# Patient Record
Sex: FEMALE | Race: WHITE | NOT HISPANIC OR LATINO | Employment: UNEMPLOYED | URBAN - NONMETROPOLITAN AREA
[De-identification: names, ages, dates, MRNs, and addresses within clinical notes are randomized per-mention and may not be internally consistent; named-entity substitution may affect disease eponyms.]

---

## 2017-01-13 ENCOUNTER — OFFICE VISIT (OUTPATIENT)
Dept: SURGERY | Facility: CLINIC | Age: 57
End: 2017-01-13

## 2017-01-13 ENCOUNTER — HOSPITAL ENCOUNTER (OUTPATIENT)
Facility: HOSPITAL | Age: 57
Setting detail: HOSPITAL OUTPATIENT SURGERY
End: 2017-01-13
Attending: SURGERY | Admitting: SURGERY

## 2017-01-13 VITALS
RESPIRATION RATE: 16 BRPM | DIASTOLIC BLOOD PRESSURE: 92 MMHG | SYSTOLIC BLOOD PRESSURE: 140 MMHG | OXYGEN SATURATION: 98 % | WEIGHT: 125.2 LBS | TEMPERATURE: 97.7 F | BODY MASS INDEX: 20.86 KG/M2 | HEIGHT: 65 IN | HEART RATE: 93 BPM

## 2017-01-13 DIAGNOSIS — R10.13 EPIGASTRIC ABDOMINAL PAIN: Primary | ICD-10-CM

## 2017-01-13 DIAGNOSIS — R11.0 NAUSEA: ICD-10-CM

## 2017-01-13 PROCEDURE — 99213 OFFICE O/P EST LOW 20 MIN: CPT | Performed by: SURGERY

## 2017-01-13 RX ORDER — SODIUM CHLORIDE 0.9 % (FLUSH) 0.9 %
1-10 SYRINGE (ML) INJECTION AS NEEDED
Status: CANCELLED | OUTPATIENT
Start: 2017-01-13

## 2017-01-13 RX ORDER — SODIUM CHLORIDE, SODIUM LACTATE, POTASSIUM CHLORIDE, CALCIUM CHLORIDE 600; 310; 30; 20 MG/100ML; MG/100ML; MG/100ML; MG/100ML
50 INJECTION, SOLUTION INTRAVENOUS CONTINUOUS
Status: CANCELLED | OUTPATIENT
Start: 2017-01-13

## 2017-01-13 NOTE — MR AVS SNAPSHOT
"                        Jessica Cole Catrachito   1/13/2017 10:10 AM   Office Visit    Dept Phone:  [phone]   Encounter #:  [id number]    Provider:  Anca Trevino MD   Department:  [facility] GENERAL SURGERY                Your Full Care Plan              Your Updated Medication List          This list is accurate as of: 1/13/17 11:39 AM.  Always use your most recent med list.                folic acid 1 MG tablet   Commonly known as:  FOLVITE       leflunomide 10 MG tablet   Commonly known as:  ARAVA       lisinopril 10 MG tablet   Commonly known as:  PRINIVIL,ZESTRIL       meloxicam 15 MG tablet   Commonly known as:  MOBIC       methotrexate 2.5 MG tablet   Commonly known as:  RHEUMATREX       omeprazole 40 MG capsule   Commonly known as:  priLOSEC       ondansetron 4 MG tablet   Commonly known as:  ZOFRAN       SIMPONI 50 MG/0.5ML solution auto-injector   Generic drug:  Golimumab               We Performed the Following     Case Request     Provide NPO Instructions to Patient       You Were Diagnosed With        Codes Comments    Epigastric abdominal pain    -  Primary ICD-10-CM: R10.13  ICD-9-CM: 789.06     Nausea     ICD-10-CM: R11.0  ICD-9-CM: 787.02       Instructions     None    Patient Instructions History      Upcoming Appointments     Visit Type Date Time Department    FOLLOW UP 1/13/2017 10:10 AM [facility]      [employer] Signup     Our records indicate that you have declined [facility] signup. If you would like to sign up for [employer], please email [email or web address] or call [phone] to obtain an activation code.             Other Info from Your Visit           Allergies     Codeine      Penicillins        Reason for Visit     Follow-up RUQ pain      Vital Signs     Blood Pressure Pulse Temperature Respirations Height Weight    140/92 93 97.7 °F (36.5 °C) (Temporal Artery ) 16 65\" (165.1 cm) 125 lb 3.2 oz (56.8 kg)    Oxygen Saturation " Body Mass Index Smoking Status             98% 20.83 kg/m2 Current Every Day Smoker         Problems and Diagnoses Noted     Epigastric abdominal pain    -  Primary    Nauseous

## 2017-01-13 NOTE — LETTER
January 13, 2017     DENISE Jimenez  [address] [address]    Patient: Jessica Winston   YOB: 1960   Date of Visit: 1/13/2017       Dear DENISE Kingston:    Thank you for referring Jessica Winston to me for evaluation. Below are the relevant portions of my assessment and plan of care.      Jessica was seen today for follow-up.    Diagnoses and all orders for this visit:    Epigastric abdominal pain  -     Case Request; Standing  -     sodium chloride 0.9 % flush 1-10 mL; Infuse 1-10 mL into a venous catheter As Needed for line care.  -     lactated ringers infusion; Infuse 50 mL/hr into a venous catheter Continuous.  -     Case Request    Nausea  -     Case Request; Standing  -     sodium chloride 0.9 % flush 1-10 mL; Infuse 1-10 mL into a venous catheter As Needed for line care.  -     lactated ringers infusion; Infuse 50 mL/hr into a venous catheter Continuous.  -     Case Request    Other orders  -     Follow Anesthesia Guidelines / Standing Orders; Future  -     Provide NPO Instructions to Patient  -     Follow Anesthesia Guidelines / Standing Orders; Standing  -     Verify NPO Status; Standing  -     Verify Informed Consent; Standing  -     Insert Peripheral IV; Standing  -     Saline Lock & Maintain IV Access; Standing      We discussed EGD for diagnostic purposes given that GB workup has been unremarkable.  We will need to evaluate for underlying PUD and h. Pylori infection.   We discussed the indications for upper endoscopy as well as the risks, benefits and alternatives to this procedure.   The patient was given an opportunity to ask questions.  The patient verbalized understanding of these recommendations and the plan of care. The patient is willing to proceed with endoscopy and has signed informed consent in the office today.  My office will arrange scheduling for the EGD procedure and pre-admission testing.                      If you have questions, please do not  hesitate to call me. I look forward to following Jessica along with you.         Sincerely,        Anca Trevino MD        CC: No Recipients

## 2017-01-13 NOTE — PROGRESS NOTES
Subjective   Jessica Winston is a 56 y.o. female.   Chief Complaint   Patient presents with   • Follow-up     RUQ pain     .     History of Present Illness   Ms. Winston returns for routine follow up after recent HIDA scan and for reassessment of her abdominal symptoms.  Her HIDA scan was normal and she reports that she had absolutely no symptoms with the HIDA.  She has changed her diet to a bland diet and is avoiding large meals with improvement in her symptoms.  She continues to have irregular bowel habits.      Past Medical History   Diagnosis Date   • Depression    • Fractures    • HTN (hypertension)    • Hyperglycemia    • Mixed hyperlipidemia    • Ovarian cyst    • RA (rheumatoid arthritis)    • Vitamin D deficiency        Past Surgical History   Procedure Laterality Date   • Tubal abdominal ligation     • [place] tooth extraction     • Knee surgery Left    • Mandible surgery     • Arm tendon repair Left    • Inguinal hernia repair       age 8         Current Outpatient Prescriptions:   •  folic acid (FOLVITE) 1 MG tablet, Take 1 mg by mouth Daily., Disp: , Rfl:   •  leflunomide (ARAVA) 10 MG tablet, Take 10 mg by mouth Daily., Disp: , Rfl:   •  lisinopril (PRINIVIL,ZESTRIL) 10 MG tablet, Take 10 mg by mouth Daily., Disp: , Rfl:   •  meloxicam (MOBIC) 15 MG tablet, Take 15 mg by mouth Daily., Disp: , Rfl:   •  methotrexate (RHEUMATREX) 2.5 MG tablet, Take 15 mg by mouth 1 (One) Time Per Week., Disp: , Rfl:   •  omeprazole (priLOSEC) 40 MG capsule, Take 40 mg by mouth Daily., Disp: , Rfl: 0  •  ondansetron (ZOFRAN) 4 MG tablet, Take 4 mg by mouth Every 6 (Six) Hours As Needed., Disp: , Rfl: 0  •  SIMPONI 50 MG/0.5ML solution auto-injector, Every 30 (Thirty) Days., Disp: , Rfl:       Allergies   Allergen Reactions   • Codeine    • Penicillins        Family History   Problem Relation Age of Onset   • Arthritis Mother    • Cancer Mother      leukemia   • Hypertension Mother    • Heart disease Father      heart  "attack   • Hypertension Father    • Migraines Daughter    • Cancer Maternal Grandmother      uterus       Social History     Social History   • Marital status: Single     Spouse name: N/A   • Number of children: N/A   • Years of education: N/A     Occupational History   • Not on file.     Social History Main Topics   • Smoking status: Current Every Day Smoker     Packs/day: 1.00     Types: Cigarettes   • Smokeless tobacco: Never Used   • Alcohol use No   • Drug use: No   • Sexual activity: Not on file     Other Topics Concern   • Not on file     Social History Narrative         Review of Systems   Constitutional: Positive for fatigue.   HENT: Positive for congestion, postnasal drip, sinus pressure, sneezing and voice change.    Eyes: Negative.         Eye swelling   Respiratory: Positive for cough, shortness of breath and wheezing.    Cardiovascular: Negative.    Gastrointestinal: Positive for abdominal pain, constipation and nausea.   Endocrine: Negative.    Genitourinary: Negative.    Musculoskeletal: Positive for arthralgias.   Skin: Negative.    Allergic/Immunologic: Negative.    Neurological: Positive for dizziness, weakness, light-headedness and headaches.   Hematological: Negative.    Psychiatric/Behavioral: Negative.        Objective    Visit Vitals   • /92   • Pulse 93   • Temp 97.7 °F (36.5 °C) (Temporal Artery )   • Resp 16   • Ht 65\" (165.1 cm)   • Wt 125 lb 3.2 oz (56.8 kg)   • SpO2 98%   • BMI 20.83 kg/m2       Physical Exam   Constitutional: She is oriented to person, place, and time. She appears well-developed and well-nourished.   HENT:   Head: Normocephalic and atraumatic.   Eyes: No scleral icterus.   Cardiovascular: Regular rhythm.    Pulmonary/Chest: Effort normal.   Abdominal: Soft. She exhibits no distension. There is no tenderness.   Neurological: She is alert and oriented to person, place, and time.   Skin: Skin is warm and dry.   Psychiatric: She has a normal mood and affect. Her " behavior is normal.       IMAGING:     HIDA scan personally reviewed.   EF = 51% with no reproduction of symptoms.     Assessment/Plan   Jessica was seen today for follow-up.    Diagnoses and all orders for this visit:    Epigastric abdominal pain  -     Case Request; Standing  -     sodium chloride 0.9 % flush 1-10 mL; Infuse 1-10 mL into a venous catheter As Needed for line care.  -     lactated ringers infusion; Infuse 50 mL/hr into a venous catheter Continuous.  -     Case Request    Nausea  -     Case Request; Standing  -     sodium chloride 0.9 % flush 1-10 mL; Infuse 1-10 mL into a venous catheter As Needed for line care.  -     lactated ringers infusion; Infuse 50 mL/hr into a venous catheter Continuous.  -     Case Request    Other orders  -     Follow Anesthesia Guidelines / Standing Orders; Future  -     Provide NPO Instructions to Patient  -     Follow Anesthesia Guidelines / Standing Orders; Standing  -     Verify NPO Status; Standing  -     Verify Informed Consent; Standing  -     Insert Peripheral IV; Standing  -     Saline Lock & Maintain IV Access; Standing      We discussed EGD for diagnostic purposes given that GB workup has been unremarkable.  We will need to evaluate for underlying PUD and h. Pylori infection.   We discussed the indications for upper endoscopy as well as the risks, benefits and alternatives to this procedure.   The patient was given an opportunity to ask questions.  The patient verbalized understanding of these recommendations and the plan of care. The patient is willing to proceed with endoscopy and has signed informed consent in the office today.  My office will arrange scheduling for the EGD procedure and pre-admission testing.

## 2017-01-17 VITALS — WEIGHT: 125 LBS | BODY MASS INDEX: 20.09 KG/M2 | HEIGHT: 66 IN

## 2017-01-17 NOTE — DISCHARGE INSTRUCTIONS
DISCUSSED WITH PATIENT THE PREADMISSION TESTING PASS AND PREOP INSTRUCTIONS. PATIENT VERBALIZED UNDERSTANDING.

## 2017-01-27 ENCOUNTER — HOSPITAL ENCOUNTER (OUTPATIENT)
Dept: OTHER | Age: 57
Discharge: HOME | End: 2017-01-27
Attending: NURSE PRACTITIONER | Admitting: NURSE PRACTITIONER

## 2017-01-27 LAB
A/G RATIO: 1.5 (ref 0.8–2)
ALBUMIN SERPL-MCNC: 4.1 G/DL (ref 3.4–4.8)
ALP BLD-CCNC: 64 U/L (ref 25–100)
ALT SERPL-CCNC: 16 U/L (ref 4–36)
ANION GAP SERPL CALCULATED.3IONS-SCNC: 17 MMOL/L (ref 3–16)
AST SERPL-CCNC: 22 U/L (ref 8–33)
BASOPHILS ABSOLUTE: 0.1 K/UL (ref 0–0.1)
BASOPHILS RELATIVE PERCENT: 0.9 %
BILIRUB SERPL-MCNC: 0.3 MG/DL (ref 0.3–1.2)
BUN BLDV-MCNC: 10 MG/DL (ref 6–20)
CALCIUM SERPL-MCNC: 9.7 MG/DL (ref 8.5–10.5)
CHLORIDE BLD-SCNC: 94 MMOL/L (ref 98–107)
CO2: 24 MMOL/L (ref 20–30)
CREAT SERPL-MCNC: 0.7 MG/DL (ref 0.4–1.2)
EOSINOPHILS ABSOLUTE: 0.2 K/UL (ref 0–0.4)
EOSINOPHILS RELATIVE PERCENT: 1.7 %
GFR AFRICAN AMERICAN: >59
GFR NON-AFRICAN AMERICAN: >60
GLOBULIN: 2.8 G/DL
GLUCOSE BLD-MCNC: 100 MG/DL (ref 74–106)
HCT VFR BLD CALC: 38.1 % (ref 37–47)
HEMOGLOBIN: 12.4 G/DL (ref 11.5–16.5)
LYMPHOCYTES ABSOLUTE: 1.6 K/UL (ref 1.5–4)
LYMPHOCYTES RELATIVE PERCENT: 15.6 % (ref 20–40)
MCH RBC QN AUTO: 33 PG (ref 27–32)
MCHC RBC AUTO-ENTMCNC: 32.5 G/DL (ref 31–35)
MCV RBC AUTO: 101.3 FL (ref 80–100)
MONOCYTES ABSOLUTE: 1.3 K/UL (ref 0.2–0.8)
MONOCYTES RELATIVE PERCENT: 13.1 % (ref 3–10)
NEUTROPHILS ABSOLUTE: 6.8 K/UL (ref 2–7.5)
NEUTROPHILS RELATIVE PERCENT: 68.7 %
PDW BLD-RTO: 13 % (ref 11–16)
PLATELET # BLD: 435 K/UL (ref 150–400)
PMV BLD AUTO: 10 FL (ref 6–10)
POTASSIUM SERPL-SCNC: 4.4 MMOL/L (ref 3.4–5.1)
RBC # BLD: 3.76 M/UL (ref 3.8–5.8)
SODIUM BLD-SCNC: 135 MMOL/L (ref 136–145)
TOTAL PROTEIN: 6.9 G/DL (ref 6.4–8.3)
WBC # BLD: 9.9 K/UL (ref 4–11)

## 2017-02-08 ENCOUNTER — HOSPITAL ENCOUNTER (OUTPATIENT)
Dept: OTHER | Age: 57
Discharge: HOME | End: 2017-02-08
Attending: NURSE PRACTITIONER | Admitting: NURSE PRACTITIONER

## 2017-02-08 LAB
A/G RATIO: 1.5 (ref 0.8–2)
ALBUMIN SERPL-MCNC: 4.3 G/DL (ref 3.4–4.8)
ALP BLD-CCNC: 66 U/L (ref 25–100)
ALT SERPL-CCNC: 18 U/L (ref 4–36)
ANION GAP SERPL CALCULATED.3IONS-SCNC: 14 MMOL/L (ref 3–16)
AST SERPL-CCNC: 27 U/L (ref 8–33)
BILIRUB SERPL-MCNC: 0.4 MG/DL (ref 0.3–1.2)
BUN BLDV-MCNC: 9 MG/DL (ref 6–20)
CALCIUM SERPL-MCNC: 10.3 MG/DL (ref 8.5–10.5)
CHLORIDE BLD-SCNC: 96 MMOL/L (ref 98–107)
CO2: 26 MMOL/L (ref 20–30)
CREAT SERPL-MCNC: 0.6 MG/DL (ref 0.4–1.2)
GFR AFRICAN AMERICAN: >59
GFR NON-AFRICAN AMERICAN: >60
GLOBULIN: 2.8 G/DL
GLUCOSE BLD-MCNC: 116 MG/DL (ref 74–106)
POTASSIUM SERPL-SCNC: 4.8 MMOL/L (ref 3.4–5.1)
SODIUM BLD-SCNC: 136 MMOL/L (ref 136–145)
TOTAL PROTEIN: 7.1 G/DL (ref 6.4–8.3)

## 2017-03-29 ENCOUNTER — PREP FOR SURGERY (OUTPATIENT)
Dept: SURGERY | Facility: CLINIC | Age: 57
End: 2017-03-29

## 2017-03-29 DIAGNOSIS — R11.0 NAUSEA: ICD-10-CM

## 2017-03-29 DIAGNOSIS — R10.13 EPIGASTRIC PAIN: Primary | ICD-10-CM

## 2017-03-29 RX ORDER — SODIUM CHLORIDE 0.9 % (FLUSH) 0.9 %
1-10 SYRINGE (ML) INJECTION AS NEEDED
Status: CANCELLED | OUTPATIENT
Start: 2017-03-29

## 2017-03-29 RX ORDER — HYDROXYZINE HYDROCHLORIDE 25 MG/1
25 TABLET, FILM COATED ORAL 3 TIMES DAILY PRN
COMMUNITY
End: 2017-05-26

## 2017-03-30 ENCOUNTER — ANESTHESIA (OUTPATIENT)
Dept: GASTROENTEROLOGY | Facility: HOSPITAL | Age: 57
End: 2017-03-30

## 2017-03-30 ENCOUNTER — ANESTHESIA EVENT (OUTPATIENT)
Dept: GASTROENTEROLOGY | Facility: HOSPITAL | Age: 57
End: 2017-03-30

## 2017-03-30 ENCOUNTER — HOSPITAL ENCOUNTER (OUTPATIENT)
Facility: HOSPITAL | Age: 57
Setting detail: HOSPITAL OUTPATIENT SURGERY
Discharge: HOME | End: 2017-03-30
Attending: SURGERY | Admitting: SURGERY

## 2017-03-30 VITALS
BODY MASS INDEX: 18.64 KG/M2 | HEIGHT: 66 IN | TEMPERATURE: 98.2 F | SYSTOLIC BLOOD PRESSURE: 118 MMHG | WEIGHT: 116 LBS | DIASTOLIC BLOOD PRESSURE: 77 MMHG | OXYGEN SATURATION: 97 % | RESPIRATION RATE: 21 BRPM | HEART RATE: 87 BPM

## 2017-03-30 DIAGNOSIS — R10.13 EPIGASTRIC PAIN: ICD-10-CM

## 2017-03-30 DIAGNOSIS — R11.0 NAUSEA: ICD-10-CM

## 2017-03-30 PROBLEM — R10.11 RUQ PAIN: Status: ACTIVE | Noted: 2017-03-30

## 2017-03-30 PROCEDURE — 25010000002 PROPOFOL 200 MG/20ML EMULSION: Performed by: NURSE ANESTHETIST, CERTIFIED REGISTERED

## 2017-03-30 PROCEDURE — 43239 EGD BIOPSY SINGLE/MULTIPLE: CPT | Performed by: SURGERY

## 2017-03-30 PROCEDURE — S0260 H&P FOR SURGERY: HCPCS | Performed by: SURGERY

## 2017-03-30 PROCEDURE — 25010000002 ONDANSETRON PER 1 MG: Performed by: NURSE ANESTHETIST, CERTIFIED REGISTERED

## 2017-03-30 RX ORDER — PROPOFOL 10 MG/ML
INJECTION, EMULSION INTRAVENOUS AS NEEDED
Status: DISCONTINUED | OUTPATIENT
Start: 2017-03-30 | End: 2017-03-30 | Stop reason: SURG

## 2017-03-30 RX ORDER — SODIUM CHLORIDE 0.9 % (FLUSH) 0.9 %
1-10 SYRINGE (ML) INJECTION AS NEEDED
Status: DISCONTINUED | OUTPATIENT
Start: 2017-03-30 | End: 2017-03-30 | Stop reason: HOSPADM

## 2017-03-30 RX ORDER — ONDANSETRON 2 MG/ML
INJECTION INTRAMUSCULAR; INTRAVENOUS AS NEEDED
Status: DISCONTINUED | OUTPATIENT
Start: 2017-03-30 | End: 2017-03-30 | Stop reason: SURG

## 2017-03-30 RX ORDER — SODIUM CHLORIDE, SODIUM LACTATE, POTASSIUM CHLORIDE, CALCIUM CHLORIDE 600; 310; 30; 20 MG/100ML; MG/100ML; MG/100ML; MG/100ML
1000 INJECTION, SOLUTION INTRAVENOUS CONTINUOUS PRN
Status: DISCONTINUED | OUTPATIENT
Start: 2017-03-30 | End: 2017-03-30 | Stop reason: HOSPADM

## 2017-03-30 RX ORDER — SODIUM CHLORIDE 0.9 % (FLUSH) 0.9 %
3 SYRINGE (ML) INJECTION AS NEEDED
Status: DISCONTINUED | OUTPATIENT
Start: 2017-03-30 | End: 2017-03-30 | Stop reason: HOSPADM

## 2017-03-30 RX ADMIN — PROPOFOL 50 MG: 10 INJECTION, EMULSION INTRAVENOUS at 08:44

## 2017-03-30 RX ADMIN — PROPOFOL 50 MG: 10 INJECTION, EMULSION INTRAVENOUS at 08:42

## 2017-03-30 RX ADMIN — ONDANSETRON 4 MG: 2 INJECTION INTRAMUSCULAR; INTRAVENOUS at 08:31

## 2017-03-30 RX ADMIN — PROPOFOL 100 MG: 10 INJECTION, EMULSION INTRAVENOUS at 08:37

## 2017-03-30 RX ADMIN — SODIUM CHLORIDE, POTASSIUM CHLORIDE, SODIUM LACTATE AND CALCIUM CHLORIDE 1000 ML: 600; 310; 30; 20 INJECTION, SOLUTION INTRAVENOUS at 07:04

## 2017-03-30 RX ADMIN — PROPOFOL 50 MG: 10 INJECTION, EMULSION INTRAVENOUS at 08:40

## 2017-03-30 RX ADMIN — LIDOCAINE HYDROCHLORIDE 60 MG: 20 INJECTION, SOLUTION INTRAVENOUS at 08:37

## 2017-03-30 NOTE — ANESTHESIA POSTPROCEDURE EVALUATION
Patient: Jessica Winston    Procedure Summary     Date Anesthesia Start Anesthesia Stop Room / Location    03/30/17 0828 0853 [facility] ENDOSCOPY 1 / [facility] ENDOSCOPY       Procedure Diagnosis Surgeon Provider    ESOPHAGOGASTRODUODENOSCOPY WITH COLD FORCEP BIOPSY (N/A Esophagus) Nausea; Epigastric pain  (Nausea [R11.0]; Epigastric pain [R10.13]) MD Leatha Dye CRNA          Anesthesia Type: MAC  Last vitals  /77 (03/30/17 0928)    Temp 98.2 °F (36.8 °C) (03/30/17 0858)    Pulse 87 (03/30/17 0928)   Resp 21 (03/30/17 0928)    SpO2 97 % (03/30/17 0928)      Post Anesthesia Care and Evaluation    Patient location during evaluation: PHASE II  Patient participation: complete - patient participated  Level of consciousness: awake and alert  Pain score: 0  Pain management: satisfactory to patient  Airway patency: patent  Anesthetic complications: No anesthetic complications  PONV Status: none  Cardiovascular status: acceptable and stable  Respiratory status: acceptable  Hydration status: acceptable

## 2017-03-30 NOTE — ANESTHESIA PREPROCEDURE EVALUATION
Anesthesia Evaluation     Patient summary reviewed and Nursing notes reviewed   no history of anesthetic complications:  NPO Status: > 8 hours   Airway   Mallampati: II  TM distance: >3 FB  no difficulty expected  Dental - normal exam     Pulmonary - normal exam   (+) pneumonia (21 years ago) resolved , a smoker (1 ppd for 30 years) Current,   (-) COPD, asthma, shortness of breath, sleep apnea  Cardiovascular   Exercise tolerance: good (4-7 METS)    Rhythm: regular  Rate: normal    (+) hypertension well controlled, hyperlipidemia  (-) pacemaker, past MI, CAD, dysrhythmias, cardiac stents, CABG      Neuro/Psych  (+) psychiatric history Depression,    (-) seizures, TIA, CVA, headaches, dizziness/light headedness  GI/Hepatic/Renal/Endo    (-) GERD, PUD, hepatitis, liver disease, renal disease, diabetes, hypothyroidism    Musculoskeletal     (-) back pain, neck pain, chronic pain  Abdominal    Substance History   (-) alcohol use, drug use     OB/GYN      Comment: menopause      Other   (+) arthritis (RA and OA)                                 Anesthesia Plan    ASA 3     MAC   (Risks and benefits discussed including risk of aspiration, recall and dental damage. All patient questions answered. Will continue with POC.)  intravenous induction   Anesthetic plan and risks discussed with patient.

## 2017-04-05 LAB
LAB AP CASE REPORT: NORMAL
Lab: NORMAL
PATH REPORT.FINAL DX SPEC: NORMAL

## 2017-04-07 ENCOUNTER — OFFICE VISIT (OUTPATIENT)
Dept: SURGERY | Facility: CLINIC | Age: 57
End: 2017-04-07

## 2017-04-07 VITALS
OXYGEN SATURATION: 97 % | BODY MASS INDEX: 18.64 KG/M2 | WEIGHT: 116 LBS | TEMPERATURE: 96.3 F | HEIGHT: 66 IN | HEART RATE: 96 BPM | DIASTOLIC BLOOD PRESSURE: 86 MMHG | SYSTOLIC BLOOD PRESSURE: 140 MMHG

## 2017-04-07 DIAGNOSIS — K29.30 SUPERFICIAL GASTRITIS WITHOUT HEMORRHAGE, UNSPECIFIED CHRONICITY: ICD-10-CM

## 2017-04-07 DIAGNOSIS — T39.391A: Primary | ICD-10-CM

## 2017-04-07 PROCEDURE — 99213 OFFICE O/P EST LOW 20 MIN: CPT | Performed by: SURGERY

## 2017-04-07 RX ORDER — SUCRALFATE ORAL 1 G/10ML
1 SUSPENSION ORAL
Qty: 420 ML | Refills: 1 | Status: SHIPPED | OUTPATIENT
Start: 2017-04-07 | End: 2017-04-21 | Stop reason: SDUPTHER

## 2017-04-07 NOTE — PROGRESS NOTES
"Subjective   Jessica Winston is a 56 y.o. female.   Chief Complaint   Patient presents with   • Follow-up     EGD done 3/30/17       History of Present Illness   Ms. Winston returns to the office today for routine follow-up after recent upper endoscopy performed for further investigation of abdominal pain.  She was found have mild gastritis.  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  There was no evidence of H. pylori.  She also appeared grossly to have some inflammation of the duodenal bulb.  However, biopsies of the duodenal bulb demonstrated no evidence of inflammation.  Biopsies of the second portion of duodenum were also normal without evidence of celiac sprue.    Of note, the patient is a long-term smoker and is on multiple medications for rheumatoid arthritis, including Mobic,  Methotrexate, Arava and Simponi.  She recently discontinued the methotrexate at the direction of her rheumatologist.  However she does continue to take the other 3 medications.  Given the findings of chemical gastropathy on her abscesses, I suspect that this is NSAID related given her long-term use of Mobic.      The following portions of the patient's history were reviewed and updated as appropriate: allergies, current medications, past family history, past medical history, past social history, past surgical history and problem list.    Review of Systems   Constitutional: Negative.    Eyes: Negative.    Respiratory: Negative.    Cardiovascular: Negative.    Gastrointestinal: Negative.    Endocrine: Negative.    Genitourinary: Negative.    Musculoskeletal: Negative.    Skin: Negative.    Allergic/Immunologic: Negative.    Neurological: Positive for headaches.   Hematological: Negative.    Psychiatric/Behavioral: Negative.        Objective    /86  Pulse 96  Temp 96.3 °F (35.7 °C)  Ht 66\" (167.6 cm)  Wt 116 lb (52.6 kg)  LMP 08/01/2010 (Approximate)  SpO2 97%  BMI 18.72 kg/m2    Physical Exam "   Constitutional: She is oriented to person, place, and time. She appears well-developed and well-nourished.   HENT:   Head: Normocephalic and atraumatic.   Pulmonary/Chest: Effort normal.   Neurological: She is alert and oriented to person, place, and time.   Skin: Skin is warm and dry.   Psychiatric: She has a normal mood and affect. Her behavior is normal.     Results:  Pathology reviewed.  See above.      Assessment/Plan   Jessica was seen today for follow-up.    Diagnoses and all orders for this visit:    NSAID-associated gastropathy, accidental or unintentional, initial encounter    Superficial gastritis without hemorrhage, unspecified chronicity  -     sucralfate (CARAFATE) 1 GM/10ML suspension; Take 10 mL by mouth 4 (Four) Times a Day With Meals & at Bedtime.       Discussed these results with the patient in detail.  I have advised her that I suspect her abdominal pain is related to NSAID associated gastropathy likely from the use of Mobic.  This is certainly exacerbated by her ongoing tobacco use.  I have prescribed her Carafate to be taken 4 times a day with meals and at bedtime.  I have also encouraged her to discuss with her room and tolerated just the possibility of discontinuing the use of Mobic.  I will see her back in 1 month for reevaluation.

## 2017-04-21 DIAGNOSIS — K29.30 SUPERFICIAL GASTRITIS WITHOUT HEMORRHAGE, UNSPECIFIED CHRONICITY: ICD-10-CM

## 2017-04-21 RX ORDER — SUCRALFATE ORAL 1 G/10ML
SUSPENSION ORAL
Qty: 420 ML | Refills: 1 | Status: SHIPPED | OUTPATIENT
Start: 2017-04-21 | End: 2017-05-05 | Stop reason: SDUPTHER

## 2017-05-05 DIAGNOSIS — K29.30 SUPERFICIAL GASTRITIS WITHOUT HEMORRHAGE, UNSPECIFIED CHRONICITY: ICD-10-CM

## 2017-05-05 RX ORDER — SUCRALFATE 1 G/10ML
SUSPENSION ORAL
Qty: 420 ML | Refills: 1 | Status: SHIPPED | OUTPATIENT
Start: 2017-05-05 | End: 2017-08-03 | Stop reason: SDUPTHER

## 2017-05-26 ENCOUNTER — OFFICE VISIT (OUTPATIENT)
Dept: SURGERY | Facility: CLINIC | Age: 57
End: 2017-05-26

## 2017-05-26 VITALS
OXYGEN SATURATION: 96 % | HEIGHT: 66 IN | SYSTOLIC BLOOD PRESSURE: 186 MMHG | TEMPERATURE: 97.8 F | DIASTOLIC BLOOD PRESSURE: 110 MMHG | HEART RATE: 94 BPM | BODY MASS INDEX: 19.61 KG/M2 | WEIGHT: 122 LBS

## 2017-05-26 DIAGNOSIS — K29.70 GASTRITIS DETERMINED BY BIOPSY: Primary | ICD-10-CM

## 2017-05-26 PROCEDURE — 99212 OFFICE O/P EST SF 10 MIN: CPT | Performed by: SURGERY

## 2017-05-26 RX ORDER — RANITIDINE 150 MG/1
1 TABLET ORAL DAILY PRN
Refills: 1 | COMMUNITY
Start: 2017-05-05 | End: 2021-03-05

## 2017-05-26 RX ORDER — IPRATROPIUM BROMIDE 21 UG/1
1 SPRAY, METERED NASAL DAILY PRN
Refills: 5 | COMMUNITY
Start: 2017-05-05 | End: 2020-01-27

## 2017-05-26 RX ORDER — KETOTIFEN FUMARATE 0.35 MG/ML
1 SOLUTION/ DROPS OPHTHALMIC DAILY PRN
Refills: 4 | COMMUNITY
Start: 2017-05-05 | End: 2020-01-27

## 2017-05-26 RX ORDER — ALBUTEROL SULFATE 90 UG/1
1 AEROSOL, METERED RESPIRATORY (INHALATION) 4 TIMES DAILY PRN
Refills: 4 | COMMUNITY
Start: 2017-05-05 | End: 2020-01-27

## 2017-05-26 RX ORDER — PREDNISONE 10 MG/1
10 TABLET ORAL AS NEEDED
COMMUNITY

## 2017-05-26 RX ORDER — ONDANSETRON 4 MG/1
4 TABLET, FILM COATED ORAL EVERY 8 HOURS PRN
COMMUNITY
End: 2020-01-27

## 2017-05-31 DIAGNOSIS — K29.30 SUPERFICIAL GASTRITIS WITHOUT HEMORRHAGE, UNSPECIFIED CHRONICITY: ICD-10-CM

## 2017-05-31 RX ORDER — SUCRALFATE 1 G/10ML
SUSPENSION ORAL
Qty: 420 ML | Refills: 1 | Status: SHIPPED | OUTPATIENT
Start: 2017-05-31 | End: 2020-01-27

## 2017-06-30 ENCOUNTER — HOSPITAL ENCOUNTER (OUTPATIENT)
Dept: GENERAL RADIOLOGY | Age: 57
Discharge: HOME | End: 2017-06-30
Attending: NURSE PRACTITIONER | Admitting: NURSE PRACTITIONER

## 2017-06-30 DIAGNOSIS — R22.42 LEG MASS, LEFT: ICD-10-CM

## 2017-06-30 DIAGNOSIS — R22.42 LOCALIZED SWELLING, MASS AND LUMP, LEFT LOWER LIMB: ICD-10-CM

## 2017-08-03 DIAGNOSIS — K29.30 SUPERFICIAL GASTRITIS WITHOUT HEMORRHAGE, UNSPECIFIED CHRONICITY: ICD-10-CM

## 2017-08-03 RX ORDER — SUCRALFATE 1 G/10ML
SUSPENSION ORAL
Qty: 420 ML | Refills: 1 | Status: SHIPPED | OUTPATIENT
Start: 2017-08-03 | End: 2020-01-27

## 2018-02-06 ENCOUNTER — HOSPITAL ENCOUNTER (OUTPATIENT)
Dept: OTHER | Age: 58
Discharge: HOME | End: 2018-02-06
Attending: NURSE PRACTITIONER | Admitting: NURSE PRACTITIONER

## 2018-02-06 ENCOUNTER — HOSPITAL ENCOUNTER (OUTPATIENT)
Dept: GENERAL RADIOLOGY | Age: 58
Discharge: HOME | End: 2018-02-06
Attending: NURSE PRACTITIONER | Admitting: NURSE PRACTITIONER

## 2018-02-06 DIAGNOSIS — R60.0 LOCALIZED EDEMA: ICD-10-CM

## 2018-02-06 DIAGNOSIS — M79.605 LEFT LEG PAIN: ICD-10-CM

## 2018-02-06 LAB
A/G RATIO: 1.8 (ref 0.8–2)
ALBUMIN SERPL-MCNC: 4.6 G/DL (ref 3.4–4.8)
ALP BLD-CCNC: 61 U/L (ref 25–100)
ALT SERPL-CCNC: 15 U/L (ref 4–36)
ANION GAP SERPL CALCULATED.3IONS-SCNC: 11 MMOL/L (ref 3–16)
AST SERPL-CCNC: 22 U/L (ref 8–33)
BASOPHILS ABSOLUTE: 0.1 K/UL (ref 0–0.1)
BASOPHILS RELATIVE PERCENT: 0.7 %
BILIRUB SERPL-MCNC: 0.3 MG/DL (ref 0.3–1.2)
BUN BLDV-MCNC: 12 MG/DL (ref 6–20)
CALCIUM SERPL-MCNC: 10 MG/DL (ref 8.5–10.5)
CHLORIDE BLD-SCNC: 99 MMOL/L (ref 98–107)
CO2: 27 MMOL/L (ref 20–30)
CREAT SERPL-MCNC: 0.6 MG/DL (ref 0.4–1.2)
EOSINOPHILS ABSOLUTE: 0 K/UL (ref 0–0.4)
EOSINOPHILS RELATIVE PERCENT: 0.5 %
GFR AFRICAN AMERICAN: >59
GFR NON-AFRICAN AMERICAN: >60
GLOBULIN: 2.6 G/DL
GLUCOSE BLD-MCNC: 101 MG/DL (ref 74–106)
HCT VFR BLD CALC: 41.7 % (ref 37–47)
HEMOGLOBIN: 13.3 G/DL (ref 11.5–16.5)
IMMATURE GRANULOCYTES #: 0 K/UL
IMMATURE GRANULOCYTES %: 0.4 % (ref 0–5)
LYMPHOCYTES ABSOLUTE: 1.6 K/UL (ref 1.5–4)
LYMPHOCYTES RELATIVE PERCENT: 19.5 %
MCH RBC QN AUTO: 33.3 PG (ref 27–32)
MCHC RBC AUTO-ENTMCNC: 31.9 G/DL (ref 31–35)
MCV RBC AUTO: 104.5 FL (ref 80–100)
MONOCYTES ABSOLUTE: 0.5 K/UL (ref 0.2–0.8)
MONOCYTES RELATIVE PERCENT: 6.1 %
NEUTROPHILS ABSOLUTE: 5.9 K/UL (ref 2–7.5)
NEUTROPHILS RELATIVE PERCENT: 72.8 %
PDW BLD-RTO: 13.2 % (ref 11–16)
PLATELET # BLD: 316 K/UL (ref 150–400)
PMV BLD AUTO: 10.9 FL (ref 6–10)
POTASSIUM SERPL-SCNC: 4.6 MMOL/L (ref 3.4–5.1)
RBC # BLD: 3.99 M/UL (ref 3.8–5.8)
SODIUM BLD-SCNC: 137 MMOL/L (ref 136–145)
TOTAL PROTEIN: 7.2 G/DL (ref 6.4–8.3)
URIC ACID, SERUM: 4.7 MG/DL (ref 2.5–7.1)
WBC # BLD: 8 K/UL (ref 4–11)

## 2018-02-07 LAB — C-REACTIVE PROTEIN: 2.6 MG/L (ref 0–5.1)

## 2018-02-09 ENCOUNTER — HOSPITAL ENCOUNTER (OUTPATIENT)
Dept: OTHER | Age: 58
Discharge: HOME | End: 2018-02-09
Attending: NURSE PRACTITIONER | Admitting: NURSE PRACTITIONER

## 2018-02-09 DIAGNOSIS — M79.672 LEFT FOOT PAIN: ICD-10-CM

## 2019-05-30 ENCOUNTER — HOSPITAL ENCOUNTER (OUTPATIENT)
Dept: GENERAL RADIOLOGY | Facility: HOSPITAL | Age: 59
Discharge: HOME | End: 2019-05-30

## 2019-05-30 ENCOUNTER — TRANSCRIBE ORDERS (OUTPATIENT)
Dept: ADMINISTRATIVE | Facility: HOSPITAL | Age: 59
End: 2019-05-30

## 2019-05-30 ENCOUNTER — HOSPITAL ENCOUNTER (OUTPATIENT)
Dept: GENERAL RADIOLOGY | Facility: HOSPITAL | Age: 59
Discharge: HOME | End: 2019-05-30
Admitting: INTERNAL MEDICINE

## 2019-05-30 DIAGNOSIS — M79.641 BILATERAL HAND PAIN: ICD-10-CM

## 2019-05-30 DIAGNOSIS — E55.9 VITAMIN D DEFICIENCY, UNSPECIFIED: ICD-10-CM

## 2019-05-30 DIAGNOSIS — M25.521 BILATERAL ELBOW JOINT PAIN: ICD-10-CM

## 2019-05-30 DIAGNOSIS — M06.09 RHEUMATOID ARTHRITIS OF MULTIPLE SITES WITHOUT RHEUMATOID FACTOR (HCC): ICD-10-CM

## 2019-05-30 DIAGNOSIS — M79.672 FOOT PAIN, BILATERAL: ICD-10-CM

## 2019-05-30 DIAGNOSIS — M79.671 FOOT PAIN, BILATERAL: ICD-10-CM

## 2019-05-30 DIAGNOSIS — M25.522 BILATERAL ELBOW JOINT PAIN: ICD-10-CM

## 2019-05-30 DIAGNOSIS — M79.7 FIBROMYALGIA: ICD-10-CM

## 2019-05-30 DIAGNOSIS — M79.642 BILATERAL HAND PAIN: ICD-10-CM

## 2019-05-30 PROCEDURE — 73120 X-RAY EXAM OF HAND: CPT

## 2019-05-30 PROCEDURE — 73630 X-RAY EXAM OF FOOT: CPT

## 2019-05-30 PROCEDURE — 73080 X-RAY EXAM OF ELBOW: CPT

## 2019-08-12 ENCOUNTER — HOSPITAL ENCOUNTER (OUTPATIENT)
Facility: HOSPITAL | Age: 59
Discharge: HOME | End: 2019-08-12
Payer: MEDICAID

## 2019-08-12 LAB
A/G RATIO: 2 (ref 0.8–2)
ALBUMIN SERPL-MCNC: 4.7 G/DL (ref 3.4–4.8)
ALP BLD-CCNC: 63 U/L (ref 25–100)
ALT SERPL-CCNC: 13 U/L (ref 4–36)
ANION GAP SERPL CALCULATED.3IONS-SCNC: 14 MMOL/L (ref 3–16)
AST SERPL-CCNC: 23 U/L (ref 8–33)
BASOPHILS ABSOLUTE: 0.1 K/UL (ref 0–0.1)
BASOPHILS RELATIVE PERCENT: 1 %
BILIRUB SERPL-MCNC: <0.2 MG/DL (ref 0.3–1.2)
BUN BLDV-MCNC: 12 MG/DL (ref 6–20)
CALCIUM SERPL-MCNC: 10.1 MG/DL (ref 8.5–10.5)
CHLORIDE BLD-SCNC: 99 MMOL/L (ref 98–107)
CHOLESTEROL, TOTAL: 247 MG/DL (ref 0–200)
CO2: 25 MMOL/L (ref 20–30)
CREAT SERPL-MCNC: 0.6 MG/DL (ref 0.4–1.2)
EOSINOPHILS ABSOLUTE: 0.1 K/UL (ref 0–0.4)
EOSINOPHILS RELATIVE PERCENT: 2 %
FOLATE: 8.66 NG/ML
GFR AFRICAN AMERICAN: >59
GFR NON-AFRICAN AMERICAN: >60
GLOBULIN: 2.4 G/DL
GLUCOSE BLD-MCNC: 87 MG/DL (ref 74–106)
HCT VFR BLD CALC: 44 % (ref 37–47)
HDLC SERPL-MCNC: 87 MG/DL (ref 40–60)
HEMOGLOBIN: 14.4 G/DL (ref 11.5–16.5)
IMMATURE GRANULOCYTES #: 0 K/UL
IMMATURE GRANULOCYTES %: 0.4 % (ref 0–5)
LDL CHOLESTEROL CALCULATED: 143 MG/DL
LYMPHOCYTES ABSOLUTE: 1.1 K/UL (ref 1.5–4)
LYMPHOCYTES RELATIVE PERCENT: 16.4 %
MCH RBC QN AUTO: 34.3 PG (ref 27–32)
MCHC RBC AUTO-ENTMCNC: 32.7 G/DL (ref 31–35)
MCV RBC AUTO: 104.8 FL (ref 80–100)
MONOCYTES ABSOLUTE: 0.4 K/UL (ref 0.2–0.8)
MONOCYTES RELATIVE PERCENT: 5.7 %
NEUTROPHILS ABSOLUTE: 5.1 K/UL (ref 2–7.5)
NEUTROPHILS RELATIVE PERCENT: 74.5 %
PDW BLD-RTO: 13.4 % (ref 11–16)
PLATELET # BLD: 270 K/UL (ref 150–400)
PMV BLD AUTO: 10.5 FL (ref 6–10)
POTASSIUM SERPL-SCNC: 4.7 MMOL/L (ref 3.4–5.1)
RBC # BLD: 4.2 M/UL (ref 3.8–5.8)
SEDIMENTATION RATE, ERYTHROCYTE: 6 MM/HR (ref 0–20)
SODIUM BLD-SCNC: 138 MMOL/L (ref 136–145)
TOTAL PROTEIN: 7.1 G/DL (ref 6.4–8.3)
TRIGL SERPL-MCNC: 85 MG/DL (ref 0–249)
TSH SERPL DL<=0.05 MIU/L-ACNC: 1.76 UIU/ML (ref 0.35–5.5)
URIC ACID, SERUM: 5.7 MG/DL (ref 2.5–7.1)
VITAMIN B-12: 303 PG/ML (ref 211–911)
VITAMIN D 25-HYDROXY: 26.2 (ref 32–100)
VLDLC SERPL CALC-MCNC: 17 MG/DL
WBC # BLD: 6.8 K/UL (ref 4–11)

## 2019-08-12 PROCEDURE — 82607 VITAMIN B-12: CPT

## 2019-08-12 PROCEDURE — 85652 RBC SED RATE AUTOMATED: CPT

## 2019-08-12 PROCEDURE — 82306 VITAMIN D 25 HYDROXY: CPT

## 2019-08-12 PROCEDURE — 82746 ASSAY OF FOLIC ACID SERUM: CPT

## 2019-08-12 PROCEDURE — 80061 LIPID PANEL: CPT

## 2019-08-12 PROCEDURE — 85025 COMPLETE CBC W/AUTO DIFF WBC: CPT

## 2019-08-12 PROCEDURE — 80053 COMPREHEN METABOLIC PANEL: CPT

## 2019-08-12 PROCEDURE — 84443 ASSAY THYROID STIM HORMONE: CPT

## 2019-08-12 PROCEDURE — 84550 ASSAY OF BLOOD/URIC ACID: CPT

## 2019-08-12 PROCEDURE — 36415 COLL VENOUS BLD VENIPUNCTURE: CPT

## 2019-08-15 ENCOUNTER — HOSPITAL ENCOUNTER (OUTPATIENT)
Dept: MAMMOGRAPHY | Facility: HOSPITAL | Age: 59
Discharge: HOME | End: 2019-08-15
Payer: MEDICAID

## 2019-08-15 ENCOUNTER — HOSPITAL ENCOUNTER (OUTPATIENT)
Dept: ULTRASOUND IMAGING | Facility: HOSPITAL | Age: 59
Discharge: HOME | End: 2019-08-15
Payer: MEDICAID

## 2019-08-15 DIAGNOSIS — N63.20 LEFT BREAST MASS: ICD-10-CM

## 2019-08-15 DIAGNOSIS — N63.20 LEFT BREAST LUMP: ICD-10-CM

## 2019-08-15 PROCEDURE — G0279 TOMOSYNTHESIS, MAMMO: HCPCS

## 2019-08-15 PROCEDURE — 76642 ULTRASOUND BREAST LIMITED: CPT

## 2019-08-19 ENCOUNTER — HOSPITAL ENCOUNTER (OUTPATIENT)
Dept: GENERAL RADIOLOGY | Facility: HOSPITAL | Age: 59
Discharge: HOME | End: 2019-08-19
Admitting: NURSE PRACTITIONER

## 2019-08-19 ENCOUNTER — TRANSCRIBE ORDERS (OUTPATIENT)
Dept: GENERAL RADIOLOGY | Facility: HOSPITAL | Age: 59
End: 2019-08-19

## 2019-08-19 DIAGNOSIS — M06.09 RHEUMATOID ARTHRITIS OF MULTIPLE SITES WITHOUT RHEUMATOID FACTOR (HCC): Primary | ICD-10-CM

## 2019-08-19 DIAGNOSIS — M79.7 SCAPULOHUMERAL FIBROSITIS: ICD-10-CM

## 2019-08-19 DIAGNOSIS — Z22.7 INACTIVE TUBERCULOSIS: ICD-10-CM

## 2019-08-19 DIAGNOSIS — Z11.1 SCREENING EXAMINATION FOR PULMONARY TUBERCULOSIS: ICD-10-CM

## 2019-08-19 DIAGNOSIS — E55.9 AVITAMINOSIS D: ICD-10-CM

## 2019-08-19 DIAGNOSIS — Z79.899 NEED FOR PROPHYLACTIC CHEMOTHERAPY: ICD-10-CM

## 2019-08-19 DIAGNOSIS — M15.0 PRIMARY GENERALIZED HYPERTROPHIC OSTEOARTHROSIS: ICD-10-CM

## 2019-08-19 DIAGNOSIS — M06.09 RHEUMATOID ARTHRITIS OF MULTIPLE SITES WITHOUT RHEUMATOID FACTOR (HCC): ICD-10-CM

## 2019-08-19 PROCEDURE — 71046 X-RAY EXAM CHEST 2 VIEWS: CPT

## 2019-08-23 ENCOUNTER — OFFICE VISIT (OUTPATIENT)
Dept: SURGERY | Facility: CLINIC | Age: 59
End: 2019-08-23

## 2019-08-23 VITALS
WEIGHT: 111 LBS | BODY MASS INDEX: 17.84 KG/M2 | SYSTOLIC BLOOD PRESSURE: 140 MMHG | DIASTOLIC BLOOD PRESSURE: 78 MMHG | HEIGHT: 66 IN | OXYGEN SATURATION: 99 % | HEART RATE: 70 BPM | TEMPERATURE: 99.7 F

## 2019-08-23 DIAGNOSIS — N63.0 BREAST MASS: Primary | ICD-10-CM

## 2019-08-23 DIAGNOSIS — R92.8 ABNORMAL MAMMOGRAM: ICD-10-CM

## 2019-08-23 PROCEDURE — 99203 OFFICE O/P NEW LOW 30 MIN: CPT | Performed by: SURGERY

## 2019-08-23 RX ORDER — SODIUM CHLORIDE 0.9 % (FLUSH) 0.9 %
3 SYRINGE (ML) INJECTION EVERY 12 HOURS SCHEDULED
Status: CANCELLED | OUTPATIENT
Start: 2019-08-23

## 2019-08-23 RX ORDER — SULFAMETHOXAZOLE AND TRIMETHOPRIM 800; 160 MG/1; MG/1
TABLET ORAL
Refills: 0 | COMMUNITY
Start: 2019-08-16 | End: 2020-01-27

## 2019-08-23 RX ORDER — ERGOCALCIFEROL 1.25 MG/1
CAPSULE ORAL
Refills: 3 | COMMUNITY
Start: 2019-08-16 | End: 2020-01-27 | Stop reason: SDUPTHER

## 2019-08-23 RX ORDER — SODIUM CHLORIDE 0.9 % (FLUSH) 0.9 %
3-10 SYRINGE (ML) INJECTION AS NEEDED
Status: CANCELLED | OUTPATIENT
Start: 2019-08-23
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
## 2019-09-03 ENCOUNTER — OUTSIDE FACILITY SERVICE (OUTPATIENT)
Dept: SURGERY | Facility: CLINIC | Age: 59
End: 2019-09-03

## 2019-09-03 PROCEDURE — 19120 REMOVAL OF BREAST LESION: CPT | Performed by: SURGERY

## 2019-09-10 NOTE — PROGRESS NOTES
Patient: Jessica Winston    YOB: 1960    Date: 09/12/2019    Primary Care Provider: Dina Tompkins APRN    Chief Complaint   Patient presents with   • Post-op     breast bx       Subjective .     History of present illness:  I saw the patient in the office  today for follow up on recent left breast biopsy. Pathology showed ruptured epidermal inclusion cyst. Patient has no complaints.     Review of Systems    History:  Past Medical History:   Diagnosis Date   • Depression    • Fractures    • HTN (hypertension)    • Hyperglycemia    • Mixed hyperlipidemia    • Ovarian cyst    • Pneumonia     PT REPORTS APX 21 YEARS AGO    • RA (rheumatoid arthritis) (CMS/HCC)     ALSO REPORTS OA   • Vitamin D deficiency           Past Surgical History:   Procedure Laterality Date   • ARM TENDON REPAIR Left    • BREAST SURGERY     • ENDOSCOPY N/A 3/30/2017    Procedure: ESOPHAGOGASTRODUODENOSCOPY WITH COLD FORCEP BIOPSY;  Surgeon: Anca Trevino MD;  Location: [facility] ENDOSCOPY;  Service:    • HERNIA REPAIR Bilateral     INGUINAL   • INGUINAL HERNIA REPAIR      age 8   • KNEE SURGERY Left    • MANDIBLE SURGERY     • TUBAL ABDOMINAL LIGATION     • WISDOM TOOTH EXTRACTION         Family History   Problem Relation Age of Onset   • Arthritis Mother    • Cancer Mother         leukemia   • Hypertension Mother    • Heart disease Father         heart attack   • Hypertension Father    • Migraines Daughter    • Cancer Maternal Grandmother         uterus       Social History     Tobacco Use   • Smoking status: Current Every Day Smoker     Packs/day: 1.00     Years: 30.00     Pack years: 30.00     Types: Cigarettes   • Smokeless tobacco: Never Used   • Tobacco comment: HX OF SMOKING 1 PPD FOR THE PAST 30 YEARS    Substance Use Topics   • Alcohol use: No   • Drug use: No       Allergies:  Allergies   Allergen Reactions   • Clindamycin/Lincomycin Swelling   • Codeine Swelling   • Penicillins Swelling  "      Medications:     Current Outpatient Medications:   •  CARAFATE 1 GM/10ML suspension, TAKE TWO TEASPOONFULS BY MOUTH 4 TIMES DAILY WITH MEALS AND AT BEDTIME, Disp: 420 mL, Rfl: 1  •  CARAFATE 1 GM/10ML suspension, TAKE TWO TEASPOONFULS BY MOUTH 4 TIMES DAILY WITH MEALS AND AT BEDTIME, Disp: 420 mL, Rfl: 1  •  Golimumab (SIMPONI) 50 MG/0.5ML solution auto-injector, Inject  under the skin Every 30 (Thirty) Days., Disp: , Rfl:   •  HYDROcodone-acetaminophen (NORCO) 5-325 MG per tablet, , Disp: , Rfl: 0  •  ipratropium (ATROVENT) 0.03 % nasal spray, 1 spray into each nostril Daily As Needed., Disp: , Rfl: 5  •  ketotifen (ZADITOR) 0.025 % ophthalmic solution, Administer 1 drop to both eyes Daily As Needed., Disp: , Rfl: 4  •  leflunomide (ARAVA) 10 MG tablet, Take 10 mg by mouth Daily., Disp: , Rfl:   •  lisinopril (PRINIVIL,ZESTRIL) 10 MG tablet, Take 10 mg by mouth Daily., Disp: , Rfl:   •  meloxicam (MOBIC) 15 MG tablet, Take 15 mg by mouth Daily., Disp: , Rfl:   •  omeprazole (priLOSEC) 40 MG capsule, Take 40 mg by mouth Daily., Disp: , Rfl: 0  •  ondansetron (ZOFRAN) 4 MG tablet, Take 4 mg by mouth Every 8 (Eight) Hours As Needed for Nausea or Vomiting., Disp: , Rfl:   •  predniSONE (DELTASONE) 10 MG tablet, Take 10 mg by mouth Daily., Disp: , Rfl:   •  raNITIdine (ZANTAC) 150 MG tablet, Take 1 tablet by mouth Daily As Needed., Disp: , Rfl: 1  •  sulfamethoxazole-trimethoprim (BACTRIM DS,SEPTRA DS) 800-160 MG per tablet, , Disp: , Rfl: 0  •  VENTOLIN  (90 BASE) MCG/ACT inhaler, Inhale 1 puff 4 (Four) Times a Day As Needed., Disp: , Rfl: 4  •  vitamin D (ERGOCALCIFEROL) 00238 units capsule capsule, , Disp: , Rfl: 3    Objective     Vital Signs:   Vitals:    09/12/19 0836   BP: 154/90   Pulse: 80   Resp: 16   Temp: 99 °F (37.2 °C)   SpO2: 99%   Weight: 50.9 kg (112 lb 3.2 oz)   Height: 167.6 cm (66\")       Physical Exam:     General Appearance:    Alert, cooperative, in no acute distress   Head:    " Normocephalic, without obvious abnormality, atraumatic   Eyes:            Lids and lashes normal, conjunctivae and sclerae normal, no   icterus, no pallor, corneas clear,   Ears:    Ears appear intact with no abnormalities noted   Throat:   No oral lesions, no thrush, oral mucosa moist   Breast:    No redness or drainage, wound looks good   Lungs:     Clear to auscultation,respirations regular, even and                  Unlabored    Heart:    Regular rhythm and normal rate, no murmur, no gallop.   Chest Wall:    No abnormalities observed   Abdomen:     Normal bowel sounds, no masses, no organomegaly, soft        non-tender, non-distended, no guarding.   Extremities:   Moves all extremities well, no edema, no cyanosis, no             redness   Pulses:   Pulses palpable and equal bilaterally   Skin:   No bleeding, bruising or rash   Lymph nodes:   No palpable adenopathy   Neurologic:   Cranial nerves 2 - 12 grossly intact.           Results Review:   I reviewed the patient's new clinical results.      Assessment / Plan:    1. Postoperative visit        I did have a detailed and extensive discussion with the patient in the office today and reviewed her recent workup.  I have told the patient that there is no precancerous regions, continue yearly mammograms and follow-up as needed    Electronically signed by Oc Estrada MD  09/12/19  8:58 AM    Portions of this note have been scribed for Oc Estrada MD by Joy Robbins CMA 9/12/2019  8:58 AM

## 2019-09-12 ENCOUNTER — OFFICE VISIT (OUTPATIENT)
Dept: SURGERY | Facility: CLINIC | Age: 59
End: 2019-09-12

## 2019-09-12 VITALS
OXYGEN SATURATION: 99 % | SYSTOLIC BLOOD PRESSURE: 154 MMHG | DIASTOLIC BLOOD PRESSURE: 90 MMHG | HEART RATE: 80 BPM | HEIGHT: 66 IN | TEMPERATURE: 99 F | WEIGHT: 112.2 LBS | RESPIRATION RATE: 16 BRPM | BODY MASS INDEX: 18.03 KG/M2

## 2019-09-12 DIAGNOSIS — Z48.89 POSTOPERATIVE VISIT: Primary | ICD-10-CM

## 2019-09-12 PROCEDURE — 99024 POSTOP FOLLOW-UP VISIT: CPT | Performed by: SURGERY

## 2019-09-12 RX ORDER — HYDROCODONE BITARTRATE AND ACETAMINOPHEN 5; 325 MG/1; MG/1
TABLET ORAL
Refills: 0 | COMMUNITY
Start: 2019-09-03 | End: 2020-01-27

## 2019-11-20 ENCOUNTER — APPOINTMENT (OUTPATIENT)
Dept: GENERAL RADIOLOGY | Facility: HOSPITAL | Age: 59
End: 2019-11-20
Payer: MEDICAID

## 2019-11-20 ENCOUNTER — HOSPITAL ENCOUNTER (EMERGENCY)
Facility: HOSPITAL | Age: 59
Discharge: HOME | End: 2019-11-20
Attending: HOSPITALIST
Payer: MEDICAID

## 2019-11-20 VITALS
WEIGHT: 112 LBS | HEIGHT: 66 IN | RESPIRATION RATE: 16 BRPM | TEMPERATURE: 97.8 F | SYSTOLIC BLOOD PRESSURE: 144 MMHG | BODY MASS INDEX: 18 KG/M2 | DIASTOLIC BLOOD PRESSURE: 90 MMHG | HEART RATE: 71 BPM | OXYGEN SATURATION: 97 %

## 2019-11-20 DIAGNOSIS — S22.42XA CLOSED FRACTURE OF MULTIPLE RIBS OF LEFT SIDE, INITIAL ENCOUNTER: Primary | ICD-10-CM

## 2019-11-20 PROCEDURE — 6360000002 HC RX W HCPCS: Performed by: HOSPITALIST

## 2019-11-20 PROCEDURE — 99283 EMERGENCY DEPT VISIT LOW MDM: CPT

## 2019-11-20 PROCEDURE — 71101 X-RAY EXAM UNILAT RIBS/CHEST: CPT

## 2019-11-20 PROCEDURE — 96372 THER/PROPH/DIAG INJ SC/IM: CPT

## 2019-11-20 RX ORDER — PREDNISONE 10 MG/1
10 TABLET ORAL PRN
COMMUNITY
End: 2021-06-11 | Stop reason: SDUPTHER

## 2019-11-20 RX ORDER — LEFLUNOMIDE 10 MG/1
10 TABLET ORAL DAILY
COMMUNITY
End: 2020-10-22 | Stop reason: DRUGHIGH

## 2019-11-20 RX ORDER — OXYCODONE HYDROCHLORIDE AND ACETAMINOPHEN 5; 325 MG/1; MG/1
1 TABLET ORAL EVERY 6 HOURS PRN
Qty: 12 TABLET | Refills: 0 | Status: SHIPPED | OUTPATIENT
Start: 2019-11-20 | End: 2019-11-23

## 2019-11-20 RX ORDER — IPRATROPIUM BROMIDE 21 UG/1
1 SPRAY, METERED NASAL PRN
COMMUNITY
Start: 2017-05-05 | End: 2020-10-22 | Stop reason: ALTCHOICE

## 2019-11-20 RX ORDER — IBUPROFEN 600 MG/1
600 TABLET ORAL EVERY 6 HOURS PRN
Qty: 20 TABLET | Refills: 0 | Status: SHIPPED | OUTPATIENT
Start: 2019-11-20 | End: 2020-10-22 | Stop reason: ALTCHOICE

## 2019-11-20 RX ORDER — LISINOPRIL 10 MG/1
10 TABLET ORAL DAILY
COMMUNITY
End: 2020-11-12 | Stop reason: SDUPTHER

## 2019-11-20 RX ORDER — ALBUTEROL SULFATE 90 UG/1
1 AEROSOL, METERED RESPIRATORY (INHALATION) EVERY 6 HOURS PRN
COMMUNITY
Start: 2017-05-05 | End: 2021-02-11 | Stop reason: SDUPTHER

## 2019-11-20 RX ORDER — MELOXICAM 15 MG/1
15 TABLET ORAL DAILY
COMMUNITY
End: 2020-11-12 | Stop reason: SDUPTHER

## 2019-11-20 RX ORDER — KETOROLAC TROMETHAMINE 30 MG/ML
30 INJECTION, SOLUTION INTRAMUSCULAR; INTRAVENOUS ONCE
Status: COMPLETED | OUTPATIENT
Start: 2019-11-20 | End: 2019-11-20

## 2019-11-20 RX ORDER — ERGOCALCIFEROL 1.25 MG/1
50000 CAPSULE ORAL WEEKLY
COMMUNITY
Start: 2019-08-16 | End: 2020-11-12 | Stop reason: SDUPTHER

## 2019-11-20 RX ADMIN — KETOROLAC TROMETHAMINE 30 MG: 30 INJECTION, SOLUTION INTRAMUSCULAR; INTRAVENOUS at 17:11

## 2019-11-20 ASSESSMENT — PAIN DESCRIPTION - LOCATION: LOCATION: RIB CAGE

## 2019-11-20 ASSESSMENT — PAIN DESCRIPTION - PAIN TYPE: TYPE: ACUTE PAIN

## 2019-11-20 ASSESSMENT — PAIN SCALES - GENERAL: PAINLEVEL_OUTOF10: 8

## 2019-11-20 ASSESSMENT — PAIN DESCRIPTION - FREQUENCY: FREQUENCY: CONTINUOUS

## 2019-11-20 ASSESSMENT — PAIN DESCRIPTION - ONSET: ONSET: GRADUAL

## 2019-11-20 ASSESSMENT — PAIN DESCRIPTION - ORIENTATION: ORIENTATION: LEFT

## 2019-11-20 ASSESSMENT — PAIN DESCRIPTION - DESCRIPTORS: DESCRIPTORS: ACHING

## 2019-11-20 ASSESSMENT — PAIN DESCRIPTION - PROGRESSION: CLINICAL_PROGRESSION: GRADUALLY WORSENING

## 2020-01-09 ENCOUNTER — HOSPITAL ENCOUNTER (EMERGENCY)
Facility: HOSPITAL | Age: 60
Discharge: ANOTHER HOSPITAL | End: 2020-01-10
Attending: EMERGENCY MEDICINE | Admitting: EMERGENCY MEDICINE

## 2020-01-09 ENCOUNTER — APPOINTMENT (OUTPATIENT)
Dept: GENERAL RADIOLOGY | Facility: HOSPITAL | Age: 60
End: 2020-01-09

## 2020-01-09 DIAGNOSIS — S62.101A RIGHT WRIST FRACTURE, CLOSED, INITIAL ENCOUNTER: Primary | ICD-10-CM

## 2020-01-09 DIAGNOSIS — S72.001A CLOSED RIGHT HIP FRACTURE, INITIAL ENCOUNTER (HCC): ICD-10-CM

## 2020-01-09 LAB
BASOPHILS # BLD AUTO: 0.06 10*3/MM3 (ref 0–0.2)
BASOPHILS NFR BLD AUTO: 0.8 % (ref 0–1.5)
DEPRECATED RDW RBC AUTO: 49.8 FL (ref 37–54)
EOSINOPHIL # BLD AUTO: 0.09 10*3/MM3 (ref 0–0.4)
EOSINOPHIL NFR BLD AUTO: 1.2 % (ref 0.3–6.2)
ERYTHROCYTE [DISTWIDTH] IN BLOOD BY AUTOMATED COUNT: 13.2 % (ref 12.3–15.4)
HCT VFR BLD AUTO: 39.8 % (ref 34–46.6)
HGB BLD-MCNC: 13.3 G/DL (ref 12–15.9)
IMM GRANULOCYTES # BLD AUTO: 0.04 10*3/MM3 (ref 0–0.05)
IMM GRANULOCYTES NFR BLD AUTO: 0.6 % (ref 0–0.5)
LYMPHOCYTES # BLD AUTO: 2.19 10*3/MM3 (ref 0.7–3.1)
LYMPHOCYTES NFR BLD AUTO: 30.3 % (ref 19.6–45.3)
MCH RBC QN AUTO: 34 PG (ref 26.6–33)
MCHC RBC AUTO-ENTMCNC: 33.4 G/DL (ref 31.5–35.7)
MCV RBC AUTO: 101.8 FL (ref 79–97)
MONOCYTES # BLD AUTO: 0.69 10*3/MM3 (ref 0.1–0.9)
MONOCYTES NFR BLD AUTO: 9.6 % (ref 5–12)
NEUTROPHILS # BLD AUTO: 4.15 10*3/MM3 (ref 1.7–7)
NEUTROPHILS NFR BLD AUTO: 57.5 % (ref 42.7–76)
NRBC BLD AUTO-RTO: 0 /100 WBC (ref 0–0.2)
PLATELET # BLD AUTO: 294 10*3/MM3 (ref 140–450)
PMV BLD AUTO: 10.3 FL (ref 6–12)
RBC # BLD AUTO: 3.91 10*6/MM3 (ref 3.77–5.28)
WBC NRBC COR # BLD: 7.22 10*3/MM3 (ref 3.4–10.8)

## 2020-01-09 PROCEDURE — 96374 THER/PROPH/DIAG INJ IV PUSH: CPT

## 2020-01-09 PROCEDURE — 96375 TX/PRO/DX INJ NEW DRUG ADDON: CPT

## 2020-01-09 PROCEDURE — 73110 X-RAY EXAM OF WRIST: CPT

## 2020-01-09 PROCEDURE — 99284 EMERGENCY DEPT VISIT MOD MDM: CPT

## 2020-01-09 PROCEDURE — 80053 COMPREHEN METABOLIC PANEL: CPT | Performed by: EMERGENCY MEDICINE

## 2020-01-09 PROCEDURE — 25010000002 FENTANYL CITRATE (PF) 100 MCG/2ML SOLUTION: Performed by: EMERGENCY MEDICINE

## 2020-01-09 PROCEDURE — 85025 COMPLETE CBC W/AUTO DIFF WBC: CPT | Performed by: EMERGENCY MEDICINE

## 2020-01-09 PROCEDURE — 25010000002 ONDANSETRON PER 1 MG: Performed by: EMERGENCY MEDICINE

## 2020-01-09 PROCEDURE — 51702 INSERT TEMP BLADDER CATH: CPT

## 2020-01-09 PROCEDURE — 73502 X-RAY EXAM HIP UNI 2-3 VIEWS: CPT

## 2020-01-09 RX ORDER — SODIUM CHLORIDE 0.9 % (FLUSH) 0.9 %
10 SYRINGE (ML) INJECTION AS NEEDED
Status: DISCONTINUED | OUTPATIENT
Start: 2020-01-09 | End: 2020-01-10 | Stop reason: HOSPADM

## 2020-01-09 RX ORDER — ONDANSETRON 2 MG/ML
4 INJECTION INTRAMUSCULAR; INTRAVENOUS ONCE
Status: COMPLETED | OUTPATIENT
Start: 2020-01-09 | End: 2020-01-09

## 2020-01-09 RX ORDER — FENTANYL CITRATE 50 UG/ML
50 INJECTION, SOLUTION INTRAMUSCULAR; INTRAVENOUS ONCE
Status: COMPLETED | OUTPATIENT
Start: 2020-01-09 | End: 2020-01-09

## 2020-01-09 RX ADMIN — ONDANSETRON 4 MG: 2 INJECTION INTRAMUSCULAR; INTRAVENOUS at 23:43

## 2020-01-09 RX ADMIN — FENTANYL CITRATE 50 MCG: 50 INJECTION, SOLUTION INTRAMUSCULAR; INTRAVENOUS at 23:43

## 2020-01-10 ENCOUNTER — APPOINTMENT (OUTPATIENT)
Dept: GENERAL RADIOLOGY | Facility: HOSPITAL | Age: 60
End: 2020-01-10

## 2020-01-10 VITALS
DIASTOLIC BLOOD PRESSURE: 87 MMHG | HEART RATE: 104 BPM | RESPIRATION RATE: 20 BRPM | HEIGHT: 65 IN | BODY MASS INDEX: 17.99 KG/M2 | WEIGHT: 108 LBS | TEMPERATURE: 98.1 F | OXYGEN SATURATION: 94 % | SYSTOLIC BLOOD PRESSURE: 135 MMHG

## 2020-01-10 LAB
ALBUMIN SERPL-MCNC: 4 G/DL (ref 3.5–5.2)
ALBUMIN/GLOB SERPL: 1.3 G/DL
ALP SERPL-CCNC: 47 U/L (ref 39–117)
ALT SERPL W P-5'-P-CCNC: 23 U/L (ref 1–33)
ANION GAP SERPL CALCULATED.3IONS-SCNC: 14 MMOL/L (ref 5–15)
AST SERPL-CCNC: 29 U/L (ref 1–32)
BILIRUB SERPL-MCNC: 0.3 MG/DL (ref 0.2–1.2)
BUN BLD-MCNC: 14 MG/DL (ref 6–20)
BUN/CREAT SERPL: 24.6 (ref 7–25)
CALCIUM SPEC-SCNC: 9.3 MG/DL (ref 8.6–10.5)
CHLORIDE SERPL-SCNC: 102 MMOL/L (ref 98–107)
CO2 SERPL-SCNC: 25 MMOL/L (ref 22–29)
CREAT BLD-MCNC: 0.57 MG/DL (ref 0.57–1)
GFR SERPL CREATININE-BSD FRML MDRD: 109 ML/MIN/1.73
GLOBULIN UR ELPH-MCNC: 3 GM/DL
GLUCOSE BLD-MCNC: 100 MG/DL (ref 65–99)
POTASSIUM BLD-SCNC: 3.8 MMOL/L (ref 3.5–5.2)
PROT SERPL-MCNC: 7 G/DL (ref 6–8.5)
SODIUM BLD-SCNC: 141 MMOL/L (ref 136–145)

## 2020-01-10 PROCEDURE — 25010000002 ORPHENADRINE CITRATE PER 60 MG: Performed by: PHYSICIAN ASSISTANT

## 2020-01-10 PROCEDURE — 25010000002 HYDROMORPHONE 1 MG/ML SOLUTION: Performed by: EMERGENCY MEDICINE

## 2020-01-10 PROCEDURE — 96375 TX/PRO/DX INJ NEW DRUG ADDON: CPT

## 2020-01-10 PROCEDURE — 71045 X-RAY EXAM CHEST 1 VIEW: CPT

## 2020-01-10 RX ORDER — ORPHENADRINE CITRATE 30 MG/ML
60 INJECTION INTRAMUSCULAR; INTRAVENOUS ONCE
Status: COMPLETED | OUTPATIENT
Start: 2020-01-10 | End: 2020-01-10

## 2020-01-10 RX ADMIN — HYDROMORPHONE HYDROCHLORIDE 1 MG: 1 INJECTION, SOLUTION INTRAMUSCULAR; INTRAVENOUS; SUBCUTANEOUS at 00:28

## 2020-01-10 RX ADMIN — ORPHENADRINE CITRATE 60 MG: 30 INJECTION INTRAMUSCULAR; INTRAVENOUS at 00:57

## 2020-01-10 NOTE — ED NOTES
EMS gave 50mcg of Fentanyl at 2215 and 2245 for a total of 100mcg.     Mushtaq Sanchez, RN  01/09/20 4258

## 2020-01-10 NOTE — ED PROVIDER NOTES
Subjective   This patient states she slipped and fell on a wet spot on the floor work just prior to arrival.  She complains right wrist and right hip pain.  I performed the right wrist.  Neurovascular intact right upper and right lower extremity.  Nonambulatory due to pain.  No head neck or back injury.           Review of Systems   Constitutional: Negative.    HENT: Negative.    Eyes: Negative.    Respiratory: Negative.    Cardiovascular: Negative.    Gastrointestinal: Negative.    Genitourinary: Negative.    Musculoskeletal: Negative.         Right hip and right wrist pain   Skin: Negative.    Neurological: Negative.    Psychiatric/Behavioral: Negative.        Past Medical History:   Diagnosis Date   • Depression    • Fractures    • HTN (hypertension)    • Hyperglycemia    • Mixed hyperlipidemia    • Ovarian cyst    • Pneumonia     PT REPORTS APX 21 YEARS AGO    • RA (rheumatoid arthritis) (CMS/[facility])     ALSO REPORTS OA   • Vitamin D deficiency        Allergies   Allergen Reactions   • Clindamycin/Lincomycin Swelling   • Codeine Swelling   • Penicillins Swelling       Past Surgical History:   Procedure Laterality Date   • ARM TENDON REPAIR Left    • BREAST SURGERY     • ENDOSCOPY N/A 3/30/2017    Procedure: ESOPHAGOGASTRODUODENOSCOPY WITH COLD FORCEP BIOPSY;  Surgeon: Anca Trevino MD;  Location: [facility] ENDOSCOPY;  Service:    • HERNIA REPAIR Bilateral     INGUINAL   • INGUINAL HERNIA REPAIR      age 8   • KNEE SURGERY Left    • MANDIBLE SURGERY     • TUBAL ABDOMINAL LIGATION     • WISDOM TOOTH EXTRACTION         Family History   Problem Relation Age of Onset   • Arthritis Mother    • Cancer Mother         leukemia   • Hypertension Mother    • Heart disease Father         heart attack   • Hypertension Father    • Migraines Daughter    • Cancer Maternal Grandmother         uterus       Social History     Socioeconomic History   • Marital status: Single     Spouse name: Not on file   • Number of  children: Not on file   • Years of education: Not on file   • Highest education level: Not on file   Tobacco Use   • Smoking status: Current Every Day Smoker     Packs/day: 1.00     Years: 30.00     Pack years: 30.00     Types: Cigarettes   • Smokeless tobacco: Never Used   • Tobacco comment: HX OF SMOKING 1 PPD FOR THE PAST 30 YEARS    Substance and Sexual Activity   • Alcohol use: No   • Drug use: No   • Sexual activity: Defer           Objective   Physical Exam   Constitutional: She appears well-developed and well-nourished.   HENT:   Head: Normocephalic and atraumatic.   Eyes: Pupils are equal, round, and reactive to light.   Neck: Normal range of motion. Neck supple.   Cardiovascular: Normal rate and regular rhythm.   Pulmonary/Chest: Effort normal.   Abdominal: Soft.   Musculoskeletal:   Deformity to the right wrist with deviation of the hand medially.  2+ radial pulse on the right.  No laceration or puncture to the skin.  Pain with palpation and any movement of the right hip.  Given patient positioning difficult to determine if shortening present or not.  2+ dorsalis pedis pulse on the right.   Neurological: She is alert.   Skin: Skin is warm and dry.   Psychiatric: She has a normal mood and affect. Her behavior is normal.   Nursing note and vitals reviewed.      Procedures           ED Course        Splinting / strapping of right wrist  Consent obtained discussed all risks and benefits, patient elected to continue  Volar wrist splint on the right  placed  Supplies used 3 inch Ortho-Glass and 3 inch Ace wrap  re-examined post splinting revealing neurovascular intact with good capillary refill, pink extremity distal                                         MDM  1:01 AM  Discussed with Dr. Cheema on-call for orthopedics after reviewing images recommends transfer to [place] for higher level care.      Final diagnoses:   Right wrist fracture, closed, initial encounter   Closed right hip fracture, initial encounter  (CMS/[facility])            Lefty Snell PA-C  01/10/20 0102       Lefty Snell PA-C  01/10/20 0103

## 2020-01-15 RX ORDER — OXYCODONE HYDROCHLORIDE 5 MG/1
5 TABLET ORAL EVERY 6 HOURS PRN
Qty: 120 TABLET | Refills: 0 | Status: SHIPPED | OUTPATIENT
Start: 2020-01-15 | End: 2020-01-27 | Stop reason: SDUPTHER

## 2020-01-15 RX ORDER — TRAMADOL HYDROCHLORIDE 50 MG/1
50 TABLET ORAL EVERY 4 HOURS PRN
Qty: 180 TABLET | Refills: 1 | Status: SHIPPED | OUTPATIENT
Start: 2020-01-15 | End: 2020-01-27 | Stop reason: SDUPTHER

## 2020-01-15 RX ORDER — GABAPENTIN 100 MG/1
100 CAPSULE ORAL 3 TIMES DAILY
Qty: 90 CAPSULE | Refills: 1 | Status: SHIPPED | OUTPATIENT
Start: 2020-01-15 | End: 2020-01-27 | Stop reason: SDUPTHER

## 2020-01-27 ENCOUNTER — NURSING HOME (OUTPATIENT)
Dept: INTERNAL MEDICINE | Facility: CLINIC | Age: 60
End: 2020-01-27

## 2020-01-27 DIAGNOSIS — S72.001D CLOSED FRACTURE OF RIGHT HIP WITH ROUTINE HEALING, SUBSEQUENT ENCOUNTER: Primary | ICD-10-CM

## 2020-01-27 DIAGNOSIS — M06.9 RHEUMATOID ARTHRITIS, INVOLVING UNSPECIFIED SITE, UNSPECIFIED RHEUMATOID FACTOR PRESENCE: ICD-10-CM

## 2020-01-27 DIAGNOSIS — S52.501D CLOSED FRACTURE OF DISTAL END OF RIGHT RADIUS WITH ROUTINE HEALING, UNSPECIFIED FRACTURE MORPHOLOGY, SUBSEQUENT ENCOUNTER: ICD-10-CM

## 2020-01-27 DIAGNOSIS — I10 ESSENTIAL HYPERTENSION: ICD-10-CM

## 2020-01-27 PROCEDURE — 99315 NF DSCHRG MGMT 30 MIN/LESS: CPT | Performed by: PHYSICIAN ASSISTANT

## 2020-01-27 RX ORDER — ERGOCALCIFEROL 1.25 MG/1
50000 CAPSULE ORAL WEEKLY
COMMUNITY

## 2020-01-27 RX ORDER — METHOCARBAMOL 750 MG/1
750 TABLET, FILM COATED ORAL EVERY 6 HOURS PRN
Status: ON HOLD | COMMUNITY
End: 2021-06-02

## 2020-01-27 RX ORDER — ACETAMINOPHEN 500 MG
1000 TABLET ORAL EVERY 6 HOURS PRN
COMMUNITY
End: 2021-06-04 | Stop reason: HOSPADM

## 2020-01-27 RX ORDER — SENNA AND DOCUSATE SODIUM 50; 8.6 MG/1; MG/1
2 TABLET, FILM COATED ORAL NIGHTLY
COMMUNITY
End: 2021-03-05

## 2020-01-27 RX ORDER — MULTIPLE VITAMINS W/ MINERALS TAB 9MG-400MCG
1 TAB ORAL DAILY
COMMUNITY
End: 2021-03-05

## 2020-01-27 RX ORDER — TRAMADOL HYDROCHLORIDE 50 MG/1
50 TABLET ORAL EVERY 4 HOURS PRN
Qty: 10 TABLET | Refills: 0 | Status: SHIPPED | OUTPATIENT
Start: 2020-01-27 | End: 2020-02-06

## 2020-01-27 RX ORDER — GABAPENTIN 100 MG/1
100 CAPSULE ORAL 3 TIMES DAILY
Qty: 30 CAPSULE | Refills: 0 | Status: SHIPPED | OUTPATIENT
Start: 2020-01-27 | End: 2021-03-05

## 2020-01-27 RX ORDER — OXYCODONE HYDROCHLORIDE 5 MG/1
5 TABLET ORAL EVERY 6 HOURS PRN
Qty: 40 TABLET | Refills: 0 | Status: SHIPPED | OUTPATIENT
Start: 2020-01-27 | End: 2020-02-06

## 2020-01-27 RX ORDER — LEFLUNOMIDE 20 MG/1
20 TABLET ORAL DAILY
COMMUNITY
End: 2021-04-13

## 2020-01-28 VITALS
OXYGEN SATURATION: 98 % | DIASTOLIC BLOOD PRESSURE: 82 MMHG | SYSTOLIC BLOOD PRESSURE: 124 MMHG | RESPIRATION RATE: 16 BRPM | HEART RATE: 78 BPM | TEMPERATURE: 98.2 F

## 2020-01-28 NOTE — PROGRESS NOTES
Nursing Home Discharge Summary      Efra Cobos DO  []  DENISE Scott  []  [address] [place] [address]  Phone: [phone]  Fax: [phone] Ruth Carmen MD  []  Haris Ortiz DO  []  Lilliana Soliman PA-C  [x]  [address] [place] [address]  Phone: [phone]  Fax: [phone]     PATIENT NAME: Jessica Winston                                                                          YOB: 1960     Age:  59 y.o.  Sex:  female  DATE OF SERVICE: 01/27/2020  Primary Care Physician:  Dina Tompkins APRN   Date of Discharge:  01/27/2020   Admission Date:  1/14/2020  FACILITY:   [x] [facility]    [] [facility]    [] [facility]     [] [facility]    [] Other      Discharge Diagnosis:    Encounter Diagnoses   Name Primary?   • Closed fracture of right hip with routine healing, subsequent encounter Stable.  Continue follow up with ortho.       • Closed fracture of distal end of right radius with routine healing, unspecified fracture morphology, subsequent encounter Stable.  Continue follow up with ortho.         • Essential hypertension Controlled.  Continue current medications.     • Rheumatoid arthritis, involving unspecified site, unspecified rheumatoid factor presence (CMS/[facility]) Stable.  Continue current medications.           Presenting Problem: weakness due to IT femur fracture and right distal radius fracture.    History of Presenting Illness:  Ms. Cole is a 58 y/o female patient who presented to [facility] for rehabilitation services following IT femur and right radial fracture from mechanical fall.  She underwent surgical intervention at [facility] and subsequently transferred to [facility] for strengthening.  She has been participating in therapy services daily, with noted improvement to mobility and function.  She has followed along with orthopaedic services regularly as well while at inpatient rehab.  She has hard cast to right wrist.  Dressing in place to  right hip.  She continues to require oral pain medications.  She plans to discharge home today.  She will be participating in outpatient therapy services in [place].  It is of note that patient has refused lovenox injections that were ordered to be continued until 2/15/20.  This was communicated with ortho.  Medication has been discontinued.  She will require BSC upon arrival to home.  In addition, she will also need cane for stability.  She denies any acute concerns about discharge.  She feels she is ready to return home.     Labs:  1/15:  Na 143, K 3.8, BUN 11, Cr 0.5, AST 43, ALT 30, WBC 4.2, Hgb 8.1, Hct 24.7    Vitals:  /82   Pulse 78   Temp 98.2 °F (36.8 °C)   Resp 16   LMP 08/01/2010 (Approximate)   SpO2 98%     Review of Systems   Constitutional: Negative for activity change, appetite change, chills and fever.   HENT: Negative.    Eyes: Negative.    Respiratory: Negative for cough, chest tightness and shortness of breath.    Cardiovascular: Negative for chest pain, palpitations and leg swelling.   Gastrointestinal: Negative for abdominal pain, constipation, diarrhea, nausea and vomiting.   Endocrine: Negative.    Genitourinary: Negative for dysuria and hematuria.   Musculoskeletal: Positive for arthralgias. Negative for myalgias.   Skin: Negative for color change.   Neurological: Positive for weakness (generalized, improving. ). Negative for dizziness and light-headedness.   Psychiatric/Behavioral: Negative for dysphoric mood and sleep disturbance.         Physical Exam   Constitutional: She is oriented to person, place, and time. She appears well-developed and well-nourished. No distress.   HENT:   Head: Normocephalic and atraumatic.   Eyes: Pupils are equal, round, and reactive to light. Conjunctivae and EOM are normal.   Neck: Normal range of motion. Neck supple.   Cardiovascular: Normal rate, regular rhythm, normal heart sounds and intact distal pulses. Exam reveals no gallop and no friction  rub.   No murmur heard.  Pulmonary/Chest: Effort normal and breath sounds normal. No respiratory distress. She has no wheezes. She has no rales.   Musculoskeletal: Normal range of motion. She exhibits no edema.   Dressing, dry and intact, to right LE.  Case noted to Right UE.     Neurological: She is alert and oriented to person, place, and time.   Skin: Skin is warm and dry. Capillary refill takes less than 2 seconds. She is not diaphoretic.   Psychiatric: She has a normal mood and affect. Her behavior is normal.   Nursing note and vitals reviewed.       Condition on Discharge:    Stable to home    Discharge Medication:    Current Outpatient Medications:   •  acetaminophen (TYLENOL) 500 MG tablet, Take 1,000 mg by mouth Every 6 (Six) Hours As Needed., Disp: , Rfl:   •  Etanercept 50 MG/ML solution auto-injector, Inject 1 mL under the skin into the appropriate area as directed Every 7 (Seven) Days., Disp: , Rfl:   •  leflunomide (ARAVA) 20 MG tablet, Take 20 mg by mouth Daily., Disp: , Rfl:   •  lisinopril (PRINIVIL,ZESTRIL) 10 MG tablet, Take 10 mg by mouth Daily., Disp: , Rfl:   •  meloxicam (MOBIC) 15 MG tablet, Take 15 mg by mouth Daily., Disp: , Rfl:   •  methocarbamol (ROBAXIN) 750 MG tablet, Take 750 mg by mouth Every 6 (Six) Hours As Needed., Disp: , Rfl:   •  Multiple Vitamins-Minerals (MULTIVITAMIN WITH MINERALS) tablet tablet, Take 1 tablet by mouth Daily., Disp: , Rfl:   •  omeprazole (priLOSEC) 40 MG capsule, Take 40 mg by mouth Daily., Disp: , Rfl: 0  •  predniSONE (DELTASONE) 10 MG tablet, Take 10 mg by mouth Daily., Disp: , Rfl:   •  raNITIdine (ZANTAC) 150 MG tablet, Take 1 tablet by mouth Daily As Needed., Disp: , Rfl: 1  •  senna-docusate sodium (SENOKOT-S) 8.6-50 MG tablet, Take 2 tablets by mouth Every Night., Disp: , Rfl:   •  vitamin D (ERGOCALCIFEROL) 1.25 MG (50196 UT) capsule capsule, Take 50,000 Units by mouth 1 (One) Time Per Week., Disp: , Rfl:   •  gabapentin (NEURONTIN) 100 MG capsule,  Take 1 capsule by mouth 3 (Three) Times a Day., Disp: 30 capsule, Rfl: 0  •  oxyCODONE (ROXICODONE) 5 MG immediate release tablet, Take 1 tablet by mouth Every 6 (Six) Hours As Needed for Moderate Pain  for up to 10 days., Disp: 40 tablet, Rfl: 0  •  traMADol (ULTRAM) 50 MG tablet, Take 1 tablet by mouth Every 4 (Four) Hours As Needed for Moderate Pain  for up to 10 days., Disp: 10 tablet, Rfl: 0     Time: Discharge 25 min    Lilliana Soliman PA-C   01/27/2020

## 2020-03-10 ENCOUNTER — HOSPITAL ENCOUNTER (OUTPATIENT)
Dept: PHYSICAL THERAPY | Facility: HOSPITAL | Age: 60
Setting detail: THERAPIES SERIES
Discharge: HOME | End: 2020-03-10
Payer: COMMERCIAL

## 2020-03-10 PROCEDURE — 97161 PT EVAL LOW COMPLEX 20 MIN: CPT

## 2020-03-10 PROCEDURE — 97110 THERAPEUTIC EXERCISES: CPT

## 2020-03-11 ASSESSMENT — PAIN DESCRIPTION - ONSET: ONSET: ON-GOING

## 2020-03-11 ASSESSMENT — PAIN DESCRIPTION - ORIENTATION: ORIENTATION: RIGHT

## 2020-03-11 ASSESSMENT — PAIN DESCRIPTION - PAIN TYPE: TYPE: SURGICAL PAIN

## 2020-03-11 ASSESSMENT — PAIN DESCRIPTION - PROGRESSION: CLINICAL_PROGRESSION: GRADUALLY IMPROVING

## 2020-03-11 ASSESSMENT — PAIN DESCRIPTION - FREQUENCY: FREQUENCY: INTERMITTENT

## 2020-03-11 ASSESSMENT — PAIN DESCRIPTION - LOCATION: LOCATION: HIP

## 2020-03-11 ASSESSMENT — PAIN SCALES - GENERAL: PAINLEVEL_OUTOF10: 3

## 2020-03-11 ASSESSMENT — PAIN DESCRIPTION - DESCRIPTORS: DESCRIPTORS: ACHING

## 2020-03-11 NOTE — PROGRESS NOTES
Physical Therapy  Initial Assessment  Date: 3/10/2020  Patient Name: Patsy Wade  MRN: [id number]  : 1960     Treatment Diagnosis: s/p right hip fracture with ORIF; muscle weakness; abnormalilty of gait; decreased functional capacity    Restrictions  Restrictions/Precautions  Restrictions/Precautions: Weight Bearing, General Precautions  Required Braces or Orthoses?: No  Lower Extremity Weight Bearing Restrictions  Left Lower Extremity Weight Bearing: Weight Bearing As Tolerated    Subjective   General  Chart Reviewed: Yes  Patient assessed for rehabilitation services?: Yes  Response To Previous Treatment: Not applicable  Family / Caregiver Present: No  Referring Practitioner: Poppy Hinkle PA-C  Diagnosis: s/p right closed intertrochanteric hip fracture, s/p ORIF - WBAT  Follows Commands: Within Functional Limits  PT Visit Information  PT Insurance Information: Worker's compensation  Subjective  Subjective: Patient reports: she sustained injuires from a fall at work [employer]); including right hip fracture as noted; also with right distal radius fx (no specific orders for therapy on this); regarding hip -- had surgical fixation, had a 4-day inpatient hospital stay,then 2 weeks of inpt rehab, returned home; was independent prior to injury; current c/o: right hip pain, knee pain, difficulty walking, LE weakness; uses SBQC for ambulation  Pain Screening  Patient Currently in Pain: Yes  Pain Assessment  Clinical Progression: Gradually improving    Vision/Hearing  Vision  Vision: Within Functional Limits  Hearing  Hearing: Within functional limits    Orientation  Orientation  Overall Orientation Status: Within Normal Limits    Social/Functional History  Social/Functional History  Lives With: Alone  Active : Yes  Mode of Transportation: Car  Occupation: Full time employment  Type of occupation: [place]  - frequent walking, some bending and lifting; currently off work    Objective Plan   Plan  Times per week: 2-3 x week  Plan weeks: 6-8 weeks  Current Treatment Recommendations: Strengthening, ROM, Gait Training, Balance Training, Functional Mobility Training, Manual Therapy - Soft Tissue Mobilization, Home Exercise Program, Neuromuscular Re-education, Manual Therapy - Joint Manipulation, Modalities, Safety Education & Training               Goals  Long term goals  Time Frame for Long term goals : 6-8 weeks  Long term goal 1: Achieve a normal gait without use of AD for community distances. Long term goal 2: Achieve 4+/5 or greater right hip strength in MMG;s. Long term goal 3: Independent with HEP. Long term goal 4: Achieve a TUG test time of 14 seconds or less. Long term goal 5: Achieve a LEFS score of 38/80. Therapy Time   Individual Concurrent Group Co-treatment   Time In           Time Out           Minutes                   Electronically signed by Ronnie Parker PT on 3/13/2020 at 91:38 AM      Certification of Medical Necessity: It will be understood that this treatment plan is certified medically necessary by the documenting therapist and referring physician mentioned in this report. Unless the physician indicated otherwise through written correspondence with our office, all further referrals will act as certification of medical necessity on this treatment plan. Thank you for this referral.  If you have questions regarding this plan of care, please call [phone].           Revisions to this plan (optional):                     Please sign and return this plan to:   FAX: [phone]      Signature:                                 Date:

## 2020-03-12 ENCOUNTER — HOSPITAL ENCOUNTER (OUTPATIENT)
Dept: PHYSICAL THERAPY | Facility: HOSPITAL | Age: 60
Setting detail: THERAPIES SERIES
Discharge: HOME | End: 2020-03-12
Payer: COMMERCIAL

## 2020-03-12 PROCEDURE — 97530 THERAPEUTIC ACTIVITIES: CPT

## 2020-03-12 PROCEDURE — 97110 THERAPEUTIC EXERCISES: CPT

## 2020-03-13 ASSESSMENT — PAIN DESCRIPTION - PROGRESSION: CLINICAL_PROGRESSION: GRADUALLY IMPROVING

## 2020-03-17 ENCOUNTER — APPOINTMENT (OUTPATIENT)
Dept: PHYSICAL THERAPY | Facility: HOSPITAL | Age: 60
End: 2020-03-17
Payer: COMMERCIAL

## 2020-03-18 ENCOUNTER — HOSPITAL ENCOUNTER (OUTPATIENT)
Dept: PHYSICAL THERAPY | Facility: HOSPITAL | Age: 60
Setting detail: THERAPIES SERIES
Discharge: HOME | End: 2020-03-18
Payer: COMMERCIAL

## 2020-03-18 PROCEDURE — 97530 THERAPEUTIC ACTIVITIES: CPT

## 2020-03-18 PROCEDURE — 97110 THERAPEUTIC EXERCISES: CPT

## 2020-03-19 ENCOUNTER — HOSPITAL ENCOUNTER (OUTPATIENT)
Dept: PHYSICAL THERAPY | Facility: HOSPITAL | Age: 60
Setting detail: THERAPIES SERIES
End: 2020-03-19
Payer: COMMERCIAL

## 2020-03-26 ENCOUNTER — HOSPITAL ENCOUNTER (OUTPATIENT)
Dept: PHYSICAL THERAPY | Facility: HOSPITAL | Age: 60
Setting detail: THERAPIES SERIES
End: 2020-03-26
Payer: COMMERCIAL

## 2020-06-02 ENCOUNTER — LAB (OUTPATIENT)
Dept: LAB | Facility: HOSPITAL | Age: 60
End: 2020-06-02

## 2020-06-02 ENCOUNTER — TRANSCRIBE ORDERS (OUTPATIENT)
Dept: LAB | Facility: HOSPITAL | Age: 60
End: 2020-06-02

## 2020-06-02 DIAGNOSIS — M06.09 RHEUMATOID ARTHRITIS OF MULTIPLE SITES WITHOUT RHEUMATOID FACTOR (HCC): ICD-10-CM

## 2020-06-02 DIAGNOSIS — M15.0 PRIMARY GENERALIZED HYPERTROPHIC OSTEOARTHROSIS: ICD-10-CM

## 2020-06-02 DIAGNOSIS — E55.9 AVITAMINOSIS D: ICD-10-CM

## 2020-06-02 DIAGNOSIS — Z72.0 TOBACCO ABUSE: ICD-10-CM

## 2020-06-02 DIAGNOSIS — M06.09 RHEUMATOID ARTHRITIS OF MULTIPLE SITES WITHOUT RHEUMATOID FACTOR (HCC): Primary | ICD-10-CM

## 2020-06-02 DIAGNOSIS — M79.7 SCAPULOHUMERAL FIBROSITIS: ICD-10-CM

## 2020-06-02 LAB
BASOPHILS # BLD AUTO: 0.13 10*3/MM3 (ref 0–0.2)
BASOPHILS NFR BLD AUTO: 1.9 % (ref 0–1.5)
DEPRECATED RDW RBC AUTO: 43.8 FL (ref 37–54)
EOSINOPHIL # BLD AUTO: 0.03 10*3/MM3 (ref 0–0.4)
EOSINOPHIL NFR BLD AUTO: 0.4 % (ref 0.3–6.2)
ERYTHROCYTE [DISTWIDTH] IN BLOOD BY AUTOMATED COUNT: 12 % (ref 12.3–15.4)
HCT VFR BLD AUTO: 36.5 % (ref 34–46.6)
HGB BLD-MCNC: 12.4 G/DL (ref 12–15.9)
IMM GRANULOCYTES # BLD AUTO: 0.05 10*3/MM3 (ref 0–0.05)
IMM GRANULOCYTES NFR BLD AUTO: 0.7 % (ref 0–0.5)
LYMPHOCYTES # BLD AUTO: 1.27 10*3/MM3 (ref 0.7–3.1)
LYMPHOCYTES NFR BLD AUTO: 18.5 % (ref 19.6–45.3)
MCH RBC QN AUTO: 33.8 PG (ref 26.6–33)
MCHC RBC AUTO-ENTMCNC: 34 G/DL (ref 31.5–35.7)
MCV RBC AUTO: 99.5 FL (ref 79–97)
MONOCYTES # BLD AUTO: 0.34 10*3/MM3 (ref 0.1–0.9)
MONOCYTES NFR BLD AUTO: 5 % (ref 5–12)
NEUTROPHILS # BLD AUTO: 5.04 10*3/MM3 (ref 1.7–7)
NEUTROPHILS NFR BLD AUTO: 73.5 % (ref 42.7–76)
NRBC BLD AUTO-RTO: 0 /100 WBC (ref 0–0.2)
PLATELET # BLD AUTO: 402 10*3/MM3 (ref 140–450)
PMV BLD AUTO: 10.1 FL (ref 6–12)
RBC # BLD AUTO: 3.67 10*6/MM3 (ref 3.77–5.28)
WBC NRBC COR # BLD: 6.86 10*3/MM3 (ref 3.4–10.8)

## 2020-06-02 PROCEDURE — 86140 C-REACTIVE PROTEIN: CPT

## 2020-06-02 PROCEDURE — 80053 COMPREHEN METABOLIC PANEL: CPT

## 2020-06-02 PROCEDURE — 85025 COMPLETE CBC W/AUTO DIFF WBC: CPT

## 2020-06-02 PROCEDURE — 36415 COLL VENOUS BLD VENIPUNCTURE: CPT

## 2020-06-03 LAB
ALBUMIN SERPL-MCNC: 4.1 G/DL (ref 3.5–5.2)
ALBUMIN/GLOB SERPL: 1.3 G/DL
ALP SERPL-CCNC: 59 U/L (ref 39–117)
ALT SERPL W P-5'-P-CCNC: 13 U/L (ref 1–33)
ANION GAP SERPL CALCULATED.3IONS-SCNC: 16.4 MMOL/L (ref 5–15)
AST SERPL-CCNC: 29 U/L (ref 1–32)
BILIRUB SERPL-MCNC: 0.2 MG/DL (ref 0.2–1.2)
BUN BLD-MCNC: 8 MG/DL (ref 6–20)
BUN/CREAT SERPL: 15.7 (ref 7–25)
CALCIUM SPEC-SCNC: 9.7 MG/DL (ref 8.6–10.5)
CHLORIDE SERPL-SCNC: 99 MMOL/L (ref 98–107)
CO2 SERPL-SCNC: 18.6 MMOL/L (ref 22–29)
CREAT BLD-MCNC: 0.51 MG/DL (ref 0.57–1)
CRP SERPL-MCNC: 0.05 MG/DL (ref 0–0.5)
GFR SERPL CREATININE-BSD FRML MDRD: 124 ML/MIN/1.73
GLOBULIN UR ELPH-MCNC: 3.1 GM/DL
GLUCOSE BLD-MCNC: 103 MG/DL (ref 65–99)
POTASSIUM BLD-SCNC: 4.9 MMOL/L (ref 3.5–5.2)
PROT SERPL-MCNC: 7.2 G/DL (ref 6–8.5)
SODIUM BLD-SCNC: 134 MMOL/L (ref 136–145)

## 2020-06-18 ENCOUNTER — APPOINTMENT (OUTPATIENT)
Dept: PHYSICAL THERAPY | Facility: HOSPITAL | Age: 60
End: 2020-06-18
Payer: COMMERCIAL

## 2020-06-23 ENCOUNTER — HOSPITAL ENCOUNTER (OUTPATIENT)
Dept: PHYSICAL THERAPY | Facility: HOSPITAL | Age: 60
Setting detail: THERAPIES SERIES
Discharge: HOME | End: 2020-06-23
Payer: COMMERCIAL

## 2020-06-23 PROCEDURE — 97110 THERAPEUTIC EXERCISES: CPT

## 2020-06-23 PROCEDURE — 97161 PT EVAL LOW COMPLEX 20 MIN: CPT

## 2020-06-23 ASSESSMENT — PAIN SCALES - GENERAL: PAINLEVEL_OUTOF10: 3

## 2020-06-23 ASSESSMENT — PAIN DESCRIPTION - LOCATION: LOCATION: HIP

## 2020-06-23 ASSESSMENT — PAIN DESCRIPTION - ORIENTATION: ORIENTATION: RIGHT

## 2020-06-25 ASSESSMENT — PAIN DESCRIPTION - ORIENTATION: ORIENTATION: RIGHT

## 2020-06-25 ASSESSMENT — PAIN DESCRIPTION - LOCATION: LOCATION: HIP

## 2020-06-25 ASSESSMENT — PAIN SCALES - GENERAL: PAINLEVEL_OUTOF10: 3

## 2020-06-25 NOTE — PROGRESS NOTES
External Rotation 0-45: 0-38 deg  R Hip Internal Rotation 0-45: 0-20 deg    Strength RLE  Strength RLE: Exception  R Hip Flexion: 4+/5  R Hip ABduction: 3+/5  R Hip Internal Rotation: 4-/5  R Hip External Rotation: 4/5  R Knee Flexion: 5/5  R Knee Extension: 5/5  R Ankle Dorsiflexion: 5/5     Additional Measures  Special Tests: LEFS: 55/80        Exercises  Exercise 1: Nustep: L4-5, 9'  Exercise 2: QS w/ towel roll 3x10  Exercise 3: SLR 3x10  Exercise 4: Hip abd / add (BTB / ball) 3x10  Exercise 5: Clam shells 3x10  Exercise 6: Bridges 3x10  Exercise 7: Mini squats 3x10   Exercise 8: Standing hip abd 3x10   Pt was educated in and issued a written HEP of the above exercises. Assessment   Conditions Requiring Skilled Therapeutic Intervention  Body structures, Functions, Activity limitations: Decreased ROM; Decreased high-level IADLs;Decreased functional mobility ; Decreased strength; Increased pain  Assessment: Pt will benefit from skilled PT to address deficits due to s/p R hip intertrochanteric fracture with surgical correction to restore optimal functional level. Treatment Diagnosis: s/p R hip intertrochanteric fracture with surgical correction  REQUIRES PT FOLLOW UP: Yes  Activity Tolerance  Activity Tolerance: Patient Tolerated treatment well         Plan   Plan  Times per week: 2-3x/week  Plan weeks: 8-10 weeks  Current Treatment Recommendations: Strengthening, Gait Training, Patient/Caregiver Education & Training, ROM, Equipment Evaluation, Education, & procurement, Modalities, Balance Training, Functional Mobility Training, Manual Therapy - Soft Tissue Mobilization, Home Exercise Program      Goals  Short term goals  Time Frame for Short term goals: 4 weeks  Short term goal 1: Pt to be I with HEP  Short term goal 2: Pt to demonstrate R hip abd strength of 4-/5.   Short term goal 3: Pt to perform daily activities with pain of 5/10 or less at greatest.  Long term goals  Time Frame for Long term goals : 8 weeks  Long term goal 1: LEFS score to improve to 65/80 indicating improved function. Long term goal 2: Pt to demonstrate R hip abduction strength of 4/5 or greater. Long term goal 3: Pt to be able to ambulate short community distances without need for a cane with minimum to no gait deviations. Long term goal 4: Pt to perform daily activities with average pain of less than 3/10. Karlie Quinones, PT     Certification of Medical Necessity: It will be understood that this treatment plan is certified medically necessary by the documenting therapist and referring physician mentioned in this report. Unless the physician indicated otherwise through written correspondence with our office, all further referrals will act as certification of medical necessity on this treatment plan. Thank you for this referral.  If you have questions regarding this plan of care, please call [phone].           Revisions to this plan (optional):                     Please sign and return this plan to:   FAX: [phone]      Signature:                                 Date:

## 2020-06-29 ENCOUNTER — HOSPITAL ENCOUNTER (OUTPATIENT)
Dept: PHYSICAL THERAPY | Facility: HOSPITAL | Age: 60
Setting detail: THERAPIES SERIES
Discharge: HOME | End: 2020-06-29
Payer: COMMERCIAL

## 2020-06-29 PROCEDURE — 97110 THERAPEUTIC EXERCISES: CPT

## 2020-07-01 ENCOUNTER — HOSPITAL ENCOUNTER (OUTPATIENT)
Dept: PHYSICAL THERAPY | Facility: HOSPITAL | Age: 60
Setting detail: THERAPIES SERIES
Discharge: HOME | End: 2020-07-01
Payer: COMMERCIAL

## 2020-07-01 PROCEDURE — 97110 THERAPEUTIC EXERCISES: CPT

## 2020-07-01 NOTE — FLOWSHEET NOTE
Physical Therapy Daily Treatment Note   Date:  2020    TIme In:  1129                    Time Out:  4173    Patient Name:  Keith Mercedes    :  1960  MRN: [id number]    Restrictions/Precautions:    Pertinent Medical History:  Medical/Treatment Diagnosis Information:  ·   s/p R hip intertrochanteric fracture with surgical correction  ·   Intertrochanteric fracture, R hip  Insurance/Certification information:     Physician Information:     Plan of care signed (Y/N):    Visit# / total visits:     3/    G-Code (if applicable):      Date / Visit # G-Code Applied:         Progress Note: []  Yes  [x]  No  Next due by: Visit #10      Pain level:   3/10  Subjective: Pt reports she done really well on the steps today and is a little sore. Objective:   Observation:    Test measurements:     Palpation:    Exercises:  Exercise Resistance/Repetitions Other comments   Nustep L4-5, 8' 1   Mini squats 3x10 1   Standing hip abd 3x10 B 1   QS w/ towel roll 3x10 1   SLR  3x10 1   Hip abd / add  BTB / ball, 3x10 1   Clam shells 3x10 1   Bridges 3x10 1   Side stepping 3 laps 1   Step ups (lead with R as shar) 2x10 1               Other Therapeutic Activities:      Manual Treatments:       Modalities:        Timed Code Treatment Minutes:  32      Total Treatment Minutes:  36    Treatment/Activity Tolerance:  [x] Patient tolerated treatment well [] Patient limited by fatigue  [] Patient limited by pain  [] Patient limited by other medical complications  [x] Other: Pt completed tx with mod c/o pain and completed tx with slower performance of exercises.     Pain after treatment:      7/10    Prognosis: [x] Good [] Fair  [] Poor    Patient Requires Follow-up: [x] Yes  [] No    Plan:   [x] Continue per plan of care [] Alter current plan (see comments)  [] Plan of care initiated [] Hold pending MD visit [] Discharge    Plan for Next Session:        Electronically signed by:  Vahid Major PTA

## 2020-07-06 ENCOUNTER — HOSPITAL ENCOUNTER (OUTPATIENT)
Dept: PHYSICAL THERAPY | Facility: HOSPITAL | Age: 60
Setting detail: THERAPIES SERIES
End: 2020-07-06
Payer: COMMERCIAL

## 2020-07-07 ENCOUNTER — HOSPITAL ENCOUNTER (OUTPATIENT)
Dept: PHYSICAL THERAPY | Facility: HOSPITAL | Age: 60
Setting detail: THERAPIES SERIES
Discharge: HOME | End: 2020-07-07
Payer: COMMERCIAL

## 2020-07-07 PROCEDURE — 97110 THERAPEUTIC EXERCISES: CPT

## 2020-07-09 ENCOUNTER — HOSPITAL ENCOUNTER (OUTPATIENT)
Dept: PHYSICAL THERAPY | Facility: HOSPITAL | Age: 60
Setting detail: THERAPIES SERIES
Discharge: HOME | End: 2020-07-09
Payer: COMMERCIAL

## 2020-07-09 PROCEDURE — 97110 THERAPEUTIC EXERCISES: CPT

## 2020-07-09 NOTE — FLOWSHEET NOTE
Physical Therapy Daily Treatment Note   Date:  2020    TIme In:     1128                 Time Out: 1209    Patient Name:  Guanaco Gotti    :  1960  MRN: [id number]    Restrictions/Precautions:    Pertinent Medical History:  Medical/Treatment Diagnosis Information:  ·   s/p R hip intertrochanteric fracture with surgical correction  ·   Intertrochanteric fracture, R hip  Insurance/Certification information:     Physician Information:     Plan of care signed (Y/N):    Visit# / total visits:     5 /    G-Code (if applicable):      Date / Visit # G-Code Applied:         Progress Note: []  Yes  [x]  No  Next due by: Visit #10      Pain level:   4-5 /10    Subjective:    Pt reports: more sore today-states she did some house cleaning yesterday, otherwise ok. Objective:   Observation: guarded gait with mild antalgia, using a cane   Test measurements:     Palpation:    Exercises:  Exercise Resistance/Repetitions Other comments   Nustep L4-5, 8' 9   Mini squats 3x10 9   Standing hip abd 3x10 B 9   QS w/ towel roll 3x10 9   SLR  3x10 9   Hip abd / add  BTB / ball, 3x10 9   Clam shells 3x10 9   Bridges 3x10 9   Side stepping 3 laps 9   Step ups (lead with R as shar) 2x10 9               Other Therapeutic Activities:      Manual Treatments:       Modalities:        Timed Code Treatment Minutes:  45'      Total Treatment Minutes:  39'    Treatment/Activity Tolerance:  [x] Patient tolerated treatment well [] Patient limited by fatigue  [] Patient limited by pain  [] Patient limited by other medical complications  [x] Other: Pt completed tx with mod c/o pain and completed tx with slower performance of exercises.     Pain after treatment:      8 /10    Prognosis: [x] Good [] Fair  [] Poor    Patient Requires Follow-up: [x] Yes  [] No    Plan:   [x] Continue per plan of care [] Alter current plan (see comments)  [] Plan of care initiated [] Hold pending MD visit [] Discharge    Plan for Next Session: Electronically signed by:  Ashley Berger, PT

## 2020-07-13 ENCOUNTER — HOSPITAL ENCOUNTER (OUTPATIENT)
Dept: PHYSICAL THERAPY | Facility: HOSPITAL | Age: 60
Setting detail: THERAPIES SERIES
Discharge: HOME | End: 2020-07-13
Payer: COMMERCIAL

## 2020-07-13 PROCEDURE — 97110 THERAPEUTIC EXERCISES: CPT

## 2020-07-13 NOTE — FLOWSHEET NOTE
Physical Therapy Daily Treatment Note   Date:  2020    TIme In:   1132                   Time Out:  7044    Patient Name:  Sowmya Pimentel    :  1960  MRN: [id number]    Restrictions/Precautions:    Pertinent Medical History:  Medical/Treatment Diagnosis Information:  ·   s/p R hip intertrochanteric fracture with surgical correction  ·   Intertrochanteric fracture, R hip  Insurance/Certification information:     Physician Information:     Plan of care signed (Y/N):    Visit# / total visits:     6 /    G-Code (if applicable):      Date / Visit # G-Code Applied:         Progress Note: []  Yes  [x]  No  Next due by: Visit #10      Pain level:    2/10    Subjective:    Pt reports her hip still hurts but she knows she has to deal with it. Objective:   Observation: guarded gait with mild antalgia, using a cane   Test measurements:     Palpation:    Exercises:  Exercise Resistance/Repetitions Other comments   Nustep L4-5, 8' 13   Mini squats 3x10 13   Standing hip abd 3x10 B 13   QS w/ towel roll 3x10 13   SLR  3x10 13   Hip abd / add  BTB / ball, 3x10 13   Clam shells 3x10 13   Bridges 3x10 13   Side stepping 3 laps 13   Step ups (lead with R as shar) 2x10 13               Other Therapeutic Activities:      Manual Treatments:       Modalities:        Timed Code Treatment Minutes:  28      Total Treatment Minutes:  30    Treatment/Activity Tolerance:  [x] Patient tolerated treatment well [] Patient limited by fatigue  [] Patient limited by pain  [] Patient limited by other medical complications  [x] Other: Pt completed tx with slight increase in pain but good compliance with PT.     Pain after treatment:      3/10    Prognosis: [x] Good [] Fair  [] Poor    Patient Requires Follow-up: [x] Yes  [] No    Plan:   [x] Continue per plan of care [] Alter current plan (see comments)  [] Plan of care initiated [] Hold pending MD visit [] Discharge    Plan for Next Session:        Electronically signed by: Denise Major, PTA

## 2020-07-15 ENCOUNTER — HOSPITAL ENCOUNTER (OUTPATIENT)
Dept: PHYSICAL THERAPY | Facility: HOSPITAL | Age: 60
Setting detail: THERAPIES SERIES
Discharge: HOME | End: 2020-07-15
Payer: COMMERCIAL

## 2020-07-15 PROCEDURE — 97110 THERAPEUTIC EXERCISES: CPT

## 2020-07-15 NOTE — FLOWSHEET NOTE
Physical Therapy Daily Treatment Note   Date:  7/15/2020    TIme In:   1129                   Time Out:  1209    Patient Name:  Liz Hernandez    :  1960  MRN: [id number]    Restrictions/Precautions:    Pertinent Medical History:  Medical/Treatment Diagnosis Information:  ·   s/p R hip intertrochanteric fracture with surgical correction  ·   Intertrochanteric fracture, R hip  Insurance/Certification information:     Physician Information:     Plan of care signed (Y/N):    Visit# / total visits:     6 /    G-Code (if applicable):      Date / Visit # G-Code Applied:         Progress Note: []  Yes  [x]  No  Next due by: Visit #10      Pain level:    2/10    Subjective:    Pt reports: hip still hurts, knees hurt; no significant changes to note    Objective:   Observation: guarded gait with mild antalgia, using a cane   Test measurements:     Palpation:    Exercises:  Exercise Resistance/Repetitions Other comments   Nustep L4-5, 10' 15   Mini squats 3x10 15   Standing hip abd 3x10 B 15   QS w/ towel roll 3x10 15   SLR  3x10 15   Hip abd / add  BTB / ball, 3x10 15   Clam shells 3x10 15   Bridges 3x10 15   Side stepping 3 laps 15   Step ups (lead with R as shar) 3x10 15               Other Therapeutic Activities:      Manual Treatments:       Modalities:        Timed Code Treatment Minutes:  45'      Total Treatment Minutes:  36'    Treatment/Activity Tolerance:  [x] Patient tolerated treatment well [] Patient limited by fatigue  [] Patient limited by pain  [] Patient limited by other medical complications  [x] Other: Pt completed tx with slight increase in pain but good compliance with PT.     Pain after treatment:      3/10    Prognosis: [x] Good [] Fair  [] Poor    Patient Requires Follow-up: [x] Yes  [] No    Plan:   [x] Continue per plan of care [] Alter current plan (see comments)  [] Plan of care initiated [] Hold pending MD visit [] Discharge    Plan for Next Session:        Electronically signed by: Nader Brunson, PT

## 2020-07-20 ENCOUNTER — HOSPITAL ENCOUNTER (OUTPATIENT)
Dept: PHYSICAL THERAPY | Facility: HOSPITAL | Age: 60
Setting detail: THERAPIES SERIES
Discharge: HOME | End: 2020-07-20
Payer: COMMERCIAL

## 2020-07-20 PROCEDURE — 97110 THERAPEUTIC EXERCISES: CPT

## 2020-07-22 ENCOUNTER — HOSPITAL ENCOUNTER (OUTPATIENT)
Dept: PHYSICAL THERAPY | Facility: HOSPITAL | Age: 60
Setting detail: THERAPIES SERIES
Discharge: HOME | End: 2020-07-22
Payer: COMMERCIAL

## 2020-07-22 PROCEDURE — 97110 THERAPEUTIC EXERCISES: CPT

## 2020-07-22 NOTE — FLOWSHEET NOTE
Physical Therapy Daily Treatment Note   Date:  2020    TIme In:   1138                   Time Out:  1219    Patient Name:  Raimundo Stovall    :  1960  MRN: [id number]    Restrictions/Precautions:    Pertinent Medical History:  Medical/Treatment Diagnosis Information:  ·   s/p R hip intertrochanteric fracture with surgical correction  ·   Intertrochanteric fracture, R hip  Insurance/Certification information:     Physician Information:     Plan of care signed (Y/N):    Visit# / total visits:     8 /    G-Code (if applicable):      Date / Visit # G-Code Applied:         Progress Note: []  Yes  [x]  No  Next due by: Visit #10      Pain level:    4 /10    Subjective:    Pt reports: no new c/o today    Objective:   Observation: guarded gait with mild antalgia, using a cane   Test measurements:  LEFS: 37/80, TU\", R hip abd: 4/5.  Palpation:    Exercises:  Exercise Resistance/Repetitions Other comments   Nustep L4-5, 10' 22   Mini squats 3x10 22   Standing hip abd 3x10 B 22   QS w/ towel roll 3x10 22   SLR  3x10 22   Hip abd / add  BTB / ball, 3x10 22   Clam shells 3x10 22   Bridges 3x10 22   Side stepping 3 laps 22   Step ups (lead with R as shar) 3x10 22               Other Therapeutic Activities:     Manual Treatments:       Modalities:        Timed Code Treatment Minutes: 45'      Total Treatment Minutes:  39'    Treatment/Activity Tolerance:  [x] Patient tolerated treatment well [] Patient limited by fatigue  [] Patient limited by pain  [] Patient limited by other medical complications  [x] Other: Pt completed tx with mod increase in pain and continues to have weakness in right hip abductors. Pain after treatment:      6 /10    Goals  Short term goals  Time Frame for Short term goals: 4 weeks  Short term goal 1: Pt to be I with HEP  Short term goal 2: Pt to demonstrate R hip abd strength of 4-/5.   Short term goal 3: Pt to perform daily activities with pain of 5/10 or less at greatest.  Long term goals  Time Frame for Long term goals : 8 weeks  Long term goal 1: LEFS score to improve to 65/80 indicating improved function. Long term goal 2: Pt to demonstrate R hip abduction strength of 4/5 or greater. Long term goal 3: Pt to be able to ambulate short community distances without need for a cane with minimum to no gait deviations. Long term goal 4: Pt to perform daily activities with average pain of less than 3/10.        Prognosis: [x] Good [] Fair  [] Poor    Patient Requires Follow-up: [x] Yes  [] No    Plan:   [x] Continue per plan of care [] Alter current plan (see comments)  [] Plan of care initiated [] Hold pending MD visit [] Discharge    Plan for Next Session:         Electronically signed by:  Francisco Hannah PT

## 2020-07-27 ENCOUNTER — HOSPITAL ENCOUNTER (OUTPATIENT)
Dept: PHYSICAL THERAPY | Facility: HOSPITAL | Age: 60
Setting detail: THERAPIES SERIES
Discharge: HOME | End: 2020-07-27
Payer: COMMERCIAL

## 2020-07-27 PROCEDURE — 97110 THERAPEUTIC EXERCISES: CPT

## 2020-07-27 NOTE — FLOWSHEET NOTE
Physical Therapy Daily Treatment Note   Date:  2020    TIme In:   1132                   Time Out:  1207    Patient Name:  Ashwini Stephens    :  1960  MRN: [id number]    Restrictions/Precautions:    Pertinent Medical History:  Medical/Treatment Diagnosis Information:  ·   s/p R hip intertrochanteric fracture with surgical correction  ·   Intertrochanteric fracture, R hip  Insurance/Certification information:     Physician Information:    Yeni Sneed PA-C  Plan of care signed (Y/N):    Visit# / total visits:     9 /    G-Code (if applicable):      Date / Visit # G-Code Applied:         Progress Note: []  Yes  [x]  No  Next due by: Visit #10      Pain level:    1/10    Subjective:    Pt reports her hip feels like it has a cath in it and sometimes it feels like the perla is going to come out of her butt/thigh. Objective:   Observation: guarded gait with mild antalgia, using a cane   Test measurements:  LEFS: 37/80, TU\", R hip abd: 4/5.  Palpation:    Exercises:  Exercise Resistance/Repetitions Other comments   Nustep L4-5, 10' 27   Mini squats 3x10 27   Standing hip abd 3x10 B 27   QS w/ towel roll 3x10 27   SLR  3x10 27   Hip abd / add  BTB / ball, 3x10 27   Clam shells 3x10 27   Bridges 3x10 27   Side stepping 3 laps 27   Step ups (lead with R as shar) 3x10 27               Other Therapeutic Activities:     Manual Treatments:       Modalities:        Timed Code Treatment Minutes: 32      Total Treatment Minutes:  35    Treatment/Activity Tolerance:  [x] Patient tolerated treatment well [] Patient limited by fatigue  [] Patient limited by pain  [] Patient limited by other medical complications  [x] Other: Pt completed tx with complaints of instability of right hip jnt    Pain after treatment:      -- /10    Goals  Short term goals  Time Frame for Short term goals: 4 weeks  Short term goal 1: Pt to be I with HEP  Short term goal 2: Pt to demonstrate R hip abd strength of 4-/5.   Short term goal 3: Pt to perform daily activities with pain of 5/10 or less at greatest.  Long term goals  Time Frame for Long term goals : 8 weeks  Long term goal 1: LEFS score to improve to 65/80 indicating improved function. Long term goal 2: Pt to demonstrate R hip abduction strength of 4/5 or greater. Long term goal 3: Pt to be able to ambulate short community distances without need for a cane with minimum to no gait deviations. Long term goal 4: Pt to perform daily activities with average pain of less than 3/10.        Prognosis: [x] Good [] Fair  [] Poor    Patient Requires Follow-up: [x] Yes  [] No    Plan:   [x] Continue per plan of care [] Alter current plan (see comments)  [] Plan of care initiated [] Hold pending MD visit [] Discharge    Plan for Next Session:         Electronically signed by:  Neris Major PTA

## 2020-07-29 ENCOUNTER — APPOINTMENT (OUTPATIENT)
Dept: PHYSICAL THERAPY | Facility: HOSPITAL | Age: 60
End: 2020-07-29
Payer: COMMERCIAL

## 2020-08-03 ENCOUNTER — HOSPITAL ENCOUNTER (OUTPATIENT)
Dept: PHYSICAL THERAPY | Facility: HOSPITAL | Age: 60
Setting detail: THERAPIES SERIES
Discharge: HOME | End: 2020-08-03
Payer: COMMERCIAL

## 2020-08-03 PROCEDURE — 97110 THERAPEUTIC EXERCISES: CPT

## 2020-08-03 NOTE — FLOWSHEET NOTE
Physical Therapy Daily Treatment Note   Date:  8/3/2020    TIme In:  6046                    Time Out:  1200    Patient Name:  Fanny Garcia    :  1960  MRN: [id number]    Restrictions/Precautions:    Pertinent Medical History:  Medical/Treatment Diagnosis Information:  ·   s/p R hip intertrochanteric fracture with surgical correction  ·   Intertrochanteric fracture, R hip  Insurance/Certification information:     Physician Information:    Zoltan Jones PA-C  Plan of care signed (Y/N):    Visit# / total visits:     10/    G-Code (if applicable):      Date / Visit # G-Code Applied:         Progress Note: []  Yes  [x]  No  Next due by: Visit #10      Pain level:    0/10    Subjective:    Pt reports she went back to the doc and they are going to send her to a specialist and will most likely do another surgery. Objective:   Observation: guarded gait with mild antalgia, using a cane   Test measurements:  LEFS: 37/80, TU\", R hip abd: 4/5.  Palpation:    Exercises:  Exercise Resistance/Repetitions Other comments   Nustep L4-5, 10' 3   Mini squats 3x10 3   Standing hip abd 3x10 B 3   QS w/ towel roll 3x10 3   SLR  3x10 3   Hip abd / add  BTB / ball, 3x10 3   Clam shells 3x10 3   Bridges 3x10 3   Side stepping 3 laps 3   Step ups (lead with R as shar) 3x10 3               Other Therapeutic Activities:     Manual Treatments:       Modalities:        Timed Code Treatment Minutes: 35      Total Treatment Minutes:  38    Treatment/Activity Tolerance:  [x] Patient tolerated treatment well [] Patient limited by fatigue  [] Patient limited by pain  [] Patient limited by other medical complications  [x] Other: Pt completed tx with no pain and no new symptoms. Pain after treatment:      0/10    Goals  Short term goals  Time Frame for Short term goals: 4 weeks  Short term goal 1: Pt to be I with HEP  Short term goal 2: Pt to demonstrate R hip abd strength of 4-/5.   Short term goal 3: Pt to perform daily activities with pain of 5/10 or less at greatest.  Long term goals  Time Frame for Long term goals : 8 weeks  Long term goal 1: LEFS score to improve to 65/80 indicating improved function. Long term goal 2: Pt to demonstrate R hip abduction strength of 4/5 or greater. Long term goal 3: Pt to be able to ambulate short community distances without need for a cane with minimum to no gait deviations. Long term goal 4: Pt to perform daily activities with average pain of less than 3/10.        Prognosis: [x] Good [] Fair  [] Poor    Patient Requires Follow-up: [x] Yes  [] No    Plan:   [x] Continue per plan of care [] Alter current plan (see comments)  [] Plan of care initiated [] Hold pending MD visit [] Discharge    Plan for Next Session:         Electronically signed by:  Daysi Major PTA

## 2020-08-05 ENCOUNTER — APPOINTMENT (OUTPATIENT)
Dept: PHYSICAL THERAPY | Facility: HOSPITAL | Age: 60
End: 2020-08-05
Payer: COMMERCIAL

## 2020-08-06 ENCOUNTER — HOSPITAL ENCOUNTER (OUTPATIENT)
Dept: PHYSICAL THERAPY | Facility: HOSPITAL | Age: 60
Setting detail: THERAPIES SERIES
Discharge: HOME | End: 2020-08-06
Payer: COMMERCIAL

## 2020-08-06 PROCEDURE — 97110 THERAPEUTIC EXERCISES: CPT

## 2020-08-06 NOTE — FLOWSHEET NOTE
Physical Therapy Daily Treatment Note   Date:  2020    TIme In:   1105                  Time Out:  8767    Patient Name:  Raimundo Stovall    :  1960  MRN: [id number]    Restrictions/Precautions:    Pertinent Medical History:  Medical/Treatment Diagnosis Information:  ·   s/p R hip intertrochanteric fracture with surgical correction  ·   Intertrochanteric fracture, R hip  Insurance/Certification information:     Physician Information:    Sania Sterling PA-C  Plan of care signed (Y/N):    Visit# / total visits:     11/    G-Code (if applicable):      Date / Visit # G-Code Applied:         Progress Note: []  Yes  [x]  No  Next due by: Visit #10      Pain level:    1/10    Subjective:    Pt reports she isn't hurting too bad today. She expressed that she hurts worse at night and when she sits too long and gets up to move. Objective:   Observation: guarded gait with mild antalgia, using a cane   Test measurements:  LEFS: 37/80, TU\", R hip abd: 4/5.  Palpation:    Exercises:  Exercise Resistance/Repetitions Other comments   Nustep L4-5, 10' 6   Mini squats 3x10 6   Standing hip abd 3x10 B 6   QS w/ towel roll 3x10 6   SLR  3x10 6   Hip abd / add  BTB / ball, 3x10 6   Clam shells 3x10 6   Bridges 3x10 6   Side stepping 3 laps 6   Step ups (lead with R as shar) 3x10 6               Other Therapeutic Activities: Pt ambulated well in PT clinic with quad cane and is recommended pt use of said device at home or in public places. Manual Treatments:       Modalities:        Timed Code Treatment Minutes: 30      Total Treatment Minutes:  31    Treatment/Activity Tolerance:  [x] Patient tolerated treatment well [] Patient limited by fatigue  [] Patient limited by pain  [] Patient limited by other medical complications  [x] Other: Pt completed tx with slight increase in pain and good tolerance to activity.      Pain after treatment:      3/10    Goals  Short term goals  Time Frame for Short term goals: 4 weeks  Short term goal 1: Pt to be I with HEP  Short term goal 2: Pt to demonstrate R hip abd strength of 4-/5. Short term goal 3: Pt to perform daily activities with pain of 5/10 or less at greatest.  Long term goals  Time Frame for Long term goals : 8 weeks  Long term goal 1: LEFS score to improve to 65/80 indicating improved function. Long term goal 2: Pt to demonstrate R hip abduction strength of 4/5 or greater. Long term goal 3: Pt to be able to ambulate short community distances without need for a cane with minimum to no gait deviations. Long term goal 4: Pt to perform daily activities with average pain of less than 3/10.        Prognosis: [x] Good [] Fair  [] Poor    Patient Requires Follow-up: [x] Yes  [] No    Plan:   [x] Continue per plan of care [] Alter current plan (see comments)  [] Plan of care initiated [] Hold pending MD visit [] Discharge    Plan for Next Session:         Electronically signed by:  Olya Major PTA

## 2020-08-10 ENCOUNTER — HOSPITAL ENCOUNTER (OUTPATIENT)
Dept: PHYSICAL THERAPY | Facility: HOSPITAL | Age: 60
Setting detail: THERAPIES SERIES
Discharge: HOME | End: 2020-08-10
Payer: COMMERCIAL

## 2020-08-10 PROCEDURE — 97110 THERAPEUTIC EXERCISES: CPT

## 2020-08-10 NOTE — FLOWSHEET NOTE
Physical Therapy Daily Treatment Note   Date:  8/10/2020    TIme In:   1130                  Time Out:  5300    Patient Name:  Mitzi Echo    :  1960  MRN: [id number]    Restrictions/Precautions:    Pertinent Medical History:  Medical/Treatment Diagnosis Information:  ·   s/p R hip intertrochanteric fracture with surgical correction  ·   Intertrochanteric fracture, R hip  Insurance/Certification information:     Physician Information:    Rakel Reese PA-C  Plan of care signed (Y/N):    Visit# / total visits:     12/    G-Code (if applicable):      Date / Visit # G-Code Applied:         Progress Note: []  Yes  [x]  No  Next due by: Visit #10      Pain level:    0/10    Subjective:    Pt reports her pain isn't bad today and she goes at the end of the month to the specialist.  Objective:   Observation: guarded gait with mild antalgia, using a cane   Test measurements:  LEFS: 37/80, TU\", R hip abd: 4/5.  Palpation:    Exercises:  Exercise Resistance/Repetitions Other comments   Nustep L4-5, 10' 10   Mini squats 3x10 10   Standing hip abd 3x10 B 10   QS w/ towel roll 3x10 10   SLR  3x10 10   Hip abd / add  BTB / ball, 3x10 10   Clam shells 3x10 10   Bridges 3x10 10   Side stepping 3 laps 10   Step ups (lead with R as shar) 3x10 10   HS curls 2x10 green 10   LAQ 2x10 10   Mini lunge 2x10 10               Other Therapeutic Activities: Pt ambulated well in PT clinic with quad cane and is recommended pt use of said device at home or in public places. Manual Treatments:       Modalities:        Timed Code Treatment Minutes: 39      Total Treatment Minutes:  41    Treatment/Activity Tolerance:  [x] Patient tolerated treatment well [] Patient limited by fatigue  [] Patient limited by pain  [] Patient limited by other medical complications  [x] Other: Pt completed tx with slight increase in pain due to additional exercises today.     Pain after treatment:      3/10    Goals  Short term goals  Time Frame for Short term goals: 4 weeks  Short term goal 1: Pt to be I with HEP  Short term goal 2: Pt to demonstrate R hip abd strength of 4-/5. Short term goal 3: Pt to perform daily activities with pain of 5/10 or less at greatest.  Long term goals  Time Frame for Long term goals : 8 weeks  Long term goal 1: LEFS score to improve to 65/80 indicating improved function. Long term goal 2: Pt to demonstrate R hip abduction strength of 4/5 or greater. Long term goal 3: Pt to be able to ambulate short community distances without need for a cane with minimum to no gait deviations. Long term goal 4: Pt to perform daily activities with average pain of less than 3/10.        Prognosis: [x] Good [] Fair  [] Poor    Patient Requires Follow-up: [x] Yes  [] No    Plan:   [x] Continue per plan of care [] Alter current plan (see comments)  [] Plan of care initiated [] Hold pending MD visit [] Discharge    Plan for Next Session:         Electronically signed by:  Keke Major PTA

## 2020-08-12 ENCOUNTER — HOSPITAL ENCOUNTER (OUTPATIENT)
Dept: PHYSICAL THERAPY | Facility: HOSPITAL | Age: 60
Setting detail: THERAPIES SERIES
Discharge: HOME | End: 2020-08-12
Payer: COMMERCIAL

## 2020-08-12 PROCEDURE — 97110 THERAPEUTIC EXERCISES: CPT

## 2020-08-12 NOTE — FLOWSHEET NOTE
Physical Therapy Daily Treatment Note   Date:  2020    TIme In:     1131                Time Out:  6618    Patient Name:  Michel Hung    :  1960  MRN: [id number]    Restrictions/Precautions:    Pertinent Medical History:  Medical/Treatment Diagnosis Information:  ·   s/p R hip intertrochanteric fracture with surgical correction  ·   Intertrochanteric fracture, R hip  Insurance/Certification information:     Physician Information:    Comfort Clinton PA-C  Plan of care signed (Y/N):    Visit# / total visits:     12/    G-Code (if applicable):      Date / Visit # G-Code Applied:         Progress Note: []  Yes  [x]  No  Next due by: Visit #10      Pain level:    2 /10    Subjective:    Pt reports: no changes to note; trying to walk some at home without the cane, but hip still hurts with FWBing; sees surgeon soon    Objective:   Observation: guarded gait with mild antalgia, using a cane   Test measurements:  LEFS: 37/80, TU\", R hip abd: 4/5.  Palpation:    Exercises:  Exercise Resistance/Repetitions Other comments   Nustep L4-5, 10' 12   Mini squats 3x10 12   Standing hip abd 3x10 B 12   QS w/ towel roll 3x10 12   SLR  3x10 12   Hip abd / add  BTB / ball, 3x10 12   Clam shells 3x10 12   Bridges 3x10 12   Side stepping 3 laps 12   Step ups (lead with R as shar) 3x10 12   HS curls 2x10 green 12   LAQ 2x10 12   Mini lunge 2x10 12               Other Therapeutic Activities:     Manual Treatments:       Modalities:        Timed Code Treatment Minutes: 45'      Total Treatment Minutes:  36'    Treatment/Activity Tolerance:  [x] Patient tolerated treatment well [] Patient limited by fatigue  [] Patient limited by pain  [] Patient limited by other medical complications  [x] Other: Pt completed tx with slight increase in pain due to additional exercises today.     Pain after treatment:    5  /10    Goals  Short term goals  Time Frame for Short term goals: 4 weeks  Short term goal 1: Pt to be I with HEP  Short term goal 2: Pt to demonstrate R hip abd strength of 4-/5. Short term goal 3: Pt to perform daily activities with pain of 5/10 or less at greatest.  Long term goals  Time Frame for Long term goals : 8 weeks  Long term goal 1: LEFS score to improve to 65/80 indicating improved function. Long term goal 2: Pt to demonstrate R hip abduction strength of 4/5 or greater. Long term goal 3: Pt to be able to ambulate short community distances without need for a cane with minimum to no gait deviations. Long term goal 4: Pt to perform daily activities with average pain of less than 3/10.        Prognosis: [x] Good [] Fair  [] Poor    Patient Requires Follow-up: [x] Yes  [] No    Plan:   [x] Continue per plan of care [] Alter current plan (see comments)  [] Plan of care initiated [] Hold pending MD visit [] Discharge    Plan for Next Session:         Electronically signed by:  Manju Duncan PT

## 2020-08-17 ENCOUNTER — HOSPITAL ENCOUNTER (OUTPATIENT)
Dept: PHYSICAL THERAPY | Facility: HOSPITAL | Age: 60
Setting detail: THERAPIES SERIES
Discharge: HOME | End: 2020-08-17
Payer: COMMERCIAL

## 2020-08-17 PROCEDURE — 97110 THERAPEUTIC EXERCISES: CPT

## 2020-08-17 NOTE — FLOWSHEET NOTE
Physical Therapy Reassessment Note   Date:  2020    TIme In:   1133                  Time Out:  1210    Patient Name:  Rico Frazier    :  1960  MRN: [id number]    Restrictions/Precautions:    Pertinent Medical History:  Medical/Treatment Diagnosis Information:  ·   s/p R hip intertrochanteric fracture with surgical correction  ·   Intertrochanteric fracture, R hip  Insurance/Certification information:     Physician Information:    Crys Mohan PA-C  Plan of care signed (Y/N):    Visit# / total visits:     13/    G-Code (if applicable):      Date / Visit # G-Code Applied:         Progress Note: []  Yes  [x]  No  Next due by: Visit #10      Pain level:    2-3/10    Subjective:    Pt reports she goes to the specialist at then end of this month. She expressed her hip still feels like it is going to come out of her butt cheek. Objective:   Observation: guarded gait with mild antalgia, using a cane   Test measurements:  LEFS: 33/80, TU\", R hip abd: 4+/5.  Palpation:    Exercises:  Exercise Resistance/Repetitions Other comments   Nustep L4-5, 10' 17   Mini squats 3x10 17   Standing hip abd 3x10 B 17   QS w/ towel roll 3x10 17   SLR  3x10 17   Hip abd / add  BTB / ball, 3x10 17   Clam shells 3x10 17   Bridges 3x10 17   Side stepping 3 laps 17   Step ups (lead with R as shar) 3x10 17   HS curls 2x10 green 17   LAQ 2x10 17   Mini lunge 2x10 17               Other Therapeutic Activities: Re-assessment performed by Robel Olivares PT. Manual Treatments:       Modalities:        Timed Code Treatment Minutes: 35      Total Treatment Minutes:  37    Treatment/Activity Tolerance:  [x] Patient tolerated treatment well [] Patient limited by fatigue  [] Patient limited by pain  [] Patient limited by other medical complications  [x] Other: Pt completed tx with mod c/o pain but is maintaining good functional measures with improved strength in right hip abductors.     Pain after treatment: 5-6/10    Goals  Short term goals  Time Frame for Short term goals: 4 weeks  Short term goal 1: Pt to be I with HEP - met  Short term goal 2: Pt to demonstrate R hip abd strength of 4-/5. - met  Short term goal 3: Pt to perform daily activities with pain of 5/10 or less at greatest. - not met but slightly improved  Long term goals  Time Frame for Long term goals : 8 weeks  Long term goal 1: LEFS score to improve to 65/80 indicating improved function. - not met  Long term goal 2: Pt to demonstrate R hip abduction strength of 4/5 or greater. - met  Long term goal 3: Pt to be able to ambulate short community distances without need for a cane with minimum to no gait deviations. Long term goal 4: Pt to perform daily activities with average pain of less than 3/10. Patient has shown improvement with general functional mobility (LEFS score improved by 6) and hip strength; however, her pain c/o are not significantly changed.        Prognosis: [x] Good [] Fair  [] Poor    Patient Requires Follow-up: [x] Yes  [] No    Plan:   [x] Continue per plan of care [] Alter current plan (see comments)  [] Plan of care initiated [] Hold pending MD visit [] Discharge    Plan for Next Session:         Electronically signed by:  Brittny Vallejo PTA      Electronically signed by Velia Coleman PT on 8/19/2020 at 1:13 PM

## 2020-08-19 ENCOUNTER — HOSPITAL ENCOUNTER (OUTPATIENT)
Dept: PHYSICAL THERAPY | Facility: HOSPITAL | Age: 60
Setting detail: THERAPIES SERIES
Discharge: HOME | End: 2020-08-19
Payer: COMMERCIAL

## 2020-08-19 PROCEDURE — 97110 THERAPEUTIC EXERCISES: CPT

## 2020-08-19 NOTE — FLOWSHEET NOTE
Physical Therapy Daily Treatment Note   Date:  2020    TIme In:   1136                  Time Out:  7792    Patient Name:  Moises Walton    :  1960  MRN: [id number]    Restrictions/Precautions:    Pertinent Medical History:  Medical/Treatment Diagnosis Information:  ·   s/p R hip intertrochanteric fracture with surgical correction  ·   Intertrochanteric fracture, R hip  Insurance/Certification information:     Physician Information:    Vinayak Garza PA-C  Plan of care signed (Y/N):    Visit# / total visits:     15 /    G-Code (if applicable):      Date / Visit # G-Code Applied:         Progress Note: []  Yes  [x]  No  Next due by: Visit #10      Pain level:    23/10    Subjective:    Pt reports she goes to the specialist at then end of this month; hip is still sore. Objective:   Observation: guarded gait with mild antalgia, using a cane   Test measurements:  LEFS: 33/80, TU\", R hip abd: 4+/5.    Palpation:    Exercises:  Exercise Resistance/Repetitions Other comments   Nustep L4-5, 10' 19   Mini squats 3x10 19   Standing hip abd 3x10 B 19   QS w/ towel roll 3x10 19   SLR  3x10 19   Hip abd / add  BTB / ball, 3x10 19   Clam shells 3x10 19   Bridges 3x10 19   Side stepping 3 laps 19   Step ups (lead with R as shar) 3x10 19   HS curls 2x10 green 19   LAQ 2x10 19   Mini lunge 2x10 19               Other Therapeutic Activities:    Manual Treatments:       Modalities:        Timed Code Treatment Minutes: 45'      Total Treatment Minutes:  40    Treatment/Activity Tolerance:  [x] Patient tolerated treatment well [] Patient limited by fatigue  [] Patient limited by pain  [] Patient limited by other medical complications  [x] Other: Pt completed tx with mild c/o pain but is maintaining good functional measures with improved strength in right hip abductors; completed exercises well today    Pain after treatment:   4/10    Goals  Short term goals  Time Frame for Short term goals: 4 weeks  Short term goal 1: Pt to be I with HEP  Short term goal 2: Pt to demonstrate R hip abd strength of 4-/5. Short term goal 3: Pt to perform daily activities with pain of 5/10 or less at greatest.  Long term goals  Time Frame for Long term goals : 8 weeks  Long term goal 1: LEFS score to improve to 65/80 indicating improved function. Long term goal 2: Pt to demonstrate R hip abduction strength of 4/5 or greater. Long term goal 3: Pt to be able to ambulate short community distances without need for a cane with minimum to no gait deviations. Long term goal 4: Pt to perform daily activities with average pain of less than 3/10.        Prognosis: [x] Good [] Fair  [] Poor    Patient Requires Follow-up: [x] Yes  [] No    Plan:   [x] Continue per plan of care [] Alter current plan (see comments)  [] Plan of care initiated [] Hold pending MD visit [] Discharge    Plan for Next Session:         Electronically signed by:  Francisco Hannah PT

## 2020-08-24 ENCOUNTER — HOSPITAL ENCOUNTER (OUTPATIENT)
Dept: PHYSICAL THERAPY | Facility: HOSPITAL | Age: 60
Setting detail: THERAPIES SERIES
Discharge: HOME | End: 2020-08-24
Payer: COMMERCIAL

## 2020-08-24 PROCEDURE — 97110 THERAPEUTIC EXERCISES: CPT

## 2020-08-24 NOTE — FLOWSHEET NOTE
Physical Therapy Daily Treatment Note   Date:  2020    TIme In:   3551                  Time Out:  7284    Patient Name:  Sandhya Morgan    :  1960  MRN: [id number]    Restrictions/Precautions:    Pertinent Medical History:  Medical/Treatment Diagnosis Information:  ·   s/p R hip intertrochanteric fracture with surgical correction  ·   Intertrochanteric fracture, R hip  Insurance/Certification information:     Physician Information:    Refugio Evans PA-C  Plan of care signed (Y/N):    Visit# / total visits:     13 /    G-Code (if applicable):      Date / Visit # G-Code Applied:         Progress Note: []  Yes  [x]  No  Next due by: Visit #10      Pain level:    0 /10    Subjective:    Pt reports she goes to the specialist at then end of this month; hip is still sore, feeling pretty good at the moment. Objective:   Observation: guarded gait with mild antalgia, using a cane   Test measurements:  LEFS: 33/80, TU\", R hip abd: 4+/5.    Palpation:    Exercises:  Exercise Resistance/Repetitions Other comments   Nustep L4-5, 10' 24   Mini squats 3x10 24   Standing hip abd 3x10 B 24   QS w/ towel roll 3x10 24   SLR  3x10 24   Hip abd / add  BTB / ball, 3x10 24   Clam shells 3x10 24   Bridges 3x10 24   Side stepping 3 laps 24   Step ups (lead with R as shar) 3x10 24   HS curls 2x10 green 24   LAQ 2x10 24   Mini lunge 2x10 24               Other Therapeutic Activities:    Manual Treatments:       Modalities:        Timed Code Treatment Minutes: 45'      Total Treatment Minutes:  39'    Treatment/Activity Tolerance:  [x] Patient tolerated treatment well [] Patient limited by fatigue  [] Patient limited by pain  [] Patient limited by other medical complications  [x] Other: Pt completed tx with mild c/o pain but is maintaining good functional measures with improved strength in right hip abductors; completed exercises well today    Pain after treatment:   0/10      Prognosis: [x] Good [] Fair  [] Poor    Goals  Short term goals  Time Frame for Short term goals: 4 weeks  Short term goal 1: Pt to be I with HEP - met  Short term goal 2: Pt to demonstrate R hip abd strength of 4-/5. - met  Short term goal 3: Pt to perform daily activities with pain of 5/10 or less at greatest. - not met but slightly improved  Long term goals  Time Frame for Long term goals : 8 weeks  Long term goal 1: LEFS score to improve to 65/80 indicating improved function. - not met  Long term goal 2: Pt to demonstrate R hip abduction strength of 4/5 or greater. - met  Long term goal 3: Pt to be able to ambulate short community distances without need for a cane with minimum to no gait deviations.   Long term goal 4: Pt to perform daily activities with average pain of less than 3/10.     Patient has shown improvement with general functional mobility (LEFS score improved by 6) and hip strength; however, her pain c/o are not significantly changed.         Patient Requires Follow-up: [x] Yes  [] No    Plan:   [x] Continue per plan of care [] Alter current plan (see comments)  [] Plan of care initiated [] Hold pending MD visit [] Discharge    Plan for Next Session:         Electronically signed by:  Aminata Murphy, PT

## 2020-08-26 ENCOUNTER — HOSPITAL ENCOUNTER (OUTPATIENT)
Dept: PHYSICAL THERAPY | Facility: HOSPITAL | Age: 60
Setting detail: THERAPIES SERIES
Discharge: HOME | End: 2020-08-26
Payer: COMMERCIAL

## 2020-08-26 PROCEDURE — 97110 THERAPEUTIC EXERCISES: CPT

## 2020-08-26 NOTE — FLOWSHEET NOTE
Physical Therapy Daily Treatment Note   Date:  2020    TIme In:   1134                  Time Out:  6406    Patient Name:  Oscar High    :  1960  MRN: [id number]    Restrictions/Precautions:    Pertinent Medical History:  Medical/Treatment Diagnosis Information:  ·   s/p R hip intertrochanteric fracture with surgical correction  ·   Intertrochanteric fracture, R hip  Insurance/Certification information:     Physician Information:    Rex Chu PA-C  Plan of care signed (Y/N):    Visit# / total visits:     12 /    G-Code (if applicable):      Date / Visit # G-Code Applied:         Progress Note: []  Yes  [x]  No  Next due by: Visit #10      Pain level:    0 /10    Subjective:    Pt reports: feeling better this week; no new c/o. Objective:   Observation: guarded gait with mild antalgia, using a cane   Test measurements:  LEFS: 33/80, TU\", R hip abd: 4+/5.    Palpation:    Exercises:  Exercise Resistance/Repetitions Other comments   Nustep L4-5, 10' 26   Mini squats 3x10 26   Standing hip abd 3x10 B 26   QS w/ towel roll 3x10 26   SLR  3x10 26   Hip abd / add  BTB / ball, 3x10 26   Clam shells 3x10 26   Bridges 3x10 26   Side stepping 3 laps 26   Step ups (lead with R as shar) 3x10 26   HS curls 2x10 green 26   LAQ 2x10 26   Mini lunge 2x10 26               Other Therapeutic Activities:    Manual Treatments:       Modalities:        Timed Code Treatment Minutes: 36'      Total Treatment Minutes:  50'    Treatment/Activity Tolerance:  [x] Patient tolerated treatment well [] Patient limited by fatigue  [] Patient limited by pain  [] Patient limited by other medical complications  [x] Other: Pt completed tx with mild c/o pain but is maintaining good functional measures with improved strength in right hip abductors; completed exercises well today    Pain after treatment:   0/10      Prognosis: [x] Good [] Fair  [] Poor    Goals  Short term goals  Time Frame for Short term goals: 4 weeks  Short term goal 1: Pt to be I with HEP - met  Short term goal 2: Pt to demonstrate R hip abd strength of 4-/5. - met  Short term goal 3: Pt to perform daily activities with pain of 5/10 or less at greatest. - not met but slightly improved  Long term goals  Time Frame for Long term goals : 8 weeks  Long term goal 1: LEFS score to improve to 65/80 indicating improved function. - not met  Long term goal 2: Pt to demonstrate R hip abduction strength of 4/5 or greater. - met  Long term goal 3: Pt to be able to ambulate short community distances without need for a cane with minimum to no gait deviations.   Long term goal 4: Pt to perform daily activities with average pain of less than 3/10.     Patient has shown improvement with general functional mobility (LEFS score improved by 6) and hip strength; however, her pain c/o are not significantly changed.         Patient Requires Follow-up: [x] Yes  [] No    Plan:   [x] Continue per plan of care [] Alter current plan (see comments)  [] Plan of care initiated [] Hold pending MD visit [] Discharge    Plan for Next Session:         Electronically signed by:  Sheila Lima, PT

## 2020-09-09 ENCOUNTER — HOSPITAL ENCOUNTER (OUTPATIENT)
Dept: PHYSICAL THERAPY | Facility: HOSPITAL | Age: 60
Setting detail: THERAPIES SERIES
Discharge: HOME | End: 2020-09-09
Payer: COMMERCIAL

## 2020-09-09 PROCEDURE — 97110 THERAPEUTIC EXERCISES: CPT

## 2020-09-09 NOTE — FLOWSHEET NOTE
Physical Therapy Daily Treatment Note   Date:  2020    TIme In:   1103                  Time Out:  7554    Patient Name:  Belgica Dahl    :  1960  MRN: [id number]    Restrictions/Precautions:    Pertinent Medical History:  Medical/Treatment Diagnosis Information:  ·   s/p R hip intertrochanteric fracture with surgical correction  ·   Intertrochanteric fracture, R hip  Insurance/Certification information:     Physician Information:    Marcel Dennison PA-C  Plan of care signed (Y/N):    Visit# / total visits:     16 /    G-Code (if applicable):      Date / Visit # G-Code Applied:         Progress Note: []  Yes  [x]  No  Next due by: Visit #10      Pain level:    0 /10    Subjective:    Pt reports: feeling better this week; no new c/o; had orthopedic consult for right hip - is scheduled for a CT scan, with plans to refer to [facility] orthopedic trauma surgeon. Objective:   Observation: guarded gait with mild antalgia, using a cane   Test measurements:  LEFS: 33/80, TU\", R hip abd: 4+/5.    Palpation:    Exercises:  Exercise Resistance/Repetitions Other comments   Nustep L4-5, 10' 9   Mini squats 3x10 9   Standing hip abd 3x10 B 9   QS w/ towel roll 3x10 9   SLR  3x10 9   Hip abd / add  BTB / ball, 3x10 9   Clam shells 3x10 9   Bridges 3x10 9   Side stepping 3 laps 9   Step ups (lead with R as shar) 3x10 9   HS curls 2x10 green 9   LAQ 2x10 9   Mini lunge 2x10 9               Other Therapeutic Activities:    Manual Treatments:       Modalities:        Timed Code Treatment Minutes: 36'      Total Treatment Minutes:  39'    Treatment/Activity Tolerance:  [x] Patient tolerated treatment well [] Patient limited by fatigue  [] Patient limited by pain  [] Patient limited by other medical complications  [x] Other: Pt completed tx with mild c/o pain but is maintaining good functional measures with improved strength in right hip abductors; completed exercises well today    Pain after treatment: 0/10      Prognosis: [x] Good [] Fair  [] Poor    Goals  Short term goals  Time Frame for Short term goals: 4 weeks  Short term goal 1: Pt to be I with HEP - met  Short term goal 2: Pt to demonstrate R hip abd strength of 4-/5. - met  Short term goal 3: Pt to perform daily activities with pain of 5/10 or less at greatest. - not met but slightly improved  Long term goals  Time Frame for Long term goals : 8 weeks  Long term goal 1: LEFS score to improve to 65/80 indicating improved function. - not met  Long term goal 2: Pt to demonstrate R hip abduction strength of 4/5 or greater. - met  Long term goal 3: Pt to be able to ambulate short community distances without need for a cane with minimum to no gait deviations.   Long term goal 4: Pt to perform daily activities with average pain of less than 3/10.     Patient has shown improvement with general functional mobility (LEFS score improved by 6) and hip strength; however, her pain c/o are not significantly changed.         Patient Requires Follow-up: [x] Yes  [] No    Plan:   [x] Continue per plan of care [] Alter current plan (see comments)  [] Plan of care initiated [] Hold pending MD visit [] Discharge    Plan for Next Session:         Electronically signed by:  Francesca No, PT

## 2020-09-14 ENCOUNTER — HOSPITAL ENCOUNTER (OUTPATIENT)
Dept: PHYSICAL THERAPY | Facility: HOSPITAL | Age: 60
Setting detail: THERAPIES SERIES
Discharge: HOME | End: 2020-09-14
Payer: COMMERCIAL

## 2020-09-14 PROCEDURE — 97110 THERAPEUTIC EXERCISES: CPT

## 2020-09-14 NOTE — FLOWSHEET NOTE
Physical Therapy Daily Treatment Note   Date:  2020    TIme In:   1121                  Time Out:  1159    Patient Name:  Mitzi Chapman    :  1960  MRN: [id number]    Restrictions/Precautions:    Pertinent Medical History:  Medical/Treatment Diagnosis Information:  ·   s/p R hip intertrochanteric fracture with surgical correction  ·   Intertrochanteric fracture, R hip  Insurance/Certification information:     Physician Information:    Rakel Reese PA-C  Plan of care signed (Y/N):    Visit# / total visits:     18/    G-Code (if applicable):      Date / Visit # G-Code Applied:         Progress Note: []  Yes  [x]  No  Next due by: Visit #10      Pain level:   4 /10    Subjective:    Pt reports no new complaints today. Objective:   Observation: guarded gait with mild antalgia, using a cane   Test measurements:  LEFS: 33/80, TU\", R hip abd: 4+/5.  Palpation:    Exercises:  Exercise Resistance/Repetitions Other comments   Nustep L4-5, 10' 14   Mini squats 3x10 14   Standing hip abd 3x10 B 14   QS w/ towel roll 3x10 14   SLR  3x10 14   Hip abd / add  BTB / ball, 3x10 14   Clam shells 3x10 14   Bridges 3x10 14   Side stepping 3 laps 14   Step ups (lead with R as shar) 3x10 14   HS curls 2x10 green 14   LAQ 2x10 14   Mini lunge 2x10 14               Other Therapeutic Activities:    Manual Treatments:       Modalities:        Timed Code Treatment Minutes: 35      Total Treatment Minutes:  38    Treatment/Activity Tolerance:  [x] Patient tolerated treatment well [] Patient limited by fatigue  [] Patient limited by pain  [] Patient limited by other medical complications  [x] Other: Pt completed tx with no change in pain but is motivated to return to prior level of function.     Pain after treatment:   4/10      Prognosis: [x] Good [] Fair  [] Poor    Goals  Short term goals  Time Frame for Short term goals: 4 weeks  Short term goal 1: Pt to be I with HEP - met  Short term goal 2: Pt to

## 2020-09-16 ENCOUNTER — HOSPITAL ENCOUNTER (OUTPATIENT)
Dept: PHYSICAL THERAPY | Facility: HOSPITAL | Age: 60
Setting detail: THERAPIES SERIES
Discharge: HOME | End: 2020-09-16
Payer: COMMERCIAL

## 2020-09-16 PROCEDURE — 97110 THERAPEUTIC EXERCISES: CPT

## 2020-09-16 NOTE — FLOWSHEET NOTE
Physical Therapy Reassessment Note   Date:  2020    TIme In:   1121                  Time Out:  1159    Patient Name:  Traci Price    :  1960  MRN: [id number]    Restrictions/Precautions:    Pertinent Medical History:  Medical/Treatment Diagnosis Information:  ·   s/p R hip intertrochanteric fracture with surgical correction  ·   Intertrochanteric fracture, R hip  Insurance/Certification information:     Physician Information:    Jordyn Lizama PA-C  Plan of care signed (Y/N):    Visit# / total visits:     18/    G-Code (if applicable):      Date / Visit # G-Code Applied:         Progress Note: [x]  Yes  []  No  Next due by: Visit #10      Pain level:   4 /10    Subjective:    Pt reports no new complaints today; some overall decrease in pain; awaiting consultation with [facility] orthopedics. Objective:   Observation: guarded gait with mild antalgia, using a cane   Test measurements:  LEFS: 45/80 (was 33/80), TU\", R hip abd: 4+/5.  Palpation:    Exercises:  Exercise Resistance/Repetitions Other comments   Nustep L4-5, 10' 14   Mini squats 3x10 14   Standing hip abd 3x10 B 14   QS w/ towel roll 3x10 14   SLR  3x10 14   Hip abd / add  BTB / ball, 3x10 14   Clam shells 3x10 14   Bridges 3x10 14   Side stepping 3 laps 14   Step ups (lead with R as shar) 3x10 14   HS curls 2x10 green 14   LAQ 2x10 14   Mini lunge 2x10 14               Other Therapeutic Activities:    Manual Treatments:       Modalities:        Timed Code Treatment Minutes:   45'      Total Treatment Minutes:  45'    Treatment/Activity Tolerance:  [x] Patient tolerated treatment well [] Patient limited by fatigue  [] Patient limited by pain  [] Patient limited by other medical complications  [x] Other: Pt completed tx with no change in pain but is motivated to return to prior level of function.     Pain after treatment:   4/10      Prognosis: [x] Good [] Fair  [] Poor    Goals  Short term goals  Time Frame for Short term goals: 4 weeks  Short term goal 1: Pt to be I with HEP - met  Short term goal 2: Pt to demonstrate R hip abd strength of 4-/5. - met  Short term goal 3: Pt to perform daily activities with pain of 5/10 or less at greatest. - not met but slightly improved  Long term goals  Time Frame for Long term goals : 8 weeks  Long term goal 1: LEFS score to improve to 65/80 indicating improved function. - not met  Long term goal 2: Pt to demonstrate R hip abduction strength of 4/5 or greater. - met  Long term goal 3: Pt to be able to ambulate short community distances without need for a cane with minimum to no gait deviations.   Long term goal 4: Pt to perform daily activities with average pain of less than 3/10.     Patient has shown improvement with general functional mobility (LEFS score improved by 12) and hip strength; her pain is somewhat less.         Patient Requires Follow-up: [x] Yes  [] No    Plan:   [x] Continue per plan of care [] Alter current plan (see comments)  [] Plan of care initiated [] Hold pending MD visit [] Discharge    Plan for Next Session:         Electronically signed by:  Remi Loja, PT

## 2020-09-21 ENCOUNTER — HOSPITAL ENCOUNTER (OUTPATIENT)
Dept: PHYSICAL THERAPY | Facility: HOSPITAL | Age: 60
Setting detail: THERAPIES SERIES
Discharge: HOME | End: 2020-09-21
Payer: COMMERCIAL

## 2020-09-21 PROCEDURE — 97110 THERAPEUTIC EXERCISES: CPT

## 2020-09-21 NOTE — FLOWSHEET NOTE
Physical Therapy Daily Note   Date:  2020    TIme In:   1136                  Time Out:  1225    Patient Name:  Keith Mercedes    :  1960  MRN: [id number]    Restrictions/Precautions:    Pertinent Medical History:  Medical/Treatment Diagnosis Information:  ·   s/p R hip intertrochanteric fracture with surgical correction  ·   Intertrochanteric fracture, R hip  Insurance/Certification information:     Physician Information:    Nikia Umana PA-C  Plan of care signed (Y/N):    Visit# / total visits:     20/    G-Code (if applicable):      Date / Visit # G-Code Applied:         Progress Note: []  Yes  [x]  No  Next due by: Visit #10      Pain level:   3/10    Subjective:    Pt reports she goes to trauma specialist at [facility] at the end of month to hopefully her hip fixed. Objective:   Observation: guarded gait with mild antalgia, using a cane   Test measurements:  LEFS: 45/80 (was 33/80), TU\", R hip abd: 4+/5.  Palpation:    Exercises:  Exercise Resistance/Repetitions Other comments   Nustep L4-5, 10' 21   Mini squats 3x10 21   Standing hip abd 3x10 B 21   QS w/ towel roll 3x10 21   SLR  3x10 21   Hip abd / add  BTB / ball, 3x10 21        Bridges 3x10 21   Side stepping 3 laps 21   Step ups (lead with R as shar) 3x10 21   HS curls 2x10 green 21   LAQ 2x10 21   Mini lunge 2x10 21               Other Therapeutic Activities:    Manual Treatments:       Modalities:        Timed Code Treatment Minutes:   45      Total Treatment Minutes:  49    Treatment/Activity Tolerance:  [x] Patient tolerated treatment well [] Patient limited by fatigue  [] Patient limited by pain  [] Patient limited by other medical complications  [x] Other: Pt completed tx with no change in pain and significant grinding and non fluid movement noted in right hip upon palpation.     Pain after treatment:   \"same\"/10      Prognosis: [x] Good [] Fair  [] Poor    Goals  Short term goals  Time Frame for Short term goals: 4 weeks  Short term goal 1: Pt to be I with HEP - met  Short term goal 2: Pt to demonstrate R hip abd strength of 4-/5. - met  Short term goal 3: Pt to perform daily activities with pain of 5/10 or less at greatest. - not met but slightly improved  Long term goals  Time Frame for Long term goals : 8 weeks  Long term goal 1: LEFS score to improve to 65/80 indicating improved function. - not met  Long term goal 2: Pt to demonstrate R hip abduction strength of 4/5 or greater. - met  Long term goal 3: Pt to be able to ambulate short community distances without need for a cane with minimum to no gait deviations.   Long term goal 4: Pt to perform daily activities with average pain of less than 3/10.     Patient has shown improvement with general functional mobility (LEFS score improved by 12) and hip strength; her pain is somewhat less.         Patient Requires Follow-up: [x] Yes  [] No    Plan:   [x] Continue per plan of care [] Alter current plan (see comments)  [] Plan of care initiated [] Hold pending MD visit [] Discharge    Plan for Next Session:         Electronically signed by:  Armani Major PTA

## 2020-09-23 ENCOUNTER — HOSPITAL ENCOUNTER (OUTPATIENT)
Dept: PHYSICAL THERAPY | Facility: HOSPITAL | Age: 60
Setting detail: THERAPIES SERIES
Discharge: HOME | End: 2020-09-23
Payer: COMMERCIAL

## 2020-09-23 PROCEDURE — 97110 THERAPEUTIC EXERCISES: CPT

## 2020-09-23 NOTE — FLOWSHEET NOTE
Physical Therapy Daily Note   Date:  2020    TIme In:   1134                  Time Out:  1738    Patient Name:  Ashwini Stephens    :  1960  MRN: [id number]    Restrictions/Precautions:    Pertinent Medical History:  Medical/Treatment Diagnosis Information:  ·   s/p R hip intertrochanteric fracture with surgical correction  ·   Intertrochanteric fracture, R hip  Insurance/Certification information:     Physician Information:    Yeni Sneed PA-C  Plan of care signed (Y/N):    Visit# / total visits:     21 /    G-Code (if applicable):      Date / Visit # G-Code Applied:         Progress Note: []  Yes  [x]  No  Next due by: Visit #10      Pain level:   3/10    Subjective:    Pt reports she goes to trauma specialist at [facility] at the end of month to hopefully her hip fixed. Objective:   Observation: guarded gait with mild antalgia, using a cane   Test measurements:  LEFS: 45/80 (was 33/80), TU\", R hip abd: 4+/5.    Palpation:    Exercises:  Exercise Resistance/Repetitions Other comments   Nustep L4-5, 10' 23   Mini squats 3x10 23   Standing hip abd 3x10 B 23   QS w/ towel roll 3x10 23   SLR  3x10 23   Hip abd / add  BTB / ball, 3x10 23        Bridges 3x10 23   Side stepping 3 laps 23   Step ups (lead with R as shar) 3x10 23   HS curls 2x10 green 23   LAQ 2x10 23   Mini lunge 2x10 23               Other Therapeutic Activities:    Manual Treatments:       Modalities:        Timed Code Treatment Minutes:   36'      Total Treatment Minutes:  43'    Treatment/Activity Tolerance:  [x] Patient tolerated treatment well [] Patient limited by fatigue  [] Patient limited by pain  [] Patient limited by other medical complications  [x] Other:     Pain after treatment:   3 /10      Prognosis: [x] Good [] Fair  [] Poor    Goals  Short term goals  Time Frame for Short term goals: 4 weeks  Short term goal 1: Pt to be I with HEP - met  Short term goal 2: Pt to demonstrate R hip abd strength of 4-/5. - met  Short term goal 3: Pt to perform daily activities with pain of 5/10 or less at greatest. - not met but slightly improved  Long term goals  Time Frame for Long term goals : 8 weeks  Long term goal 1: LEFS score to improve to 65/80 indicating improved function. - not met  Long term goal 2: Pt to demonstrate R hip abduction strength of 4/5 or greater. - met  Long term goal 3: Pt to be able to ambulate short community distances without need for a cane with minimum to no gait deviations.   Long term goal 4: Pt to perform daily activities with average pain of less than 3/10.     Patient has shown improvement with general functional mobility (LEFS score improved by 12) and hip strength; her pain is somewhat less.         Patient Requires Follow-up: [x] Yes  [] No    Plan:   [x] Continue per plan of care [] Alter current plan (see comments)  [] Plan of care initiated [] Hold pending MD visit [] Discharge    Plan for Next Session:         Electronically signed by:  Rachel Bojorquez PT

## 2020-09-28 ENCOUNTER — HOSPITAL ENCOUNTER (OUTPATIENT)
Dept: PHYSICAL THERAPY | Facility: HOSPITAL | Age: 60
Setting detail: THERAPIES SERIES
Discharge: HOME | End: 2020-09-28
Payer: COMMERCIAL

## 2020-09-28 PROCEDURE — 97110 THERAPEUTIC EXERCISES: CPT

## 2020-09-28 NOTE — FLOWSHEET NOTE
Physical Therapy Daily Note   Date:  2020    TIme In:   1138                  Time Out:  1217    Patient Name:  Suhail Mckeon    :  1960  MRN: [id number]    Restrictions/Precautions:    Pertinent Medical History:  Medical/Treatment Diagnosis Information:  ·   s/p R hip intertrochanteric fracture with surgical correction  ·   Intertrochanteric fracture, R hip  Insurance/Certification information:     Physician Information:    Vashti Kwan PA-C  Plan of care signed (Y/N):    Visit# / total visits:     25 /    G-Code (if applicable):      Date / Visit # G-Code Applied:         Progress Note: []  Yes  [x]  No  Next due by: Visit #10      Pain level:   0/10    Subjective:    Pt reports she is going to the specialist tomorrow and is hoping for some good news. Objective:   Observation: guarded gait with mild antalgia, using a cane   Test measurements:  LEFS: 45/80 (was 33/80), TU\", R hip abd: 4+/5.  Palpation:    Exercises:  Exercise Resistance/Repetitions Other comments   Nustep L4-5, 10' 28   Mini squats 3x10 28   Standing hip abd 3x10 B 28   QS w/ towel roll 3x10 28   SLR  3x10 28   Hip abd / add  BTB / ball, 3x10 28        Bridges 3x10 28   Side stepping 3 laps 28   Step ups (lead with R as shar) 3x10 28   HS curls 2x10 green 28   LAQ 2x10 28   Mini lunge 2x10 28               Other Therapeutic Activities:    Manual Treatments:       Modalities:        Timed Code Treatment Minutes:   36      Total Treatment Minutes:  39    Treatment/Activity Tolerance:  [x] Patient tolerated treatment well [] Patient limited by fatigue  [] Patient limited by pain  [] Patient limited by other medical complications  [x] Other: Pt completed tx with mild c/o pain and is motivated to return to prior level of function.     Pain after treatment:   2-3 /10      Prognosis: [x] Good [] Fair  [] Poor    Goals  Short term goals  Time Frame for Short term goals: 4 weeks  Short term goal 1: Pt to be I with HEP - met  Short term goal 2: Pt to demonstrate R hip abd strength of 4-/5. - met  Short term goal 3: Pt to perform daily activities with pain of 5/10 or less at greatest. - not met but slightly improved  Long term goals  Time Frame for Long term goals : 8 weeks  Long term goal 1: LEFS score to improve to 65/80 indicating improved function. - not met  Long term goal 2: Pt to demonstrate R hip abduction strength of 4/5 or greater. - met  Long term goal 3: Pt to be able to ambulate short community distances without need for a cane with minimum to no gait deviations.   Long term goal 4: Pt to perform daily activities with average pain of less than 3/10.     Patient has shown improvement with general functional mobility (LEFS score improved by 12) and hip strength; her pain is somewhat less.         Patient Requires Follow-up: [x] Yes  [] No    Plan:   [x] Continue per plan of care [] Alter current plan (see comments)  [] Plan of care initiated [] Hold pending MD visit [] Discharge    Plan for Next Session:         Electronically signed by:  Mohini Major PTA

## 2020-09-30 ENCOUNTER — HOSPITAL ENCOUNTER (OUTPATIENT)
Dept: PHYSICAL THERAPY | Facility: HOSPITAL | Age: 60
Setting detail: THERAPIES SERIES
Discharge: HOME | End: 2020-09-30
Payer: COMMERCIAL

## 2020-09-30 PROCEDURE — 97110 THERAPEUTIC EXERCISES: CPT

## 2020-09-30 NOTE — FLOWSHEET NOTE
Physical Therapy Daily Note   Date:  2020    TIme In:   1142                  Time Out:  1225    Patient Name:  Lionel Saldana    :  1960  MRN: [id number]    Restrictions/Precautions:    Pertinent Medical History:  Medical/Treatment Diagnosis Information:  ·   s/p R hip intertrochanteric fracture with surgical correction  ·   Intertrochanteric fracture, R hip  Insurance/Certification information:     Physician Information:    Manuela Ott PA-C  Plan of care signed (Y/N):    Visit# / total visits:     23 /    G-Code (if applicable):      Date / Visit # G-Code Applied:         Progress Note: []  Yes  [x]  No  Next due by: Visit #10      Pain level:   2 /10    Subjective:    Pt reports: no new changes or c/o. Objective:   Observation: guarded gait with mild antalgia, using a cane   Test measurements:  LEFS: 45/80 (was 33/80), TU\", R hip abd: 4+/5.  Palpation:    Exercises:  Exercise Resistance/Repetitions Other comments   Nustep L4-5, 10' 30   Mini squats 3x10 30   Standing hip abd 3x10 B 30   QS w/ towel roll 3x10 30   SLR  3x10 30   Hip abd / add  BTB / ball, 3x10 30        Bridges 3x10 30   Side stepping 3 laps 30   Step ups (lead with R as shar) 3x10 30   HS curls 2x10 green 30   LAQ 2x10 30   Mini lunge 2x10 30               Other Therapeutic Activities:    Manual Treatments:       Modalities:        Timed Code Treatment Minutes:   36'      Total Treatment Minutes:  37'    Treatment/Activity Tolerance:  [x] Patient tolerated treatment well [] Patient limited by fatigue  [] Patient limited by pain  [] Patient limited by other medical complications  [x] Other: Pt completed tx with mild c/o pain and is motivated to return to prior level of function.     Pain after treatment:   1 /10      Prognosis: [x] Good [] Fair  [] Poor    Goals  Short term goals  Time Frame for Short term goals: 4 weeks  Short term goal 1: Pt to be I with HEP - met  Short term goal 2: Pt to demonstrate R hip abd strength of 4-/5. - met  Short term goal 3: Pt to perform daily activities with pain of 5/10 or less at greatest. - not met but slightly improved  Long term goals  Time Frame for Long term goals : 8 weeks  Long term goal 1: LEFS score to improve to 65/80 indicating improved function. - not met  Long term goal 2: Pt to demonstrate R hip abduction strength of 4/5 or greater. - met  Long term goal 3: Pt to be able to ambulate short community distances without need for a cane with minimum to no gait deviations.   Long term goal 4: Pt to perform daily activities with average pain of less than 3/10.     Patient has shown improvement with general functional mobility (LEFS score improved by 12) and hip strength; her pain is somewhat less.         Patient Requires Follow-up: [x] Yes  [] No    Plan:   [x] Continue per plan of care [] Alter current plan (see comments)  [] Plan of care initiated [] Hold pending MD visit [] Discharge    Plan for Next Session:         Electronically signed by:  Gale Sofia PT

## 2020-10-05 ENCOUNTER — HOSPITAL ENCOUNTER (OUTPATIENT)
Dept: PHYSICAL THERAPY | Facility: HOSPITAL | Age: 60
Setting detail: THERAPIES SERIES
Discharge: HOME | End: 2020-10-05
Payer: COMMERCIAL

## 2020-10-05 PROCEDURE — 97110 THERAPEUTIC EXERCISES: CPT

## 2020-10-05 NOTE — FLOWSHEET NOTE
Physical Therapy Daily Note   Date:  10/5/2020    TIme In:     1136                Time Out:  1216    Patient Name:  Florencia Lozano    :  1960  MRN: [id number]    Restrictions/Precautions:    Pertinent Medical History:  Medical/Treatment Diagnosis Information:  ·   s/p R hip intertrochanteric fracture with surgical correction  ·   Intertrochanteric fracture, R hip  Insurance/Certification information:     Physician Information:    Cristofer Moreno PA-C  Plan of care signed (Y/N):    Visit# / total visits:     24 /    G-Code (if applicable):      Date / Visit # G-Code Applied:         Progress Note: []  Yes  [x]  No  Next due by: Visit #10      Pain level:   2-3 /10    Subjective:    Pt reports her hip is about the same and she has to go for blood work because the specialist doesn't think her bone is healing well. Objective:   Observation: guarded gait with mild antalgia, using a cane   Test measurements:  LEFS: 45/80 (was 33/80), TU\", R hip abd: 4+/5.    Palpation:    Exercises:  Exercise Resistance/Repetitions Other comments   Nustep L4-5, 10' 5   Mini squats 3x10 5   Standing hip abd 3x10 B 5   QS w/ towel roll 3x10 5   SLR  3x10 5   Hip abd / add  BTB / ball, 3x10 5        Bridges 3x10 5   Side stepping 3 laps 5   Step ups (lead with R as shar) 3x10 5   HS curls 2x10 green 5   LAQ 2x10 5   Mini lunge 2x10 5               Other Therapeutic Activities:    Manual Treatments:       Modalities:        Timed Code Treatment Minutes:   28      Total Treatment Minutes:  30    Treatment/Activity Tolerance:  [x] Patient tolerated treatment well [] Patient limited by fatigue  [] Patient limited by pain  [] Patient limited by other medical complications  [x] Other: Pt completed tx with min c/o pain and is awaiting further testing from specialist.    Pain after treatment:   \"Same\" /10      Prognosis: [x] Good [] Fair  [] Poor    Goals  Short term goals  Time Frame for Short term goals: 4 weeks  Short term goal 1: Pt to be I with HEP - met  Short term goal 2: Pt to demonstrate R hip abd strength of 4-/5. - met  Short term goal 3: Pt to perform daily activities with pain of 5/10 or less at greatest. - not met but slightly improved  Long term goals  Time Frame for Long term goals : 8 weeks  Long term goal 1: LEFS score to improve to 65/80 indicating improved function. - not met  Long term goal 2: Pt to demonstrate R hip abduction strength of 4/5 or greater. - met  Long term goal 3: Pt to be able to ambulate short community distances without need for a cane with minimum to no gait deviations.   Long term goal 4: Pt to perform daily activities with average pain of less than 3/10.     Patient has shown improvement with general functional mobility (LEFS score improved by 12) and hip strength; her pain is somewhat less.         Patient Requires Follow-up: [x] Yes  [] No    Plan:   [x] Continue per plan of care [] Alter current plan (see comments)  [] Plan of care initiated [] Hold pending MD visit [] Discharge    Plan for Next Session:         Electronically signed by:  [facility], PTA

## 2020-10-07 ENCOUNTER — HOSPITAL ENCOUNTER (OUTPATIENT)
Dept: PHYSICAL THERAPY | Facility: HOSPITAL | Age: 60
Setting detail: THERAPIES SERIES
Discharge: HOME | End: 2020-10-07
Payer: COMMERCIAL

## 2020-10-07 PROCEDURE — 97110 THERAPEUTIC EXERCISES: CPT

## 2020-10-07 NOTE — FLOWSHEET NOTE
Physical Therapy Daily Note   Date:  10/7/2020    TIme In:     1130                Time Out:  8940    Patient Name:  Kassie Heredia    :  1960  MRN: [id number]    Restrictions/Precautions:    Pertinent Medical History:  Medical/Treatment Diagnosis Information:  ·   s/p R hip intertrochanteric fracture with surgical correction  ·   Intertrochanteric fracture, R hip  Insurance/Certification information:     Physician Information:    Anette Gaspar PA-C  Plan of care signed (Y/N):    Visit# / total visits:     22 /    G-Code (if applicable):      Date / Visit # G-Code Applied:         Progress Note: []  Yes  [x]  No  Next due by: Visit #10      Pain level:   2 /10    Subjective:    Pt reports her hip is about the same; waiting on lab work results; was provided with a bone stimulator but has not used it yet; provided new MD order = continue ROM, strengthening, WBAT, etc    Objective:   Observation: guarded gait with mild antalgia, using a cane   Test measurements:  LEFS: 45/80 (was 33/80), TU\", R hip abd: 4+/5.    Palpation:    Exercises:  Exercise Resistance/Repetitions Other comments   Nustep L4-5, 10' 7   Mini squats 3x15 7   Standing hip abd 3x10 B 7   QS w/ towel roll 3x10 7   SLR  3x15 7   Hip abd / add  BTB / ball, 3x10 7        Bridges 3x10 7   Side stepping 3 laps 7   Step ups (lead with R as shar) 3x10 7   HS curls 3x10 green 7   LAQ 3x10 7   Mini lunge 3x10 7               Other Therapeutic Activities:    Manual Treatments:       Modalities:        Timed Code Treatment Minutes:   40'      Total Treatment Minutes:  45    Treatment/Activity Tolerance:  [x] Patient tolerated treatment well [] Patient limited by fatigue  [] Patient limited by pain  [] Patient limited by other medical complications  [x] Other: Pt completed tx with min c/o pain and is awaiting further testing from specialist.    Pain after treatment:   2-3 /10      Prognosis: [x] Good [] Fair  [] Poor    Goals  Short term goals  Time Frame for Short term goals: 4 weeks  Short term goal 1: Pt to be I with HEP - met  Short term goal 2: Pt to demonstrate R hip abd strength of 4-/5. - met  Short term goal 3: Pt to perform daily activities with pain of 5/10 or less at greatest. - not met but slightly improved  Long term goals  Time Frame for Long term goals : 8 weeks  Long term goal 1: LEFS score to improve to 65/80 indicating improved function. - not met  Long term goal 2: Pt to demonstrate R hip abduction strength of 4/5 or greater. - met  Long term goal 3: Pt to be able to ambulate short community distances without need for a cane with minimum to no gait deviations.   Long term goal 4: Pt to perform daily activities with average pain of less than 3/10.     Patient has shown improvement with general functional mobility (LEFS score improved by 12) and hip strength; her pain is somewhat less.         Patient Requires Follow-up: [x] Yes  [] No    Plan:   [x] Continue per plan of care [] Alter current plan (see comments)  [] Plan of care initiated [] Hold pending MD visit [] Discharge    Plan for Next Session:         Electronically signed by:  Mora Mathew, PT

## 2020-10-12 ENCOUNTER — APPOINTMENT (OUTPATIENT)
Dept: PHYSICAL THERAPY | Facility: HOSPITAL | Age: 60
End: 2020-10-12
Payer: COMMERCIAL

## 2020-10-14 ENCOUNTER — HOSPITAL ENCOUNTER (OUTPATIENT)
Dept: PHYSICAL THERAPY | Facility: HOSPITAL | Age: 60
Setting detail: THERAPIES SERIES
Discharge: HOME | End: 2020-10-14
Payer: COMMERCIAL

## 2020-10-14 PROCEDURE — 97110 THERAPEUTIC EXERCISES: CPT

## 2020-10-14 NOTE — FLOWSHEET NOTE
Physical Therapy Daily Note   Date:  10/14/2020    TIme In:     1128                Time Out:  1209    Patient Name:  Dyana Montero    :  1960  MRN: [id number]    Restrictions/Precautions:    Pertinent Medical History:  Medical/Treatment Diagnosis Information:  ·   s/p R hip intertrochanteric fracture with surgical correction  ·   Intertrochanteric fracture, R hip  Insurance/Certification information:     Physician Information:    Juno Glass PA-C  Plan of care signed (Y/N):    Visit# / total visits:     32 /    G-Code (if applicable):      Date / Visit # G-Code Applied:         Progress Note: []  Yes  [x]  No  Next due by: Visit #10      Pain level:   2-3 /10    Subjective:    Pt reports her hip is about the same; states lab work results were OK; has started using her bone stimulator. Objective:   Observation: guarded gait with mild antalgia, using a cane   Test measurements:  LEFS: 45/80 (was 33/80), TU\", R hip abd: 4+/5.    Palpation:    Exercises:  Exercise Resistance/Repetitions Other comments   Nustep L4-5, 10' 14   Mini squats 3x15 14   Standing hip abd 3x10 B 14   QS w/ towel roll 3x10 14   SLR  3x15 14   Hip abd / add  BTB / ball, 3x10 14        Bridges 3x10 14   Side stepping 3 laps 14   Step ups (lead with R as shar) 3x10 14   HS curls 3x10 green 14   LAQ 3x10 14   Mini lunge 3x10 14               Other Therapeutic Activities:    Manual Treatments:       Modalities:        Timed Code Treatment Minutes:   36'      Total Treatment Minutes:  39'    Treatment/Activity Tolerance:  [x] Patient tolerated treatment well [] Patient limited by fatigue  [] Patient limited by pain  [] Patient limited by other medical complications  [x] Other: Pt completed tx with min c/o pain    Pain after treatment:   2-3 /10      Prognosis: [x] Good [] Fair  [] Poor    Goals  Short term goals  Time Frame for Short term goals: 4 weeks  Short term goal 1: Pt to be I with HEP - met  Short term goal 2: Pt to demonstrate R hip abd strength of 4-/5. - met  Short term goal 3: Pt to perform daily activities with pain of 5/10 or less at greatest. - not met but slightly improved  Long term goals  Time Frame for Long term goals : 8 weeks  Long term goal 1: LEFS score to improve to 65/80 indicating improved function. - not met  Long term goal 2: Pt to demonstrate R hip abduction strength of 4/5 or greater. - met  Long term goal 3: Pt to be able to ambulate short community distances without need for a cane with minimum to no gait deviations.   Long term goal 4: Pt to perform daily activities with average pain of less than 3/10.     Patient has shown improvement with general functional mobility (LEFS score improved by 12) and hip strength; her pain is somewhat less.         Patient Requires Follow-up: [x] Yes  [] No    Plan:   [x] Continue per plan of care [] Alter current plan (see comments)  [] Plan of care initiated [] Hold pending MD visit [] Discharge    Plan for Next Session:         Electronically signed by:  Ana Lorenzo PT

## 2020-10-19 ENCOUNTER — HOSPITAL ENCOUNTER (OUTPATIENT)
Dept: PHYSICAL THERAPY | Facility: HOSPITAL | Age: 60
Setting detail: THERAPIES SERIES
Discharge: HOME | End: 2020-10-19
Payer: COMMERCIAL

## 2020-10-19 PROCEDURE — 97110 THERAPEUTIC EXERCISES: CPT

## 2020-10-19 NOTE — FLOWSHEET NOTE
Physical Therapy Reassessment Note   Date:  10/19/2020    TIme In:       1131              Time Out:  1218    Patient Name:  Andrzej Moran    :  1960  MRN: [id number]    Restrictions/Precautions:    Pertinent Medical History:  Medical/Treatment Diagnosis Information:  ·   s/p R hip intertrochanteric fracture with surgical correction  ·   Intertrochanteric fracture, R hip  Insurance/Certification information:     Physician Information:    Jadiel Mora PA-C  Plan of care signed (Y/N):    Visit# / total visits:     27/    G-Code (if applicable):      Date / Visit # G-Code Applied:         Progress Note: []  Yes  [x]  No  Next due by: Visit #10      Pain level:    0/10    Subjective:    Pt reports her hip still feels like its going to pop out all the time. She also expressed that it hurts to lay on that hip sometimes. Objective:   Observation: guarded gait with mild antalgia, using a cane   Test measurements:  LEFS: 34/80 (was 33/80), TU\", R hip abd: 4/5.  Palpation:    Exercises:  Exercise Resistance/Repetitions Other comments   Nustep L4-5, 10' 19   Mini squats 3x15 19   Standing hip abd 3x10 B 19   QS w/ towel roll 3x10 19   SLR  3x15 19   Hip abd / add  BTB / ball, 3x10 19        Bridges 3x10 19   Side stepping 3 laps 19   Step ups (lead with R as shar) 3x10 19   HS curls 3x10 green 19   LAQ 3x10 19   Mini lunge 3x10 19               Other Therapeutic Activities: Re-assessment performed by Ana Lorenzo PT. Manual Treatments:       Modalities:        Timed Code Treatment Minutes:   45      Total Treatment Minutes:  47    Treatment/Activity Tolerance:  [x] Patient tolerated treatment well [] Patient limited by fatigue  [] Patient limited by pain  [] Patient limited by other medical complications  [x] Other: Pt completed tx with no pain and mild c/o discomfort during sleep.     Pain after treatment:    0/10      Prognosis: [x] Good [] Fair  [] Poor    Goals  Short term goals  Time Frame for Short term goals: 4 weeks  Short term goal 1: Pt to be I with HEP - met  Short term goal 2: Pt to demonstrate R hip abd strength of 4-/5. - met  Short term goal 3: Pt to perform daily activities with pain of 5/10 or less at greatest. - not met but slightly improved  Long term goals  Time Frame for Long term goals : 8 weeks  Long term goal 1: LEFS score to improve to 65/80 indicating improved function. - not met  Long term goal 2: Pt to demonstrate R hip abduction strength of 4/5 or greater. - met  Long term goal 3: Pt to be able to ambulate short community distances without need for a cane with minimum to no gait deviations. Long term goal 4: Pt to perform daily activities with average pain of less than 3/10.     Patient has made gains in strength thus far; her pain overall seems better as well; functional mobility is slowly improving.       Patient Requires Follow-up: [x] Yes  [] No    Plan:   [x] Continue per plan of care [] Alter current plan (see comments)  [] Plan of care initiated [] Hold pending MD visit [] Discharge    Plan for Next Session:         Electronically signed by:  Jatin Pineda PTA      Electronically signed by Chinedu Hdez PT on 10/26/2020 at 8:26 AM

## 2020-10-21 ENCOUNTER — HOSPITAL ENCOUNTER (OUTPATIENT)
Dept: PHYSICAL THERAPY | Facility: HOSPITAL | Age: 60
Setting detail: THERAPIES SERIES
Discharge: HOME | End: 2020-10-21
Payer: COMMERCIAL

## 2020-10-21 PROCEDURE — 97110 THERAPEUTIC EXERCISES: CPT

## 2020-10-21 NOTE — FLOWSHEET NOTE
Physical Therapy Daily Note   Date:  10/21/2020    TIme In:   1132                  Time Out:  1214    Patient Name:  Jaquelin Vera    :  1960  MRN: [id number]    Restrictions/Precautions:    Pertinent Medical History:  Medical/Treatment Diagnosis Information:  ·   s/p R hip intertrochanteric fracture with surgical correction  ·   Intertrochanteric fracture, R hip  Insurance/Certification information:     Physician Information:    Lourdes Rueda PA-C  Plan of care signed (Y/N):    Visit# / total visits:     29 /    G-Code (if applicable):      Date / Visit # G-Code Applied:         Progress Note: []  Yes  [x]  No  Next due by: Visit #10      Pain level:    2 /10    Subjective:    Pt reports: mild hip pain today; no changes or new c/o. Objective:   Observation: guarded gait with mild antalgia, using a cane   Test measurements:  LEFS: 34/80 (was 33/80), TU\", R hip abd: 4/5.  Palpation:    Exercises:  Exercise Resistance/Repetitions Other comments   Nustep L4-5, 10' 21   Mini squats 3x15 21   Standing hip abd 3x10 B 21   QS w/ towel roll 3x10 21   SLR  3x15 21   Hip abd / add  BTB / ball, 3x10 21        Bridges 3x10 21   Side stepping 3 laps 21   Step ups (lead with R as shar) 3x10 21   HS curls 3x10 green 21   LAQ 3x10 21   Mini lunge 3x10 21               Other Therapeutic Activities:     Manual Treatments:       Modalities:        Timed Code Treatment Minutes:   36'      Total Treatment Minutes:  43'    Treatment/Activity Tolerance:  [x] Patient tolerated treatment well [] Patient limited by fatigue  [] Patient limited by pain  [] Patient limited by other medical complications  [x] Other: Pt completed tx with no pain and mild c/o discomfort during sleep.     Pain after treatment:    0/10      Prognosis: [x] Good [] Fair  [] Poor    Goals  Short term goals  Time Frame for Short term goals: 4 weeks  Short term goal 1: Pt to be I with HEP - met  Short term goal 2: Pt to demonstrate R hip abd

## 2020-10-22 ENCOUNTER — OFFICE VISIT (OUTPATIENT)
Dept: PRIMARY CARE CLINIC | Age: 60
End: 2020-10-22
Payer: MEDICAID

## 2020-10-22 VITALS
RESPIRATION RATE: 16 BRPM | HEART RATE: 101 BPM | DIASTOLIC BLOOD PRESSURE: 72 MMHG | SYSTOLIC BLOOD PRESSURE: 124 MMHG | HEIGHT: 66 IN | TEMPERATURE: 97.8 F | BODY MASS INDEX: 20.22 KG/M2 | WEIGHT: 125.8 LBS | OXYGEN SATURATION: 95 %

## 2020-10-22 PROCEDURE — 4004F PT TOBACCO SCREEN RCVD TLK: CPT | Performed by: NURSE PRACTITIONER

## 2020-10-22 PROCEDURE — G8420 CALC BMI NORM PARAMETERS: HCPCS | Performed by: NURSE PRACTITIONER

## 2020-10-22 PROCEDURE — G8484 FLU IMMUNIZE NO ADMIN: HCPCS | Performed by: NURSE PRACTITIONER

## 2020-10-22 PROCEDURE — G0296 VISIT TO DETERM LDCT ELIG: HCPCS | Performed by: NURSE PRACTITIONER

## 2020-10-22 PROCEDURE — G8427 DOCREV CUR MEDS BY ELIG CLIN: HCPCS | Performed by: NURSE PRACTITIONER

## 2020-10-22 PROCEDURE — 3017F COLORECTAL CA SCREEN DOC REV: CPT | Performed by: NURSE PRACTITIONER

## 2020-10-22 PROCEDURE — 99204 OFFICE O/P NEW MOD 45 MIN: CPT | Performed by: NURSE PRACTITIONER

## 2020-10-22 RX ORDER — CYANOCOBALAMIN 1000 UG/ML
INJECTION INTRAMUSCULAR; SUBCUTANEOUS
Qty: 1 ML | Refills: 5 | Status: SHIPPED | OUTPATIENT
Start: 2020-10-22 | End: 2020-11-19

## 2020-10-22 RX ORDER — TRIAMCINOLONE ACETONIDE 1 MG/G
CREAM TOPICAL
Qty: 60 G | Refills: 0 | Status: SHIPPED | OUTPATIENT
Start: 2020-10-22 | End: 2020-11-19

## 2020-10-22 RX ORDER — CYANOCOBALAMIN 1000 UG/ML
1000 INJECTION INTRAMUSCULAR; SUBCUTANEOUS ONCE
Status: COMPLETED | OUTPATIENT
Start: 2020-10-22 | End: 2020-10-22

## 2020-10-22 RX ORDER — OMEPRAZOLE 40 MG/1
40 CAPSULE, DELAYED RELEASE ORAL DAILY
COMMUNITY
End: 2020-11-12 | Stop reason: SDUPTHER

## 2020-10-22 RX ORDER — SYRINGE W-NEEDLE,DISPOSAB,3 ML 25GX5/8"
1 SYRINGE, EMPTY DISPOSABLE MISCELLANEOUS WEEKLY
Qty: 50 EACH | Refills: 1 | Status: SHIPPED | OUTPATIENT
Start: 2020-10-22 | End: 2021-11-29

## 2020-10-22 RX ORDER — LEFLUNOMIDE 20 MG/1
20 TABLET ORAL DAILY
COMMUNITY
End: 2021-02-11 | Stop reason: SDUPTHER

## 2020-10-22 RX ADMIN — CYANOCOBALAMIN 1000 MCG: 1000 INJECTION INTRAMUSCULAR; SUBCUTANEOUS at 16:21

## 2020-10-22 SDOH — HEALTH STABILITY: MENTAL HEALTH: HOW OFTEN DO YOU HAVE A DRINK CONTAINING ALCOHOL?: NOT ASKED

## 2020-10-22 ASSESSMENT — PATIENT HEALTH QUESTIONNAIRE - PHQ9
SUM OF ALL RESPONSES TO PHQ9 QUESTIONS 1 & 2: 0
1. LITTLE INTEREST OR PLEASURE IN DOING THINGS: 0
2. FEELING DOWN, DEPRESSED OR HOPELESS: 0
SUM OF ALL RESPONSES TO PHQ QUESTIONS 1-9: 0

## 2020-10-22 ASSESSMENT — ENCOUNTER SYMPTOMS
GASTROINTESTINAL NEGATIVE: 1
RESPIRATORY NEGATIVE: 1

## 2020-10-22 NOTE — PATIENT INSTRUCTIONS
· Keep a list of your medicines with you. List all of the prescription medicines, nonprescription medicines, supplements, natural remedies, and vitamins that you take. Tell your healthcare providers who treat you about all of the products you are taking. Your provider can provide you with a form to keep track of them. Just ask. · Follow the directions that come with your medicine, including information about food or alcohol. Make sure you know how and when to take your medicine. Do not take more or less than you are supposed to take. · Keep all medicines out of the reach of children. · Store medicines according to the directions on the label. · Monitor yourself. Learn to know how your body reacts to your new medicine and keep track of how it makes you feel before attempting (If your provider has allowed you to do so) to drive or go to work. · Seek emergency medical attention if you think you have used too much of this medicine. An overdose of any prescription medicine can be fatal. Overdose symptoms may include extreme drowsiness, muscle weakness, confusion, cold and clammy skin, pinpoint pupils, shallow breathing, slow heart rate, fainting, or coma. · Don't share prescription medicines with others, even when they seem to have the same symptoms. What may be good for you may be harmful to others. · If you are no longer taking a prescribed medication and you have pills left please take your pills out of their original containers. Mix crushed pills with an undesirable substance, such as cat litter or used coffee grounds. Put the mixture into a disposable container with a lid, such as an empty margarine tub, or into a sealable bag. Cover up or remove any of your personal information on the empty containers by covering it with black permanent marker or duct tape. Place the sealed container with the mixture, and the empty drug containers, in the trash.    · If you use a medication that is in the form of a patch, dispose of used patches by folding them in half so that the sticky sides meet, and then flushing them down a toilet. They should not be placed in the household trash where children or pets can find them. · If you have any questions, ask your provider or pharmacist for more information. · Be sure to keep all appointments for provider visits or tests. We are committed to providing you with the best care possible. In order to help us achieve these goals please remember to bring all medications, herbal products, and over the counter supplements with you to each visit. If your provider has ordered testing for you, please be sure to follow up with our office if you have not received results within 7 days after the testing took place. *If you receive a survey after visiting one of our offices, please take time to share your experience concerning your physician office visit. These surveys are confidential and no health information about you is shared. We are eager to improve for you and we are counting on your feedback to help make that happen. ips to Help You Stop Smoking       Cigarette smoking is a preventable cause of death in the [place]. If you have thought about quitting but haven't been able to, here are some reasons why you should and some ways to do it. Here's Why   Quitting smoking now can decrease your risk of getting smoking-related illnesses like:   Heart disease   Stroke   Several types of cancer, including:   Lung   Mouth   Esophagus   Larynx   Bladder   Pancreas   Kidney   Chronic lung diseases:   Bronchitis   Emphysema   Asthma   Cataracts   Macular degeneration   Thyroid conditions   Hearing loss   Erectile dysfunction   Dementia   Osteoporosis   Here's How   Once you've decided to quit smoking, set your target quit date a few weeks away.  In the time leading up to your quit day, try some of these ideas offered by the [address] [place] [address] [place] to help you successfully quit smoking. For the best results, work with your doctor. Together, you can test your lung function and compare the results to those of a nonsmoking person. The results can be given to you as your lung age. Finding out your lung age right after having the test done may help you to stop smoking. Your doctor can also discuss with you all of your options and refer you to smoking-cessation support groups. You may wish to use nicotine replacement (gum, patches, inhaler) or one of the prescription medications that have been shown to increase quit rates and prolong abstinence from smoking. But whatever you and your doctor decide on these matters, it will still be you who decides when an how to quit. Here are some techniques:   Switch Brands   Switch to a brand you find distasteful. Change to a brand that is low in tar and nicotine a couple of weeks before your target quit date. This will help change your smoking behavior. However, do not smoke more cigarettes, inhale them more often or more deeply, or place your fingertips over the holes in the filters. All of these actions will increase your nicotine intake, and the idea is to get your body used to functioning without nicotine. Cut Down the Number of Cigarettes You Smoke   Smoke only half of each cigarette. Each day, postpone the lighting of your first cigarette by one hour. Decide you'll only smoke during odd or even hours of the day. Decide beforehand how many cigarettes you'll smoke during the day. For each additional cigarette, give a dollar to your favorite irina. Change your eating habits to help you cut down. For example, drink milk, which many people consider incompatible with smoking. End meals or snacks with something that won't lead to a cigarette. Reach for a glass of juice instead of a cigarette for a \"pick-me-up. \"   Remember: Cutting down can help you quit, but it's not a substitute for quitting.  If you're down to about seven cigarettes a day, it's time to set your target quit date, and get ready to stick to it. Don't Smoke \"Automatically\"   Smoke only those cigarettes you really want. Catch yourself before you light up a cigarette out of pure habit. Don't empty your ashtrays. This will remind you of how many cigarettes you've smoked each day, and the sight and the smell of stale cigarettes butts will be very unpleasant. Make yourself aware of each cigarette by using the opposite hand or putting cigarettes in an unfamiliar location or a different pocket to break the automatic reach. If you light up many times during the day without even thinking about it, try to look in a mirror each time you put a match to your cigarette. You may decide you don't need it. Make Smoking Inconvenient   Stop buying cigarettes by the carton. Wait until one pack is empty before you buy another. Stop carrying cigarettes with you at home or at work. Make them difficult to get to. Make Smoking Unpleasant   Smoke only under circumstances that aren't especially pleasurable for you. If you like to smoke with others, smoke alone. Turn your chair to an empty corner and focus only on the cigarette you are smoking and all its many negative effects. Collect all your cigarette butts in one large glass container as a visual reminder of the filth made by smoking. Just Before Quitting   Practice going without cigarettes. Don't think of never smoking again. Think of quitting in terms of one day at a time . Tell yourself you won't smoke today, and then don't. Clean your clothes to rid them of the cigarette smell, which can linger a long time. On the Day You Quit   Throw away all your cigarettes and matches. Hide your lighters and ashtrays. Visit the dentist and have your teeth cleaned to get rid of tobacco stains. Notice how nice they look and resolve to keep them that way.    Make a list of things you'd like to buy for Sign Up page. Enter your Smart Plate Access Code exactly as it appears below. You will not need to use this code after youve completed the sign-up process. If you do not sign up before the expiration date, you must request a new code. Smart Plate Access Code: [email or web address]  Expires: 12/6/2020  2:11 PM    Enter your Social Security Number ([id number]) and Date of Birth (mm/dd/yyyy) as indicated and click Submit. You will be taken to the next sign-up page. Create a Smart Plate ID. This will be your Smart Plate login ID and cannot be changed, so think of one that is secure and easy to remember. Create a Smart Plate password. You can change your password at any time. Enter your Password Reset Question and Answer. This can be used at a later time if you forget your password. Enter your e-mail address. You will receive e-mail notification when new information is available in [address] Click Sign Up. You can now view your medical record. Additional Information  If you have questions, please contact your physician practice where you receive care. Remember, Smart Plate is NOT to be used for urgent needs. For medical emergencies, dial 911. What is lung cancer screening? Lung cancer screening is a way in which doctors check the lungs for early signs of cancer in people who have no symptoms of lung cancer. A low-dose CT scan uses much less radiation than a normal CT scan and shows a more detailed image of the lungs than a standard X-ray. The goal of lung cancer screening is to find cancer early, before it has a chance to grow, spread, or cause problems. One large study found that smokers who were screened with low-dose CT scans were less likely to die of lung cancer than those who were screened with standard X-ray. Below is a summary of the things you need to know regarding screening for lung cancer with low-dose computed tomography (LDCT).   This is a screening program that involves routine annual screening with LDCT studies of the lung. The LDCTs are done using low-dose radiation that is not thought to increase your cancer risk. If you have other serious medical conditions (other cancers, congestive heart failure) that limit your life expectancy to less than 10 years, you should not undergo lung cancer screening with LDCT. The chance is 20%-60% that the LDCT result will show abnormalities. This would require additional testing which could include repeat imaging or even invasive procedures. Most (about 95%) of \"abnormal\" LDCT results are false in the sense that no lung cancer is ultimately found. Additionally, some (about 10%) of the cancers found would not affect your life expectancy, even if undetected and untreated. If you are still smoking, the single most important thing that you can do to reduce your risk of dying of lung cancer is to quit. For this screening to be covered by Medicare and most other insurers, strict criteria must be met. If you do not meet these criteria, but still wish to undergo LDCT testing, you will be required to sign a waiver indicating your willingness to pay for the scan.

## 2020-10-22 NOTE — PROGRESS NOTES
Chief Complaint   Patient presents with    Breast Pain     left    Establish Care       Have you seen any other physician or provider since your last visit yes - Dr Concha Gongora ortho    Have you had any other diagnostic tests since your last visit? yes - CT right leg and arm for Dr Sangeeta Rae and Dr Vargas Neither     Have you changed or stopped any medications since your last visit? no     I have recommended that this patient have a immunization for pneumonia and sigmoidoscopy but she declines at this time. I have discussed the risks and benefits of this examination with her. The patient verbalizes understanding. Diabetic retinal exam completed this year? No                       * If yes please have patient sign a records release to obtain record to update Health Maintenance                       * If no, please order referral for patient to be scheduled     Patient is here to establish care. She is c/o left breast pain that radiates under her arm. She states that the pain has currently stopped. She may have a spider bite on her right arm.

## 2020-10-22 NOTE — PROGRESS NOTES
SUBJECTIVE:    Patient ID: Bettylou Cushing is a 63 y.o. female. Chief Complaint   Patient presents with    Breast Pain     left    Establish Care         HPI:  She presents to [facility] care. She has history of RA and OA. She goes to dr. Alton Sanz for Rheumatology and is on Jania. And Entenercept. She hs HTN, and is on vit d and prednisone just as needed. She fell on 01/10/2020 and fractured her femur. At the same time she fractured her right radius and thumb. She is a chronic daily smoker. After returning home she has been having pain in her right hip, leg and buttock. She is going to Dr. Caden Martinez for her right arm/hand and it was never set properly. She is trying to see about having it surgically repaired. At this time she is having pain and loss of function. 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 She had normal labs. She has not had a bone dexa scan that she knows. Mother and father both with HTN and father with heart disease. Father  in his 63's. She was having breast pain but it improved. Patient's medications,allergies, past medical, surgical, social and family histories were reviewed and updated as appropriate. .  Current Outpatient Medications on File Prior to Visit   Medication Sig Dispense Refill    leflunomide (ARAVA) 20 MG tablet Take 20 mg by mouth daily      Etanercept 50 MG/ML SOAJ Inject 1 mL into the skin every 7 days      predniSONE (DELTASONE) 10 MG tablet Take 10 mg by mouth as needed      albuterol sulfate HFA (VENTOLIN HFA) 108 (90 Base) MCG/ACT inhaler Inhale 1 puff into the lungs every 6 hours as needed       No current facility-administered medications on file prior to visit. Review of Systems   Constitutional: Negative. HENT: Negative. Respiratory: Negative. Cardiovascular: Negative. Gastrointestinal: Negative. Genitourinary: Negative. Musculoskeletal: Positive for arthralgias (right hip and right hip pain) and gait problem. Skin: Negative. Psychiatric/Behavioral: Negative. OBJECTIVE:  /72 (Site: Left Upper Arm, Position: Sitting, Cuff Size: Small Adult)   Pulse 101   Temp 97.8 °F (36.6 °C) (Temporal)   Resp 16   Ht 5' 6\" (1.676 m)   Wt 125 lb 12.8 oz (57.1 kg)   SpO2 95% Comment: room air  BMI 20.30 kg/m²    Physical Exam  Vitals signs and nursing note reviewed. Constitutional:       Appearance: She is well-developed. HENT:      Head: Normocephalic and atraumatic. Eyes:      Conjunctiva/sclera: Conjunctivae normal.      Pupils: Pupils are equal, round, and reactive to light. Neck:      Musculoskeletal: Normal range of motion and neck supple. Thyroid: No thyromegaly. Vascular: No JVD. Cardiovascular:      Rate and Rhythm: Normal rate and regular rhythm. Heart sounds: No murmur. No friction rub. No gallop. Pulmonary:      Effort: Pulmonary effort is normal. No respiratory distress. Breath sounds: Examination of the right-lower field reveals decreased breath sounds. Examination of the left-lower field reveals decreased breath sounds. Decreased breath sounds present. No rales. Abdominal:      General: Bowel sounds are normal. There is no distension. Palpations: Abdomen is soft. Tenderness: There is no abdominal tenderness. There is no guarding. Musculoskeletal:      Right wrist: She exhibits decreased range of motion, tenderness and deformity. Right hip: She exhibits decreased range of motion, decreased strength and deformity. Comments: Walking with 4 prong cane. Skin:     General: Skin is warm and dry. Findings: No rash. Neurological:      Mental Status: She is alert and oriented to person, place, and time. Psychiatric:         Judgment: Judgment normal.         No results found for requested labs within last 30 days.        Hemoglobin A1C (%)   Date Value   03/04/2016 5.5     LDL Calculated (mg/dL)   Date Value   08/12/2019 143 (H)           Lab Results   Component Value Date    TSH 1.76 08/12/2019         ASSESSMENT/PLAN:     Jaky Whitman was seen today for breast pain and establish care. Diagnoses and all orders for this visit:    Closed fracture of distal end of right radius, unspecified fracture morphology, initial encounter  -     DEXA Bone Density Axial Skeleton; Future    Closed nondisplaced intertrochanteric fracture of right femur with delayed healing, subsequent encounter  -     DEXA Bone Density Axial Skeleton; Future    Insect bite of right upper arm, initial encounter  -     triamcinolone (KENALOG) 0.1 % cream; Apply topically 2 times daily. B12 deficiency  -     Cancel: Vitamin B12 & Folate; Future  -     cyanocobalamin injection 1,000 mcg  -     cyanocobalamin 1000 MCG/ML injection; Inject 1 ml once a week for 4 weeks and then once a month. -     Syringe/Needle, Disp, (SYRINGE 3CC/25GX1\") 25G X 1\" 3 ML MISC; 1 each by Does not apply route once a week    Encounter for screening mammogram for malignant neoplasm of breast  -     Cancel: SOC[facility] Digital Screen Unilateral Left; Future    Personal history of tobacco use  -     [facility] VISIT TO DISCUSS LUNG CA SCREEN W LDCT  -     CT Lung Screen (Annual); Future      Medications Discontinued During This Encounter   Medication Reason    Golimumab (SIMPONI) 50 MG/0.5ML SOAJ Alternate therapy    leflunomide (ARAVA) 10 MG tablet DOSE ADJUSTMENT    ipratropium (ATROVENT) 0.03 % nasal spray Therapy completed    ibuprofen (IBU) 600 MG tablet Therapy completed     Low Dose CT (LDCT) Lung Screening criteria met   Age 50-69   Pack year smoking >30   Still smoking or less than 15 year since quit   No sign or symptoms of lung cancer   > 11 months since last LDCT     Risks and benefits of lung cancer screening with LDCT scans discussed:    Significance of positive screen - False-positive LDCT results often occur. 95% of all positive results do not lead to a diagnosis of cancer.  Usually further imaging can resolve most false-positive results; however, some patients may require invasive procedures. Over diagnosis risk - 10% to 12% of screen-detected lung cancer cases are over diagnosed--that is, the cancer would not have been detected in the patient's lifetime without the screening. Need for follow up screens annually to continue lung cancer screening effectiveness     Risks associated with radiation from annual LDCT- Radiation exposure is about the same as for a mammogram, which is about 1/3 of the annual background radiation exposure from everyday life. Starting screening at age 54 is not likely to increase cancer risk from radiation exposure. Patients with comorbidities resulting in life expectancy of < 10 years, or that would preclude treatment of an abnormality identified on CT, should not be screened due to lack of benefit.     To obtain maximal benefit from this screening, smoking cessation and long-term abstinence from smoking is critical

## 2020-10-26 ENCOUNTER — HOSPITAL ENCOUNTER (OUTPATIENT)
Dept: PHYSICAL THERAPY | Facility: HOSPITAL | Age: 60
Setting detail: THERAPIES SERIES
Discharge: HOME | End: 2020-10-26
Payer: COMMERCIAL

## 2020-10-26 PROCEDURE — 97110 THERAPEUTIC EXERCISES: CPT

## 2020-10-26 NOTE — FLOWSHEET NOTE
Physical Therapy Daily Note   Date:  10/26/2020    TIme In:  1100                  Time Out: 1143    Patient Name:  Allie Zapata    :  1960  MRN: [id number]    Restrictions/Precautions:    Pertinent Medical History:  Medical/Treatment Diagnosis Information:  ·   s/p R hip intertrochanteric fracture with surgical correction  ·   Intertrochanteric fracture, R hip  Insurance/Certification information:     Physician Information:    Evan Reyes PA-C  Plan of care signed (Y/N):    Visit# / total visits:     29/    G-Code (if applicable):      Date / Visit # G-Code Applied:         Progress Note: []  Yes  [x]  No  Next due by: Visit #10      Pain level:    0/10    Subjective:    Pt reports no new complaints today. Objective:   Observation: guarded gait with mild antalgia, using a cane.  Test measurements:  LEFS: 34/80 (was 33/80), TU\", R hip abd: 4/5.  Palpation:    Exercises:  Exercise Resistance/Repetitions Other comments   Nustep L4-5, 10' 26   Mini squats 3x15 26   Standing hip abd 3x10 B 26   QS w/ towel roll 3x10 26   SLR  3x15 26   Hip abd / add  BTB / ball, 3x10 26        Bridges 3x10 26   Side stepping 3 laps 26   Step ups (lead with R as shar) 3x10 26   HS curls 3x10 green 26   LAQ 3x10 26   Mini lunge 3x10 26               Other Therapeutic Activities:     Manual Treatments:       Modalities:        Timed Code Treatment Minutes:   36'      Total Treatment Minutes:  37'    Treatment/Activity Tolerance:  [x] Patient tolerated treatment well [] Patient limited by fatigue  [] Patient limited by pain  [] Patient limited by other medical complications  [x] Other: Pt completed tx with no pain and no new complaints.     Pain after treatment:    0/10      Prognosis: [x] Good [] Fair  [] Poor    Goals  Short term goals  Time Frame for Short term goals: 4 weeks  Short term goal 1: Pt to be I with HEP - met  Short term goal 2: Pt to demonstrate R hip abd strength of 4-/5. - met  Short term goal 3: Pt to perform daily activities with pain of 5/10 or less at greatest. - not met but slightly improved  Long term goals  Time Frame for Long term goals : 8 weeks  Long term goal 1: LEFS score to improve to 65/80 indicating improved function. - not met  Long term goal 2: Pt to demonstrate R hip abduction strength of 4/5 or greater. - met  Long term goal 3: Pt to be able to ambulate short community distances without need for a cane with minimum to no gait deviations.   Long term goal 4: Pt to perform daily activities with average pain of less than 3/10.       Patient Requires Follow-up: [x] Yes  [] No    Plan:   [x] Continue per plan of care [] Alter current plan (see comments)  [] Plan of care initiated [] Hold pending MD visit [] Discharge    Plan for Next Session:         Electronically signed by:  Deborah Major PTA

## 2020-10-28 ENCOUNTER — HOSPITAL ENCOUNTER (OUTPATIENT)
Dept: PHYSICAL THERAPY | Facility: HOSPITAL | Age: 60
Setting detail: THERAPIES SERIES
Discharge: HOME | End: 2020-10-28
Payer: COMMERCIAL

## 2020-10-28 PROCEDURE — 97110 THERAPEUTIC EXERCISES: CPT

## 2020-10-28 NOTE — FLOWSHEET NOTE
Physical Therapy Daily Note   Date:  10/28/2020    TIme In:  1054                  Time Out: 1136    Patient Name:  Andrzej Moran    :  1960  MRN: [id number]    Restrictions/Precautions:    Pertinent Medical History:  Medical/Treatment Diagnosis Information:  ·   s/p R hip intertrochanteric fracture with surgical correction  ·   Intertrochanteric fracture, R hip  Insurance/Certification information:     Physician Information:    Jadiel Mora PA-C  Plan of care signed (Y/N):    Visit# / total visits:     27 /    G-Code (if applicable):      Date / Visit # G-Code Applied:         Progress Note: []  Yes  [x]  No  Next due by: Visit #10      Pain level:    2 /10    Subjective:    Pt reports no new complaints today. Objective:   Observation: guarded gait with mild antalgia, using a cane.  Test measurements:  LEFS: 34/80 (was 33/80), TU\", R hip abd: 4/5.  Palpation:    Exercises:  Exercise Resistance/Repetitions Other comments   Nustep L4-5, 10' - up to L-6 28   Mini squats 3x15 28   Standing hip abd 3x10 B 28   QS w/ towel roll 3x10 28   SLR  3x15 28   Hip abd / add  BTB / ball, 3x10 28        Bridges 3x10 28   Side stepping 3 laps 28   Step ups (lead with R as shar) 3x10 28   HS curls 3x10 green 28   LAQ 3x10 28   Mini lunge 3x10 28               Other Therapeutic Activities:     Manual Treatments:       Modalities:        Timed Code Treatment Minutes:   36'      Total Treatment Minutes:  43'    Treatment/Activity Tolerance:  [x] Patient tolerated treatment well [] Patient limited by fatigue  [] Patient limited by pain  [] Patient limited by other medical complications  [x] Other: Pt completed tx with no pain and no new complaints.     Pain after treatment:    3 /10      Prognosis: [x] Good [] Fair  [] Poor    Goals  Short term goals  Time Frame for Short term goals: 4 weeks  Short term goal 1: Pt to be I with HEP - met  Short term goal 2: Pt to demonstrate R hip abd strength of 4-/5. - met  Short term goal 3: Pt to perform daily activities with pain of 5/10 or less at greatest. - not met but slightly improved  Long term goals  Time Frame for Long term goals : 8 weeks  Long term goal 1: LEFS score to improve to 65/80 indicating improved function. - not met  Long term goal 2: Pt to demonstrate R hip abduction strength of 4/5 or greater. - met  Long term goal 3: Pt to be able to ambulate short community distances without need for a cane with minimum to no gait deviations.   Long term goal 4: Pt to perform daily activities with average pain of less than 3/10.       Patient Requires Follow-up: [x] Yes  [] No    Plan:   [x] Continue per plan of care [] Alter current plan (see comments)  [] Plan of care initiated [] Hold pending MD visit [] Discharge    Plan for Next Session:         Electronically signed by:  Charles Headley PT

## 2020-11-02 ENCOUNTER — HOSPITAL ENCOUNTER (OUTPATIENT)
Dept: PHYSICAL THERAPY | Facility: HOSPITAL | Age: 60
Setting detail: THERAPIES SERIES
Discharge: HOME | End: 2020-11-02
Payer: COMMERCIAL

## 2020-11-02 PROCEDURE — 97110 THERAPEUTIC EXERCISES: CPT

## 2020-11-02 NOTE — FLOWSHEET NOTE
Physical Therapy Daily Note   Date:  2020    TIme In:  1102                  Time Out: 1146    Patient Name:  Isis Parker    :  1960  MRN: [id number]    Restrictions/Precautions:    Pertinent Medical History:  Medical/Treatment Diagnosis Information:  ·   s/p R hip intertrochanteric fracture with surgical correction  ·   Intertrochanteric fracture, R hip  Insurance/Certification information:     Physician Information:    Samira Travis PA-C  Plan of care signed (Y/N):    Visit# / total visits:     32 /    G-Code (if applicable):      Date / Visit # G-Code Applied:         Progress Note: []  Yes  [x]  No  Next due by: Visit #10      Pain level:    2 /10    Subjective:    Pt reports no new complaints today. Objective:   Observation: guarded gait with mild antalgia, using a cane.  Test measurements:  LEFS: 34/80 (was 33/80), TU\", R hip abd: 4/5.  Palpation:    Exercises:  Exercise Resistance/Repetitions Other comments   Nustep L4-5, 10' - up to L-6 2   Mini squats 3x15 2   Standing hip abd 3x10 B 2   QS w/ towel roll 3x10 2   SLR  3x15 2   Hip abd / add  BTB / ball, 3x10 2        Bridges 3x10 2   Side stepping 3 laps 2   Step ups (lead with R as shar) 3x10 2   HS curls 3x10 green 2   LAQ 3x10 2   Mini lunge 3x10 2               Other Therapeutic Activities:     Manual Treatments:       Modalities:        Timed Code Treatment Minutes:   36'      Total Treatment Minutes:  43'    Treatment/Activity Tolerance:  [x] Patient tolerated treatment well [] Patient limited by fatigue  [] Patient limited by pain  [] Patient limited by other medical complications  [x] Other: Pt completed tx with no pain and no new complaints.     Pain after treatment:    3 /10      Prognosis: [x] Good [] Fair  [] Poor    Goals  Short term goals  Time Frame for Short term goals: 4 weeks  Short term goal 1: Pt to be I with HEP - met  Short term goal 2: Pt to demonstrate R hip abd strength of 4-/5. - met  Short term goal 3: Pt to perform daily activities with pain of 5/10 or less at greatest. - not met but slightly improved  Long term goals  Time Frame for Long term goals : 8 weeks  Long term goal 1: LEFS score to improve to 65/80 indicating improved function. - not met  Long term goal 2: Pt to demonstrate R hip abduction strength of 4/5 or greater. - met  Long term goal 3: Pt to be able to ambulate short community distances without need for a cane with minimum to no gait deviations.   Long term goal 4: Pt to perform daily activities with average pain of less than 3/10.       Patient Requires Follow-up: [x] Yes  [] No    Plan:   [x] Continue per plan of care [] Alter current plan (see comments)  [] Plan of care initiated [] Hold pending MD visit [] Discharge    Plan for Next Session:         Electronically signed by:  Kranthi Andrew PT

## 2020-11-04 ENCOUNTER — HOSPITAL ENCOUNTER (OUTPATIENT)
Dept: CT IMAGING | Facility: HOSPITAL | Age: 60
Discharge: HOME | End: 2020-11-04
Payer: MEDICAID

## 2020-11-04 ENCOUNTER — HOSPITAL ENCOUNTER (OUTPATIENT)
Dept: MAMMOGRAPHY | Facility: HOSPITAL | Age: 60
Discharge: HOME | End: 2020-11-04
Payer: MEDICAID

## 2020-11-04 ENCOUNTER — HOSPITAL ENCOUNTER (OUTPATIENT)
Dept: GENERAL RADIOLOGY | Facility: HOSPITAL | Age: 60
Discharge: HOME | End: 2020-11-04
Payer: MEDICAID

## 2020-11-04 PROCEDURE — G0297 LDCT FOR LUNG CA SCREEN: HCPCS

## 2020-11-04 PROCEDURE — 77063 BREAST TOMOSYNTHESIS BI: CPT

## 2020-11-04 PROCEDURE — 77080 DXA BONE DENSITY AXIAL: CPT

## 2020-11-09 ENCOUNTER — HOSPITAL ENCOUNTER (OUTPATIENT)
Dept: PHYSICAL THERAPY | Facility: HOSPITAL | Age: 60
Setting detail: THERAPIES SERIES
Discharge: HOME | End: 2020-11-09
Payer: COMMERCIAL

## 2020-11-09 ENCOUNTER — TELEPHONE (OUTPATIENT)
Dept: PRIMARY CARE CLINIC | Age: 60
End: 2020-11-09

## 2020-11-09 PROCEDURE — 97110 THERAPEUTIC EXERCISES: CPT

## 2020-11-09 NOTE — FLOWSHEET NOTE
pain of 5/10 or less at greatest. - not met but slightly improved  Long term goals  Time Frame for Long term goals : 8 weeks  Long term goal 1: LEFS score to improve to 65/80 indicating improved function. - not met  Long term goal 2: Pt to demonstrate R hip abduction strength of 4/5 or greater. - met  Long term goal 3: Pt to be able to ambulate short community distances without need for a cane with minimum to no gait deviations.   Long term goal 4: Pt to perform daily activities with average pain of less than 3/10.       Patient Requires Follow-up: [x] Yes  [] No    Plan:   [x] Continue per plan of care [] Alter current plan (see comments)  [] Plan of care initiated [] Hold pending MD visit [] Discharge    Plan for Next Session:         Electronically signed by:  Benson Prince PT

## 2020-11-09 NOTE — TELEPHONE ENCOUNTER
----- Message from LUCILLE Robb sent at 11/5/2020  9:19 PM EST -----  No nodules or sign of cancer. Will discuss the compression fracture and fat density at her next appt.

## 2020-11-11 ENCOUNTER — HOSPITAL ENCOUNTER (OUTPATIENT)
Dept: PHYSICAL THERAPY | Facility: HOSPITAL | Age: 60
Setting detail: THERAPIES SERIES
Discharge: HOME | End: 2020-11-11
Payer: COMMERCIAL

## 2020-11-11 PROCEDURE — 97110 THERAPEUTIC EXERCISES: CPT

## 2020-11-11 NOTE — FLOWSHEET NOTE
perform daily activities with pain of 5/10 or less at greatest. - not met but slightly improved  Long term goals  Time Frame for Long term goals : 8 weeks  Long term goal 1: LEFS score to improve to 65/80 indicating improved function. - not met  Long term goal 2: Pt to demonstrate R hip abduction strength of 4/5 or greater. - met  Long term goal 3: Pt to be able to ambulate short community distances without need for a cane with minimum to no gait deviations.   Long term goal 4: Pt to perform daily activities with average pain of less than 3/10.       Patient Requires Follow-up: [x] Yes  [] No    Plan:   [x] Continue per plan of care [] Alter current plan (see comments)  [] Plan of care initiated [] Hold pending MD visit [] Discharge    Plan for Next Session:         Electronically signed by:  Radha Dangelo, PT

## 2020-11-12 RX ORDER — OMEPRAZOLE 40 MG/1
40 CAPSULE, DELAYED RELEASE ORAL DAILY
Qty: 30 CAPSULE | Refills: 3 | Status: SHIPPED | OUTPATIENT
Start: 2020-11-12 | End: 2021-09-15 | Stop reason: ALTCHOICE

## 2020-11-12 RX ORDER — MELOXICAM 15 MG/1
15 TABLET ORAL DAILY
Qty: 30 TABLET | Refills: 3 | Status: SHIPPED | OUTPATIENT
Start: 2020-11-12 | End: 2021-05-28

## 2020-11-12 RX ORDER — LISINOPRIL 10 MG/1
10 TABLET ORAL DAILY
Qty: 30 TABLET | Refills: 3 | Status: SHIPPED | OUTPATIENT
Start: 2020-11-12 | End: 2021-08-03 | Stop reason: SDUPTHER

## 2020-11-12 RX ORDER — ERGOCALCIFEROL 1.25 MG/1
50000 CAPSULE ORAL WEEKLY
Qty: 5 CAPSULE | Refills: 3 | Status: SHIPPED | OUTPATIENT
Start: 2020-11-12 | End: 2021-03-01

## 2020-11-16 ENCOUNTER — HOSPITAL ENCOUNTER (OUTPATIENT)
Dept: PHYSICAL THERAPY | Facility: HOSPITAL | Age: 60
Setting detail: THERAPIES SERIES
End: 2020-11-16
Payer: COMMERCIAL

## 2020-11-18 ENCOUNTER — HOSPITAL ENCOUNTER (OUTPATIENT)
Dept: PHYSICAL THERAPY | Facility: HOSPITAL | Age: 60
Setting detail: THERAPIES SERIES
Discharge: HOME | End: 2020-11-18
Payer: COMMERCIAL

## 2020-11-18 PROCEDURE — 97110 THERAPEUTIC EXERCISES: CPT

## 2020-11-18 NOTE — FLOWSHEET NOTE
Physical Therapy Reassessment Note   Date:  2020    TIme In:  1132                  Time Out: 1214    Patient Name:  Allie Zapata    :  1960  MRN: [id number]    Restrictions/Precautions:    Pertinent Medical History:  Medical/Treatment Diagnosis Information:  ·   s/p R hip intertrochanteric fracture with surgical correction  ·   Intertrochanteric fracture, R hip  Insurance/Certification information:     Physician Information:    Evan Reyes PA-C  Plan of care signed (Y/N):    Visit# / total visits:     29 /    G-Code (if applicable):      Date / Visit # G-Code Applied:         Progress Note: []  Yes  [x]  No  Next due by: Visit #10      Pain level:    3 /10    Subjective:    Pt reports mild- moderate pain currently; no new c/o or changes to note    Objective:   Observation: guarded gait with mild antalgia, using a cane.  Test measurements:  LEFS: 31/80 (was 34/80), TU\", R hip abd: 4/5.    Palpation:    Exercises:  Exercise Resistance/Repetitions Other comments   Nustep L6, 10' -  18   Mini squats 3x15 18   Standing hip abd 3x10 B 18   QS w/ towel roll 3x10 18   SLR  3x15 18   Hip abd / add  BTB / ball, 3x10 18        Bridges 3x10 18   Side stepping 3 laps 18   Step ups (lead with R as shar) 3x10 18   HS curls 3x10 green 18   LAQ 3x10 18   Mini lunge 3x10 18               Other Therapeutic Activities:     Manual Treatments:       Modalities:        Timed Code Treatment Minutes:   36'      Total Treatment Minutes:  43'    Treatment/Activity Tolerance:  [x] Patient tolerated treatment well [] Patient limited by fatigue  [] Patient limited by pain  [] Patient limited by other medical complications  [] Other:     Pain after treatment:     3 /10      Prognosis: [x] Good [] Fair  [] Poor    Goals  Short term goals  Time Frame for Short term goals: 4 weeks  Short term goal 1: Pt to be I with HEP - met  Short term goal 2: Pt to demonstrate R hip abd strength of 4-/5. - met  Short term goal

## 2020-11-19 RX ORDER — CYANOCOBALAMIN 1000 UG/ML
INJECTION INTRAMUSCULAR; SUBCUTANEOUS
Qty: 2 ML | Refills: 5 | Status: SHIPPED | OUTPATIENT
Start: 2020-11-19 | End: 2021-03-01

## 2020-11-19 RX ORDER — TRIAMCINOLONE ACETONIDE 1 MG/G
CREAM TOPICAL
Qty: 60 G | Refills: 0 | Status: SHIPPED | OUTPATIENT
Start: 2020-11-19 | End: 2020-12-31

## 2020-11-23 ENCOUNTER — HOSPITAL ENCOUNTER (OUTPATIENT)
Dept: PHYSICAL THERAPY | Facility: HOSPITAL | Age: 60
Setting detail: THERAPIES SERIES
Discharge: HOME | End: 2020-11-23
Payer: COMMERCIAL

## 2020-11-23 PROCEDURE — 97110 THERAPEUTIC EXERCISES: CPT

## 2020-11-23 NOTE — FLOWSHEET NOTE
Physical Therapy Daily Note   Date:  2020    TIme In:   1100                Time Out: 1138    Patient Name:  Christine Cavazos    :  1960  MRN: [id number]    Restrictions/Precautions:    Pertinent Medical History:  Medical/Treatment Diagnosis Information:  ·   s/p R hip intertrochanteric fracture with surgical correction  ·   Intertrochanteric fracture, R hip  Insurance/Certification information:     Physician Information:    Brenda Patel PA-C  Plan of care signed (Y/N):    Visit# / total visits:     28 /    G-Code (if applicable):      Date / Visit # G-Code Applied:         Progress Note: []  Yes  [x]  No  Next due by: Visit #10      Pain level:    2/10    Subjective:    Pt reports she returns to the doc about her hip on the 22nd of Dec and her other doc won't operate on her rist until she quits smoking and gets her hip taken care of.     Objective:   Observation: guarded gait with mild antalgia, using a cane.  Test measurements:  LEFS: 31/80 (was 34/80), TU\", R hip abd: 4/5.  Palpation:    Exercises:  Exercise Resistance/Repetitions Other comments   Nustep L6, 10' -  23   Mini squats 3x15 23   Standing hip abd 3x10 B 23   QS w/ towel roll 3x10 23   SLR  3x15 23   Hip abd / add  BTB / ball, 3x10 23        Bridges 3x10 23   Side stepping 3 laps 23   Step ups (lead with R as shar) 3x10 23   HS curls 3x10 green 23   LAQ 3x10 23   Mini lunge 3x10 23               Other Therapeutic Activities:     Manual Treatments:       Modalities:        Timed Code Treatment Minutes:   35      Total Treatment Minutes:  38    Treatment/Activity Tolerance:  [x] Patient tolerated treatment well [] Patient limited by fatigue  [] Patient limited by pain  [] Patient limited by other medical complications  [x] Other: Pt completed tx with mild c/o pain and no new symptoms.     Pain after treatment:      3/10      Prognosis: [x] Good [] Fair  [] Poor     Goals  Short term goals  Time Frame for Short term goals: 4 weeks  Short term goal 1: Pt to be I with HEP - met  Short term goal 2: Pt to demonstrate R hip abd strength of 4-/5. - met  Short term goal 3: Pt to perform daily activities with pain of 5/10 or less at greatest. - not met but slightly improved  Long term goals  Time Frame for Long term goals : 8 weeks  Long term goal 1: LEFS score to improve to 65/80 indicating improved function. - not met  Long term goal 2: Pt to demonstrate R hip abduction strength of 4/5 or greater. - met  Long term goal 3: Pt to be able to ambulate short community distances without need for a cane with minimum to no gait deviations. Long term goal 4: Pt to perform daily activities with average pain of less than 3/10.     Patient continues to have hip pain, limiting her strength and progression; she is able to complete her exercise program with minimal difficulty, but is limited on progression due to pain and fatigue; non-healing fracture complicates therapeutic and functional progression.       Patient Requires Follow-up: [x] Yes  [] No    Plan:   [x] Continue per plan of care [] Alter current plan (see comments)  [] Plan of care initiated [] Hold pending MD visit [] Discharge    Plan for Next Session:         Electronically signed by:  Erasto Major PTA

## 2020-11-25 ENCOUNTER — APPOINTMENT (OUTPATIENT)
Dept: PHYSICAL THERAPY | Facility: HOSPITAL | Age: 60
End: 2020-11-25
Payer: COMMERCIAL

## 2020-12-04 ENCOUNTER — HOSPITAL ENCOUNTER (OUTPATIENT)
Dept: PHYSICAL THERAPY | Facility: HOSPITAL | Age: 60
Setting detail: THERAPIES SERIES
Discharge: HOME | End: 2020-12-04
Payer: COMMERCIAL

## 2020-12-04 PROCEDURE — 97110 THERAPEUTIC EXERCISES: CPT

## 2020-12-04 NOTE — FLOWSHEET NOTE
Physical Therapy Daily Note   Date:  2020    TIme In:   1033                Time Out: 1117    Patient Name:  Dasha Dickens    :  1960  MRN: [id number]    Restrictions/Precautions:    Pertinent Medical History:  Medical/Treatment Diagnosis Information:  ·   s/p R hip intertrochanteric fracture with surgical correction  ·   Intertrochanteric fracture, R hip  Insurance/Certification information:     Physician Information:    Shadi Ortiz PA-C  Plan of care signed (Y/N):    Visit# / total visits:     39 /    G-Code (if applicable):      Date / Visit # G-Code Applied:         Progress Note: []  Yes  [x]  No  Next due by: Visit #10      Pain level:    3 /10    Subjective:    Pt reports: mild soreness in hip; no new c/o; was having more pain last week but feels better; ortho f/u . Objective:   Observation: guarded gait with mild antalgia, using a cane.  Test measurements:  LEFS: 31/80 (was 34/80), TU\", R hip abd: 4/5.  Palpation:    Exercises:  Exercise Resistance/Repetitions Other comments   Nustep L6, 10' -  4   Mini squats 3x15 4   Standing hip abd 3x10 B 4   QS w/ towel roll 3x10 4   SLR  3x15 4   Hip abd / add  BTB / ball, 3x10 4        Bridges 3x10 4   Side stepping 3 laps 4   Step ups (lead with R as shar) 3x10 4   HS curls 3x10 green 4   LAQ 3x10 4   Mini lunge 3x10 4               Other Therapeutic Activities:     Manual Treatments:       Modalities:        Timed Code Treatment Minutes:   36'      Total Treatment Minutes:    40'    Treatment/Activity Tolerance:  [x] Patient tolerated treatment well [] Patient limited by fatigue  [] Patient limited by pain  [] Patient limited by other medical complications  [x] Other: Pt completed tx with mild c/o pain and no new symptoms.     Pain after treatment:      3 /10      Prognosis: [x] Good [] Fair  [] Poor     Goals  Short term goals  Time Frame for Short term goals: 4 weeks  Short term goal 1: Pt to be I with HEP - met  Short term goal 2: Pt to demonstrate R hip abd strength of 4-/5. - met  Short term goal 3: Pt to perform daily activities with pain of 5/10 or less at greatest. - not met but slightly improved  Long term goals  Time Frame for Long term goals : 8 weeks  Long term goal 1: LEFS score to improve to 65/80 indicating improved function. - not met  Long term goal 2: Pt to demonstrate R hip abduction strength of 4/5 or greater. - met  Long term goal 3: Pt to be able to ambulate short community distances without need for a cane with minimum to no gait deviations. Long term goal 4: Pt to perform daily activities with average pain of less than 3/10.     Patient continues to have hip pain, limiting her strength and progression; she is able to complete her exercise program with minimal difficulty, but is limited on progression due to pain and fatigue; non-healing fracture complicates therapeutic and functional progression.       Patient Requires Follow-up: [x] Yes  [] No    Plan:   [x] Continue per plan of care [] Alter current plan (see comments)  [] Plan of care initiated [] Hold pending MD visit [] Discharge    Plan for Next Session:         Electronically signed by:  Agustin Morley, PT

## 2020-12-07 ENCOUNTER — HOSPITAL ENCOUNTER (OUTPATIENT)
Dept: PHYSICAL THERAPY | Facility: HOSPITAL | Age: 60
Setting detail: THERAPIES SERIES
Discharge: HOME | End: 2020-12-07
Payer: COMMERCIAL

## 2020-12-07 PROCEDURE — 97110 THERAPEUTIC EXERCISES: CPT

## 2020-12-07 NOTE — FLOWSHEET NOTE
Physical Therapy Daily Note   Date:  2020    TIme In:   1131                Time Out:  1212    Patient Name:  Isis Parker    :  1960  MRN: [id number]    Restrictions/Precautions:    Pertinent Medical History:  Medical/Treatment Diagnosis Information:  ·   s/p R hip intertrochanteric fracture with surgical correction  ·   Intertrochanteric fracture, R hip  Insurance/Certification information:     Physician Information:    Samira Travis PA-C  Plan of care signed (Y/N):    Visit# / total visits:     40 /    G-Code (if applicable):      Date / Visit # G-Code Applied:         Progress Note: []  Yes  [x]  No  Next due by: Visit #10      Pain level:    2 /10    Subjective:    Pt reports: mild soreness in hip; no new c/o; was having more pain last week but feels better; ortho f/u . Objective:   Observation: guarded gait with mild antalgia, using a cane.  Test measurements:  LEFS: 31/80 (was 34/80), TU\", R hip abd: 4/5.  Palpation:    Exercises:  Exercise Resistance/Repetitions Other comments   Nustep L6, 10' -  7   Mini squats 3x15 7   Standing hip abd 3x10 B 7   QS w/ towel roll 3x10 7   SLR  3x15 7   Hip abd / add  BTB / ball, 3x10 7        Bridges 3x10 7   Side stepping 3 laps 7   Step ups (lead with R as shar) 3x10 7   HS curls 3x10 green 7   LAQ 3x10 7   Mini lunge 3x10 7               Other Therapeutic Activities:     Manual Treatments:       Modalities:        Timed Code Treatment Minutes:   36'      Total Treatment Minutes:    39'    Treatment/Activity Tolerance:  [x] Patient tolerated treatment well [] Patient limited by fatigue  [] Patient limited by pain  [] Patient limited by other medical complications  [x] Other: Pt completed tx with mild c/o pain and no new symptoms.     Pain after treatment:      3 /10      Prognosis: [x] Good [] Fair  [] Poor     Goals  Short term goals  Time Frame for Short term goals: 4 weeks  Short term goal 1: Pt to be I with HEP - met  Short term goal 2: Pt to demonstrate R hip abd strength of 4-/5. - met  Short term goal 3: Pt to perform daily activities with pain of 5/10 or less at greatest. - not met but slightly improved  Long term goals  Time Frame for Long term goals : 8 weeks  Long term goal 1: LEFS score to improve to 65/80 indicating improved function. - not met  Long term goal 2: Pt to demonstrate R hip abduction strength of 4/5 or greater. - met  Long term goal 3: Pt to be able to ambulate short community distances without need for a cane with minimum to no gait deviations. Long term goal 4: Pt to perform daily activities with average pain of less than 3/10.     Patient continues to have hip pain, limiting her strength and progression; she is able to complete her exercise program with minimal difficulty, but is limited on progression due to pain and fatigue; non-healing fracture complicates therapeutic and functional progression.       Patient Requires Follow-up: [x] Yes  [] No    Plan:   [x] Continue per plan of care [] Alter current plan (see comments)  [] Plan of care initiated [] Hold pending MD visit [] Discharge    Plan for Next Session:         Electronically signed by:  Efraín Schwab, PT

## 2020-12-09 ENCOUNTER — HOSPITAL ENCOUNTER (OUTPATIENT)
Dept: PHYSICAL THERAPY | Facility: HOSPITAL | Age: 60
Setting detail: THERAPIES SERIES
Discharge: HOME | End: 2020-12-09
Payer: COMMERCIAL

## 2020-12-09 PROCEDURE — 97110 THERAPEUTIC EXERCISES: CPT

## 2020-12-09 NOTE — FLOWSHEET NOTE
Physical Therapy Daily Note   Date:  2020    TIme In:   1053                Time Out:  1136    Patient Name:  Kassie Heredia    :  1960  MRN: [id number]    Restrictions/Precautions:    Pertinent Medical History:  Medical/Treatment Diagnosis Information:  ·   s/p R hip intertrochanteric fracture with surgical correction  ·   Intertrochanteric fracture, R hip  Insurance/Certification information:     Physician Information:    Anette Gaspar PA-C  Plan of care signed (Y/N):    Visit# / total visits:     45 /    G-Code (if applicable):      Date / Visit # G-Code Applied:         Progress Note: []  Yes  [x]  No  Next due by: Visit #10      Pain level:    2 /10    Subjective:    Pt reports: mild soreness in hip; no new c/o; was having more pain last week but feels better; ortho f/u . Objective:   Observation: guarded gait with mild antalgia, using a cane.  Test measurements:  LEFS: 31/80 (was 34/80), TU\", R hip abd: 4/5.  Palpation:    Exercises:  Exercise Resistance/Repetitions Other comments   Nustep L6, 10' -  9   Mini squats 3x15 9   Standing hip abd 3x10 B 9   QS w/ towel roll 3x10 9   SLR  3x15 9   Hip abd / add  BTB / ball, 3x10 9        Bridges 3x10 9   Side stepping 3 laps 9   Step ups (lead with R as shar) 3x10 9   HS curls 3x10 green 9   LAQ 3x10 9   Mini lunge 3x10 9               Other Therapeutic Activities:     Manual Treatments:       Modalities:        Timed Code Treatment Minutes:   36'      Total Treatment Minutes:    37'    Treatment/Activity Tolerance:  [x] Patient tolerated treatment well [] Patient limited by fatigue  [] Patient limited by pain  [] Patient limited by other medical complications  [x] Other: Pt completed tx with mild c/o pain and no new symptoms.     Pain after treatment:      3 /10      Prognosis: [x] Good [] Fair  [] Poor     Goals  Short term goals  Time Frame for Short term goals: 4 weeks  Short term goal 1: Pt to be I with HEP - met  Short term goal 2: Pt to demonstrate R hip abd strength of 4-/5. - met  Short term goal 3: Pt to perform daily activities with pain of 5/10 or less at greatest. - not met but slightly improved  Long term goals  Time Frame for Long term goals : 8 weeks  Long term goal 1: LEFS score to improve to 65/80 indicating improved function. - not met  Long term goal 2: Pt to demonstrate R hip abduction strength of 4/5 or greater. - met  Long term goal 3: Pt to be able to ambulate short community distances without need for a cane with minimum to no gait deviations. Long term goal 4: Pt to perform daily activities with average pain of less than 3/10.     Patient continues to have hip pain, limiting her strength and progression; she is able to complete her exercise program with minimal difficulty, but is limited on progression due to pain and fatigue; non-healing fracture complicates therapeutic and functional progression.       Patient Requires Follow-up: [x] Yes  [] No    Plan:   [x] Continue per plan of care [] Alter current plan (see comments)  [] Plan of care initiated [] Hold pending MD visit [] Discharge    Plan for Next Session:         Electronically signed by:  Braden Jarquin, PT

## 2020-12-15 ENCOUNTER — HOSPITAL ENCOUNTER (OUTPATIENT)
Dept: PHYSICAL THERAPY | Facility: HOSPITAL | Age: 60
Setting detail: THERAPIES SERIES
Discharge: HOME | End: 2020-12-15
Payer: COMMERCIAL

## 2020-12-15 PROCEDURE — 97110 THERAPEUTIC EXERCISES: CPT

## 2020-12-15 NOTE — FLOWSHEET NOTE
Physical Therapy Reassessment Note   Date:  12/15/2020    TIme In:                  Time Out:       Patient Name:  Bettylou Cushing    :  1960  MRN: [id number]    Restrictions/Precautions:    Pertinent Medical History:  Medical/Treatment Diagnosis Information:  ·   s/p R hip intertrochanteric fracture with surgical correction  ·   Intertrochanteric fracture, R hip  Insurance/Certification information:     Physician Information:    Fabrice Leong PA-C  Plan of care signed (Y/N):    Visit# / total visits:     39/    G-Code (if applicable):      Date / Visit # G-Code Applied:         Progress Note: []  Yes  [x]  No  Next due by: Visit #10      Pain level:    2/10    Subjective:    Pt reports she can't really tell if she is any better but she returns to MD next week to see how well the bone has healed. Objective:   Observation: guarded gait with mild antalgia, using a cane.  Test measurements:  LEFS: 43/80 (was 34/80), TU\", R hip abd: 4+/5.  Palpation:    Exercises:  Exercise Resistance/Repetitions Other comments   Nustep L6, 10'  15   Mini squats 3x15 15   Standing hip abd 3x10 B 15   QS w/ towel roll 3x10 15   SLR  3x15 15   Hip abd / add  BTB / ball, 3x10 15        Bridges 3x10 15   Side stepping 3 laps 15   Step ups (lead with R as shar) 3x10 15   HS curls 3x10 green 15   LAQ 3x10 15   Mini lunge 3x10 15               Other Therapeutic Activities: Re-assessment performed by Wendy Santizo PT. Manual Treatments:       Modalities:        Timed Code Treatment Minutes:   39      Total Treatment Minutes:    41    Treatment/Activity Tolerance:  [x] Patient tolerated treatment well [] Patient limited by fatigue  [] Patient limited by pain  [] Patient limited by other medical complications  [x] Other: Pt completed tx with improved functional measures and improved hip abd strength as compared to last re-assess.      Pain after treatment:       0/10      Prognosis: [x] Good [] Fair  [] Poor     Goals  Short term goals  Time Frame for Short term goals: 4 weeks  Short term goal 1: Pt to be I with HEP - met  Short term goal 2: Pt to demonstrate R hip abd strength of 4-/5. - met  Short term goal 3: Pt to perform daily activities with pain of 5/10 or less at greatest. - not met but slightly improved  Long term goals  Time Frame for Long term goals : 8 weeks  Long term goal 1: LEFS score to improve to 65/80 indicating improved function. - not met  Long term goal 2: Pt to demonstrate R hip abduction strength of 4/5 or greater. - met  Long term goal 3: Pt to be able to ambulate short community distances without need for a cane with minimum to no gait deviations. Long term goal 4: Pt to perform daily activities with average pain of less than 3/10.     Patient is showing progress toward some of her goals; still having pain that is limiting progressin.       Patient Requires Follow-up: [x] Yes  [] No    Plan:   [x] Continue per plan of care [] Alter current plan (see comments)  [] Plan of care initiated [] Hold pending MD visit [] Discharge    Plan for Next Session:         Electronically signed by:  Ephraim Dunlap PTA    Electronically signed by Myriam Deal PT on 2/18/2021 at 11:50 AM

## 2020-12-17 ENCOUNTER — HOSPITAL ENCOUNTER (OUTPATIENT)
Dept: PHYSICAL THERAPY | Facility: HOSPITAL | Age: 60
Setting detail: THERAPIES SERIES
Discharge: HOME | End: 2020-12-17
Payer: COMMERCIAL

## 2020-12-17 PROCEDURE — 97110 THERAPEUTIC EXERCISES: CPT

## 2020-12-17 NOTE — FLOWSHEET NOTE
Physical Therapy Daily Note   Date:  2020    TIme In:  1057                 Time Out:   1139    Patient Name:  Girish Hall    :  1960  MRN: [id number]    Restrictions/Precautions:    Pertinent Medical History:  Medical/Treatment Diagnosis Information:  ·   s/p R hip intertrochanteric fracture with surgical correction  ·   Intertrochanteric fracture, R hip  Insurance/Certification information:     Physician Information:    Meghana Duran PA-C  Plan of care signed (Y/N):    Visit# / total visits:     40/    G-Code (if applicable):      Date / Visit # G-Code Applied:         Progress Note: []  Yes  [x]  No  Next due by: Visit #10      Pain level:    2/10    Subjective:    Pt reports she can't really tell if she is any better but she returns to MD next week to see how well the bone has healed. Objective:   Observation: guarded gait with mild antalgia, using a cane.  Test measurements:  LEFS: 43/80 (was 34/80), TU\", R hip abd: 4+/5.  Palpation:    Exercises:  Exercise Resistance/Repetitions Other comments   Nustep L6, 10'  15   Mini squats 3x15 15   Standing hip abd 3x10 B 15   QS w/ towel roll 3x10 15   SLR  3x15 15   Hip abd / add  BTB / ball, 3x10 15        Bridges 3x10 15   Side stepping 3 laps 15   Step ups (lead with R as shar) 3x10 15   HS curls 3x10 green 15   LAQ 3x10 15   Mini lunge 3x10 15               Other Therapeutic Activities:     Manual Treatments:       Modalities:        Timed Code Treatment Minutes:   36'      Total Treatment Minutes:    43'    Treatment/Activity Tolerance:  [x] Patient tolerated treatment well [] Patient limited by fatigue  [] Patient limited by pain  [] Patient limited by other medical complications  [x] Other: Pt completed tx with improved functional measures and improved hip abd strength as compared to last re-assess.      Pain after treatment:       0/10      Prognosis: [x] Good [] Fair  [] Poor     Goals  Short term goals  Time Frame for Short term goals: 4 weeks  Short term goal 1: Pt to be I with HEP - met  Short term goal 2: Pt to demonstrate R hip abd strength of 4-/5. - met  Short term goal 3: Pt to perform daily activities with pain of 5/10 or less at greatest. - not met but slightly improved  Long term goals  Time Frame for Long term goals : 8 weeks  Long term goal 1: LEFS score to improve to 65/80 indicating improved function. - not met  Long term goal 2: Pt to demonstrate R hip abduction strength of 4/5 or greater. - met  Long term goal 3: Pt to be able to ambulate short community distances without need for a cane with minimum to no gait deviations.   Long term goal 4: Pt to perform daily activities with average pain of less than 3/10.       Patient Requires Follow-up: [x] Yes  [] No    Plan:   [x] Continue per plan of care [] Alter current plan (see comments)  [] Plan of care initiated [] Hold pending MD visit [] Discharge    Plan for Next Session:         Electronically signed by:  Luis Ann PT

## 2020-12-21 ENCOUNTER — APPOINTMENT (OUTPATIENT)
Dept: PHYSICAL THERAPY | Facility: HOSPITAL | Age: 60
End: 2020-12-21
Payer: COMMERCIAL

## 2020-12-23 ENCOUNTER — HOSPITAL ENCOUNTER (OUTPATIENT)
Dept: PHYSICAL THERAPY | Facility: HOSPITAL | Age: 60
Setting detail: THERAPIES SERIES
Discharge: HOME | End: 2020-12-23
Payer: COMMERCIAL

## 2020-12-23 PROCEDURE — 97110 THERAPEUTIC EXERCISES: CPT

## 2020-12-23 NOTE — FLOWSHEET NOTE
Physical Therapy Daily Note   Date:  2020    TIme In:  1130                 Time Out:   6280    Patient Name:  Florencia Lozano    :  1960  MRN: [id number]    Restrictions/Precautions:    Pertinent Medical History:  Medical/Treatment Diagnosis Information:  ·   s/p R hip intertrochanteric fracture with surgical correction  ·   Intertrochanteric fracture, R hip  Insurance/Certification information:     Physician Information:    Cristofer Moreno PA-C  Plan of care signed (Y/N):    Visit# / total visits:     39 /    G-Code (if applicable):      Date / Visit # G-Code Applied:         Progress Note: []  Yes  [x]  No  Next due by: Visit #10      Pain level:    4 /10    Subjective:    Pt reports: hip is more sore this morning - lateral hip area, no known reason; MD appt was cancelled by MD office - so no update on that. Objective:   Observation: guarded gait with mild antalgia, using a cane.  Test measurements:  LEFS: 43/80 (was 34/80), TU\", R hip abd: 4+/5.    Palpation:    Exercises:  Exercise Resistance/Repetitions Other comments   Nustep L6, 10'  HOLD   Mini squats 3x15 23   Standing hip abd 3x10 B 23   QS w/ towel roll 3x10 23   SLR  3x15 23   Hip abd / add  BTB / ball, 3x10 23        Bridges 3x10 23   Side stepping 3 laps 23   Step ups (lead with R as shar) 3x10 23   HS curls 3x10 green 23   LAQ 3x10 23   Mini lunge 3x10 23               Other Therapeutic Activities:     Manual Treatments:       Modalities:        Timed Code Treatment Minutes:   36'      Total Treatment Minutes:    39'    Treatment/Activity Tolerance:  [x] Patient tolerated treatment well [] Patient limited by fatigue  [] Patient limited by pain  [] Patient limited by other medical complications  [x] Other: Pt completed tx with extra time due to pain    Pain after treatment:       4 /10      Prognosis: [x] Good [] Fair  [] Poor     Goals  Short term goals  Time Frame for Short term goals: 4 weeks  Short term goal 1: Pt to be I with HEP - met  Short term goal 2: Pt to demonstrate R hip abd strength of 4-/5. - met  Short term goal 3: Pt to perform daily activities with pain of 5/10 or less at greatest. - not met but slightly improved  Long term goals  Time Frame for Long term goals : 8 weeks  Long term goal 1: LEFS score to improve to 65/80 indicating improved function. - not met  Long term goal 2: Pt to demonstrate R hip abduction strength of 4/5 or greater. - met  Long term goal 3: Pt to be able to ambulate short community distances without need for a cane with minimum to no gait deviations.   Long term goal 4: Pt to perform daily activities with average pain of less than 3/10.       Patient Requires Follow-up: [x] Yes  [] No    Plan:   [x] Continue per plan of care [] Alter current plan (see comments)  [] Plan of care initiated [] Hold pending MD visit [] Discharge    Plan for Next Session:         Electronically signed by:  Kimani Patton PT

## 2020-12-30 ENCOUNTER — HOSPITAL ENCOUNTER (OUTPATIENT)
Dept: PHYSICAL THERAPY | Facility: HOSPITAL | Age: 60
Setting detail: THERAPIES SERIES
Discharge: HOME | End: 2020-12-30
Payer: COMMERCIAL

## 2020-12-30 PROCEDURE — 97110 THERAPEUTIC EXERCISES: CPT

## 2020-12-30 NOTE — FLOWSHEET NOTE
hip abd strength of 4-/5. - met  Short term goal 3: Pt to perform daily activities with pain of 5/10 or less at greatest. - not met but slightly improved  Long term goals  Time Frame for Long term goals : 8 weeks  Long term goal 1: LEFS score to improve to 65/80 indicating improved function. - not met  Long term goal 2: Pt to demonstrate R hip abduction strength of 4/5 or greater. - met  Long term goal 3: Pt to be able to ambulate short community distances without need for a cane with minimum to no gait deviations.   Long term goal 4: Pt to perform daily activities with average pain of less than 3/10.       Patient Requires Follow-up: [x] Yes  [] No    Plan:   [x] Continue per plan of care [] Alter current plan (see comments)  [] Plan of care initiated [] Hold pending MD visit [] Discharge    Plan for Next Session:         Electronically signed by:  Ramos Major PTA

## 2020-12-31 ENCOUNTER — OFFICE VISIT (OUTPATIENT)
Dept: PRIMARY CARE CLINIC | Age: 60
End: 2020-12-31
Payer: MEDICAID

## 2020-12-31 VITALS
DIASTOLIC BLOOD PRESSURE: 70 MMHG | SYSTOLIC BLOOD PRESSURE: 118 MMHG | BODY MASS INDEX: 22.21 KG/M2 | HEIGHT: 66 IN | WEIGHT: 138.2 LBS | TEMPERATURE: 96.9 F | RESPIRATION RATE: 16 BRPM | OXYGEN SATURATION: 97 % | HEART RATE: 69 BPM

## 2020-12-31 PROCEDURE — G8420 CALC BMI NORM PARAMETERS: HCPCS | Performed by: NURSE PRACTITIONER

## 2020-12-31 PROCEDURE — 4004F PT TOBACCO SCREEN RCVD TLK: CPT | Performed by: NURSE PRACTITIONER

## 2020-12-31 PROCEDURE — G8427 DOCREV CUR MEDS BY ELIG CLIN: HCPCS | Performed by: NURSE PRACTITIONER

## 2020-12-31 PROCEDURE — 99214 OFFICE O/P EST MOD 30 MIN: CPT | Performed by: NURSE PRACTITIONER

## 2020-12-31 PROCEDURE — 3017F COLORECTAL CA SCREEN DOC REV: CPT | Performed by: NURSE PRACTITIONER

## 2020-12-31 PROCEDURE — G8484 FLU IMMUNIZE NO ADMIN: HCPCS | Performed by: NURSE PRACTITIONER

## 2020-12-31 RX ORDER — ROPINIROLE 0.5 MG/1
0.5 TABLET, FILM COATED ORAL NIGHTLY
Qty: 30 TABLET | Refills: 2 | Status: SHIPPED | OUTPATIENT
Start: 2020-12-31 | End: 2021-03-01

## 2020-12-31 RX ORDER — BUPROPION HYDROCHLORIDE 150 MG/1
150 TABLET ORAL EVERY MORNING
Qty: 30 TABLET | Refills: 3 | Status: SHIPPED | OUTPATIENT
Start: 2020-12-31 | End: 2021-04-05

## 2020-12-31 NOTE — PROGRESS NOTES
Chief Complaint   Patient presents with    3 Month Follow-Up    Results     Bone density        Have you seen any other physician or provider since your last visit no    Have you had any other diagnostic tests since your last visit? no    Have you changed or stopped any medications since your last visit? no     Pt here today for f/u and results of bone density scan

## 2020-12-31 NOTE — PROGRESS NOTES
SUBJECTIVE:    Patient ID: Aysha Bird is a 62 y.o. female. Chief Complaint   Patient presents with    3 Month Follow-Up    Results     Bone density     Other     b12 def         HPI:  She returns about her bone density. She is having a lot of depression and is interested in stopping smoking. She is interested in taking Wellbutrin. She wants to discuss RLS  She has never addressed it before. She is not sleeping due to her arms and legs jumping and moving. She is still following about her orthopedic issues. She is walking with a cane and wearing a splint om her right leg. she has a perla in her right leg. She has an appt in Feb.   She had ct lung screen completed and bone dexa scan Patient's medications,allergies, past medical, surgical, social and family histories were reviewed and updated as appropriate. .  Current Outpatient Medications on File Prior to Visit   Medication Sig Dispense Refill    cyanocobalamin 1000 MCG/ML injection INJECT 1 ML ONCE A WEEK FOR 4 WEEKS AND THEN ONCE A MONTH. 2 mL 5    vitamin D (ERGOCALCIFEROL) 1.25 MG (57124 UT) CAPS capsule Take 1 capsule by mouth once a week 5 capsule 3    meloxicam (MOBIC) 15 MG tablet Take 1 tablet by mouth daily 30 tablet 3    lisinopril (PRINIVIL;ZESTRIL) 10 MG tablet Take 1 tablet by mouth daily 30 tablet 3    omeprazole (PRILOSEC) 40 MG delayed release capsule Take 1 capsule by mouth daily 30 capsule 3    leflunomide (ARAVA) 20 MG tablet Take 20 mg by mouth daily      Etanercept 50 MG/ML SOAJ Inject 1 mL into the skin every 7 days      Syringe/Needle, Disp, (SYRINGE 3CC/25GX1\") 25G X 1\" 3 ML MISC 1 each by Does not apply route once a week 50 each 1    albuterol sulfate HFA (VENTOLIN HFA) 108 (90 Base) MCG/ACT inhaler Inhale 1 puff into the lungs every 6 hours as needed      predniSONE (DELTASONE) 10 MG tablet Take 10 mg by mouth as needed       No current facility-administered medications on file prior to visit.         Review of Systems   Constitutional: Negative. HENT: Negative. Respiratory: Positive for cough and shortness of breath. Cardiovascular: Negative. Gastrointestinal: Negative. Genitourinary: Negative. Musculoskeletal: Positive for arthralgias (right arm and leg) and back pain. Skin: Negative. Neurological: Negative. Leg pain at night, jerking     Psychiatric/Behavioral: Negative. OBJECTIVE:  /70 (Site: Left Upper Arm, Position: Sitting, Cuff Size: Medium Adult)   Pulse 69   Temp 96.9 °F (36.1 °C) (Temporal)   Resp 16   Ht 5' 6\" (1.676 m)   Wt 138 lb 3.2 oz (62.7 kg)   SpO2 97%   BMI 22.31 kg/m²    Physical Exam  Vitals signs and nursing note reviewed. Constitutional:       Appearance: She is well-developed. HENT:      Head: Normocephalic and atraumatic. Eyes:      Conjunctiva/sclera: Conjunctivae normal.      Pupils: Pupils are equal, round, and reactive to light. Neck:      Musculoskeletal: Normal range of motion and neck supple. Thyroid: No thyromegaly. Vascular: No JVD. Cardiovascular:      Rate and Rhythm: Normal rate and regular rhythm. Heart sounds: No murmur. No friction rub. No gallop. Pulmonary:      Effort: Pulmonary effort is normal. No respiratory distress. Breath sounds: Normal breath sounds. Decreased air movement present. No wheezing or rales. Abdominal:      General: Bowel sounds are normal. There is no distension. Palpations: Abdomen is soft. Tenderness: There is no abdominal tenderness. There is no guarding. Musculoskeletal:         General: No tenderness. Arms:       Comments: Walking with cane and limp from right leg pain     Skin:     General: Skin is warm and dry. Findings: No rash. Neurological:      Mental Status: She is alert and oriented to person, place, and time. Psychiatric:         Judgment: Judgment normal.         No results found for requested labs within last 30 days. Hemoglobin A1C (%)   Date Value   03/04/2016 5.5     LDL Calculated (mg/dL)   Date Value   08/12/2019 143 (H)           Lab Results   Component Value Date    TSH 1.76 08/12/2019         ASSESSMENT/PLAN:     Lazaro Martinez was seen today for 3 month follow-up, results and other. 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 Will get echo to evaluate. She has not symptoms of chest pain or arrhythmia. RLS (restless legs syndrome)  -     rOPINIRole (REQUIP) 0.5 MG tablet; Take 1 tablet by mouth nightly  Will start on med and titrate. B12 deficiency  vitamin b injection given today. Localized osteoporosis, unspecified pathological fracture presence  Discussed treatment options and will have her discuss with her orthopedist for her plan before starting    Reactive depression  -     buPROPion (WELLBUTRIN XL) 150 MG extended release tablet; Take 1 tablet by mouth every morning    Tobacco abuse  -     buPROPion (WELLBUTRIN XL) 150 MG extended release tablet;  Take 1 tablet by mouth every morning      Medications Discontinued During This Encounter   Medication Reason    triamcinolone (KENALOG) 0.1 % cream LIST CLEANUP

## 2021-01-15 ENCOUNTER — OUTSIDE FACILITY SERVICE (OUTPATIENT)
Dept: CARDIOLOGY | Facility: CLINIC | Age: 61
End: 2021-01-15

## 2021-01-15 ENCOUNTER — HOSPITAL ENCOUNTER (OUTPATIENT)
Dept: NON INVASIVE DIAGNOSTICS | Facility: HOSPITAL | Age: 61
Discharge: HOME | End: 2021-01-15
Payer: MEDICAID

## 2021-01-15 DIAGNOSIS — I51.89 CARDIAC MASS: ICD-10-CM

## 2021-01-15 LAB
LV EF: 65 %
LVEF MODALITY: NORMAL

## 2021-01-15 PROCEDURE — 93306 TTE W/DOPPLER COMPLETE: CPT

## 2021-01-15 PROCEDURE — 93308 TTE F-UP OR LMTD: CPT | Performed by: INTERNAL MEDICINE

## 2021-01-18 ENCOUNTER — HOSPITAL ENCOUNTER (OUTPATIENT)
Dept: PHYSICAL THERAPY | Facility: HOSPITAL | Age: 61
Setting detail: THERAPIES SERIES
Discharge: HOME | End: 2021-01-18
Payer: COMMERCIAL

## 2021-01-18 PROCEDURE — 97110 THERAPEUTIC EXERCISES: CPT

## 2021-01-18 NOTE — FLOWSHEET NOTE
Physical Therapy Reassessment Note   Date:  2021    TIme In:                  Time Out:   1217    Patient Name:  Patito Hensley    :  1960  MRN: [id number]    Restrictions/Precautions:    Pertinent Medical History:  Medical/Treatment Diagnosis Information:  ·   s/p R hip intertrochanteric fracture with surgical correction  ·   Intertrochanteric fracture, R hip  Insurance/Certification information:     Physician Information:    Barbara Harvey PA-C  Plan of care signed (Y/N):    Visit# / total visits:     37 /    G-Code (if applicable):      Date / Visit # G-Code Applied:         Progress Note: []  Yes  [x]  No  Next due by: Visit #10      Pain level:    4/10    Subjective:    Pt reports she can tell she hasn't been to PT in a couple of weeks. She returns to MD in March. Objective:   Observation: guarded gait with mild antalgia, using a cane.  Test measurements:  LEFS: 38/80 (was 34/80), TU\", R hip abd: 4+/5.  Palpation:    Exercises:  Exercise Resistance/Repetitions Other comments   Nustep L6, 10'  18   Mini squats 3x15 18   Standing hip abd 3x10 B 18   QS w/ towel roll 3x10 18   SLR  3x15 18   Hip abd / add  BTB / ball, 3x10 18        Bridges 3x10 18   Side stepping 3 laps 18   Step ups (lead with R as shar) 3x10 18   HS curls 3x10 green 18   LAQ 3x10 18   Mini lunge 3x10 18               Other Therapeutic Activities: Re-assessment performed by Griffin Conner PT. Manual Treatments:       Modalities:        Timed Code Treatment Minutes:  40      Total Treatment Minutes:     42    Treatment/Activity Tolerance:  [x] Patient tolerated treatment well [] Patient limited by fatigue  [] Patient limited by pain  [] Patient limited by other medical complications  [x] Other: Pt completed tx with no change in pain and had slight decrease in functional measures today.     Pain after treatment:       \"same\"/10      Prognosis: [x] Good [] Fair  [] Poor     Goals  Short term goals  Time Frame for Short term goals: 4 weeks  Short term goal 1: Pt to be I with HEP - met  Short term goal 2: Pt to demonstrate R hip abd strength of 4-/5. - met  Short term goal 3: Pt to perform daily activities with pain of 5/10 or less at greatest. - not met but slightly improved  Long term goals  Time Frame for Long term goals : 8 weeks  Long term goal 1: LEFS score to improve to 65/80 indicating improved function. - not met  Long term goal 2: Pt to demonstrate R hip abduction strength of 4/5 or greater. - met  Long term goal 3: Pt to be able to ambulate short community distances without need for a cane with minimum to no gait deviations. Long term goal 4: Pt to perform daily activities with average pain of less than 3/10.     Patient continues to have c/o pain, soreness at hip; functionally stable, but with antalgic gait, and generally limited with functional mobility activities; pain is inhibiting progression with exercises and functional abilities.       Patient Requires Follow-up: [x] Yes  [] No    Plan:   [x] Continue per plan of care [] Alter current plan (see comments)  [] Plan of care initiated [] Hold pending MD visit [] Discharge    Plan for Next Session:         Electronically signed by:  James Worthington PTA    Electronically signed by Feliciano Griffith PT on 1/27/2021 at 8:53 AM

## 2021-01-20 ENCOUNTER — HOSPITAL ENCOUNTER (OUTPATIENT)
Dept: PHYSICAL THERAPY | Facility: HOSPITAL | Age: 61
Setting detail: THERAPIES SERIES
Discharge: HOME | End: 2021-01-20
Payer: COMMERCIAL

## 2021-01-20 PROCEDURE — 97110 THERAPEUTIC EXERCISES: CPT

## 2021-01-20 NOTE — FLOWSHEET NOTE
Physical Therapy Daily Note   Date:  2021    TIme In:   1137               Time Out:   8161    Patient Name:  Lynda Alexis    :  1960  MRN: [id number]    Restrictions/Precautions:    Pertinent Medical History:  Medical/Treatment Diagnosis Information:  ·   s/p R hip intertrochanteric fracture with surgical correction  ·   Intertrochanteric fracture, R hip  Insurance/Certification information:     Physician Information:    Georges Shaikh PA-C  Plan of care signed (Y/N):    Visit# / total visits:     40 /    G-Code (if applicable):      Date / Visit # G-Code Applied:         Progress Note: []  Yes  [x]  No  Next due by: Visit #10      Pain level:    4/10    Subjective:    Pt reports: still weak from missing PT for a couple of weeks; although she has pain, she feels better after therapy. Objective:   Observation: guarded gait with mild antalgia, using a cane.  Test measurements:  LEFS: 38/80 (was 34/80), TU\", R hip abd: 4+/5.  Palpation:    Exercises:  Exercise Resistance/Repetitions Other comments   Nustep L6, 10'  20   Mini squats 3x15 20   Standing hip abd 3x10 B 20   QS w/ towel roll 3x10 20   SLR  3x15 20   Hip abd / add  BTB / ball, 3x10 20        Bridges 3x10 20   Side stepping 3 laps 20   Step ups (lead with R as shar) 3x10 20   HS curls 3x10 green 20   LAQ 3x10 20   Mini lunge 3x10 20               Other Therapeutic Activities:     Manual Treatments:       Modalities:        Timed Code Treatment Minutes:  36'      Total Treatment Minutes:     55'    Treatment/Activity Tolerance:  [x] Patient tolerated treatment well [] Patient limited by fatigue  [] Patient limited by pain  [] Patient limited by other medical complications  [x] Other: Pt completed tx with no change in pain and had slight decrease in functional measures today.     Pain after treatment:       4 /10      Prognosis: [x] Good [] Fair  [] Poor     Goals  Short term goals  Time Frame for Short term goals: 4 weeks  Short term goal 1: Pt to be I with HEP - met  Short term goal 2: Pt to demonstrate R hip abd strength of 4-/5. - met  Short term goal 3: Pt to perform daily activities with pain of 5/10 or less at greatest. - not met but slightly improved  Long term goals  Time Frame for Long term goals : 8 weeks  Long term goal 1: LEFS score to improve to 65/80 indicating improved function. - not met  Long term goal 2: Pt to demonstrate R hip abduction strength of 4/5 or greater. - met  Long term goal 3: Pt to be able to ambulate short community distances without need for a cane with minimum to no gait deviations.   Long term goal 4: Pt to perform daily activities with average pain of less than 3/10.       Patient Requires Follow-up: [x] Yes  [] No    Plan:   [x] Continue per plan of care [] Alter current plan (see comments)  [] Plan of care initiated [] Hold pending MD visit [] Discharge    Plan for Next Session:         Electronically signed by:  Elvis Bey PT

## 2021-01-25 ENCOUNTER — APPOINTMENT (OUTPATIENT)
Dept: PHYSICAL THERAPY | Facility: HOSPITAL | Age: 61
End: 2021-01-25
Payer: COMMERCIAL

## 2021-01-27 ENCOUNTER — HOSPITAL ENCOUNTER (OUTPATIENT)
Dept: PHYSICAL THERAPY | Facility: HOSPITAL | Age: 61
Setting detail: THERAPIES SERIES
Discharge: HOME | End: 2021-01-27
Payer: COMMERCIAL

## 2021-01-27 PROCEDURE — 97110 THERAPEUTIC EXERCISES: CPT

## 2021-01-27 NOTE — FLOWSHEET NOTE
Physical Therapy Daily Note   Date:  2021    TIme In:   1106               Time Out:   1157    Patient Name:  Janean Schwab    :  1960  MRN: [id number]    Restrictions/Precautions:    Pertinent Medical History:  Medical/Treatment Diagnosis Information:  ·   s/p R hip intertrochanteric fracture with surgical correction  ·   Intertrochanteric fracture, R hip  Insurance/Certification information:     Physician Information:    Pat Ramírez PA-C  Plan of care signed (Y/N):    Visit# / total visits:     39 /    G-Code (if applicable):      Date / Visit # G-Code Applied:         Progress Note: []  Yes  [x]  No  Next due by: Visit #10      Pain level:    5 /10    Subjective:    Pt reports: more sore today than usual; states she is feeling worse; notes she had changes to some of her meds also; has f/u with Ortho next week. Objective:   Observation: guarded gait with mild antalgia, using a cane.  Test measurements:  LEFS: 38/80 (was 34/80), TU\", R hip abd: 4+/5.  Palpation:    Exercises:  Exercise Resistance/Repetitions Other comments   Nustep L6, 10'  27   Mini squats 3x15 27   Standing hip abd 3x10 B 27   QS w/ towel roll 3x10 27   SLR  3x15 27   Hip abd / add  BTB / ball, 3x10 27        Bridges 3x10 27   Side stepping 3 laps 27   Step ups (lead with R as shar) 3x10 27   HS curls 3x10 green 27   LAQ 3x10 27   Mini lunge 3x10 27               Other Therapeutic Activities:     Manual Treatments:       Modalities:        Timed Code Treatment Minutes:  36'      Total Treatment Minutes:     46'    Treatment/Activity Tolerance:  [x] Patient tolerated treatment well [] Patient limited by fatigue  [] Patient limited by pain  [] Patient limited by other medical complications  [x] Other: Pt completed tx with no change in pain.     Pain after treatment:       5 /10      Prognosis: [x] Good [] Fair  [] Poor     Goals  Short term goals  Time Frame for Short term goals: 4 weeks  Short term goal 1: Pt to be I with HEP - met  Short term goal 2: Pt to demonstrate R hip abd strength of 4-/5. - met  Short term goal 3: Pt to perform daily activities with pain of 5/10 or less at greatest. - not met but slightly improved  Long term goals  Time Frame for Long term goals : 8 weeks  Long term goal 1: LEFS score to improve to 65/80 indicating improved function. - not met  Long term goal 2: Pt to demonstrate R hip abduction strength of 4/5 or greater. - met  Long term goal 3: Pt to be able to ambulate short community distances without need for a cane with minimum to no gait deviations.   Long term goal 4: Pt to perform daily activities with average pain of less than 3/10.       Patient Requires Follow-up: [x] Yes  [] No    Plan:   [] Continue per plan of care [] Alter current plan (see comments)  [] Plan of care initiated [x] Hold pending MD visit [] Discharge    Plan for Next Session:         Electronically signed by:  Shasha Cruz PT

## 2021-02-11 ENCOUNTER — VIRTUAL VISIT (OUTPATIENT)
Dept: PRIMARY CARE CLINIC | Age: 61
End: 2021-02-11
Payer: MEDICAID

## 2021-02-11 DIAGNOSIS — F32.9 REACTIVE DEPRESSION: ICD-10-CM

## 2021-02-11 DIAGNOSIS — M81.6 LOCALIZED OSTEOPOROSIS, UNSPECIFIED PATHOLOGICAL FRACTURE PRESENCE: ICD-10-CM

## 2021-02-11 DIAGNOSIS — G25.81 RLS (RESTLESS LEGS SYNDROME): ICD-10-CM

## 2021-02-11 DIAGNOSIS — M05.79 RHEUMATOID ARTHRITIS INVOLVING MULTIPLE SITES WITH POSITIVE RHEUMATOID FACTOR (HCC): ICD-10-CM

## 2021-02-11 DIAGNOSIS — M25.551 RIGHT HIP PAIN: ICD-10-CM

## 2021-02-11 DIAGNOSIS — I51.89 MASS OF HEART: Primary | ICD-10-CM

## 2021-02-11 DIAGNOSIS — K59.01 SLOW TRANSIT CONSTIPATION: ICD-10-CM

## 2021-02-11 DIAGNOSIS — S72.001A CLOSED FRACTURE OF RIGHT HIP, INITIAL ENCOUNTER (HCC): ICD-10-CM

## 2021-02-11 DIAGNOSIS — I10 ESSENTIAL HYPERTENSION: ICD-10-CM

## 2021-02-11 DIAGNOSIS — J44.9 CHRONIC OBSTRUCTIVE PULMONARY DISEASE, UNSPECIFIED COPD TYPE (HCC): ICD-10-CM

## 2021-02-11 PROCEDURE — 99214 OFFICE O/P EST MOD 30 MIN: CPT | Performed by: NURSE PRACTITIONER

## 2021-02-11 PROCEDURE — 3017F COLORECTAL CA SCREEN DOC REV: CPT | Performed by: NURSE PRACTITIONER

## 2021-02-11 PROCEDURE — 4004F PT TOBACCO SCREEN RCVD TLK: CPT | Performed by: NURSE PRACTITIONER

## 2021-02-11 PROCEDURE — G8420 CALC BMI NORM PARAMETERS: HCPCS | Performed by: NURSE PRACTITIONER

## 2021-02-11 PROCEDURE — G8484 FLU IMMUNIZE NO ADMIN: HCPCS | Performed by: NURSE PRACTITIONER

## 2021-02-11 PROCEDURE — G8427 DOCREV CUR MEDS BY ELIG CLIN: HCPCS | Performed by: NURSE PRACTITIONER

## 2021-02-11 PROCEDURE — G8926 SPIRO NO PERF OR DOC: HCPCS | Performed by: NURSE PRACTITIONER

## 2021-02-11 PROCEDURE — 3023F SPIROM DOC REV: CPT | Performed by: NURSE PRACTITIONER

## 2021-02-11 RX ORDER — LEFLUNOMIDE 20 MG/1
20 TABLET ORAL DAILY
Qty: 30 TABLET | Refills: 0 | Status: SHIPPED | OUTPATIENT
Start: 2021-02-11 | End: 2021-09-15 | Stop reason: ALTCHOICE

## 2021-02-11 RX ORDER — ALBUTEROL SULFATE 90 UG/1
1 AEROSOL, METERED RESPIRATORY (INHALATION) EVERY 6 HOURS PRN
Qty: 1 INHALER | Refills: 3 | Status: SHIPPED | OUTPATIENT
Start: 2021-02-11 | End: 2021-05-06 | Stop reason: ALTCHOICE

## 2021-02-11 RX ORDER — HYDROCODONE BITARTRATE AND ACETAMINOPHEN 7.5; 325 MG/1; MG/1
TABLET ORAL
COMMUNITY
End: 2021-05-06 | Stop reason: ALTCHOICE

## 2021-02-11 RX ORDER — ETANERCEPT 50 MG/ML
50 SOLUTION SUBCUTANEOUS ONCE
Qty: 1 ML | Refills: 0 | Status: SHIPPED | OUTPATIENT
Start: 2021-02-11 | End: 2021-02-17 | Stop reason: SDUPTHER

## 2021-02-11 RX ORDER — GABAPENTIN 300 MG/1
CAPSULE ORAL
COMMUNITY
End: 2021-05-06 | Stop reason: ALTCHOICE

## 2021-02-11 RX ORDER — AMOXICILLIN 250 MG
CAPSULE ORAL
COMMUNITY
End: 2021-05-06 | Stop reason: ALTCHOICE

## 2021-02-11 RX ORDER — ETANERCEPT 50 MG/ML
1 SOLUTION SUBCUTANEOUS ONCE
COMMUNITY
End: 2021-02-11 | Stop reason: SDUPTHER

## 2021-02-11 ASSESSMENT — PATIENT HEALTH QUESTIONNAIRE - PHQ9
SUM OF ALL RESPONSES TO PHQ QUESTIONS 1-9: 2
2. FEELING DOWN, DEPRESSED OR HOPELESS: 1
SUM OF ALL RESPONSES TO PHQ QUESTIONS 1-9: 2

## 2021-02-11 ASSESSMENT — ENCOUNTER SYMPTOMS
GASTROINTESTINAL NEGATIVE: 1
RESPIRATORY NEGATIVE: 1

## 2021-02-11 NOTE — PROGRESS NOTES
Chief Complaint   Patient presents with    Follow-up     physical therpy    Cyst     cardiac mass       Have you seen any other physician or provider since your last visit yes - [facility], ans Dr Wesley Velasquez pain management,physical therapy     Have you had any other diagnostic tests since your last visit? yes - Echocardiogram    Have you changed or stopped any medications since your last visit? yes    I have recommended that this patient have a immunization for pneumonia and sigmoidoscopy but she declines at this time. I have discussed the risks and benefits of this examination with her. The patient verbalizes understanding. Diabetic retinal exam completed this year? No                       * If yes please have patient sign a records release to obtain record to update Health Maintenance                       * If no, please order referral for patient to be scheduled    Patient has been out of [facility] for 2 weeks due to not being able to seeing her Rheumatologist Dr Colonel Solomon. She has not been satisfied with him. Patient is doing well with RLS. Patient has been to [facility] and has to have hip surgery.

## 2021-02-11 NOTE — PROGRESS NOTES
2021    TELEHEALTH EVALUATION -- Audio/Visual (During [facility]DPR[facility]-26 public health emergency)    HPI:    Storm Gonzalez (:  1960) has requested an audio/video evaluation for the following concern(s):    She has been trying to get to her rheumatologist. She has not been able to get to see Dr. Tawana Camarena. She has a hard time getting to [place]. She would liek a different Rheumatologist but can not got to [facility] for follow up appointments. She is out of her medication for her RA until she can see the specialist.    She continues to have a lot of pain and walk with a cane. She has an appointment with Dr. Misty Donis next week about a total hip replacement. She had a hip fracture with nails and she needs a replacement at this time. She had an echocardiogram to investigate an abnormal finding in her heart on her chest ct. She denies any problems. She does continue to smoke but has been reducing. She had a bone dexa scan that she needs to discuss. Review of Systems   Constitutional: Positive for fatigue. HENT: Negative. Respiratory: Negative. Cardiovascular: Negative. Gastrointestinal: Negative. Genitourinary: Negative. Musculoskeletal: Positive for arthralgias (right hip pain. ) and gait problem. Skin: Negative. Psychiatric/Behavioral: Negative. Depression         Prior to Visit Medications    Medication Sig Taking? Authorizing Provider   HYDROcodone-acetaminophen (NORCO) 7.5-325 MG per tablet hydrocodone 7.5 mg-acetaminophen 325 mg tablet   Take 1 tablet twice a day by oral route as directed for 30 days.  Yes Historical Provider, MD   gabapentin (NEURONTIN) 300 MG capsule gabapentin 300 mg capsule Yes Historical Provider, MD   senna-docusate (Arley Roots) 8.6-50 MG per tablet Senna Plus 8.6 mg-50 mg tablet Yes Historical Provider, MD albuterol sulfate HFA (VENTOLIN HFA) 108 (90 Base) MCG/ACT inhaler Inhale 1 puff into the lungs every 6 hours as needed for Wheezing or Shortness of Breath Yes LUCILLE Leach   leflunomide (ARAVA) 20 MG tablet Take 1 tablet by mouth daily Yes LUCILLE Leach   etanercept (ENBREL) 50 MG/ML injection Inject 1 mL into the skin once for 1 dose Yes LUCILLE Leach   rOPINIRole (REQUIP) 0.5 MG tablet Take 1 tablet by mouth nightly Yes LUCILLE Leach   buPROPion (WELLBUTRIN XL) 150 MG extended release tablet Take 1 tablet by mouth every morning Yes LUCILLE Leach   cyanocobalamin 1000 MCG/ML injection INJECT 1 ML ONCE A WEEK FOR 4 WEEKS AND THEN ONCE A MONTH.  Yes LUCILLE Leach   vitamin D (ERGOCALCIFEROL) 1.25 MG (81046 UT) CAPS capsule Take 1 capsule by mouth once a week Yes LUCILLE Leach   meloxicam (MOBIC) 15 MG tablet Take 1 tablet by mouth daily Yes LUCILLE Leach   lisinopril (PRINIVIL;ZESTRIL) 10 MG tablet Take 1 tablet by mouth daily Yes LUCILLE Leach   omeprazole (PRILOSEC) 40 MG delayed release capsule Take 1 capsule by mouth daily Yes LUCILLE Leach   Syringe/Needle, Disp, (SYRINGE 3CC/25GX1\") 25G X 1\" 3 ML MISC 1 each by Does not apply route once a week Yes LUCILLE Leach   predniSONE (DELTASONE) 10 MG tablet Take 10 mg by mouth as needed Yes Historical Provider, MD       Social History     Tobacco Use    Smoking status: Current Every Day Smoker     Packs/day: 1.00     Years: 30.00     Pack years: 30.00     Types: Cigarettes    Smokeless tobacco: Never Used   Substance Use Topics    Alcohol use: Not Currently    Drug use: Not on file        Allergies   Allergen Reactions    Clindamycin/Lincomycin Swelling    Codeine     Penicillins    ,   Health Maintenance   Topic Date Due    Hepatitis C screen  1960    Pneumococcal 0-64 years Vaccine (1 of 1 - PPSV23) 12/27/1967    HIV screen  12/27/1976  DTaP/Tdap/Td vaccine (1 - Tdap) 12/27/1980    Cervical cancer screen  12/27/1982    Shingles Vaccine (1 of 2) 12/27/2011    Colon cancer screen colonoscopy  12/27/2011    Potassium monitoring  08/12/2020    Creatinine monitoring  08/12/2020    Flu vaccine (1) 09/01/2020    Low dose CT lung screening  11/04/2021    Breast cancer screen  11/04/2022    Lipid screen  08/12/2024    Hepatitis A vaccine  Aged Out    Hepatitis B vaccine  Aged Out    Hib vaccine  Aged Out    Meningococcal (ACWY) vaccine  Aged Out       PHYSICAL EXAMINATION:  [ INSTRUCTIONS:  \"[x]\" Indicates a positive item  \"[]\" Indicates a negative item  -- DELETE ALL ITEMS NOT EXAMINED]  Vital Signs: (As obtained by patient/caregiver or practitioner observation)    Blood pressure-  Heart rate-    Respiratory rate-    Temperature-  Pulse oximetry-     Constitutional: [x] Appears well-developed and well-nourished [] No apparent distress      [] Abnormal-   Mental status  [] Alert and awake  [] Oriented to person/place/time []Able to follow commands      Eyes:  EOM    []  Normal  [] Abnormal-  Sclera  []  Normal  [] Abnormal -         Discharge []  None visible  [] Abnormal -    HENT:   [] Normocephalic, atraumatic.   [] Abnormal   [] Mouth/Throat: Mucous membranes are moist.     External Ears [] Normal  [] Abnormal-     Neck: [] No visualized mass     Pulmonary/Chest: [] Respiratory effort normal.  [] No visualized signs of difficulty breathing or respiratory distress        [] Abnormal-      Musculoskeletal:   [] Normal gait with no signs of ataxia         [] Normal range of motion of neck        [x] Abnormal-  Walk with a limp      Neurological:        [] No Facial Asymmetry (Cranial nerve 7 motor function) (limited exam to video visit)          [] No gaze palsy        [] Abnormal-         Skin:        [x] No significant exanthematous lesions or discoloration noted on facial skin         [] Abnormal- Psychiatric:       [] Normal Affect [] No Hallucinations        [] Abnormal-     Other pertinent observable physical exam findings-     ASSESSMENT/PLAN:  1. Mass of heart  She needs a SARA for evaluation of the thinking of her heart  - External Referral To Cardiology    2. Localized osteoporosis, unspecified pathological fracture presence  Discussed treatment options. Advised to start medication after orthopedic surgery. 3. RLS (restless legs syndrome)  Doing well on Requip    4. Rheumatoid arthritis involving multiple sites with positive rheumatoid factor (HCC)  She is going to get an follow with with Dr. Hope Robles.   - leflunomide (ARAVA) 20 MG tablet; Take 1 tablet by mouth daily  Dispense: 30 tablet; Refill: 0  - etanercept (ENBREL) 50 MG/ML injection; Inject 1 mL into the skin once for 1 dose  Dispense: 1 mL; Refill: 0    5. Chronic obstructive pulmonary disease, unspecified COPD type ([facility])    - albuterol sulfate HFA (VENTOLIN HFA) 108 (90 Base) MCG/ACT inhaler; Inhale 1 puff into the lungs every 6 hours as needed for Wheezing or Shortness of Breath  Dispense: 1 Inhaler; Refill: 3    6. Reactive depression  Continue Wellbutrin. 7. Right hip pain  Follow with specialist.     8. Closed fracture of right hip, initial encounter ([facility] [address]    - HYDROcodone-acetaminophen ([address]) 7.5-325 MG per tablet; hydrocodone 7.5 mg-acetaminophen 325 mg tablet   Take 1 tablet twice a day by oral route as directed for 30 days.  - gabapentin (NEURONTIN) 300 MG capsule; gabapentin 300 mg capsule    9. Slow transit constipation    - senna-docusate (PERICOLACE) 8.6-50 MG per tablet; Senna Plus 8.6 mg-50 mg tablet      No follow-ups on file. Patito Hensley is a 62 y.o. female being evaluated by a Virtual Visit (video visit) encounter to address concerns as mentioned above. A caregiver was present when appropriate. Due to this being a TeleHealth encounter ([facility] public health emergency), evaluation of the following organ systems was limited: Vitals/Constitutional/EENT/Resp/CV/GI//MS/Neuro/Skin/Heme-Lymph-Imm. Pursuant to the emergency declaration under the [address] [place] [address] and the [employer] and [employer] Act, this Virtual Visit was conducted with patient's (and/or legal guardian's) consent, to reduce the patient's risk of exposure to COVID-19 and provide necessary medical care. The patient (and/or legal guardian) has also been advised to contact this office for worsening conditions or problems, and seek emergency medical treatment and/or call 911 if deemed necessary. Patient identification was verified at the start of the visit: Yes    Total time spent on this encounter: 30 minutes    Services were provided through a video synchronous discussion virtually to substitute for in-person clinic visit. Patient and provider were located at their individual homes. --LUCILLE Masters on 2/11/2021 at 11:17 AM    An electronic signature was used to authenticate this note.

## 2021-02-17 DIAGNOSIS — M05.79 RHEUMATOID ARTHRITIS INVOLVING MULTIPLE SITES WITH POSITIVE RHEUMATOID FACTOR (HCC): ICD-10-CM

## 2021-02-17 RX ORDER — ETANERCEPT 50 MG/ML
50 SOLUTION SUBCUTANEOUS ONCE
Qty: 4 ML | Refills: 1 | Status: SHIPPED | OUTPATIENT
Start: 2021-02-17 | End: 2021-05-06 | Stop reason: SDUPTHER

## 2021-03-01 DIAGNOSIS — G25.81 RLS (RESTLESS LEGS SYNDROME): ICD-10-CM

## 2021-03-01 DIAGNOSIS — E53.8 B12 DEFICIENCY: ICD-10-CM

## 2021-03-01 RX ORDER — ERGOCALCIFEROL 1.25 MG/1
50000 CAPSULE ORAL WEEKLY
Qty: 4 CAPSULE | Refills: 3 | Status: SHIPPED | OUTPATIENT
Start: 2021-03-01 | End: 2021-06-18 | Stop reason: ALTCHOICE

## 2021-03-01 RX ORDER — ROPINIROLE 0.5 MG/1
0.5 TABLET, FILM COATED ORAL NIGHTLY
Qty: 30 TABLET | Refills: 2 | Status: SHIPPED | OUTPATIENT
Start: 2021-03-01 | End: 2021-05-28

## 2021-03-01 RX ORDER — CYANOCOBALAMIN 1000 UG/ML
INJECTION INTRAMUSCULAR; SUBCUTANEOUS
Qty: 4 ML | Refills: 5 | Status: SHIPPED | OUTPATIENT
Start: 2021-03-01 | End: 2022-03-02

## 2021-03-04 ENCOUNTER — TELEPHONE (OUTPATIENT)
Dept: PRIMARY CARE CLINIC | Age: 61
End: 2021-03-04

## 2021-03-04 NOTE — PROGRESS NOTES
[facility] Cardiology  [address], [address]  [place], [place], [address]    [phone]  WWW.[facility]           OUTPATIENT CLINIC CONSULTATION NOTE    Patient care team:  Patient Care Team:  Iraida Demarco APRN as PCP - General (Family Medicine)  Anca Trevino MD as Surgeon (General Surgery)    Requesting Provider and Reason for consultation: The patient is being seen today at the request of DENISE Jamil for cardiac mass, dyspnea, fall, preoperative cardiac risk assessment.     Subjective:   Chief complaint:   Chief Complaint   Patient presents with   • cardiac mass       HPI:    Jessica Winston is a 60 y.o. female.  Partial problem list, including cardiac problems:  1. Cardiac mass  a. Initially noted on TTE 1/15/2021 at [facility].  2. Dyspnea, anginal equivalent  a. Long smoking history, family history of father having MI in his 50s or 60s  3. Hypertension  4. Hyperlipidemia  5. Right hip fracture early 2020  a. Status post repair early 2020  6. Rheumatoid arthritis  7. Anxiety/depression  8. Tobacco dependence    Today the patient presents for initial consultation.    1. Patient had a fall last Thursday.  Denies associated chest pain, dyspnea, palpitations, loss of consciousness.  States that the fall was mechanical in nature when she is bending over.  She has been having chronic right hip problems since a fall and fracture last year.  The patient states after this most recent fall, she was on the ground for 1-1/2 days without food or water nor assistance.  Family found her on the ground.  She did not have access to a phone.  Being worked up by orthopedics for possible repeat right hip surgery  2. The patient has chronic exertional dyspnea.  Long smoking history.  No clear prior diagnosis of COPD.  NYHA functional class III in severity.  Denies associated chest pain  3. Incidental diagnosis of cardiac mass on screening lung CT.  Echo  confirmed a significantly enlarged intra-atrial septum.  Denies possible related arrhythmia or heart failure symptoms.  Denies strokelike symptoms.  4. Patient has shooting pains down the right leg into the right calf when she exerts herself.  Symptoms may be related to her fall but also possibly claudication.    Father had multiple MIs, possibly first 1 in his 50s    Review of Systems:  Positive for dyspnea, falls, hip pain, claudication  All other systems are reviewed and are negative    PFSH:  Patient Active Problem List   Diagnosis   • RUQ pain   • Cardiac mass   • PVD (peripheral vascular disease) with claudication (CMS/HCC)   • Tobacco dependence         Current Outpatient Medications:   •  acetaminophen (TYLENOL) 500 MG tablet, Take 1,000 mg by mouth Every 6 (Six) Hours As Needed., Disp: , Rfl:   •  buPROPion SR (WELLBUTRIN SR) 150 MG 12 hr tablet, Take 150 mg by mouth 2 (Two) Times a Day., Disp: , Rfl:   •  cyanocobalamin 1000 MCG/ML injection, Inject  into the appropriate muscle as directed by prescriber Every 30 (Thirty) Days., Disp: , Rfl:   •  leflunomide (ARAVA) 20 MG tablet, Take 20 mg by mouth Daily., Disp: , Rfl:   •  lisinopril (PRINIVIL,ZESTRIL) 10 MG tablet, Take 10 mg by mouth Daily., Disp: , Rfl:   •  meloxicam (MOBIC) 15 MG tablet, Take 15 mg by mouth Daily., Disp: , Rfl:   •  omeprazole (priLOSEC) 40 MG capsule, Take 40 mg by mouth Daily., Disp: , Rfl: 0  •  predniSONE (DELTASONE) 10 MG tablet, Take 10 mg by mouth Daily., Disp: , Rfl:   •  rOPINIRole (REQUIP) 0.5 MG tablet, Take 0.5 mg by mouth Every Night. Take 1 hour before bedtime., Disp: , Rfl:   •  vitamin D (ERGOCALCIFEROL) 1.25 MG (27545 UT) capsule capsule, Take 50,000 Units by mouth 1 (One) Time Per Week., Disp: , Rfl:   •  methocarbamol (ROBAXIN) 750 MG tablet, Take 750 mg by mouth Every 6 (Six) Hours As Needed., Disp: , Rfl:     Allergies   Allergen Reactions   • Clindamycin/Lincomycin Swelling   • Codeine Swelling   • Penicillins  "Swelling       Social History     Socioeconomic History   • Marital status:      Spouse name: Not on file   • Number of children: Not on file   • Years of education: Not on file   • Highest education level: Not on file   Tobacco Use   • Smoking status: Current Every Day Smoker     Packs/day: 0.50     Years: 30.00     Pack years: 15.00     Types: Cigarettes   • Smokeless tobacco: Never Used   • Tobacco comment: HX OF SMOKING 1 PPD FOR THE PAST 30 YEARS    Substance and Sexual Activity   • Alcohol use: No   • Drug use: No   • Sexual activity: Defer     Family History   Problem Relation Age of Onset   • Arthritis Mother    • Cancer Mother         leukemia   • Hypertension Mother    • Heart disease Father         heart attack   • Hypertension Father    • Migraines Daughter    • Cancer Maternal Grandmother         uterus         Objective:   Physical Exam:  BP (!) 78/54   Pulse 113   Ht 165.1 cm (65\")   LMP 08/01/2010 (Approximate)   SpO2 96%   BMI 17.97 kg/m²   CONSTITUTIONAL: Well-nourished. In no acute distress.   SKIN: Warm and dry. No rashes noted  HEENT: Head is normocephalic and atraumatic. Pupils are equal and reactive to light bilaterally.   NECK: Supple without masses or thyromegaly. There is no jugular venous distention   LUNGS: Normal effort.  Mild expiratory wheeze  CARDIOVASCULAR: Regular rate and rhythm with a normal S1 and S2. There is no murmur, gallop, rub, or click appreciated. Carotid upstrokes are 2+ and symmetrical without bruits.  2+ radial pulses bilaterally.  There is no peripheral edema.   ABDOMEN: Normal bowel sounds.  Nondistended.  MUSCULOSKELETAL:  No digital cyanosis  NEUROLOGICAL: Nonfocal.  PSYCHIATRIC: Alert, orientated x 3, appropriate affect and mood    3/2021 exam: Nonpalpable right pedal pulses, nonpalpable left PT, 2+ left DP    Labs:  BUN   Date Value Ref Range Status   06/02/2020 8 6 - 20 mg/dL Final     Creatinine   Date Value Ref Range Status   06/02/2020 0.51 (L) " 0.57 - 1.00 mg/dL Final     Potassium   Date Value Ref Range Status   06/02/2020 4.9 3.5 - 5.2 mmol/L Final     ALT (SGPT)   Date Value Ref Range Status   06/02/2020 13 1 - 33 U/L Final     AST (SGOT)   Date Value Ref Range Status   06/02/2020 29 1 - 32 U/L Final     Comment:     Specimen hemolyzed.  Results may be affected.     WBC   Date Value Ref Range Status   06/02/2020 6.86 3.40 - 10.80 10*3/mm3 Final   08/12/2019 6.8 4.0 - 11.0 K/uL Final     Hemoglobin   Date Value Ref Range Status   06/02/2020 12.4 12.0 - 15.9 g/dL Final   08/12/2019 14.4 11.5 - 16.5 g/dL Final     Hematocrit   Date Value Ref Range Status   06/02/2020 36.5 34.0 - 46.6 % Final   08/12/2019 44.0 37.0 - 47.0 % Final     Platelets   Date Value Ref Range Status   06/02/2020 402 140 - 450 10*3/mm3 Final   08/12/2019 270 150 - 400 K/uL Final       No results found for: CHOL  Lab Results   Component Value Date    TRIG 85 08/12/2019     Lab Results   Component Value Date    HDL 87 (H) 08/12/2019     Lab Results   Component Value Date     (H) 08/12/2019     No components found for: LDLDIRECTC    Diagnostic Data:      ECG 12 Lead    Date/Time: 3/5/2021 12:13 PM  Performed by: Jimy Davis MD  Authorized by: Jimy Davis MD   Previous ECG: no previous ECG available  Rhythm: sinus tachycardia  Comments: Heart rate 106, QRS 76, QTc 422, right atrial enlargement            TTE 1/15/2021  -Normal left ventricular systolic function with an estimated ejection fraction of 65%.  There is grade 1A diastolic dysfunction present.  -The intra-atrial septum has evidence of significant hypertrophy.  May represent simple lipomatous hypertrophy but cannot rule out a benign or malignant neoplasm.  A cardiac MRI and/or transesophageal echocardiogram could be considered to further evaluate.  There is moderate mitral annular calcification present.       Assessment and Plan:   Diagnoses and all orders for this visit:    Dyspnea on exertion  Anginal equivalent    Preoperative cardiac risk assessment  -The patient has ongoing symptoms that may be an anginal equivalent.  Has significantly limited functional status.  Has multiple risk factors for CAD  -Recommend a Lexiscan nuclear stress test to rule out ischemia prior to a right hip operation    Fall, initial encounter  -Recent fall, and has not had a work-up  -Checking labs including a CK for rhabdo/renal injury after being on the ground for a day and a half  -Right hip x-ray ordered, will forward results to PCP    Cardiac mass  -Possible lipomatous hypertrophy of the septum versus lipoma versus other.  Discussed with the patient that if this is a benign tumor/tissue hypertrophy, will need to monitor size as it may affect other structures in the heart and/or electrical conduction  -We will pursue the above work-up first since it is the patient's most acute issues  -At next visit we will place a 30-day heart monitor to rule out arrhythmia, order a cardiac MRI to further investigate the nature of the mass  -Repeat echocardiogram in 6 months if cardiac MRI is without concerning findings to monitor size of the mass.    PVD (peripheral vascular disease) with claudication   -Arterial duplex ultrasound with ABIs at [facility]    Tobacco dependence  -Strongly advised the patient to stop smoking    Alcohol abuse  -Strongly advised the patient to decrease alcohol consumption    - Return in about 4 weeks (around 4/2/2021) for Next scheduled follow up.    A total of 85 minutes was spent preparing for the visit (such as reviewing tests); getting or reviewing a history that was separately obtained; performing the exam; counseling and providing education to the patient, family, or caregiver; ordering medicines, tests, or procedures; communicating with other healthcare professionals; documenting information in the medical record; interpreting results and sharing that information with the patient, family, or caregiver; and care  coordination.    Jimy Davis MD, MSc, FACC, [facility]  Interventional Cardiology  [facility]

## 2021-03-04 NOTE — TELEPHONE ENCOUNTER
Patient had a fall last Thursday and was not found until Saturday and was still in the floor. Daughter reports she feels she may need Xrays of her hips and lower back. She does have a workman's compensation claim since January of 2020. Daughter wondered if we could send an xray order for her, workers comp would like them done. Please advise.

## 2021-03-05 ENCOUNTER — HOSPITAL ENCOUNTER (OUTPATIENT)
Facility: HOSPITAL | Age: 61
Discharge: HOME | End: 2021-03-05
Payer: MEDICAID

## 2021-03-05 ENCOUNTER — CONSULT (OUTPATIENT)
Dept: CARDIOLOGY | Facility: CLINIC | Age: 61
End: 2021-03-05

## 2021-03-05 ENCOUNTER — HOSPITAL ENCOUNTER (OUTPATIENT)
Dept: GENERAL RADIOLOGY | Facility: HOSPITAL | Age: 61
Discharge: HOME | End: 2021-03-05
Payer: MEDICAID

## 2021-03-05 VITALS
BODY MASS INDEX: 17.97 KG/M2 | SYSTOLIC BLOOD PRESSURE: 78 MMHG | OXYGEN SATURATION: 96 % | HEART RATE: 113 BPM | HEIGHT: 65 IN | DIASTOLIC BLOOD PRESSURE: 54 MMHG

## 2021-03-05 DIAGNOSIS — Z01.810 PRE-OPERATIVE CARDIOVASCULAR EXAMINATION: ICD-10-CM

## 2021-03-05 DIAGNOSIS — I51.89 CARDIAC MASS: ICD-10-CM
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
## 2021-03-05 DIAGNOSIS — I73.9 PVD (PERIPHERAL VASCULAR DISEASE) WITH CLAUDICATION (HCC): ICD-10-CM

## 2021-03-05 DIAGNOSIS — I20.8 ANGINAL EQUIVALENT (HCC): ICD-10-CM

## 2021-03-05 DIAGNOSIS — F17.200 TOBACCO DEPENDENCE: ICD-10-CM

## 2021-03-05 DIAGNOSIS — R06.09 DYSPNEA ON EXERTION: Primary | ICD-10-CM

## 2021-03-05 LAB
A/G RATIO: 1 (ref 0.8–2)
ALBUMIN SERPL-MCNC: 3.1 G/DL (ref 3.4–4.8)
ALP BLD-CCNC: 125 U/L (ref 25–100)
ALT SERPL-CCNC: 57 U/L (ref 4–36)
ANION GAP SERPL CALCULATED.3IONS-SCNC: 17 MMOL/L (ref 3–16)
AST SERPL-CCNC: 43 U/L (ref 8–33)
BASOPHILS ABSOLUTE: 0.1 K/UL (ref 0–0.1)
BASOPHILS RELATIVE PERCENT: 0.6 %
BILIRUB SERPL-MCNC: 0.5 MG/DL (ref 0.3–1.2)
BUN BLDV-MCNC: 29 MG/DL (ref 6–20)
CALCIUM SERPL-MCNC: 9.6 MG/DL (ref 8.5–10.5)
CHLORIDE BLD-SCNC: 93 MMOL/L (ref 98–107)
CO2: 17 MMOL/L (ref 20–30)
CREAT SERPL-MCNC: 1.3 MG/DL (ref 0.4–1.2)
EOSINOPHILS ABSOLUTE: 0.1 K/UL (ref 0–0.4)
EOSINOPHILS RELATIVE PERCENT: 0.7 %
GFR AFRICAN AMERICAN: 51
GFR NON-AFRICAN AMERICAN: 42
GLOBULIN: 3.2 G/DL
GLUCOSE BLD-MCNC: 99 MG/DL (ref 74–106)
HCT VFR BLD CALC: 31.2 % (ref 37–47)
HEMOGLOBIN: 10.8 G/DL (ref 11.5–16.5)
IMMATURE GRANULOCYTES #: 0.4 K/UL
IMMATURE GRANULOCYTES %: 2.1 % (ref 0–5)
LDL CHOLESTEROL DIRECT: 92 MG/DL
LYMPHOCYTES ABSOLUTE: 0.9 K/UL (ref 1.5–4)
LYMPHOCYTES RELATIVE PERCENT: 5.4 %
MCH RBC QN AUTO: 32 PG (ref 27–32)
MCHC RBC AUTO-ENTMCNC: 34.6 G/DL (ref 31–35)
MCV RBC AUTO: 92.6 FL (ref 80–100)
MONOCYTES ABSOLUTE: 1 K/UL (ref 0.2–0.8)
MONOCYTES RELATIVE PERCENT: 5.9 %
NEUTROPHILS ABSOLUTE: 14.5 K/UL (ref 2–7.5)
NEUTROPHILS RELATIVE PERCENT: 85.3 %
PDW BLD-RTO: 13.2 % (ref 11–16)
PLATELET # BLD: 456 K/UL (ref 150–400)
PMV BLD AUTO: 9.9 FL (ref 6–10)
POTASSIUM SERPL-SCNC: 4.2 MMOL/L (ref 3.4–5.1)
RBC # BLD: 3.37 M/UL (ref 3.8–5.8)
SODIUM BLD-SCNC: 127 MMOL/L (ref 136–145)
T4 FREE: 1.05 NG/DL (ref 0.89–1.76)
TOTAL CK: 229 U/L (ref 26–174)
TOTAL PROTEIN: 6.3 G/DL (ref 6.4–8.3)
TSH SERPL DL<=0.05 MIU/L-ACNC: 4 UIU/ML (ref 0.27–4.2)
WBC # BLD: 17 K/UL (ref 4–11)

## 2021-03-05 PROCEDURE — 93005 ELECTROCARDIOGRAM TRACING: CPT

## 2021-03-05 PROCEDURE — 85025 COMPLETE CBC W/AUTO DIFF WBC: CPT

## 2021-03-05 PROCEDURE — 80053 COMPREHEN METABOLIC PANEL: CPT

## 2021-03-05 PROCEDURE — 84439 ASSAY OF FREE THYROXINE: CPT

## 2021-03-05 PROCEDURE — 36415 COLL VENOUS BLD VENIPUNCTURE: CPT

## 2021-03-05 PROCEDURE — 84443 ASSAY THYROID STIM HORMONE: CPT

## 2021-03-05 PROCEDURE — 82550 ASSAY OF CK (CPK): CPT

## 2021-03-05 PROCEDURE — 93000 ELECTROCARDIOGRAM COMPLETE: CPT | Performed by: INTERNAL MEDICINE

## 2021-03-05 PROCEDURE — 73502 X-RAY EXAM HIP UNI 2-3 VIEWS: CPT

## 2021-03-05 PROCEDURE — 99205 OFFICE O/P NEW HI 60 MIN: CPT | Performed by: INTERNAL MEDICINE

## 2021-03-05 RX ORDER — BUPROPION HYDROCHLORIDE 150 MG/1
150 TABLET, EXTENDED RELEASE ORAL DAILY
COMMUNITY

## 2021-03-05 RX ORDER — ROPINIROLE 0.5 MG/1
0.5 TABLET, FILM COATED ORAL NIGHTLY
COMMUNITY

## 2021-03-05 RX ORDER — CYANOCOBALAMIN 1000 UG/ML
1000 INJECTION, SOLUTION INTRAMUSCULAR; SUBCUTANEOUS
COMMUNITY
Start: 2021-03-01

## 2021-03-05 NOTE — TELEPHONE ENCOUNTER
I left the daughter a message that I would have to speak to [facility] on Monday.

## 2021-03-08 DIAGNOSIS — I73.9 PVD (PERIPHERAL VASCULAR DISEASE) WITH CLAUDICATION (HCC): Primary | ICD-10-CM

## 2021-03-09 ENCOUNTER — TELEPHONE (OUTPATIENT)
Dept: PRIMARY CARE CLINIC | Age: 61
End: 2021-03-09

## 2021-03-09 NOTE — TELEPHONE ENCOUNTER
Patient was seen by Dr Austen Salmon and he ordered a hip x ray that showed a fracture. Patient has an appointment on 03/11 @ 2:30 and was confirmed by patient.

## 2021-03-10 ENCOUNTER — TELEPHONE (OUTPATIENT)
Dept: PRIMARY CARE CLINIC | Age: 61
End: 2021-03-10

## 2021-03-10 NOTE — TELEPHONE ENCOUNTER
Glenny requests that the office return their call. The best time to reach her is Anytime. Thank you. *Patient called asking if she could do a VV for her appointment on 03/11/21 @ 2:30. She is unable to drive due to her hip injury and can't find anyone to bring her to the appt. Please call patient to advise. Thanks.

## 2021-03-11 ENCOUNTER — TELEPHONE (OUTPATIENT)
Dept: PRIMARY CARE CLINIC | Age: 61
End: 2021-03-11

## 2021-03-11 NOTE — TELEPHONE ENCOUNTER
Received notice from meera Duarte Edel[place], [place] for [facility], that the pt's Enbrel needed a PA. Submitted for PA with [facility] [employer] and I received notice from insurance that it did not need a PA. Called [facility] [address] and asked that they bill it to [facility]. Pharmacy billed it appropriately and claim went through for $0 copay. Called pt to let her know, left general message for her to call me back.     Iman Milan, AdrianaD

## 2021-03-15 NOTE — TELEPHONE ENCOUNTER
The fracture has still not healed. Looks like some displacement. Need to make sure she has an appt with ortho asap.

## 2021-03-25 ENCOUNTER — TRANSCRIBE ORDERS (OUTPATIENT)
Dept: LAB | Facility: HOSPITAL | Age: 61
End: 2021-03-25

## 2021-03-25 DIAGNOSIS — Z01.818 PRE-OP TESTING: Primary | ICD-10-CM

## 2021-04-02 ENCOUNTER — OFFICE VISIT (OUTPATIENT)
Dept: CARDIOLOGY | Facility: CLINIC | Age: 61
End: 2021-04-02

## 2021-04-02 ENCOUNTER — TRANSCRIBE ORDERS (OUTPATIENT)
Dept: LAB | Facility: HOSPITAL | Age: 61
End: 2021-04-02

## 2021-04-02 VITALS
DIASTOLIC BLOOD PRESSURE: 80 MMHG | OXYGEN SATURATION: 95 % | BODY MASS INDEX: 20.83 KG/M2 | HEIGHT: 65 IN | HEART RATE: 98 BPM | WEIGHT: 125 LBS | SYSTOLIC BLOOD PRESSURE: 130 MMHG

## 2021-04-02 DIAGNOSIS — R06.09 DYSPNEA ON EXERTION: Primary | ICD-10-CM

## 2021-04-02 DIAGNOSIS — I73.9 PVD (PERIPHERAL VASCULAR DISEASE) WITH CLAUDICATION (HCC): ICD-10-CM

## 2021-04-02 DIAGNOSIS — I20.8 ANGINAL EQUIVALENT (HCC): ICD-10-CM

## 2021-04-02 PROCEDURE — 99214 OFFICE O/P EST MOD 30 MIN: CPT | Performed by: INTERNAL MEDICINE

## 2021-04-02 NOTE — PROGRESS NOTES
[facility] Cardiology  [address], [address]  [place], [place], [address] [phone]  [facility]           OUTPATIENT CLINIC PROGRESS NOTE    Patient care team:  Patient Care Team:  Iraida Demarco APRN as PCP - General (Family Medicine)  Anca Trevino MD as Surgeon (General Surgery)    Subjective:   Chief complaint:   Chief Complaint   Patient presents with   • Leg Swelling       HPI:    Jessica Winston is a 60 y.o. female.  Partial problem list, including cardiac problems:  1. Cardiac mass  a. Incidentally noted on TTE 1/15/2021 at [facility].  2. Dyspnea, anginal equivalent  a. Long smoking history, family history of father having MI in his 50s or 60s  3. Hypertension  4. Hyperlipidemia  5. Right hip fracture early 2020  a. Status post repair early 2020  b. Fall 3/2021, mechanical in nature, resulted in mild rhabdomyolysis and CHRISTINE  6. Rheumatoid arthritis  7. Anxiety/depression  8. Tobacco dependence    Today the patient presents for follow-up    1. Fall last month: Ongoing hip issues, scheduled for a right hip replacement in mid April with MARTIN López, at [facility].  Has ongoing discomfort  2. Chronic dyspnea: Chronic stable dyspnea, still smoking.  No associated chest pain  3. Incidental cardiac mass: Denies significant palpitations  4. Right lower extremity pains: Pain seem most related to her hip pain and not clearly claudication    Review of Systems:  Positive for dyspnea, falls, hip pain    PFSH:  Patient Active Problem List   Diagnosis   • RUQ pain   • Cardiac mass   • PVD (peripheral vascular disease) with claudication (CMS/HCC)   • Tobacco dependence         Current Outpatient Medications:   •  acetaminophen (TYLENOL) 500 MG tablet, Take 1,000 mg by mouth Every 6 (Six) Hours As Needed., Disp: , Rfl:   •  buPROPion SR (WELLBUTRIN SR) 150 MG 12 hr tablet, Take 150 mg by mouth 2 (Two) Times a Day., Disp: , Rfl:   •   "cyanocobalamin 1000 MCG/ML injection, Inject  into the appropriate muscle as directed by prescriber Every 30 (Thirty) Days., Disp: , Rfl:   •  leflunomide (ARAVA) 20 MG tablet, Take 20 mg by mouth Daily., Disp: , Rfl:   •  lisinopril (PRINIVIL,ZESTRIL) 10 MG tablet, Take 10 mg by mouth Daily., Disp: , Rfl:   •  meloxicam (MOBIC) 15 MG tablet, Take 15 mg by mouth Daily., Disp: , Rfl:   •  methocarbamol (ROBAXIN) 750 MG tablet, Take 750 mg by mouth Every 6 (Six) Hours As Needed., Disp: , Rfl:   •  omeprazole (priLOSEC) 40 MG capsule, Take 40 mg by mouth Daily., Disp: , Rfl: 0  •  predniSONE (DELTASONE) 10 MG tablet, Take 10 mg by mouth Daily., Disp: , Rfl:   •  rOPINIRole (REQUIP) 0.5 MG tablet, Take 0.5 mg by mouth Every Night. Take 1 hour before bedtime., Disp: , Rfl:   •  vitamin D (ERGOCALCIFEROL) 1.25 MG (42456 UT) capsule capsule, Take 50,000 Units by mouth 1 (One) Time Per Week., Disp: , Rfl:     Allergies   Allergen Reactions   • Clindamycin/Lincomycin Swelling   • Codeine Swelling   • Penicillins Swelling       Social History     Socioeconomic History   • Marital status:      Spouse name: Not on file   • Number of children: Not on file   • Years of education: Not on file   • Highest education level: Not on file   Tobacco Use   • Smoking status: Current Every Day Smoker     Packs/day: 0.50     Years: 30.00     Pack years: 15.00     Types: Cigarettes   • Smokeless tobacco: Never Used   • Tobacco comment: HX OF SMOKING 1 PPD FOR THE PAST 30 YEARS    Substance and Sexual Activity   • Alcohol use: No   • Drug use: No   • Sexual activity: Defer     Family History   Problem Relation Age of Onset   • Arthritis Mother    • Cancer Mother         leukemia   • Hypertension Mother    • Heart disease Father         heart attack   • Hypertension Father    • Migraines Daughter    • Cancer Maternal Grandmother         uterus         Objective:   Physical Exam:  /80   Pulse 98   Ht 165.1 cm (65\")   Wt 56.7 kg " (125 lb)   LMP 08/01/2010 (Approximate)   SpO2 95%   BMI 20.80 kg/m²   CONSTITUTIONAL: Well-nourished. In no acute distress.   LUNGS: Normal effort.  Mild rhonchi, soft expiratory wheeze  CARDIOVASCULAR: Regular rate and rhythm with a normal S1 and S2. There is no murmur, gallop, rub, or click appreciated.  Normal radial pulse.There is no peripheral edema.     4/2021 exam: 2+ right PT pulse, nonpalpable right DP pulse  3/2021 exam: Nonpalpable right pedal pulses, nonpalpable left PT, 2+ left DP    Labs:  BUN   Date Value Ref Range Status   06/02/2020 8 6 - 20 mg/dL Final     Creatinine   Date Value Ref Range Status   06/02/2020 0.51 (L) 0.57 - 1.00 mg/dL Final     Potassium   Date Value Ref Range Status   06/02/2020 4.9 3.5 - 5.2 mmol/L Final     ALT (SGPT)   Date Value Ref Range Status   06/02/2020 13 1 - 33 U/L Final     AST (SGOT)   Date Value Ref Range Status   06/02/2020 29 1 - 32 U/L Final     Comment:     Specimen hemolyzed.  Results may be affected.     WBC   Date Value Ref Range Status   03/05/2021 17.0 (H) 4.0 - 11.0 K/uL Final     Hemoglobin   Date Value Ref Range Status   03/05/2021 10.8 (L) 11.5 - 16.5 g/dL Final     Hematocrit   Date Value Ref Range Status   03/05/2021 31.2 (L) 37.0 - 47.0 % Final     Platelets   Date Value Ref Range Status   03/05/2021 456 (H) 150 - 400 K/uL Final       No results found for: CHOL  Lab Results   Component Value Date    TRIG 85 08/12/2019     Lab Results   Component Value Date    HDL 87 (H) 08/12/2019     Lab Results   Component Value Date     (H) 08/12/2019     No components found for: LDLDIRECTC    Diagnostic Data:    Procedures    TTE 1/15/2021  -Normal left ventricular systolic function with an estimated ejection fraction of 65%.  There is grade 1A diastolic dysfunction present.  -The intra-atrial septum has evidence of significant hypertrophy.  May represent simple lipomatous hypertrophy but cannot rule out a benign or malignant neoplasm.  A cardiac MRI  and/or transesophageal echocardiogram could be considered to further evaluate.  There is moderate mitral annular calcification present.      Assessment and Plan:   Diagnoses and all orders for this visit:    Dyspnea on exertion  Anginal equivalent   Preoperative cardiac risk assessment  -Stress test scheduled for 4/7 at [facility].  If negative for ischemia, okay to proceed with hip surgery later in the month with MARTIN Glass, at [facility]    Mechanical fall  Rhabdomyolysis  CHRISTINE  -Repeat BMP and CK with PAT labs preoperatively    Cardiac mass  Ectopic heartbeats on exam  -Possible lipomatous hypertrophy of the septum versus lipoma versus other.  Discussed with the patient again that if this is a benign tumor/tissue hypertrophy, will need to monitor size as it may affect other structures in the heart and/or electrical conduction  -We will pursue further work-up after she recovers from her hip surgery  -We will order a cardiac MRI and 30-day heart monitor (to rule out concomitant arrhythmia) at her next follow-up  -If mass size is stable, will repeat echo in 1 year    PVD (peripheral vascular disease) with claudication   -Has palpable pulses bilaterally.  Will defer on further work-up for now.  Clinical monitoring.  If with claudication-like symptoms post operatively, will consider noninvasive diagnostic work-up    Tobacco dependence  -Strongly advised the patient to stop smoking      - Return in about 3 months (around 7/2/2021) for Next scheduled follow up.    Jimy Davis MD, MSc, FACC, [facility]  Interventional Cardiology  [facility]

## 2021-04-03 DIAGNOSIS — F32.9 REACTIVE DEPRESSION: ICD-10-CM

## 2021-04-03 DIAGNOSIS — Z72.0 TOBACCO ABUSE: ICD-10-CM

## 2021-04-05 ENCOUNTER — LAB (OUTPATIENT)
Dept: LAB | Facility: HOSPITAL | Age: 61
End: 2021-04-05

## 2021-04-05 DIAGNOSIS — Z01.818 PRE-OP TESTING: ICD-10-CM

## 2021-04-05 LAB — SARS-COV-2 RNA NOSE QL NAA+PROBE: NOT DETECTED

## 2021-04-05 PROCEDURE — C9803 HOPD COVID-19 SPEC COLLECT: HCPCS

## 2021-04-05 PROCEDURE — U0004 COV-19 TEST NON-CDC HGH THRU: HCPCS

## 2021-04-05 RX ORDER — ETANERCEPT 50 MG/ML
SOLUTION SUBCUTANEOUS
Qty: 4 ML | Refills: 1 | Status: SHIPPED | OUTPATIENT
Start: 2021-04-05 | End: 2021-05-28

## 2021-04-05 RX ORDER — BUPROPION HYDROCHLORIDE 150 MG/1
150 TABLET ORAL EVERY MORNING
Qty: 30 TABLET | Refills: 3 | Status: SHIPPED | OUTPATIENT
Start: 2021-04-05 | End: 2021-08-02

## 2021-04-06 ENCOUNTER — IMMUNIZATION (OUTPATIENT)
Dept: VACCINE CLINIC | Facility: HOSPITAL | Age: 61
End: 2021-04-06

## 2021-04-06 PROCEDURE — 0001A: CPT | Performed by: INTERNAL MEDICINE

## 2021-04-06 PROCEDURE — 91300 HC SARSCOV02 VAC 30MCG/0.3ML IM: CPT | Performed by: INTERNAL MEDICINE

## 2021-04-07 ENCOUNTER — HOSPITAL ENCOUNTER (OUTPATIENT)
Dept: CARDIOLOGY | Facility: HOSPITAL | Age: 61
Discharge: HOME | End: 2021-04-07

## 2021-04-07 VITALS — BODY MASS INDEX: 20.83 KG/M2 | WEIGHT: 125 LBS | HEIGHT: 65 IN

## 2021-04-07 DIAGNOSIS — R06.09 DYSPNEA ON EXERTION: ICD-10-CM

## 2021-04-07 DIAGNOSIS — I20.8 ANGINAL EQUIVALENT (HCC): ICD-10-CM

## 2021-04-07 DIAGNOSIS — Z01.810 PRE-OPERATIVE CARDIOVASCULAR EXAMINATION: ICD-10-CM

## 2021-04-07 PROCEDURE — 78452 HT MUSCLE IMAGE SPECT MULT: CPT | Performed by: INTERNAL MEDICINE

## 2021-04-07 PROCEDURE — 78452 HT MUSCLE IMAGE SPECT MULT: CPT

## 2021-04-07 PROCEDURE — 0 TECHNETIUM SESTAMIBI: Performed by: INTERNAL MEDICINE

## 2021-04-07 PROCEDURE — 93017 CV STRESS TEST TRACING ONLY: CPT

## 2021-04-07 PROCEDURE — A9500 TC99M SESTAMIBI: HCPCS | Performed by: INTERNAL MEDICINE

## 2021-04-07 PROCEDURE — 93018 CV STRESS TEST I&R ONLY: CPT | Performed by: INTERNAL MEDICINE

## 2021-04-07 PROCEDURE — 25010000002 REGADENOSON 0.4 MG/5ML SOLUTION: Performed by: INTERNAL MEDICINE

## 2021-04-07 RX ADMIN — REGADENOSON 0.4 MG: 0.08 INJECTION, SOLUTION INTRAVENOUS at 09:49

## 2021-04-07 RX ADMIN — TECHNETIUM TC 99M SESTAMIBI 1 DOSE: 1 INJECTION INTRAVENOUS at 10:04

## 2021-04-07 RX ADMIN — TECHNETIUM TC 99M SESTAMIBI 1 DOSE: 1 INJECTION INTRAVENOUS at 08:15

## 2021-04-08 LAB
BH CV REST NUCLEAR ISOTOPE DOSE: 9.7 MCI
BH CV STRESS BP STAGE 2: NORMAL
BH CV STRESS BP STAGE 4: NORMAL
BH CV STRESS COMMENTS STAGE 1: NORMAL
BH CV STRESS DOSE REGADENOSON STAGE 1: 0.4
BH CV STRESS DURATION MIN STAGE 1: 1
BH CV STRESS DURATION MIN STAGE 2: 1
BH CV STRESS DURATION MIN STAGE 3: 1
BH CV STRESS DURATION MIN STAGE 4: 1
BH CV STRESS DURATION SEC STAGE 1: 0
BH CV STRESS DURATION SEC STAGE 2: 0
BH CV STRESS DURATION SEC STAGE 3: 0
BH CV STRESS DURATION SEC STAGE 4: 0
BH CV STRESS HR STAGE 1: 106
BH CV STRESS HR STAGE 2: 125
BH CV STRESS HR STAGE 3: 120
BH CV STRESS HR STAGE 4: 120
BH CV STRESS NUCLEAR ISOTOPE DOSE: 31.5 MCI
BH CV STRESS PROTOCOL 1: NORMAL
BH CV STRESS RECOVERY BP: NORMAL MMHG
BH CV STRESS RECOVERY HR: 115 BPM
BH CV STRESS STAGE 1: 1
BH CV STRESS STAGE 2: 2
BH CV STRESS STAGE 3: 3
BH CV STRESS STAGE 4: 4
LV EF NUC BP: 74 %
MAXIMAL PREDICTED HEART RATE: 161 BPM
PERCENT MAX PREDICTED HR: 78.26 %
STRESS BASELINE BP: NORMAL MMHG
STRESS BASELINE HR: 98 BPM
STRESS O2 SAT REST: 95 %
STRESS PERCENT HR: 92 %
STRESS POST PEAK BP: NORMAL MMHG
STRESS POST PEAK HR: 126 BPM
STRESS TARGET HR: 137 BPM

## 2021-04-09 ENCOUNTER — TELEPHONE (OUTPATIENT)
Dept: CARDIOLOGY | Facility: CLINIC | Age: 61
End: 2021-04-09

## 2021-04-09 NOTE — TELEPHONE ENCOUNTER
----- Message from DENISE Almendarez sent at 4/8/2021  3:32 PM EDT -----  Can you let the patient know that her stress test was without evidence of ischemia. Per TRENTON last office noted it is okay to proceed with hip surgery without further cardiac testing.

## 2021-04-13 ENCOUNTER — HOSPITAL ENCOUNTER (OUTPATIENT)
Dept: GENERAL RADIOLOGY | Facility: HOSPITAL | Age: 61
Discharge: HOME | End: 2021-04-13

## 2021-04-13 ENCOUNTER — APPOINTMENT (OUTPATIENT)
Dept: PREADMISSION TESTING | Facility: HOSPITAL | Age: 61
End: 2021-04-13

## 2021-04-13 ENCOUNTER — PRE-ADMISSION TESTING (OUTPATIENT)
Dept: PREADMISSION TESTING | Facility: HOSPITAL | Age: 61
End: 2021-04-13

## 2021-04-13 VITALS — WEIGHT: 113.32 LBS | BODY MASS INDEX: 18.88 KG/M2 | HEIGHT: 65 IN

## 2021-04-13 DIAGNOSIS — I73.9 PVD (PERIPHERAL VASCULAR DISEASE) WITH CLAUDICATION (HCC): ICD-10-CM

## 2021-04-13 DIAGNOSIS — I20.8 ANGINAL EQUIVALENT (HCC): ICD-10-CM

## 2021-04-13 DIAGNOSIS — R06.09 DYSPNEA ON EXERTION: ICD-10-CM

## 2021-04-13 LAB
ABO GROUP BLD: NORMAL
ALBUMIN SERPL-MCNC: 3.8 G/DL (ref 3.5–5.2)
ALBUMIN/GLOB SERPL: 1.2 G/DL
ALP SERPL-CCNC: 89 U/L (ref 39–117)
ALT SERPL W P-5'-P-CCNC: 11 U/L (ref 1–33)
ANION GAP SERPL CALCULATED.3IONS-SCNC: 12 MMOL/L (ref 5–15)
APTT PPP: 24.2 SECONDS (ref 22–39)
AST SERPL-CCNC: 15 U/L (ref 1–32)
BASOPHILS # BLD AUTO: 0.13 10*3/MM3 (ref 0–0.2)
BASOPHILS NFR BLD AUTO: 1.2 % (ref 0–1.5)
BILIRUB SERPL-MCNC: 0.3 MG/DL (ref 0–1.2)
BLD GP AB SCN SERPL QL: NEGATIVE
BUN SERPL-MCNC: 7 MG/DL (ref 6–20)
BUN/CREAT SERPL: 9.5 (ref 7–25)
CALCIUM SPEC-SCNC: 9.8 MG/DL (ref 8.6–10.5)
CHLORIDE SERPL-SCNC: 100 MMOL/L (ref 98–107)
CK SERPL-CCNC: 47 U/L (ref 20–180)
CO2 SERPL-SCNC: 23 MMOL/L (ref 22–29)
CREAT SERPL-MCNC: 0.74 MG/DL (ref 0.57–1)
CRP SERPL-MCNC: 3.45 MG/DL (ref 0–0.5)
DEPRECATED RDW RBC AUTO: 54.4 FL (ref 37–54)
EOSINOPHIL # BLD AUTO: 0.01 10*3/MM3 (ref 0–0.4)
EOSINOPHIL NFR BLD AUTO: 0.1 % (ref 0.3–6.2)
ERYTHROCYTE [DISTWIDTH] IN BLOOD BY AUTOMATED COUNT: 16 % (ref 12.3–15.4)
ERYTHROCYTE [SEDIMENTATION RATE] IN BLOOD: 92 MM/HR (ref 0–30)
GFR SERPL CREATININE-BSD FRML MDRD: 80 ML/MIN/1.73
GLOBULIN UR ELPH-MCNC: 3.3 GM/DL
GLUCOSE SERPL-MCNC: 157 MG/DL (ref 65–99)
HBA1C MFR BLD: 5.2 % (ref 4.8–5.6)
HCT VFR BLD AUTO: 34.3 % (ref 34–46.6)
HGB BLD-MCNC: 10.8 G/DL (ref 12–15.9)
IMM GRANULOCYTES # BLD AUTO: 0.09 10*3/MM3 (ref 0–0.05)
IMM GRANULOCYTES NFR BLD AUTO: 0.8 % (ref 0–0.5)
INR PPP: 0.93 (ref 0.85–1.16)
LYMPHOCYTES # BLD AUTO: 1.18 10*3/MM3 (ref 0.7–3.1)
LYMPHOCYTES NFR BLD AUTO: 10.6 % (ref 19.6–45.3)
MCH RBC QN AUTO: 29.9 PG (ref 26.6–33)
MCHC RBC AUTO-ENTMCNC: 31.5 G/DL (ref 31.5–35.7)
MCV RBC AUTO: 95 FL (ref 79–97)
MONOCYTES # BLD AUTO: 0.24 10*3/MM3 (ref 0.1–0.9)
MONOCYTES NFR BLD AUTO: 2.1 % (ref 5–12)
MRSA DNA SPEC QL NAA+PROBE: NEGATIVE
NEUTROPHILS NFR BLD AUTO: 85.2 % (ref 42.7–76)
NEUTROPHILS NFR BLD AUTO: 9.53 10*3/MM3 (ref 1.7–7)
NRBC BLD AUTO-RTO: 0 /100 WBC (ref 0–0.2)
PLATELET # BLD AUTO: 633 10*3/MM3 (ref 140–450)
PMV BLD AUTO: 9.2 FL (ref 6–12)
POTASSIUM SERPL-SCNC: 4.7 MMOL/L (ref 3.5–5.2)
PROT SERPL-MCNC: 7.1 G/DL (ref 6–8.5)
PROTHROMBIN TIME: 12.2 SECONDS (ref 11.4–14.4)
RBC # BLD AUTO: 3.61 10*6/MM3 (ref 3.77–5.28)
RH BLD: POSITIVE
SARS-COV-2 RNA PNL SPEC NAA+PROBE: NOT DETECTED
SODIUM SERPL-SCNC: 135 MMOL/L (ref 136–145)
T&S EXPIRATION DATE: NORMAL
WBC # BLD AUTO: 11.18 10*3/MM3 (ref 3.4–10.8)

## 2021-04-13 PROCEDURE — 85025 COMPLETE CBC W/AUTO DIFF WBC: CPT

## 2021-04-13 PROCEDURE — G0480 DRUG TEST DEF 1-7 CLASSES: HCPCS

## 2021-04-13 PROCEDURE — 82550 ASSAY OF CK (CPK): CPT

## 2021-04-13 PROCEDURE — U0004 COV-19 TEST NON-CDC HGH THRU: HCPCS

## 2021-04-13 PROCEDURE — 87641 MR-STAPH DNA AMP PROBE: CPT

## 2021-04-13 PROCEDURE — 86850 RBC ANTIBODY SCREEN: CPT

## 2021-04-13 PROCEDURE — 86920 COMPATIBILITY TEST SPIN: CPT

## 2021-04-13 PROCEDURE — 86901 BLOOD TYPING SEROLOGIC RH(D): CPT

## 2021-04-13 PROCEDURE — 86140 C-REACTIVE PROTEIN: CPT

## 2021-04-13 PROCEDURE — 36415 COLL VENOUS BLD VENIPUNCTURE: CPT

## 2021-04-13 PROCEDURE — 71046 X-RAY EXAM CHEST 2 VIEWS: CPT

## 2021-04-13 PROCEDURE — C9803 HOPD COVID-19 SPEC COLLECT: HCPCS

## 2021-04-13 PROCEDURE — 85652 RBC SED RATE AUTOMATED: CPT

## 2021-04-13 PROCEDURE — 85730 THROMBOPLASTIN TIME PARTIAL: CPT

## 2021-04-13 PROCEDURE — 80053 COMPREHEN METABOLIC PANEL: CPT

## 2021-04-13 PROCEDURE — 82985 ASSAY OF GLYCATED PROTEIN: CPT

## 2021-04-13 PROCEDURE — 82652 VIT D 1 25-DIHYDROXY: CPT

## 2021-04-13 PROCEDURE — 85610 PROTHROMBIN TIME: CPT

## 2021-04-13 PROCEDURE — 86900 BLOOD TYPING SEROLOGIC ABO: CPT

## 2021-04-13 PROCEDURE — 83036 HEMOGLOBIN GLYCOSYLATED A1C: CPT

## 2021-04-13 ASSESSMENT — HOOS JR
HOOS JR SCORE: 21
HOOS JR SCORE: 20.805

## 2021-04-13 NOTE — PAT
Patient instructed to drink 20 ounces (or until full) of Gatorade and it needs to be completed 1 hour before given arrival time for procedure (NO RED Gatorade)    Patient verbalized understanding.    Discussed with patient options for receiving total joint replacement education and assessed patient's ability and preference. Joint Replacement Guide given to patient during PAT visit since not received a copy within the last year. Encouraged patient/family to read guide thoroughly and notify PAT staff with any questions or concerns. Handout provided directing patient to links to watch online videos related to joint replacement surgery on the [facility] website. The handout gives detailed instructions for joining an online joint replacement class through Zoom or phone conference offered on Thursdays. Patient agreed to participate by joining online class through Zoom. Patient verbalized understanding of instructions and to complete the online learning tool survey. Encouraged to share information with family and/or . An overview of the joint replacement education was provided during the visit including general perioperative instructions that are routine for all surgical patients (PAT PASS, wipes, directions to pre-op, etc.).    Clean catch urinalysis not indicated because patient denied recent urinary frequency, urinary urgency, burning or pain upon urination, or flank pain. No recent UTIs.    hoos and koos done in pat.       Pt had ekg on chart from 3/5/21 with clearance.     CK done for dr albarado.

## 2021-04-14 ENCOUNTER — ANESTHESIA EVENT (OUTPATIENT)
Dept: PERIOP | Facility: HOSPITAL | Age: 61
End: 2021-04-14

## 2021-04-14 LAB — FRUCTOSAMINE SERPL-SCNC: 212 UMOL/L (ref 0–285)

## 2021-04-14 RX ORDER — SODIUM CHLORIDE 0.9 % (FLUSH) 0.9 %
10 SYRINGE (ML) INJECTION AS NEEDED
Status: CANCELLED | OUTPATIENT
Start: 2021-04-14

## 2021-04-14 RX ORDER — FAMOTIDINE 10 MG/ML
20 INJECTION, SOLUTION INTRAVENOUS ONCE
Status: CANCELLED | OUTPATIENT
Start: 2021-04-14 | End: 2021-04-14

## 2021-04-14 RX ORDER — SODIUM CHLORIDE 0.9 % (FLUSH) 0.9 %
10 SYRINGE (ML) INJECTION EVERY 12 HOURS SCHEDULED
Status: CANCELLED | OUTPATIENT
Start: 2021-04-14

## 2021-04-15 ENCOUNTER — APPOINTMENT (OUTPATIENT)
Dept: GENERAL RADIOLOGY | Facility: HOSPITAL | Age: 61
End: 2021-04-15

## 2021-04-15 ENCOUNTER — HOSPITAL ENCOUNTER (INPATIENT)
Facility: HOSPITAL | Age: 61
LOS: 4 days | Discharge: NURSING, REHAB OR LONG-TERM CARE | End: 2021-04-19
Attending: ORTHOPAEDIC SURGERY | Admitting: ORTHOPAEDIC SURGERY

## 2021-04-15 ENCOUNTER — ANESTHESIA (OUTPATIENT)
Dept: PERIOP | Facility: HOSPITAL | Age: 61
End: 2021-04-15

## 2021-04-15 DIAGNOSIS — Z96.641 STATUS POST TOTAL HIP REPLACEMENT, RIGHT: Primary | ICD-10-CM

## 2021-04-15 DIAGNOSIS — M25.551 PAIN IN RIGHT HIP: ICD-10-CM

## 2021-04-15 PROBLEM — M06.9 RHEUMATOID ARTHRITIS (HCC): Status: ACTIVE | Noted: 2021-04-15

## 2021-04-15 PROBLEM — I10 HTN (HYPERTENSION): Status: ACTIVE | Noted: 2021-04-15

## 2021-04-15 LAB
APPEARANCE FLD: ABNORMAL
COLOR FLD: ABNORMAL
LYMPHOCYTES NFR FLD MANUAL: 33 %
MONOCYTES NFR FLD: 6 %
NEUTROPHILS NFR FLD MANUAL: 61 %
RBC # FLD AUTO: ABNORMAL /MM3
WBC # FLD AUTO: 997 /MM3

## 2021-04-15 PROCEDURE — 97161 PT EVAL LOW COMPLEX 20 MIN: CPT

## 2021-04-15 PROCEDURE — 87116 MYCOBACTERIA CULTURE: CPT | Performed by: ORTHOPAEDIC SURGERY

## 2021-04-15 PROCEDURE — 25010000002 DEXAMETHASONE PER 1 MG: Performed by: NURSE ANESTHETIST, CERTIFIED REGISTERED

## 2021-04-15 PROCEDURE — 25010000002 HYDROCORTISONE SODIUM SUCCINATE 100 MG RECONSTITUTED SOLUTION: Performed by: INTERNAL MEDICINE

## 2021-04-15 PROCEDURE — 87075 CULTR BACTERIA EXCEPT BLOOD: CPT | Performed by: ORTHOPAEDIC SURGERY

## 2021-04-15 PROCEDURE — C1755 CATHETER, INTRASPINAL: HCPCS | Performed by: ORTHOPAEDIC SURGERY

## 2021-04-15 PROCEDURE — C1776 JOINT DEVICE (IMPLANTABLE): HCPCS | Performed by: ORTHOPAEDIC SURGERY

## 2021-04-15 PROCEDURE — 25010000002 VANCOMYCIN 1 G RECONSTITUTED SOLUTION: Performed by: ORTHOPAEDIC SURGERY

## 2021-04-15 PROCEDURE — 25010000002 ONDANSETRON PER 1 MG: Performed by: NURSE ANESTHETIST, CERTIFIED REGISTERED

## 2021-04-15 PROCEDURE — 88331 PATH CONSLTJ SURG 1 BLK 1SPC: CPT | Performed by: PATHOLOGY

## 2021-04-15 PROCEDURE — 25010000002 VANCOMYCIN PER 500 MG: Performed by: ORTHOPAEDIC SURGERY

## 2021-04-15 PROCEDURE — 87205 SMEAR GRAM STAIN: CPT | Performed by: ORTHOPAEDIC SURGERY

## 2021-04-15 PROCEDURE — 97116 GAIT TRAINING THERAPY: CPT

## 2021-04-15 PROCEDURE — 76000 FLUOROSCOPY <1 HR PHYS/QHP: CPT

## 2021-04-15 PROCEDURE — C1713 ANCHOR/SCREW BN/BN,TIS/BN: HCPCS | Performed by: ORTHOPAEDIC SURGERY

## 2021-04-15 PROCEDURE — 89051 BODY FLUID CELL COUNT: CPT | Performed by: ORTHOPAEDIC SURGERY

## 2021-04-15 PROCEDURE — 87176 TISSUE HOMOGENIZATION CULTR: CPT | Performed by: ORTHOPAEDIC SURGERY

## 2021-04-15 PROCEDURE — 94799 UNLISTED PULMONARY SVC/PX: CPT

## 2021-04-15 PROCEDURE — 87206 SMEAR FLUORESCENT/ACID STAI: CPT | Performed by: ORTHOPAEDIC SURGERY

## 2021-04-15 PROCEDURE — 25010000002 KETOROLAC TROMETHAMINE PER 15 MG: Performed by: ORTHOPAEDIC SURGERY

## 2021-04-15 PROCEDURE — 25010000002 PROPOFOL 10 MG/ML EMULSION: Performed by: NURSE ANESTHETIST, CERTIFIED REGISTERED

## 2021-04-15 PROCEDURE — 0SR904A REPLACEMENT OF RIGHT HIP JOINT WITH CERAMIC ON POLYETHYLENE SYNTHETIC SUBSTITUTE, UNCEMENTED, OPEN APPROACH: ICD-10-PCS | Performed by: ORTHOPAEDIC SURGERY

## 2021-04-15 PROCEDURE — C1889 IMPLANT/INSERT DEVICE, NOC: HCPCS | Performed by: ORTHOPAEDIC SURGERY

## 2021-04-15 PROCEDURE — 87070 CULTURE OTHR SPECIMN AEROBIC: CPT | Performed by: ORTHOPAEDIC SURGERY

## 2021-04-15 PROCEDURE — 25010000002 PHENYLEPHRINE 10 MG/ML SOLUTION 1 ML VIAL: Performed by: NURSE ANESTHETIST, CERTIFIED REGISTERED

## 2021-04-15 PROCEDURE — 25010000002 CLONIDINE PER 1 MG: Performed by: ORTHOPAEDIC SURGERY

## 2021-04-15 PROCEDURE — 0QP604Z REMOVAL OF INTERNAL FIXATION DEVICE FROM RIGHT UPPER FEMUR, OPEN APPROACH: ICD-10-PCS | Performed by: ORTHOPAEDIC SURGERY

## 2021-04-15 PROCEDURE — 25010000003 CEFAZOLIN IN DEXTROSE 2-4 GM/100ML-% SOLUTION: Performed by: ORTHOPAEDIC SURGERY

## 2021-04-15 PROCEDURE — 88305 TISSUE EXAM BY PATHOLOGIST: CPT | Performed by: ORTHOPAEDIC SURGERY

## 2021-04-15 PROCEDURE — 87015 SPECIMEN INFECT AGNT CONCNTJ: CPT | Performed by: ORTHOPAEDIC SURGERY

## 2021-04-15 PROCEDURE — 73502 X-RAY EXAM HIP UNI 2-3 VIEWS: CPT

## 2021-04-15 PROCEDURE — 87102 FUNGUS ISOLATION CULTURE: CPT | Performed by: ORTHOPAEDIC SURGERY

## 2021-04-15 DEVICE — IMPLANTABLE DEVICE
Type: IMPLANTABLE DEVICE | Site: HIP | Status: FUNCTIONAL
Brand: CABLE-READY®

## 2021-04-15 DEVICE — IMPLANTABLE DEVICE
Type: IMPLANTABLE DEVICE | Site: HIP | Status: FUNCTIONAL
Brand: ARCOS MODULAR REVISION HIP SYSTEM

## 2021-04-15 DEVICE — DEV CONTRL TISS STRATAFIX SPIRAL PDO BIDIR 1 36X36CM: Type: IMPLANTABLE DEVICE | Site: HIP | Status: FUNCTIONAL

## 2021-04-15 DEVICE — K-WIRE: Type: IMPLANTABLE DEVICE | Site: HIP | Status: FUNCTIONAL

## 2021-04-15 DEVICE — IMPLANTABLE DEVICE: Type: IMPLANTABLE DEVICE | Site: HIP | Status: FUNCTIONAL

## 2021-04-15 DEVICE — IMPLANTABLE DEVICE
Type: IMPLANTABLE DEVICE | Site: HIP | Status: FUNCTIONAL
Brand: G7® ACETABULAR SYSTEM

## 2021-04-15 DEVICE — IMPLANTABLE DEVICE
Type: IMPLANTABLE DEVICE | Site: HIP | Status: FUNCTIONAL
Brand: BIOLOX DELTA HIP SYSTEM

## 2021-04-15 DEVICE — IMPLANTABLE DEVICE
Type: IMPLANTABLE DEVICE | Site: HIP | Status: FUNCTIONAL
Brand: G7® DUAL MOBILITY ACETABULAR SYSTEM

## 2021-04-15 DEVICE — IMPLANTABLE DEVICE
Type: IMPLANTABLE DEVICE | Site: HIP | Status: FUNCTIONAL
Brand: VIVACIT-E®

## 2021-04-15 RX ORDER — BUPROPION HYDROCHLORIDE 150 MG/1
150 TABLET, EXTENDED RELEASE ORAL DAILY
Status: DISCONTINUED | OUTPATIENT
Start: 2021-04-15 | End: 2021-04-19 | Stop reason: HOSPADM

## 2021-04-15 RX ORDER — FAMOTIDINE 20 MG/1
20 TABLET, FILM COATED ORAL ONCE
Status: COMPLETED | OUTPATIENT
Start: 2021-04-15 | End: 2021-04-15

## 2021-04-15 RX ORDER — PREDNISONE 10 MG/1
10 TABLET ORAL DAILY
Status: DISCONTINUED | OUTPATIENT
Start: 2021-04-16 | End: 2021-04-19 | Stop reason: HOSPADM

## 2021-04-15 RX ORDER — MELOXICAM 7.5 MG/1
15 TABLET ORAL DAILY
Status: DISCONTINUED | OUTPATIENT
Start: 2021-04-15 | End: 2021-04-19 | Stop reason: HOSPADM

## 2021-04-15 RX ORDER — BUPIVACAINE HYDROCHLORIDE 5 MG/ML
INJECTION, SOLUTION PERINEURAL
Status: COMPLETED | OUTPATIENT
Start: 2021-04-15 | End: 2021-04-15

## 2021-04-15 RX ORDER — CEFAZOLIN SODIUM 2 G/100ML
2 INJECTION, SOLUTION INTRAVENOUS EVERY 8 HOURS
Status: COMPLETED | OUTPATIENT
Start: 2021-04-15 | End: 2021-04-15

## 2021-04-15 RX ORDER — VANCOMYCIN HYDROCHLORIDE 1 G/20ML
INJECTION, POWDER, LYOPHILIZED, FOR SOLUTION INTRAVENOUS AS NEEDED
Status: DISCONTINUED | OUTPATIENT
Start: 2021-04-15 | End: 2021-04-15 | Stop reason: HOSPADM

## 2021-04-15 RX ORDER — ACETAMINOPHEN 500 MG
1000 TABLET ORAL ONCE
Status: COMPLETED | OUTPATIENT
Start: 2021-04-15 | End: 2021-04-15

## 2021-04-15 RX ORDER — KETAMINE HCL IN NACL, ISO-OSM 100MG/10ML
SYRINGE (ML) INJECTION AS NEEDED
Status: DISCONTINUED | OUTPATIENT
Start: 2021-04-15 | End: 2021-04-15 | Stop reason: SURG

## 2021-04-15 RX ORDER — MIDAZOLAM HYDROCHLORIDE 1 MG/ML
1 INJECTION INTRAMUSCULAR; INTRAVENOUS
Status: DISCONTINUED | OUTPATIENT
Start: 2021-04-15 | End: 2021-04-15 | Stop reason: HOSPADM

## 2021-04-15 RX ORDER — KETOROLAC TROMETHAMINE 15 MG/ML
15 INJECTION, SOLUTION INTRAMUSCULAR; INTRAVENOUS EVERY 6 HOURS PRN
Status: DISCONTINUED | OUTPATIENT
Start: 2021-04-15 | End: 2021-04-19 | Stop reason: HOSPADM

## 2021-04-15 RX ORDER — ONDANSETRON 2 MG/ML
INJECTION INTRAMUSCULAR; INTRAVENOUS AS NEEDED
Status: DISCONTINUED | OUTPATIENT
Start: 2021-04-15 | End: 2021-04-15 | Stop reason: SURG

## 2021-04-15 RX ORDER — DROPERIDOL 2.5 MG/ML
0.62 INJECTION, SOLUTION INTRAMUSCULAR; INTRAVENOUS ONCE AS NEEDED
Status: DISCONTINUED | OUTPATIENT
Start: 2021-04-15 | End: 2021-04-15 | Stop reason: HOSPADM

## 2021-04-15 RX ORDER — LABETALOL HYDROCHLORIDE 5 MG/ML
10 INJECTION, SOLUTION INTRAVENOUS EVERY 4 HOURS PRN
Status: DISCONTINUED | OUTPATIENT
Start: 2021-04-15 | End: 2021-04-19 | Stop reason: HOSPADM

## 2021-04-15 RX ORDER — FENTANYL CITRATE 50 UG/ML
50 INJECTION, SOLUTION INTRAMUSCULAR; INTRAVENOUS
Status: DISCONTINUED | OUTPATIENT
Start: 2021-04-15 | End: 2021-04-15 | Stop reason: HOSPADM

## 2021-04-15 RX ORDER — EPHEDRINE SULFATE 50 MG/ML
5 INJECTION, SOLUTION INTRAVENOUS ONCE AS NEEDED
Status: DISCONTINUED | OUTPATIENT
Start: 2021-04-15 | End: 2021-04-15 | Stop reason: HOSPADM

## 2021-04-15 RX ORDER — NALOXONE HCL 0.4 MG/ML
0.1 VIAL (ML) INJECTION
Status: DISCONTINUED | OUTPATIENT
Start: 2021-04-15 | End: 2021-04-19 | Stop reason: HOSPADM

## 2021-04-15 RX ORDER — ASPIRIN 81 MG/1
81 TABLET ORAL EVERY 12 HOURS SCHEDULED
Status: DISCONTINUED | OUTPATIENT
Start: 2021-04-16 | End: 2021-04-19 | Stop reason: HOSPADM

## 2021-04-15 RX ORDER — DEXAMETHASONE SODIUM PHOSPHATE 4 MG/ML
INJECTION, SOLUTION INTRA-ARTICULAR; INTRALESIONAL; INTRAMUSCULAR; INTRAVENOUS; SOFT TISSUE AS NEEDED
Status: DISCONTINUED | OUTPATIENT
Start: 2021-04-15 | End: 2021-04-15 | Stop reason: SURG

## 2021-04-15 RX ORDER — EPHEDRINE SULFATE 50 MG/ML
INJECTION, SOLUTION INTRAVENOUS AS NEEDED
Status: DISCONTINUED | OUTPATIENT
Start: 2021-04-15 | End: 2021-04-15 | Stop reason: SURG

## 2021-04-15 RX ORDER — ROPINIROLE 0.5 MG/1
0.5 TABLET, FILM COATED ORAL NIGHTLY
Status: DISCONTINUED | OUTPATIENT
Start: 2021-04-15 | End: 2021-04-19 | Stop reason: HOSPADM

## 2021-04-15 RX ORDER — BUPIVACAINE HCL/0.9 % NACL/PF 0.125 %
PLASTIC BAG, INJECTION (ML) EPIDURAL AS NEEDED
Status: DISCONTINUED | OUTPATIENT
Start: 2021-04-15 | End: 2021-04-15 | Stop reason: SURG

## 2021-04-15 RX ORDER — TRAMADOL HYDROCHLORIDE 50 MG/1
50 TABLET ORAL EVERY 8 HOURS PRN
Status: DISCONTINUED | OUTPATIENT
Start: 2021-04-15 | End: 2021-04-19 | Stop reason: HOSPADM

## 2021-04-15 RX ORDER — LIDOCAINE HYDROCHLORIDE 10 MG/ML
0.5 INJECTION, SOLUTION EPIDURAL; INFILTRATION; INTRACAUDAL; PERINEURAL ONCE AS NEEDED
Status: COMPLETED | OUTPATIENT
Start: 2021-04-15 | End: 2021-04-15

## 2021-04-15 RX ORDER — OXYCODONE HYDROCHLORIDE 5 MG/1
5 TABLET ORAL EVERY 4 HOURS PRN
Status: DISCONTINUED | OUTPATIENT
Start: 2021-04-15 | End: 2021-04-19 | Stop reason: HOSPADM

## 2021-04-15 RX ORDER — MIDAZOLAM HYDROCHLORIDE 1 MG/ML
2 INJECTION INTRAMUSCULAR; INTRAVENOUS
Status: DISCONTINUED | OUTPATIENT
Start: 2021-04-15 | End: 2021-04-15 | Stop reason: HOSPADM

## 2021-04-15 RX ORDER — MAGNESIUM HYDROXIDE 1200 MG/15ML
LIQUID ORAL AS NEEDED
Status: DISCONTINUED | OUTPATIENT
Start: 2021-04-15 | End: 2021-04-15 | Stop reason: HOSPADM

## 2021-04-15 RX ORDER — LISINOPRIL 10 MG/1
10 TABLET ORAL DAILY
Status: DISCONTINUED | OUTPATIENT
Start: 2021-04-15 | End: 2021-04-19 | Stop reason: HOSPADM

## 2021-04-15 RX ORDER — SODIUM CHLORIDE, SODIUM LACTATE, POTASSIUM CHLORIDE, CALCIUM CHLORIDE 600; 310; 30; 20 MG/100ML; MG/100ML; MG/100ML; MG/100ML
9 INJECTION, SOLUTION INTRAVENOUS CONTINUOUS
Status: DISCONTINUED | OUTPATIENT
Start: 2021-04-15 | End: 2021-04-19 | Stop reason: HOSPADM

## 2021-04-15 RX ORDER — SODIUM CHLORIDE, SODIUM LACTATE, POTASSIUM CHLORIDE, CALCIUM CHLORIDE 600; 310; 30; 20 MG/100ML; MG/100ML; MG/100ML; MG/100ML
100 INJECTION, SOLUTION INTRAVENOUS CONTINUOUS
Status: DISCONTINUED | OUTPATIENT
Start: 2021-04-15 | End: 2021-04-19 | Stop reason: HOSPADM

## 2021-04-15 RX ORDER — ACETAMINOPHEN 500 MG
1000 TABLET ORAL EVERY 8 HOURS
Status: DISCONTINUED | OUTPATIENT
Start: 2021-04-15 | End: 2021-04-19 | Stop reason: HOSPADM

## 2021-04-15 RX ORDER — MELOXICAM 15 MG/1
15 TABLET ORAL ONCE
Status: COMPLETED | OUTPATIENT
Start: 2021-04-15 | End: 2021-04-15

## 2021-04-15 RX ORDER — OXYCODONE HYDROCHLORIDE 5 MG/1
10 TABLET ORAL EVERY 4 HOURS PRN
Status: DISCONTINUED | OUTPATIENT
Start: 2021-04-15 | End: 2021-04-19 | Stop reason: HOSPADM

## 2021-04-15 RX ORDER — PREGABALIN 75 MG/1
75 CAPSULE ORAL ONCE
Status: COMPLETED | OUTPATIENT
Start: 2021-04-15 | End: 2021-04-15

## 2021-04-15 RX ADMIN — Medication 200 MCG: at 08:08

## 2021-04-15 RX ADMIN — BUPIVACAINE HYDROCHLORIDE 3 ML: 5 INJECTION, SOLUTION PERINEURAL at 07:31

## 2021-04-15 RX ADMIN — OXYCODONE 10 MG: 5 TABLET ORAL at 19:31

## 2021-04-15 RX ADMIN — Medication 1000 MG: at 07:54

## 2021-04-15 RX ADMIN — ACETAMINOPHEN 1000 MG: 500 TABLET, FILM COATED ORAL at 06:51

## 2021-04-15 RX ADMIN — MELOXICAM 15 MG: 15 TABLET ORAL at 06:51

## 2021-04-15 RX ADMIN — OXYCODONE 10 MG: 5 TABLET ORAL at 13:56

## 2021-04-15 RX ADMIN — MELOXICAM 15 MG: 7.5 TABLET ORAL at 13:56

## 2021-04-15 RX ADMIN — BUPROPION HYDROCHLORIDE 150 MG: 150 TABLET, EXTENDED RELEASE ORAL at 13:57

## 2021-04-15 RX ADMIN — HYDROCORTISONE SODIUM SUCCINATE 50 MG: 100 INJECTION, POWDER, FOR SOLUTION INTRAMUSCULAR; INTRAVENOUS at 13:55

## 2021-04-15 RX ADMIN — ROPINIROLE HYDROCHLORIDE 0.5 MG: 0.5 TABLET, FILM COATED ORAL at 21:25

## 2021-04-15 RX ADMIN — DEXAMETHASONE SODIUM PHOSPHATE 8 MG: 4 INJECTION, SOLUTION INTRA-ARTICULAR; INTRALESIONAL; INTRAMUSCULAR; INTRAVENOUS; SOFT TISSUE at 07:35

## 2021-04-15 RX ADMIN — ONDANSETRON 4 MG: 2 INJECTION INTRAMUSCULAR; INTRAVENOUS at 10:17

## 2021-04-15 RX ADMIN — PREGABALIN 75 MG: 75 CAPSULE ORAL at 06:52

## 2021-04-15 RX ADMIN — FAMOTIDINE 20 MG: 20 TABLET ORAL at 06:51

## 2021-04-15 RX ADMIN — Medication 1000 MG: at 10:06

## 2021-04-15 RX ADMIN — CEFAZOLIN SODIUM 2 G: 2 INJECTION, SOLUTION INTRAVENOUS at 21:25

## 2021-04-15 RX ADMIN — HYDROCORTISONE SODIUM SUCCINATE 50 MG: 100 INJECTION, POWDER, FOR SOLUTION INTRAMUSCULAR; INTRAVENOUS at 19:31

## 2021-04-15 RX ADMIN — SODIUM CHLORIDE, POTASSIUM CHLORIDE, SODIUM LACTATE AND CALCIUM CHLORIDE 9 ML/HR: 600; 310; 30; 20 INJECTION, SOLUTION INTRAVENOUS at 06:30

## 2021-04-15 RX ADMIN — SODIUM CHLORIDE, POTASSIUM CHLORIDE, SODIUM LACTATE AND CALCIUM CHLORIDE 100 ML/HR: 600; 310; 30; 20 INJECTION, SOLUTION INTRAVENOUS at 12:29

## 2021-04-15 RX ADMIN — Medication 30 MG: at 07:52

## 2021-04-15 RX ADMIN — LIDOCAINE HYDROCHLORIDE 0.2 ML: 10 INJECTION, SOLUTION EPIDURAL; INFILTRATION; INTRACAUDAL; PERINEURAL at 06:30

## 2021-04-15 RX ADMIN — PHENYLEPHRINE HYDROCHLORIDE 1 MCG/KG/MIN: 10 INJECTION INTRAVENOUS at 08:08

## 2021-04-15 RX ADMIN — VANCOMYCIN HYDROCHLORIDE 750 MG: 750 INJECTION, SOLUTION INTRAVENOUS at 06:57

## 2021-04-15 RX ADMIN — EPHEDRINE SULFATE 15 MG: 50 INJECTION, SOLUTION INTRAVENOUS at 07:59

## 2021-04-15 RX ADMIN — ACETAMINOPHEN 1000 MG: 500 TABLET, FILM COATED ORAL at 21:24

## 2021-04-15 RX ADMIN — Medication 100 MCG: at 08:05

## 2021-04-15 RX ADMIN — PROPOFOL 50 MCG/KG/MIN: 10 INJECTION, EMULSION INTRAVENOUS at 07:35

## 2021-04-15 RX ADMIN — MUPIROCIN 1 APPLICATION: 20 OINTMENT TOPICAL at 06:53

## 2021-04-15 RX ADMIN — ACETAMINOPHEN 1000 MG: 500 TABLET, FILM COATED ORAL at 13:56

## 2021-04-15 RX ADMIN — EPHEDRINE SULFATE 10 MG: 50 INJECTION, SOLUTION INTRAVENOUS at 07:52

## 2021-04-15 RX ADMIN — CEFAZOLIN SODIUM 2 G: 2 INJECTION, SOLUTION INTRAVENOUS at 15:56

## 2021-04-15 NOTE — ANESTHESIA POSTPROCEDURE EVALUATION
Patient: Jessica Winston    Procedure Summary     Date: 04/15/21 Room / Location:  [facility]  [facility]    Anesthesia Start: 0725 Anesthesia Stop: 1100    Procedure: COVERSION TO RIGHT TOTAL HIP ARTHROPLASTY (Right Hip) Diagnosis:       Malunion of fracture of proximal femur, right, closed      (Malunion of fracture of proximal femur, right, closed [[id number]])    Surgeons: Riaz Man MD Provider: Joey Mitchell MD    Anesthesia Type: spinal ASA Status: 3          Anesthesia Type: spinal    Vitals  No vitals data found for the desired time range.          Post Anesthesia Care and Evaluation    Patient location during evaluation: PACU  Patient participation: complete - patient participated  Level of consciousness: awake and alert  Pain management: adequate  Airway patency: patent  Anesthetic complications: No anesthetic complications  PONV Status: none  Cardiovascular status: hemodynamically stable and acceptable  Respiratory status: nonlabored ventilation, acceptable and nasal cannula  Hydration status: acceptable

## 2021-04-15 NOTE — ANESTHESIA PREPROCEDURE EVALUATION
Anesthesia Evaluation     Patient summary reviewed and Nursing notes reviewed                Airway   Mallampati: II  Dental      Pulmonary - negative pulmonary ROS   Cardiovascular     (+) hypertension,       Neuro/Psych- negative ROS  GI/Hepatic/Renal/Endo - negative ROS     Musculoskeletal (-) negative ROS    Abdominal    Substance History - negative use     OB/GYN negative ob/gyn ROS         Other                        Anesthesia Plan    ASA 3     spinal     intravenous induction     Anesthetic plan, all risks, benefits, and alternatives have been provided, discussed and informed consent has been obtained with: patient.

## 2021-04-15 NOTE — THERAPY EVALUATION
Patient Name: Jessica Winston  : 1960    MRN: [id number]                              Today's Date: 4/15/2021       Admit Date: 4/15/2021    Visit Dx:     ICD-10-CM ICD-9-CM   1. 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     Past Medical History:   Diagnosis Date   • Avascular necrosis (CMS/HCC)    • Depression    • Fractures    • HTN (hypertension)    • Hyperglycemia    • Hypertension    • Osteoarthritis    • RA (rheumatoid arthritis) (CMS/HCC)     ALSO REPORTS OA   • Tooth loose     top left    • Vitamin D deficiency    • Wears glasses     readers     Past Surgical History:   Procedure Laterality Date   • ARM TENDON REPAIR Left    • BREAST SURGERY     • ENDOSCOPY N/A 3/30/2017    Procedure: ESOPHAGOGASTRODUODENOSCOPY WITH COLD FORCEP BIOPSY;  Surgeon: Anca Trevino MD;  Location: [facility] ENDOSCOPY;  Service:    • FEMUR SURGERY      right    • HERNIA REPAIR Bilateral     INGUINAL- unsure about mesh    • KNEE SURGERY Left    • MANDIBLE SURGERY     • TUBAL ABDOMINAL LIGATION     • WISDOM TOOTH EXTRACTION       General Information     Row Name 04/15/21 8086          Physical Therapy Time and Intention    Document Type  evaluation  -LOUISE     Mode of Treatment  individual therapy;physical therapy  -LOUISE     Row Name 04/15/21 5538          General Information    Patient Profile Reviewed  yes  -LOUISE     Prior Level of Function  min assist:;all household mobility;transfer;bed mobility;ADL's  -LOUISE     Existing Precautions/Restrictions  (S) fall;right;hip, anterior;other (see comments) 50% PWB R LE  -LOUISE     Barriers to Rehab  previous functional deficit  -LOUISE     Row Name 04/15/21 7980          Living  Environment    Lives With  alone  -LOUISE     Row Name 04/15/21 1330          Home Main Entrance    Number of Stairs, Main Entrance  none  -LOUISE     Row Name 04/15/21 1330          Stairs Within Home, Primary    Stairs, Within Home, Primary  0  -     Number of Stairs, Within Home, Primary  none  -LOUISE     Row Name 04/15/21 1330          Cognition    Orientation Status (Cognition)  oriented x 4  -LOUISE     Row Name 04/15/21 1330          Safety Issues, Functional Mobility    Safety Issues Affecting Function (Mobility)  safety precaution awareness;safety precautions follow-through/compliance;awareness of need for assistance;sequencing abilities  -     Impairments Affecting Function (Mobility)  endurance/activity tolerance;strength;balance;pain;range of motion (ROM)  -       User Key  (r) = Recorded By, (t) = Taken By, (c) = Cosigned By    Initials Name Provider Type    Clarence Song, PT Physical Therapist        Mobility     Row Name 04/15/21 1330          Bed Mobility    Bed Mobility  scooting/bridging;supine-sit  -     Scooting/Bridging [place] (Bed Mobility)  verbal cues;minimum assist (75% patient effort)  -     Supine-Sit [place] (Bed Mobility)  verbal cues;minimum assist (75% patient effort)  -     Assistive Device (Bed Mobility)  bed rails;head of bed elevated  -     Comment (Bed Mobility)  Min A for LE management off of EOB and trunk control into sitting  -LOUISE     Row Name 04/15/21 1330          Transfers    Comment (Transfers)  Verbal cues for safe hand placement during standing/sitting and moving R LE out for comfort prior to sitting; cues for PWB at R LE  -[facility] 04/15/21 1330          Sit-Stand Transfer    Sit-Stand [place] (Transfers)  verbal cues;contact guard;2 person assist  -     Assistive Device (Sit-Stand Transfers)  walker, front-wheeled  -LOUISE     Row Name 04/15/21 1330          Gait/Stairs (Locomotion)    [place] Level (Gait)  verbal cues;contact guard;2 person  assist  -     Assistive Device (Gait)  walker, front-wheeled  -     Distance in Feet (Gait)  80 feet  -     Deviations/Abnormal Patterns (Gait)  bilateral deviations;queta decreased;gait speed decreased;stride length decreased;antalgic  -     Bilateral Gait Deviations  forward flexed posture  -     Right Sided Gait Deviations  heel strike decreased;weight shift ability decreased  -     [place] Level (Stairs)  not tested  -     Comment (Gait/Stairs)  Pt ambulated with step to pattern and decreased speed. Verbal cues for maintaining upright posture, body within walker, increase step length, WB through UEs and maintain PWB at R LE. Gait limited by fatigue and weakness.  -     Row Name 04/15/21 1330          Mobility    Extremity Weight-bearing Status  right lower extremity  -     Right Lower Extremity (Weight-bearing Status)  weight-bearing as tolerated (WBAT)  -       User Key  (r) = Recorded By, (t) = Taken By, (c) = Cosigned By    Initials Name Provider Type    LOUISE Clarence Ken, PT Physical Therapist        Obj/Interventions     [facility] Name 04/15/21 1330          Range of Motion Comprehensive    General Range of Motion  lower extremity range of motion deficits identified  -     Comment, General Range of Motion  R LE AROM impaired 25%; L LE AROM WFL; able to actively DF/PF  -[facility] Name 04/15/21 1330          Strength Comprehensive (MMT)    General Manual Muscle Testing (MMT) Assessment  lower extremity strength deficits identified  -     Comment, General Manual Muscle Testing (MMT) Assessment  R LE functionally 4-/5; L LE functionally 4+/5;  -[facility] Name 04/15/21 1330          Motor Skills    Therapeutic Exercise  hip;knee;ankle  -[facility] Name 04/15/21 1330          Hip (Therapeutic Exercise)    Hip (Therapeutic Exercise)  isometric exercises  -     Hip Isometrics (Therapeutic Exercise)  gluteal sets;10 repetitions  -[facility] Name 04/15/21 1330          Knee (Therapeutic  Exercise)    Knee (Therapeutic Exercise)  isometric exercises  -     Knee Isometrics (Therapeutic Exercise)  quad sets;10 repetitions  -     Row Name 04/15/21 1330          Ankle (Therapeutic Exercise)    Ankle (Therapeutic Exercise)  AROM (active range of motion)  -     Ankle AROM (Therapeutic Exercise)  bilateral;dorsiflexion;plantarflexion;10 repetitions  -     Row Name 04/15/21 1330          Sensory Assessment (Somatosensory)    Sensory Assessment (Somatosensory)  LE sensation intact  -       User Key  (r) = Recorded By, (t) = Taken By, (c) = Cosigned By    Initials Name Provider Type    Clarence Song, PT Physical Therapist        Goals/Plan     Row Name 04/15/21 1330          Bed Mobility Goal 1 (PT)    Activity/Assistive Device (Bed Mobility Goal 1, PT)  sit to supine/supine to sit  -LOUISE     [place] Level/Cues Needed (Bed Mobility Goal 1, PT)  contact guard assist  -LOUISE     Time Frame (Bed Mobility Goal 1, PT)  long term goal (LTG);3 days  -     Row Name 04/15/21 1330          Transfer Goal 1 (PT)    Activity/Assistive Device (Transfer Goal 1, PT)  sit-to-stand/stand-to-sit;walker, rolling  -LOUISE     [place] Level/Cues Needed (Transfer Goal 1, PT)  modified independence  -LOUISE     Time Frame (Transfer Goal 1, PT)  long term goal (LTG);3 days  -     Row Name 04/15/21 1330          Gait Training Goal 1 (PT)    Activity/Assistive Device (Gait Training Goal 1, PT)  gait (walking locomotion);walker, rolling  -LOUISE     [place] Level (Gait Training Goal 1, PT)  contact guard assist  -LOUISE     Distance (Gait Training Goal 1, PT)  150 feet  -LOUISE     Time Frame (Gait Training Goal 1, PT)  long term goal (LTG);3 days  -       User Key  (r) = Recorded By, (t) = Taken By, (c) = Cosigned By    Initials Name Provider Type    Clarence Song, PT Physical Therapist        Clinical Impression     Row Name 04/15/21 1330          Pain    Additional Documentation  Pain Scale: Numbers Pre/Post-Treatment (Group)   -LOUISE     Row Name 04/15/21 1330          Pain Scale: Numbers Pre/Post-Treatment    Pretreatment Pain Rating  6/10  -LOUISE     Posttreatment Pain Rating  8/10  -LOUISE     Pain Location - Side  Right  -LOUISE     Pain Location - Orientation  generalized  -LOUISE     Pain Location  hip  -LOUISE     Pain Intervention(s)  Repositioned;Cold applied;Ambulation/increased activity  -LOUISE     Row Name 04/15/21 1330          Therapy Assessment/Plan (PT)    Patient/Family Therapy Goals Statement (PT)  To return home  -LOUISE     Rehab Potential (PT)  good, to achieve stated therapy goals  -LOUISE     Criteria for Skilled Interventions Met (PT)  yes;meets criteria;skilled treatment is necessary  -LOUISE     Row Name 04/15/21 1330          Positioning and Restraints    Pre-Treatment Position  in bed  -LOUISE     Post Treatment Position  chair  -LOUISE     In Chair  notified nsg;reclined;call light within reach;encouraged to call for assist;exit alarm on;with family/caregiver;legs elevated;compression device  -LOUISE       User Key  (r) = Recorded By, (t) = Taken By, (c) = Cosigned By    Initials Name Provider Type    LOUISE Clarence Ken, PT Physical Therapist        Outcome Measures     Row Name 04/15/21 1330          How much help from another person do you currently need...    Turning from your back to your side while in flat bed without using bedrails?  2  -LOUISE     Moving from lying on back to sitting on the side of a flat bed without bedrails?  3  -LOUISE     Moving to and from a bed to a chair (including a wheelchair)?  3  -LOUISE     Standing up from a chair using your arms (e.g., wheelchair, bedside chair)?  3  -LOUISE     Climbing 3-5 steps with a railing?  2  -LOUISE     To walk in hospital room?  2  -LOUISE     AM-PAC 6 Clicks Score (PT)  15  -LOUISE     Row Name 04/15/21 1330          PADD    Diagnosis  2  -LOUISE     Gender  1  -LOUISE     Age Group  2  -LOUISE     Gait Distance  0  -LOUISE     Assist Level  1  -LOUISE     Home Support  0  -LOUISE     PADD Score  6  -LOUISE     Patient Preference  extended  rehabilitation  -     Prediction by PADD Score  extended rehabilitation  -     Row Name 04/15/21 1330          Functional Assessment    Outcome Measure Options  AM-PAC 6 Clicks Basic Mobility (PT);PADD  -LOUISE       User Key  (r) = Recorded By, (t) = Taken By, (c) = Cosigned By    Initials Name Provider Type    Clarence Song PT Physical Therapist        Physical Therapy Education                 Title: PT OT SLP Therapies (Done)     Topic: Physical Therapy (Done)     Point: Mobility training (Done)     Learning Progress Summary           Patient Acceptance, E,D, VU by LOUISE at 4/15/2021 1330    Comment: Educated on safe sequencing with bed mobility, ambulatory transfers, and gait. Reviewed HEP, hip precautions, and PWB precautions.   Family Acceptance, E,D, VU by LOUISE at 4/15/2021 1330    Comment: Educated on safe sequencing with bed mobility, ambulatory transfers, and gait. Reviewed HEP, hip precautions, and PWB precautions.                   Point: Home exercise program (Done)     Learning Progress Summary           Patient Acceptance, E,D, VU by LOUISE at 4/15/2021 1330    Comment: Educated on safe sequencing with bed mobility, ambulatory transfers, and gait. Reviewed HEP, hip precautions, and PWB precautions.   Family Acceptance, E,D, VU by LOUISE at 4/15/2021 1330    Comment: Educated on safe sequencing with bed mobility, ambulatory transfers, and gait. Reviewed HEP, hip precautions, and PWB precautions.                   Point: Body mechanics (Done)     Learning Progress Summary           Patient Acceptance, E,D, VU by LOUISE at 4/15/2021 1330    Comment: Educated on safe sequencing with bed mobility, ambulatory transfers, and gait. Reviewed HEP, hip precautions, and PWB precautions.   Family Acceptance, E,D, VU by LOUISE at 4/15/2021 1330    Comment: Educated on safe sequencing with bed mobility, ambulatory transfers, and gait. Reviewed HEP, hip precautions, and PWB precautions.                   Point: Precautions (Done)      Learning Progress Summary           Patient Acceptance, E,D, VU by LOUISE at 4/15/2021 1330    Comment: Educated on safe sequencing with bed mobility, ambulatory transfers, and gait. Reviewed HEP, hip precautions, and PWB precautions.   Family Acceptance, E,D, VU by LOUISE at 4/15/2021 1330    Comment: Educated on safe sequencing with bed mobility, ambulatory transfers, and gait. Reviewed HEP, hip precautions, and PWB precautions.                               User Key     Initials Effective Dates Name Provider Type [facility] 09/10/19 -  Clarence Ken, PT Physical Therapist PT              PT Recommendation and Plan  Planned Therapy Interventions (PT): balance training, bed mobility training, gait training, home exercise program, patient/family education, transfer training, ROM (range of motion), strengthening  Plan of Care Reviewed With: patient, daughter  Progress: improving  Outcome Summary: PT eval complete. Pt ambulated 80 feet using RW and CGA x2 for safety. Gait limited by pain, weakness and fatigue. Bed mobility requiring min A and STS performed with CGA x2. Reviewed HEP and PWB precautions. PADD score = 6. Recommend pt d/c IPR when appropriate.     Time Calculation:   PT Charges     Row Name 04/15/21 1330             Time Calculation    Start Time  1330  -      PT Received On  04/15/21  -      PT Goal Re-Cert Due Date  04/25/21  -         Time Calculation- PT    Total Timed Code Minutes- PT  10 minute(s)  -         Timed Charges    41837 - PT Therapeutic Exercise Minutes  2  -      01534 - Gait Training Minutes   8  -        User Key  (r) = Recorded By, (t) = Taken By, (c) = Cosigned By    Initials Name Provider Type     Clarence Ken, PT Physical Therapist        Therapy Charges for Today     Code Description Service Date Service Provider Modifiers Qty    26810449539 HC GAIT TRAINING EA 15 MIN 4/15/2021 Clarence Ken, PT GP 1    43670936482 HC PT EVAL LOW COMPLEXITY 3 4/15/2021 Clarence Ken, PT GP 1     [id number]  PT THER SUPP EA 15 MIN 4/15/2021 Clarence Ken, PT GP 2          PT G-Codes  Outcome Measure Options: AM-PAC 6 Clicks Basic Mobility (PT), PADD  AM-PAC 6 Clicks Score (PT): 15    Clarence Ken, PT  4/15/2021

## 2021-04-15 NOTE — INTERVAL H&P NOTE
"[facility] Pre-op    Full history and physical note from office is attached.    /88 (BP Location: Right arm, Patient Position: Sitting)   Pulse 103   Temp 98.7 °F (37.1 °C) (Temporal)   Resp 18   Ht 165.1 cm (65\")   Wt 51.3 kg (113 lb)   LMP 08/01/2010 (Approximate)   SpO2 99%   Breastfeeding No   BMI 18.80 kg/m²     Immunizations:  Influenza:  No  Pneumococcal:  No  Tetanus:  Unknown  Covid : No    LAB Results:  Lab Results   Component Value Date    WBC 11.18 (H) 04/13/2021    HGB 10.8 (L) 04/13/2021    HCT 34.3 04/13/2021    MCV 95.0 04/13/2021     (H) 04/13/2021    NEUTROABS 9.53 (H) 04/13/2021    GLUCOSE 157 (H) 04/13/2021    BUN 7 04/13/2021    CREATININE 0.74 04/13/2021    EGFRIFNONA 80 04/13/2021     (L) 04/13/2021    K 4.7 04/13/2021     04/13/2021    CO2 23.0 04/13/2021    CALCIUM 9.8 04/13/2021    ALBUMIN 3.80 04/13/2021    AST 15 04/13/2021    ALT 11 04/13/2021    BILITOT 0.3 04/13/2021    PTT 24.2 04/13/2021    INR 0.93 04/13/2021     Results for EL JORGE COX (MRN [id number]) as of 4/15/2021 06:43   Ref. Range 4/13/2021 12:41   Sed Rate Latest Ref Range: 0 - 30 mm/hr 92 (H)     Cancer Staging (if applicable)  Cancer Patient: __ yes __no __unknown__N/A; If yes, clinical stage T:__ N:__M:__, stage group or __N/A      Impression: avascular necrosis right hip      Plan: COVERSION TO RIGHT TOTAL HIP ARTHROPLASTY      Lakeisha Clemente, DENISE   4/15/2021   06:48 EDT   "

## 2021-04-15 NOTE — PLAN OF CARE
Problem: Adult Inpatient Plan of Care  Goal: Plan of Care Review  Flowsheets (Taken 4/15/2021 1330)  Progress: improving  Plan of Care Reviewed With:   patient   daughter  Outcome Summary: PT eval complete. Pt ambulated 80 feet using RW and CGA x2 for safety. Gait limited by pain, weakness and fatigue. Bed mobility requiring min A and STS performed with CGA x2. Reviewed HEP and PWB precautions. PADD score = 6. Recommend pt d/c IPR when appropriate.   Goal Outcome Evaluation:  Plan of Care Reviewed With: patient, daughter  Progress: improving  Outcome Summary: PT eval complete. Pt ambulated 80 feet using RW and CGA x2 for safety. Gait limited by pain, weakness and fatigue. Bed mobility requiring min A and STS performed with CGA x2. Reviewed HEP and PWB precautions. PADD score = 6. Recommend pt d/c IPR when appropriate.

## 2021-04-15 NOTE — OP NOTE
TOTAL HIP ARTHROPLASTY ANTERIOR REVISION  Procedure Report    Patient Name:  Jessica Winston  YOB: 1960    Date of Surgery:  4/15/2021     Indications:   60 y.o. female who was admitted to [facility] about 1 year ago patient had a fall and sustained a intertrochanteric hip fracture that was fixed at outside facility [facility].  Unfortunately the patient went on to a presignificant malunion with screw cut out of the femoral head and erosion of the femoral head with screw impingement into the acetabulum.  She became progressively and more pain and difficulty with ambulation and daily functions I met her she was mostly wheelchair-bound and had pain with any motion of her hip.  She was a active smoker a long discussion about smoking cessation prior to surgery in order to improve her chance had a positive outcome.  We discussed when she was at risk for things like infection or fracture due to her smoking and wound healing issues she was also rheumatoid arthritis .  Unfortunately patient progressed with significant pain and difficulty with ambulation and daily function.  The patient was indicated for a conversion to total hip arthroplasty. Likely risk benefits of the procedure including but not limited to infection, DVT, pulmonary embolism, leg length discrepancy, recurrent dislocation, possibility of injury to nerves and vessels, and periprosthetic fractures have been discussed with the patient and her daughter in detail. Despite the risks involved the patient elected to proceed with an informed consent was obtained.     Patient Identification:  Patient was seen in the prep, consent was reviewed, operative procedure was identified, surgical site and thigh marked.          Pre-op Diagnosis:   Malunion of fracture of proximal femur, right, closed [[id number]]       Post-Op Diagnosis Codes:     * Malunion of fracture of proximal femur, right, closed  [S72.001P]    Procedure/CPT® Codes:      Procedure(s):  COVERSION TO RIGHT TOTAL HIP ARTHROPLASTY    Staff:  Surgeon(s):  Riaz Man MD    Anesthesia: Spinal    Estimated Blood Loss: 200 mL    Implants:    Implant Name Type Inv. Item Serial No.  Lot No. LRB No. Used Action   SUT CONTRL TISS STRATAFIX SPIRAL PDO BIDIR 1 [id number] - [id number] Implant SUT CONTRL TISS STRATAFIX SPIRAL PDO BIDIR 1 [id number]  [employer]  [employer]  Right 1 Implanted   KWIRE [id number] NS STRL - [id number] Implant KWIRE 3.6A250XD NS STRL  [employer] [id number] Right 1 Implanted   SHLL ACET OSSEOTI G7 3H CESAR 52MM - [id number] Implant SHLL ACET OSSEOTI G7 3H CESAR 52MM  [employer] [id number] Right 1 Implanted   SCRW ACET COREY TRILOGY S/TAP 6.5X25 - [id number] Implant SCRW ACET COREY TRILOGY S/TAP 6.5X25  [employer] [id number] Right 1 Implanted   SCRW ACET COREY TRILOGY S/TAP 6.5X50 - [id number] Implant SCRW ACET COREY TRILOGY S/TAP 6.5X50  [employer] [id number] Right 1 Implanted   LINER G7 2MOBL CESAR 42MM - [id number] Implant LINER G7 2MOBL CESAR 42MM  [employer] [id number] Right 1 Implanted   CABL CERCLG GTR COCR 1.8MM 63.5CM - [id number] Implant CABL CERCLG GTR COCR 1.8MM 63.5CM  [employer] [id number] Right 1 Implanted   STEM DIST FEM/HIP BORA STS [id number] - [id number] Implant STEM DIST FEM/HIP BORA STS [id number]  [employer] [id number] Right 1 Implanted   CONE FEM BDY PROX STDOFFST BORA A 60 - [id number] Implant CONE FEM BDY PROX STDOFFST BORA A 60  [employer] [id number] Right 1 Implanted   HD FEM/HIP BIOLOX/DELTA CERAM NK 28MM PLS3 - [id number] Implant HD FEM/HIP BIOLOX/DELTA CERAM NK 28MM PLS3  [employer] [id number] Right 1 Implanted   BEAR HIP VIVACIT/E 2MOBIL HXPE [id number] - [id number] Implant BEAR HIP VIVACIT/E 2MOBIL HXPE [id number]  [employer] [id number] Right 1 Implanted       Specimen:          Specimens     ID Source Type Tests Collected By Collected At Frozen?    1 Hip, Right Tissue · FUNGAL  CULTURE  · TISSUE / BONE CULTURE  · AFB CULTURE  · ANAEROBIC CULTURE 10 DAY INCUBATION   Riaz Man MD 4/15/21 0808     Description: right femur for cultures    2 Hip, Right Body Fluid · BODY FLUID CELL COUNT WITH DIFFERENTIAL   Riaz Man MD 4/15/21 0832     Description: right hip fluid for cell count     3 Hip, Right Tissue · FUNGAL CULTURE  · TISSUE / BONE CULTURE  · AFB CULTURE  · ANAEROBIC CULTURE 10 DAY INCUBATION   Riaz Man MD 4/15/21 0921     Description: right femoral canal for culture    A Hip, Right Tissue · TISSUE PATHOLOGY EXAM   Riaz Man MD 4/15/21 0807 Yes    Description: right femur for frozen             Findings: Intraoperative frozens were negative for any acute inflammation aspiration of the hip revealed very low cell count of with low percentage of polys consistent with no infection    Complications: None    Description of Procedure: The patient was transferred to [facility] operating room preoperative antibiotics in form of Kefzol 2gm Given IV Prior to Skin Incision As Well As 1 G of Tranexemic Acid. Patient Received Spinalanesthesia and was transferred to the [place] Table. All Bony Prominences Were Padded Adequately. The Operative Hip Was Then Prepped and Draped in the Usual Sterile Fashion. Multiple timeouts Were Done Identifying the Correct Patient Surgical Site and Planned Procedure.    We began the procedure to remove the old long cephalomedullary nail.  We made an incision proximal to the greater trochanter and dissected down to the tip of the nail where we backed off the setscrew we then turned our attention to the cephalomedullary portion of the nail where the was inserted a guidepin into the lag screw attached the jig for the lag screw remove the lag screw without difficulty we then inserted the screw  for the set screw and screw that back down and then attached a removal device onto the proximal end of the nail.  Once that was securely  tightened we then turned our attention to the distal interlocks both were removed without difficulty they were intact and then the nail was removed all those wounds were then thoroughly irrigated with Betadine and normal saline and then closed with 0 Vicryl 2-0 Vicryl and staples.  We then cover those with new iodoform in order to seal them out from the rest of the hip replacement surgery.  We then turned our attention to do the hip replacement    A Skin Incision Was Made Overlying the Anterior Lateral Aspect of the Hip for about 10 Cm Just below the Lateral to the Anterior Superior Iliac Spine. Skin and Subcutaneous Tissue and Fascia Was Incised in Line with the Skin Incision Overlying the Tensor Fascia Beatriz Muscle. The Tensor Muscle Was Then Retracted Laterally and the Interval between Sartorius and Rectus Femoris Medially and the Tensor Fascia Muscle Were Developed. The Lateral Femoral Circumflex Vessels Were Identified They Were Ligated without Difficulty. The deep Fascia Was Incised and the Capsule of the Hip Joint Where Was Identified. We then placed a soft tissue protecting device deep to the rectus and the tensor. Retractors were then Placed Extracapsular the Capsule Was Then Incised in a T-Shaped Manner and the Anterior Posterior Capsules Were Then Tied with maxbraid suture . Following This Retractors Were Placed Intracapsularly. We Did Identify the Obvious signs of severe osteoarthritis upon Incising the Capsule. We Then Made Appropriate Releases Done on the Inferior Medial Neck and along the Saddle of the Femoral Neck Femoral Neck Remaining Was Identified and Osteotomy Was Done and According to the Preoperative Templating Is an Oscillating Saw. The Femoral Head and Cut Neck with Extracted Using a Corkscrew without Difficulty the Femoral Head Did Have Major Signs of fragmentation consistent with avascular necrosis.    The Acetabular Cavity Was Exposed by Placing Retractors and taking Care to Protect the  Anterior vascular Structures the Labrum Was Excised the pulvinar was Excised from the Cotyloid Fossa Acetabular Cavity Was Then Progressively Reamed Maintaining the Correct Inclination and Version under Image Intensifier Control. Adequate Medialization Was Obtained. Adequate Fit Was Found to Be Obtained with the Reamer Size of 53. The Biomet G7 ossioiti Cup Size 54 Was Then Impacted into Position. This Found to Seat Satisfactorily with Adequate Inclination and Anteversion. It Was Stable but we did Insert 1 6.5/25 and 1 6.5/50 Millimeter Lag Screw This Was Checked under Fluoroscopic Fluoroscopy and Found to Be in Good Position. Following this the cup was cleaned and then a dual mobility liner impacted Following This the Periarticular Tissues Were Then Infiltrated with Local Anesthetic.  Attention Was Then Directed to the Proximal Femur the Proximal Femur Was Delivered Using a Trochanteric Hook Placed Just Distal to the Vastus Tubercle and Proximal to the gluet Johnny Tendon. The leg was then Externally Rotated Gross Traction Released and Hyperextension and Adduction Was Done Using the [place] Table. Mechanical Lift on the Table Was Utilized to Support the Femur Appropriate Capsular Releases Were Made to Expose the Medial [place] of the Greater Trochanter and Proximal Femur Was Delivered the Femoral Canal Was Then Entered by Removing Lateral Femoral Neck.   We began by placing a ball-tipped wire down the canal and used flexible reamers to open up the canal up to size 14 with some moderate chatter.  We then began sequential reaming with the reamers for the Aline system got up to a size 16 and there was some of the proximal femur with a malunion was had a little bit of comminution and some of it extended out to the insertion site of the lag screw we made the decision to bring the leg back up in place a single prophylactic cable distal to this in order to prevent any propagation of fracture when inserting the implant once  we achieve this and the leg was brought back up we were able to ream up one more to a 17 and then inserted a 17 x 150 Aline stem it sat about a 60 body this was reamed for 60 a body and then we trialed a +3 neck.  The hip was reduced leg lengths were found to be symmetric and symmetric offset stable with range of motion.  We then removed trial components final implants were implanted without difficulty hip was again reduced.  This was then reduced again fluoroscopy remaining images both lateral and AP of the femur showed the stem to be in good position AP pelvis showed symmetric leg length and offset. The hip was then taken through a range of motion and found to be stable. There were no acute fractures there and wound was then thoroughly irrigated saline we did use of more of the injection cocktail. Betadine irrigation was used followed by 3L of saline irrigation. Soft tissue hemostasis was secured and second dose of IV TXA was used.  1 g of vancomycin powder was placed deep sponge and needle instrument counts were correct maxibraid sutures directly anterior and posterior capsular flaps were tied to each other then closed the fascial layer with a running #2 strata fix followed by 2-0 Vicryl and then Monocryl for the skin and Dermabond with perneo mesh over the top. Once the Dermabond had dried sterile dressing placed over the top. The patient was then transferred to the recovery room in stable condition patient tolerated the procedure well there were no Medications the patient adequate distal pulses and good cap refill.  The patient will be mobilized by physical therapy received antibiotic prophylaxis postoperatively for 2 more doses given the first 24 hours. The patient was started on DVT prophylaxis with 81 mg aspirin twice daily on starting postoperative day #1.  I discussed the satisfactory performance of the procedure with the patient's family and discussed the postoperative management.      Assistant  Participation:  Surgeon(s):  Riaz Man MD    Assistant: Bridgette Mclain PA-C assisted with proper preoperative positioning, preoperative templating, determingin availability of proper implants, prepping and draping of patient, manipulation placement of instruments, protection of ligaments and vital soft tissue structures, assistance in maintaining hemostasis and assistance with closure of the wound. Their skills and knowledge of the steps of operation and the desired outcome of each surgical step was crucial, allowing for efficient choreography of surgical procedure, and closure of the wound which lead to reduced surgical time, less blood loss, and less risk of complications for the patient.       Riaz Man MD     Date: 4/15/2021  Time: 10:51 EDT

## 2021-04-15 NOTE — ANESTHESIA PROCEDURE NOTES
Spinal Block      Patient reassessed immediately prior to procedure    Patient location during procedure: OR  Indication:at surgeon's request  Performed By  CRNA: Kostas Victoria CRNA  Preanesthetic Checklist  Completed: patient identified, IV checked, site marked, risks and benefits discussed, surgical consent, monitors and equipment checked, pre-op evaluation and timeout performed  Spinal Block Prep:  Patient Position:sitting  Sterile Tech:cap, gloves, sterile barriers and mask  Prep:Chloraprep  Patient Monitoring:blood pressure monitoring, continuous pulse oximetry and EKG  Spinal Block Procedure  Approach:midline  Guidance:landmark technique and palpation technique  Location:L4-L5  Needle Type:Sprotte  Needle Gauge:22 G  Placement of Spinal needle event:cerebrospinal fluid aspirated  Paresthesia: no  Fluid Appearance:clear  Medications: bupivacaine (MARCAINE) 0.5 % injection, 3 mL  Med Administered at 4/15/2021 7:31 AM   Post Assessment  Patient Tolerance:patient tolerated the procedure well with no apparent complications  Complications no  Additional Notes  Procedure:  Pt assisted to sitting position, with legs in position of comfort over side of bed.  Pt. instructed in optimal spine presentation, the spine was prepped/ Draped and the skin at insertion site was anesthetized with 1% Lidocaine 2 ml.  The spinal needle was then advanced until CSF flow was obtained and LA was injected:

## 2021-04-15 NOTE — PLAN OF CARE
Goal Outcome Evaluation:  Plan of Care Reviewed With: patient, daughter  Progress: improving  Pt from PACU after surgery on her right hip and femur, pt is alert and oriented, pt has ambulated in the chin

## 2021-04-16 PROBLEM — D62 ACUTE BLOOD LOSS ANEMIA: Status: ACTIVE | Noted: 2021-04-16

## 2021-04-16 LAB
ANION GAP SERPL CALCULATED.3IONS-SCNC: 8 MMOL/L (ref 5–15)
BUN SERPL-MCNC: 15 MG/DL (ref 6–20)
BUN/CREAT SERPL: 17 (ref 7–25)
CALCIUM SPEC-SCNC: 7.9 MG/DL (ref 8.6–10.5)
CHLORIDE SERPL-SCNC: 104 MMOL/L (ref 98–107)
CO2 SERPL-SCNC: 23 MMOL/L (ref 22–29)
CREAT SERPL-MCNC: 0.88 MG/DL (ref 0.57–1)
CYTO UR: NORMAL
GFR SERPL CREATININE-BSD FRML MDRD: 66 ML/MIN/1.73
GLUCOSE SERPL-MCNC: 104 MG/DL (ref 65–99)
HCT VFR BLD AUTO: 19.5 % (ref 34–46.6)
HCT VFR BLD AUTO: 21.3 % (ref 34–46.6)
HGB BLD-MCNC: 6.1 G/DL (ref 12–15.9)
HGB BLD-MCNC: 6.6 G/DL (ref 12–15.9)
LAB AP CASE REPORT: NORMAL
LAB AP CLINICAL INFORMATION: NORMAL
Lab: NORMAL
PATH REPORT.FINAL DX SPEC: NORMAL
PATH REPORT.GROSS SPEC: NORMAL
POTASSIUM SERPL-SCNC: 4.5 MMOL/L (ref 3.5–5.2)
SODIUM SERPL-SCNC: 135 MMOL/L (ref 136–145)

## 2021-04-16 PROCEDURE — 85018 HEMOGLOBIN: CPT | Performed by: INTERNAL MEDICINE

## 2021-04-16 PROCEDURE — 85014 HEMATOCRIT: CPT | Performed by: ORTHOPAEDIC SURGERY

## 2021-04-16 PROCEDURE — 85014 HEMATOCRIT: CPT | Performed by: INTERNAL MEDICINE

## 2021-04-16 PROCEDURE — 36430 TRANSFUSION BLD/BLD COMPNT: CPT

## 2021-04-16 PROCEDURE — 85018 HEMOGLOBIN: CPT | Performed by: ORTHOPAEDIC SURGERY

## 2021-04-16 PROCEDURE — 63710000001 PREDNISONE PER 5 MG: Performed by: INTERNAL MEDICINE

## 2021-04-16 PROCEDURE — 80048 BASIC METABOLIC PNL TOTAL CA: CPT | Performed by: ORTHOPAEDIC SURGERY

## 2021-04-16 PROCEDURE — 97110 THERAPEUTIC EXERCISES: CPT | Performed by: PHYSICAL THERAPIST

## 2021-04-16 PROCEDURE — P9016 RBC LEUKOCYTES REDUCED: HCPCS

## 2021-04-16 PROCEDURE — 86900 BLOOD TYPING SEROLOGIC ABO: CPT

## 2021-04-16 PROCEDURE — 97116 GAIT TRAINING THERAPY: CPT | Performed by: PHYSICAL THERAPIST

## 2021-04-16 RX ADMIN — ASPIRIN 81 MG: 81 TABLET, COATED ORAL at 20:02

## 2021-04-16 RX ADMIN — MELOXICAM 15 MG: 7.5 TABLET ORAL at 09:16

## 2021-04-16 RX ADMIN — OXYCODONE 10 MG: 5 TABLET ORAL at 20:02

## 2021-04-16 RX ADMIN — ROPINIROLE HYDROCHLORIDE 0.5 MG: 0.5 TABLET, FILM COATED ORAL at 20:02

## 2021-04-16 RX ADMIN — OXYCODONE 5 MG: 5 TABLET ORAL at 09:16

## 2021-04-16 RX ADMIN — ACETAMINOPHEN 1000 MG: 500 TABLET, FILM COATED ORAL at 22:33

## 2021-04-16 RX ADMIN — BUPROPION HYDROCHLORIDE 150 MG: 150 TABLET, EXTENDED RELEASE ORAL at 09:17

## 2021-04-16 RX ADMIN — PREDNISONE 10 MG: 10 TABLET ORAL at 09:16

## 2021-04-16 RX ADMIN — OXYCODONE 10 MG: 5 TABLET ORAL at 14:20

## 2021-04-16 RX ADMIN — ACETAMINOPHEN 1000 MG: 500 TABLET, FILM COATED ORAL at 06:17

## 2021-04-16 RX ADMIN — ACETAMINOPHEN 1000 MG: 500 TABLET, FILM COATED ORAL at 14:21

## 2021-04-16 RX ADMIN — OXYCODONE 10 MG: 5 TABLET ORAL at 00:14

## 2021-04-16 RX ADMIN — LISINOPRIL 10 MG: 10 TABLET ORAL at 09:16

## 2021-04-16 RX ADMIN — OXYCODONE 10 MG: 5 TABLET ORAL at 04:15

## 2021-04-16 NOTE — PROGRESS NOTES
"IM progress note      Jessica Winston  [id number]  1960     LOS: 1 day     Attending: Riaz Man MD    Primary Care Provider: Iraida Demarco APRN      Chief Complaint/Reason for visit:  No chief complaint on file.      Subjective   Feels okay.  No nausea, vomiting, or shortness of breath.  Some postop pain but progressing well with rehab.  Grieving cigarettes.    Objective        Visit Vitals  /77 (BP Location: Right arm, Patient Position: Lying)   Pulse 88   Temp 98.1 °F (36.7 °C) (Oral)   Resp 16   Ht 165.1 cm (65\")   Wt 51.3 kg (113 lb)   LMP 2010 (Approximate)   SpO2 97%   Breastfeeding No   BMI 18.80 kg/m²     Temp (24hrs), Av.8 °F (36.6 °C), Min:97.4 °F (36.3 °C), Max:98.3 °F (36.8 °C)      Intake/Output:    Intake/Output Summary (Last 24 hours) at 2021 1158  Last data filed at 2021 0900  Gross per 24 hour   Intake 50 ml   Output --   Net 50 ml        Physical Therapy: Today's note pending    Physical Exam:     General Appearance:    Alert, cooperative, in no acute distress   Head:    Normocephalic, without obvious abnormality, atraumatic    Lungs:     Normal effort, symmetric chest rise,  clear to      auscultation bilaterally              Heart:    Regular rhythm and normal rate, normal S1 and S2    Abdomen:     Normal bowel sounds, no masses, no organomegaly, soft        non-tender, non-distended, no guarding, no rebound                tenderness   Extremities:  Intact dressings over right hip incisions.  No clubbing, cyanosis or edema.  No deformities.  Intact flexion dorsiflexion bilateral feet.   Pulses:   Pulses palpable and equal bilaterally   Skin:   No bleeding, bruising or rash          Results Review:     I reviewed the patient's new clinical results.   Results from last 7 days   Lab Units 21  0824 21  0616 21  1241   WBC 10*3/mm3  --   --  11.18*   HEMOGLOBIN g/dL 6.6* 6.1* 10.8*   HEMATOCRIT % 21.3* 19.5* 34.3   PLATELETS 10*3/mm3  " --   --  633*     Results from last 7 days   Lab Units 04/16/21  0616 04/13/21  1241   SODIUM mmol/L 135* 135*   POTASSIUM mmol/L 4.5 4.7   CHLORIDE mmol/L 104 100   CO2 mmol/L 23.0 23.0   BUN mg/dL 15 7   CREATININE mg/dL 0.88 0.74   CALCIUM mg/dL 7.9* 9.8   BILIRUBIN mg/dL  --  0.3   ALK PHOS U/L  --  89   ALT (SGPT) U/L  --  11   AST (SGOT) U/L  --  15   GLUCOSE mg/dL 104* 157*     I reviewed the patient's new imaging including images and reports.    All medications reviewed.   acetaminophen, 1,000 mg, Oral, Q8H  aspirin, 81 mg, Oral, Q12H  buPROPion SR, 150 mg, Oral, Daily  lisinopril, 10 mg, Oral, Daily  meloxicam, 15 mg, Oral, Daily  predniSONE, 10 mg, Oral, Daily  rOPINIRole, 0.5 mg, Oral, Nightly      HYDROmorphone, 0.5 mg, Q2H PRN   And  naloxone, 0.1 mg, Q5 Min PRN  ketorolac, 15 mg, Q6H PRN  labetalol, 10 mg, Q4H PRN  oxyCODONE, 10 mg, Q4H PRN  oxyCODONE, 5 mg, Q4H PRN  sodium chloride, 500 mL, TID PRN  traMADol, 50 mg, Q8H PRN        Assessment/Plan       Status post total hip replacement, right, revision from prior repair    PVD (peripheral vascular disease) with claudication (CMS/[facility])    Tobacco dependence    Pain in right hip    HTN (hypertension)    Rheumatoid arthritis (CMS/[facility])    Acute blood loss anemia        Plan  1. PT/OT,  Weight bearing as tolerated right LE.  Total hip precautions  2. Pain control-prns  3. IS-encourage  4. DVT proph- Mechanicals and aspirin  5. Bowel regimen  6. Resume home medications as appropriate  7. Monitor post-op labs  8. DC planning .   following.  [place] referral has been made.  Apparently working through [employer].     - Hypertension:  Resume home medications as appropriate, formulary substitution when indicated.  Holding parameters.  Prn medications for elevated blood pressure.     -RA: Stress dose of steroids today, resume prednisone tomorrow.  Enbrel resumption when okay with Dr. Man.    Demi Bermudez MD  04/16/21  11:58  EDT

## 2021-04-16 NOTE — PLAN OF CARE
Problem: Adult Inpatient Plan of Care  Goal: Plan of Care Review  Outcome: Ongoing, Progressing  Flowsheets (Taken 4/16/2021 1848)  Progress: improving  Plan of Care Reviewed With: patient  Outcome Summary: Patient VSS, A&Ox4, RA. Ambulated 40ft this shift. Voiding spontaneously. Up w/ 1 assist. No c/o pain. 2 units PRBC given. Dressing changed this shift, CDI. BM this shift. Referrals made to [facility]. Continue to monitor.  Goal: Patient-Specific Goal (Individualized)  Outcome: Ongoing, Progressing  Goal: Absence of Hospital-Acquired Illness or Injury  Outcome: Ongoing, Progressing  Intervention: Identify and Manage Fall Risk  Recent Flowsheet Documentation  Taken 4/16/2021 1836 by Hanh Yañez RN  Safety Promotion/Fall Prevention:   activity supervised   assistive device/personal items within reach   clutter free environment maintained   fall prevention program maintained   gait belt   toileting scheduled   safety round/check completed   room organization consistent   nonskid shoes/slippers when out of bed  Taken 4/16/2021 1645 by aHnh Yañez RN  Safety Promotion/Fall Prevention:   activity supervised   assistive device/personal items within reach   clutter free environment maintained   fall prevention program maintained   gait belt   safety round/check completed   toileting scheduled   room organization consistent   nonskid shoes/slippers when out of bed  Intervention: Prevent Skin Injury  Recent Flowsheet Documentation  Taken 4/16/2021 1836 by Hanh Yañez RN  Body Position: sitting up in bed  Taken 4/16/2021 1645 by Hanh Yañez RN  Body Position: position changed independently  Intervention: Prevent and Manage VTE (venous thromboembolism) Risk  Recent Flowsheet Documentation  Taken 4/16/2021 1836 by Hanh Yañez RN  VTE Prevention/Management:   bilateral   sequential compression devices off   bleeding risk factor(s) identified  Taken 4/16/2021 1645 by Hanh Yañez RN  VTE  Prevention/Management: bleeding risk factor(s) identified  Intervention: Prevent Infection  Recent Flowsheet Documentation  Taken 4/16/2021 1836 by Hanh Yañez, RN  Infection Prevention: environmental surveillance performed  Taken 4/16/2021 1645 by Hanh Yañez RN  Infection Prevention: environmental surveillance performed  Goal: Optimal Comfort and Wellbeing  Outcome: Ongoing, Progressing  Intervention: Provide Person-Centered Care  Recent Flowsheet Documentation  Taken 4/16/2021 1836 by Hanh Yañez RN  Trust Relationship/Rapport: care explained  Taken 4/16/2021 1645 by Hanh Yañez RN  Trust Relationship/Rapport:   care explained   choices provided   questions encouraged   questions answered   reassurance provided   thoughts/feelings acknowledged  Goal: Readiness for Transition of Care  Outcome: Ongoing, Progressing     Problem: Bleeding (Hip Arthroplasty)  Goal: Absence of Bleeding  Outcome: Ongoing, Progressing     Problem: Bowel Elimination Impaired (Hip Arthroplasty)  Goal: Effective Bowel Elimination  Outcome: Ongoing, Progressing     Problem: Infection (Hip Arthroplasty)  Goal: Absence of Infection Signs and Symptoms  Outcome: Ongoing, Progressing     Problem: Joint Function Impaired (Hip Arthroplasty)  Goal: Optimal Functional Ability  Outcome: Ongoing, Progressing  Intervention: Protect Joint Integrity  Recent Flowsheet Documentation  Taken 4/16/2021 1836 by Hanh Yañez, RN  Positioning/Transfer Devices:   pillows   in use  Taken 4/16/2021 1645 by Hanh Yañez RN  Positioning/Transfer Devices:   pillows   in use     Problem: Ongoing Anesthesia Effects (Hip Arthroplasty)  Goal: Anesthesia/Sedation Recovery  Outcome: Ongoing, Progressing  Intervention: Optimize Anesthesia Recovery  Recent Flowsheet Documentation  Taken 4/16/2021 1836 by Hanh Yañez, RN  Safety Promotion/Fall Prevention:   activity supervised   assistive device/personal items within reach   clutter free  environment maintained   fall prevention program maintained   gait belt   toileting scheduled   safety round/check completed   room organization consistent   nonskid shoes/slippers when out of bed  Taken 4/16/2021 1645 by Hanh Yañez, RN  Safety Promotion/Fall Prevention:   activity supervised   assistive device/personal items within reach   clutter free environment maintained   fall prevention program maintained   gait belt   safety round/check completed   toileting scheduled   room organization consistent   nonskid shoes/slippers when out of bed     Problem: Pain (Hip Arthroplasty)  Goal: Acceptable Pain Control  Outcome: Ongoing, Progressing  Intervention: Prevent or Manage Pain  Recent Flowsheet Documentation  Taken 4/16/2021 1836 by Hanh Yañez, RN  Diversional Activities: television  Taken 4/16/2021 1645 by Hanh Yañez RN  Diversional Activities:   television   smartphone     Problem: Postoperative Nausea and Vomiting (Hip Arthroplasty)  Goal: Nausea and Vomiting Relief  Outcome: Ongoing, Progressing     Problem: Postoperative Urinary Retention (Hip Arthroplasty)  Goal: Effective Urinary Elimination  Outcome: Ongoing, Progressing  Intervention: Monitor and Manage Urinary Retention  Recent Flowsheet Documentation  Taken 4/16/2021 1836 by Hanh Yañez, RN  Urinary Elimination Promotion: toileting offered     Problem: Fall Injury Risk  Goal: Absence of Fall and Fall-Related Injury  Outcome: Ongoing, Progressing  Intervention: Promote Injury-Free Environment  Recent Flowsheet Documentation  Taken 4/16/2021 1836 by Hanh Yañez, RN  Safety Promotion/Fall Prevention:   activity supervised   assistive device/personal items within reach   clutter free environment maintained   fall prevention program maintained   gait belt   toileting scheduled   safety round/check completed   room organization consistent   nonskid shoes/slippers when out of bed  Taken 4/16/2021 1645 by Hanh Yañez, RN  Safety  Promotion/Fall Prevention:   activity supervised   assistive device/personal items within reach   clutter free environment maintained   fall prevention program maintained   gait belt   safety round/check completed   toileting scheduled   room organization consistent   nonskid shoes/slippers when out of bed     Problem: Adult Inpatient Plan of Care  Goal: Plan of Care Review  Outcome: Ongoing, Progressing  Flowsheets (Taken 4/16/2021 1848)  Progress: improving  Plan of Care Reviewed With: patient  Outcome Summary: Patient VSS, A&Ox4, RA. Ambulated 40ft this shift. Voiding spontaneously. Up w/ 1 assist. No c/o pain. 2 units PRBC given. Dressing changed this shift, CDI. BM this shift. Referrals made to [facility]. Continue to monitor.  Goal: Patient-Specific Goal (Individualized)  Outcome: Ongoing, Progressing  Goal: Absence of Hospital-Acquired Illness or Injury  Outcome: Ongoing, Progressing  Intervention: Identify and Manage Fall Risk  Recent Flowsheet Documentation  Taken 4/16/2021 1836 by Hanh Yañez, RN  Safety Promotion/Fall Prevention:   activity supervised   assistive device/personal items within reach   clutter free environment maintained   fall prevention program maintained   gait belt   toileting scheduled   safety round/check completed   room organization consistent   nonskid shoes/slippers when out of bed  Taken 4/16/2021 1645 by Hanh Yañez, RN  Safety Promotion/Fall Prevention:   activity supervised   assistive device/personal items within reach   clutter free environment maintained   fall prevention program maintained   gait belt   safety round/check completed   toileting scheduled   room organization consistent   nonskid shoes/slippers when out of bed  Intervention: Prevent Skin Injury  Recent Flowsheet Documentation  Taken 4/16/2021 1836 by Hanh Yañez RN  Body Position: sitting up in bed  Taken 4/16/2021 1645 by Hanh Yañez, RN  Body Position: position changed independently  Intervention:  Prevent and Manage VTE (venous thromboembolism) Risk  Recent Flowsheet Documentation  Taken 4/16/2021 1836 by Hanh Yañez RN  VTE Prevention/Management:   bilateral   sequential compression devices off   bleeding risk factor(s) identified  Taken 4/16/2021 1645 by Hanh Yañez RN  VTE Prevention/Management: bleeding risk factor(s) identified  Intervention: Prevent Infection  Recent Flowsheet Documentation  Taken 4/16/2021 1836 by Hanh Yañez RN  Infection Prevention: environmental surveillance performed  Taken 4/16/2021 1645 by Hanh Yañez RN  Infection Prevention: environmental surveillance performed  Goal: Optimal Comfort and Wellbeing  Outcome: Ongoing, Progressing  Intervention: Provide Person-Centered Care  Recent Flowsheet Documentation  Taken 4/16/2021 1836 by Hanh Yañez RN  Trust Relationship/Rapport: care explained  Taken 4/16/2021 1645 by Hanh Yañez RN  Trust Relationship/Rapport:   care explained   choices provided   questions encouraged   questions answered   reassurance provided   thoughts/feelings acknowledged  Goal: Readiness for Transition of Care  Outcome: Ongoing, Progressing     Problem: Bleeding (Hip Arthroplasty)  Goal: Absence of Bleeding  Outcome: Ongoing, Progressing     Problem: Bowel Elimination Impaired (Hip Arthroplasty)  Goal: Effective Bowel Elimination  Outcome: Ongoing, Progressing     Problem: Infection (Hip Arthroplasty)  Goal: Absence of Infection Signs and Symptoms  Outcome: Ongoing, Progressing     Problem: Joint Function Impaired (Hip Arthroplasty)  Goal: Optimal Functional Ability  Outcome: Ongoing, Progressing  Intervention: Protect Joint Integrity  Recent Flowsheet Documentation  Taken 4/16/2021 1836 by Hanh Yañez RN  Positioning/Transfer Devices:   pillows   in use  Taken 4/16/2021 1645 by Hanh Yañez RN  Positioning/Transfer Devices:   pillows   in use     Problem: Ongoing Anesthesia Effects (Hip Arthroplasty)  Goal:  Anesthesia/Sedation Recovery  Outcome: Ongoing, Progressing  Intervention: Optimize Anesthesia Recovery  Recent Flowsheet Documentation  Taken 4/16/2021 1836 by Hanh Yañez RN  Safety Promotion/Fall Prevention:   activity supervised   assistive device/personal items within reach   clutter free environment maintained   fall prevention program maintained   gait belt   toileting scheduled   safety round/check completed   room organization consistent   nonskid shoes/slippers when out of bed  Taken 4/16/2021 1645 by Hanh Yañez RN  Safety Promotion/Fall Prevention:   activity supervised   assistive device/personal items within reach   clutter free environment maintained   fall prevention program maintained   gait belt   safety round/check completed   toileting scheduled   room organization consistent   nonskid shoes/slippers when out of bed     Problem: Pain (Hip Arthroplasty)  Goal: Acceptable Pain Control  Outcome: Ongoing, Progressing  Intervention: Prevent or Manage Pain  Recent Flowsheet Documentation  Taken 4/16/2021 1836 by Hanh Yañez RN  Diversional Activities: television  Taken 4/16/2021 1645 by Hanh Yañez RN  Diversional Activities:   television   smartphone     Problem: Postoperative Nausea and Vomiting (Hip Arthroplasty)  Goal: Nausea and Vomiting Relief  Outcome: Ongoing, Progressing     Problem: Postoperative Urinary Retention (Hip Arthroplasty)  Goal: Effective Urinary Elimination  Outcome: Ongoing, Progressing  Intervention: Monitor and Manage Urinary Retention  Recent Flowsheet Documentation  Taken 4/16/2021 1836 by Hanh Yañez RN  Urinary Elimination Promotion: toileting offered     Problem: Fall Injury Risk  Goal: Absence of Fall and Fall-Related Injury  Outcome: Ongoing, Progressing  Intervention: Promote Injury-Free Environment  Recent Flowsheet Documentation  Taken 4/16/2021 1836 by Hanh Yañez RN  Safety Promotion/Fall Prevention:   activity supervised   assistive  device/personal items within reach   clutter free environment maintained   fall prevention program maintained   gait belt   toileting scheduled   safety round/check completed   room organization consistent   nonskid shoes/slippers when out of bed  Taken 4/16/2021 1645 by Hanh Yañez RN  Safety Promotion/Fall Prevention:   activity supervised   assistive device/personal items within reach   clutter free environment maintained   fall prevention program maintained   gait belt   safety round/check completed   toileting scheduled   room organization consistent   nonskid shoes/slippers when out of bed   Goal Outcome Evaluation:  Plan of Care Reviewed With: patient  Progress: improving  Outcome Summary: Patient VSS, A&Ox4, RA. Ambulated 40ft this shift. Voiding spontaneously. Up w/ 1 assist. No c/o pain. 2 units PRBC given. Dressing changed this shift, CDI. BM this shift. Referrals made to [facility]. Continue to monitor.

## 2021-04-16 NOTE — PLAN OF CARE
Problem: Adult Inpatient Plan of Care  Goal: Plan of Care Review  Outcome: Ongoing, Progressing  Flowsheets  Taken 4/16/2021 0556 by Morris Chaves, RN  Outcome Summary: Pt rested well during night. Some post operative pain, but medications did help. Ambulated well several times. Voiding without issue. Vital signs wdl, makes needs known to staff.  Taken 4/15/2021 1616 by Lima Kimball RN  Progress: improving  Plan of Care Reviewed With:   patient   daughter     Problem: Bleeding (Hip Arthroplasty)  Goal: Absence of Bleeding  Outcome: Ongoing, Progressing     Problem: Bowel Elimination Impaired (Hip Arthroplasty)  Goal: Effective Bowel Elimination  Outcome: Ongoing, Progressing     Problem: Infection (Hip Arthroplasty)  Goal: Absence of Infection Signs and Symptoms  Outcome: Ongoing, Progressing     Problem: Joint Function Impaired (Hip Arthroplasty)  Goal: Optimal Functional Ability  Outcome: Ongoing, Progressing  Intervention: Protect Joint Integrity  Recent Flowsheet Documentation  Taken 4/16/2021 0400 by Morris Chaves, RN  Positioning/Transfer Devices:   pillows   in use  Taken 4/16/2021 0200 by Morris Chaves, RN  Positioning/Transfer Devices:   pillows   in use  Taken 4/15/2021 2200 by Morris Chaves, RN  Positioning/Transfer Devices:   pillows   in use  Taken 4/15/2021 2000 by Morris Chaves, RN  Positioning/Transfer Devices:   pillows   in use     Problem: Ongoing Anesthesia Effects (Hip Arthroplasty)  Goal: Anesthesia/Sedation Recovery  Outcome: Ongoing, Progressing  Intervention: Optimize Anesthesia Recovery  Recent Flowsheet Documentation  Taken 4/16/2021 0400 by Morris Chaves, RN  Safety Promotion/Fall Prevention:   activity supervised   assistive device/personal items within reach   clutter free environment maintained   fall prevention program maintained   nonskid shoes/slippers when out of bed   room organization consistent   safety round/check completed   gait belt   lighting adjusted    mobility aid in reach  Taken 4/16/2021 0200 by Morris Chaves, RN  Safety Promotion/Fall Prevention:   activity supervised   assistive device/personal items within reach   clutter free environment maintained   fall prevention program maintained   gait belt   lighting adjusted   mobility aid in reach   nonskid shoes/slippers when out of bed   room organization consistent   safety round/check completed  Taken 4/15/2021 2200 by Morris Chaves, RN  Safety Promotion/Fall Prevention:   activity supervised   assistive device/personal items within reach   clutter free environment maintained   fall prevention program maintained   gait belt   lighting adjusted   mobility aid in reach   nonskid shoes/slippers when out of bed   room organization consistent   safety round/check completed  Taken 4/15/2021 2000 by Morris Chaves RN  $ Incentive Spirometry: yes  Patient Tolerance (IS): good  Safety Promotion/Fall Prevention:   activity supervised   assistive device/personal items within reach   clutter free environment maintained   fall prevention program maintained   nonskid shoes/slippers when out of bed   room organization consistent   safety round/check completed   gait belt   lighting adjusted   mobility aid in reach  Administration (IS):   proper technique demonstrated   self-administered  Level Incentive Spirometer (mL): 1500  Incentive Spirometer Predicted Level (mL): 1500  Number of Repetitions (IS): 5     Problem: Pain (Hip Arthroplasty)  Goal: Acceptable Pain Control  Outcome: Ongoing, Progressing  Intervention: Prevent or Manage Pain  Recent Flowsheet Documentation  Taken 4/16/2021 0400 by Morris Chaves, RN  Pain Management Interventions:   pain management plan reviewed with patient/caregiver   pillow support provided   position adjusted   see MAR  Taken 4/16/2021 0200 by Morris Chaves RN  Pain Management Interventions:   pain management plan reviewed with patient/caregiver   pillow support provided   position  adjusted  Taken 4/16/2021 0000 by Morris Chaves RN  Pain Management Interventions:   pain management plan reviewed with patient/caregiver   pillow support provided   position adjusted  Taken 4/15/2021 2200 by Morris Chaves RN  Pain Management Interventions:   pain management plan reviewed with patient/caregiver   pillow support provided   position adjusted   see MAR  Taken 4/15/2021 2000 by Morris Chaves RN  Pain Management Interventions:   pain management plan reviewed with patient/caregiver   pillow support provided   position adjusted  Diversional Activities:   television   smartphone   music  Taken 4/15/2021 1931 by Morris Chaves RN  Pain Management Interventions: see MAR     Problem: Postoperative Nausea and Vomiting (Hip Arthroplasty)  Goal: Nausea and Vomiting Relief  Outcome: Ongoing, Progressing     Problem: Postoperative Urinary Retention (Hip Arthroplasty)  Goal: Effective Urinary Elimination  Outcome: Ongoing, Progressing     Problem: Fall Injury Risk  Goal: Absence of Fall and Fall-Related Injury  Outcome: Ongoing, Progressing  Intervention: Identify and Manage Contributors to Fall Injury Risk  Recent Flowsheet Documentation  Taken 4/15/2021 2000 by Morris Chaves RN  Medication Review/Management:   medications reviewed   high-risk medications identified  Intervention: Promote Injury-Free Environment  Recent Flowsheet Documentation  Taken 4/16/2021 0400 by Morris Chaves RN  Safety Promotion/Fall Prevention:   activity supervised   assistive device/personal items within reach   clutter free environment maintained   fall prevention program maintained   nonskid shoes/slippers when out of bed   room organization consistent   safety round/check completed   gait belt   lighting adjusted   mobility aid in reach  Taken 4/16/2021 0200 by Morris Chaves, RN  Safety Promotion/Fall Prevention:   activity supervised   assistive device/personal items within reach   clutter free environment maintained    fall prevention program maintained   gait belt   lighting adjusted   mobility aid in reach   nonskid shoes/slippers when out of bed   room organization consistent   safety round/check completed  Taken 4/15/2021 2200 by Morris Chaves, RN  Safety Promotion/Fall Prevention:   activity supervised   assistive device/personal items within reach   clutter free environment maintained   fall prevention program maintained   gait belt   lighting adjusted   mobility aid in reach   nonskid shoes/slippers when out of bed   room organization consistent   safety round/check completed  Taken 4/15/2021 2000 by Morris Chaves, RN  Safety Promotion/Fall Prevention:   activity supervised   assistive device/personal items within reach   clutter free environment maintained   fall prevention program maintained   nonskid shoes/slippers when out of bed   room organization consistent   safety round/check completed   gait belt   lighting adjusted   mobility aid in reach   Goal Outcome Evaluation:        Outcome Summary: Pt rested well during night. Some post operative pain, but medications did help. Ambulated well several times. Voiding without issue. Vital signs wdl, makes needs known to staff.

## 2021-04-16 NOTE — PROGRESS NOTES
Orthopedic Progress Note      Patient: Jessica Winston  YOB: 1960     Date of Admission: 4/15/2021  6:20 AM Medical Record Number: [id number]     Attending Physician: Riaz Man MD    Status Post:  Procedure(s):  COVERSION TO RIGHT TOTAL HIP ARTHROPLASTY Post Operative Day Number: 1    Subjective : No new orthopaedic complaints     Pain Relief: some relief with present medication.     Systemic Complaints: No Complaints  Vitals:    04/15/21 1900 04/15/21 2300 04/16/21 0300 04/16/21 0700   BP: 90/59 113/70 100/75 137/85   BP Location: Right arm Right arm Right arm    Patient Position: Lying Lying Lying Lying   Pulse: 82 69 86    Resp: 18 18 18 14   Temp: 97.6 °F (36.4 °C) 97.4 °F (36.3 °C) 97.5 °F (36.4 °C) 97.5 °F (36.4 °C)   TempSrc: Oral Oral Oral Oral   SpO2:  95% 93%    Weight:       Height:           Physical Exam: 60 y.o. female    General Appearance:       Alert, cooperative, in no acute distress                  Extremities:    Dressing Clean, Dry and Intact             No clinical sign of DVT        Able to do good movements of digits    Pulses:   Pulses palpable and equal bilaterally           Diagnostic Tests:     Results from last 7 days   Lab Units 04/13/21  1241   WBC 10*3/mm3 11.18*   HEMOGLOBIN g/dL 10.8*   HEMATOCRIT % 34.3   PLATELETS 10*3/mm3 633*     Results from last 7 days   Lab Units 04/16/21  0616 04/13/21  1241   SODIUM mmol/L 135* 135*   POTASSIUM mmol/L 4.5 4.7   CHLORIDE mmol/L 104 100   CO2 mmol/L 23.0 23.0   BUN mg/dL 15 7   CREATININE mg/dL 0.88 0.74   GLUCOSE mg/dL 104* 157*   CALCIUM mg/dL 7.9* 9.8     Results from last 7 days   Lab Units 04/13/21  1241   INR  0.93   APTT seconds 24.2     Lab Results   Component Value Date    URICACID 5.7 08/12/2019     No results found for: CRYSTAL  Microbiology Results (last 10 days)     Procedure Component Value - Date/Time    Tissue / Bone Culture - Tissue, Hip, Right [[id number]] Collected: 04/15/21 0921    Lab Status:  Preliminary result Specimen: Tissue from Hip, Right Updated: 04/15/21 1144     Gram Stain No WBCs or organisms seen    Tissue / Bone Culture - Tissue, Hip, Right [[id number]] Collected: 04/15/21 0808    Lab Status: Preliminary result Specimen: Tissue from Hip, Right Updated: 04/15/21 1144     Gram Stain No WBCs or organisms seen    MRSA Screen, PCR (Inpatient) - Swab, Nares [[id number]]  (Normal) Collected: 04/13/21 1241    Lab Status: Final result Specimen: Swab from Nares Updated: 04/13/21 1517     MRSA PCR Negative    Narrative:      MRSA Negative    COVID PRE-OP / PRE-PROCEDURE SCREENING ORDER (NO ISOLATION) - Swab, Nasopharynx [[id number]]  (Normal) Collected: 04/13/21 1041    Lab Status: Final result Specimen: Swab from Nasopharynx Updated: 04/13/21 2009    Narrative:      The following orders were created for panel order COVID PRE-OP / PRE-PROCEDURE SCREENING ORDER (NO ISOLATION) - Swab, Nasopharynx.  Procedure                               Abnormality         Status                     ---------                               -----------         ------                     COVID-19, M,T,W, APTIMA ...[[id number]]  Normal              Final result                 Please view results for these tests on the individual orders.    COVID-19, M,T,W, APTIMA [facility] IN-HOUSE NP/OP SWAB IN UTM/VTM/SALINE TRANSPORT MEDIA 24HR TAT - Swab, Nasopharynx [[id number]]  (Normal) Collected: 04/13/21 1041    Lab Status: Final result Specimen: Swab from Nasopharynx Updated: 04/13/21 2009     COVID19 Not Detected    Narrative:      Fact sheet for providers: https://www.fda.gov/media/806393/download     Fact sheet for patients: https://www.fda.gov/media/373629/download    Test performed by RT PCR.        XR Chest 2 View    Result Date: 4/13/2021  Chronic emphysematous changes of the lung fields, otherwise without evidence of acute cardiopulmonary abnormality.  This report was finalized on 4/13/2021 2:39 PM by Manny Ramirez.      FL  C Arm During Surgery    Result Date: 4/15/2021  57 seconds of fluoroscopy time provided with 2 images stored during right hip arthroplasty  This report was finalized on 4/15/2021 11:12 AM by Manny Ramirez.      XR Hip With or Without Pelvis 2 - 3 View Right    Result Date: 4/15/2021  Total right hip prosthesis in anatomic alignment.  D:  04/15/2021 E:  04/15/2021    This report was finalized on 4/15/2021 9:39 PM by Dr. Neeraj Moore MD.          Current Medications:  Scheduled Meds:acetaminophen, 1,000 mg, Oral, Q8H  aspirin, 81 mg, Oral, Q12H  buPROPion SR, 150 mg, Oral, Daily  lisinopril, 10 mg, Oral, Daily  meloxicam, 15 mg, Oral, Daily  predniSONE, 10 mg, Oral, Daily  rOPINIRole, 0.5 mg, Oral, Nightly      Continuous Infusions:lactated ringers, 9 mL/hr, Last Rate: 1,300 mL/hr (04/15/21 1018)  lactated ringers, 100 mL/hr, Last Rate: 100 mL/hr (04/15/21 1229)      PRN Meds:.HYDROmorphone **AND** naloxone  •  ketorolac  •  labetalol  •  oxyCODONE  •  oxyCODONE  •  sodium chloride  •  traMADol    Assessment: Status post  SC REVISE TOTAL HIP REPLACEMENT [[id number]] (COVERSION TO RIGHT TOTAL HIP ARTHROPLASTY)    Patient Active Problem List   Diagnosis   • RUQ pain   • Cardiac mass   • PVD (peripheral vascular disease) with claudication (CMS/HCC)   • Tobacco dependence   • Pain in right hip   • HTN (hypertension)   • Rheumatoid arthritis (CMS/HCC)   • Status post total hip replacement, right, 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    Riaz Man MD    Date: 4/16/2021    Time: 08:05 EDT

## 2021-04-16 NOTE — CASE MANAGEMENT/SOCIAL WORK
Discharge Planning Assessment  [facility]     Patient Name: Jessica Winston  MRN: [id number]  Today's Date: 4/16/2021    Admit Date: 4/15/2021    Discharge Needs Assessment     Row Name 04/16/21 1123       Living Environment    Lives With  alone    Current Living Arrangements  home/apartment/condo    Primary Care Provided by  self    Family Caregiver if Needed  child(linnette), adult    Quality of Family Relationships  unable to assess    Able to Return to Prior Arrangements  yes       Resource/Environmental Concerns    Resource/Environmental Concerns  none    Transportation Concerns  car, none       Transition Planning    Patient/Family Anticipates Transition to  inpatient rehabilitation facility    Patient/Family Anticipated Services at Transition  rehabilitation services    Transportation Anticipated  family or friend will provide       Discharge Needs Assessment    Readmission Within the Last 30 Days  no previous admission in last 30 days    Equipment Currently Used at Home  walker, standard;commode;wheelchair    Equipment Needed After Discharge  -- Defer to facility    Discharge Facility/Level of Care Needs  rehabilitation facility    Provided Post Acute Provider List?  Yes    Post Acute Provider List  Inpatient Rehab    Provided Post Acute Provider Quality & Resource List?  Yes    Post Acute Provider Quality and Resource List  Inpatient Rehab    Delivered To  Patient    Method of Delivery  In person    Patient's Choice of Community Agency(s)  [facility]        Discharge Plan     Row Name 04/16/21 1125       Plan    Plan  [facility]    Patient/Family in Agreement with Plan  yes    Plan Comments  Met with Ms. Winston at the bedside to initiate discharge planning.  Ms. Winston lives alone in a one story home in [place].  She states that her daughter lives in another town, has small children and is only able to assist her periodically.    Ms. Winston states that she has never been to [facility] in the past.  She has  used [facility] for home PT.  Ms. Winston discussed rehab with her Workers Comp , Beryl Oneal, ph [phone], due to the fact that she lives alone and will not have any consistent help at home.  CM received call from Beryl today stating that Worker's Comp has approved [facility] for Ms. Winston.  Discussed rehab with the patient and she is agreeable to rehab at [facility].  Referral given to April with [facility].    CM will continue to follow and will make arrangements for transfer when patient has a bed at [facility] and is medically ready.    Final Discharge Disposition Code  62 - inpatient rehab facility        Continued Care and Services - Admitted Since 4/15/2021     Destination     Service Provider Request Status Selected Services Address Phone Fax Patient Preferred    [facility]  Pending - No Request Sent N/A [address] [facility] [address] [phone] --              Expected Discharge Date and Time     Expected Discharge Date Expected Discharge Time    Apr 17, 2021         Demographic Summary     Row Name 04/16/21 1120       General Information    Admission Type  inpatient    Arrived From  home    Reason for Consult  discharge planning    General Information Comments  PCP:  Iraida Demarco       Contact Information    Permission Granted to Share Info With      Contact Information Comments  Daughter:  Vielka Rodriguez, ph [phone]        Functional Status     Row Name 04/16/21 1121       Functional Status    Usual Activity Tolerance  moderate    Current Activity Tolerance  -- See PT notes.       Functional Status, IADL    Medications  independent    Meal Preparation  independent    Housekeeping  independent    Laundry  independent    Shopping  independent       Mental Status    General Appearance WDL  WDL        Psychosocial    No documentation.       Abuse/Neglect    No documentation.       Legal    No documentation.       Substance Abuse     No documentation.       Patient Forms    No documentation.           Aubree Monae RN

## 2021-04-16 NOTE — THERAPY TREATMENT NOTE
Patient Name: Jessica Winston  : 1960    MRN: [id number]                              Today's Date: 2021       Admit Date: 4/15/2021    Visit Dx:     ICD-10-CM ICD-9-CM   1. 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   • Acute blood loss anemia     Past Medical History:   Diagnosis Date   • Avascular necrosis (CMS/HCC)    • Depression    • Fractures    • HTN (hypertension)    • Hyperglycemia    • Hypertension    • Osteoarthritis    • RA (rheumatoid arthritis) (CMS/HCC)     ALSO REPORTS OA   • Tooth loose     top left    • Vitamin D deficiency    • Wears glasses     readers     Past Surgical History:   Procedure Laterality Date   • ARM TENDON REPAIR Left    • BREAST SURGERY     • ENDOSCOPY N/A 3/30/2017    Procedure: ESOPHAGOGASTRODUODENOSCOPY WITH COLD FORCEP BIOPSY;  Surgeon: Anca Trevino MD;  Location: [facility] ENDOSCOPY;  Service:    • FEMUR SURGERY      right    • HERNIA REPAIR Bilateral     INGUINAL- unsure about mesh    • KNEE SURGERY Left    • MANDIBLE SURGERY     • TUBAL ABDOMINAL LIGATION     • WISDOM TOOTH EXTRACTION       General Information     Row Name 21 1344          Physical Therapy Time and Intention    Document Type  therapy note (daily note)  -CS     Mode of Treatment  physical therapy;individual therapy  -CS     Row Name 21 1340          General Information    Patient Profile Reviewed  yes  -CS     Existing Precautions/Restrictions  fall;right;hip, anterior;other (see comments) 50% WB on R LE  -CS     Row Name 21 1340          Cognition    Orientation Status (Cognition)  oriented x 4  -CS     Row Name 21 1340          Safety Issues,  Functional Mobility    Safety Issues Affecting Function (Mobility)  safety precautions follow-through/compliance;safety precaution awareness  -CS     Impairments Affecting Function (Mobility)  endurance/activity tolerance;pain;range of motion (ROM);strength  -CS       User Key  (r) = Recorded By, (t) = Taken By, (c) = Cosigned By    Initials Name Provider Type    CS Romy Garcia, PT Physical Therapist        Mobility     Row Name 04/16/21 1340          Bed Mobility    Bed Mobility  scooting/bridging;sit-supine  -CS     Scooting/Bridging [place] (Bed Mobility)  verbal cues;standby assist  -CS     Sit-Supine [place] (Bed Mobility)  verbal cues;contact guard  -CS     Assistive Device (Bed Mobility)  head of bed elevated;bed rails  -CS     Comment (Bed Mobility)  Pt sitting EOB at start of treatment session. To return patient back to supine, educated patient to scoot hips back into bed, raise LE's up into bed while lowering trunk.  -     Row Name 04/16/21 1340          Transfers    Comment (Transfers)  VC's to push off of bed to stand and to reach back for bed to sit. VC's to step R LE out with sitting for comfort. VC's to maintain PWB on R LE.  -     Row Name 04/16/21 1340          Sit-Stand Transfer    Sit-Stand [place] (Transfers)  verbal cues;contact guard  -CS     Assistive Device (Sit-Stand Transfers)  walker, front-wheeled  -     Row Name 04/16/21 1340          Gait/Stairs (Locomotion)    [place] Level (Gait)  verbal cues;contact guard  -CS     Assistive Device (Gait)  walker, front-wheeled  -CS     Distance in Feet (Gait)  40'  -CS     Deviations/Abnormal Patterns (Gait)  bilateral deviations;queta decreased;gait speed decreased;stride length decreased  -CS     Bilateral Gait Deviations  forward flexed posture  -CS     Right Sided Gait Deviations  heel strike decreased;weight shift ability decreased Pt is PWB on R LE, therefore weight shift is decreased  -     [place]  Level (Stairs)  not tested  -     Comment (Gait/Stairs)  Pt able to amb with slow, step through gait mechanics with RW. Pt able to maintain PWB throughout entire treatment session. VC's to maintain upright posture, to stay within walker, to keep walker on ground instead of picking walker up, and to push through arms to maintain PWB. Limited gait due to patient's low hemoglobin.  -     Row Name 04/16/21 1340          Mobility    Extremity Weight-bearing Status  right lower extremity  -     Right Lower Extremity (Weight-bearing Status)  (S) partial weight-bearing (PWB)  -       User Key  (r) = Recorded By, (t) = Taken By, (c) = Cosigned By    Initials Name Provider Type     Romy Garcia, PT Physical Therapist        Obj/Interventions     [facility] Name 04/16/21 1340          Motor Skills    Therapeutic Exercise  hip;knee;ankle  -[facility] Name 04/16/21 1340          Hip (Therapeutic Exercise)    Hip (Therapeutic Exercise)  strengthening exercise  -     Hip Isometrics (Therapeutic Exercise)  bilateral;gluteal sets;5 repetitions;10 repetitions  -     Hip Strengthening (Therapeutic Exercise)  right;aBduction;aDduction;10 repetitions;5 repetitions  -[facility] Name 04/16/21 1340          Knee (Therapeutic Exercise)    Knee (Therapeutic Exercise)  strengthening exercise  -     Knee Isometrics (Therapeutic Exercise)  right;quad sets;5 repetitions;10 repetitions  -     Knee Strengthening (Therapeutic Exercise)  right;heel slides;LAQ (long arc quad);10 repetitions;5 repetitions SLR not performed due to MD request  -[facility] Name 04/16/21 1340          Ankle (Therapeutic Exercise)    Ankle AROM (Therapeutic Exercise)  bilateral;dorsiflexion;plantarflexion;10 repetitions;5 repetitions  -[facility] Name 04/16/21 1340          Balance    Balance Assessment  sitting static balance;standing static balance;standing dynamic balance  -     Static Sitting Balance  WFL;sitting, edge of bed  -     Static  Standing Balance  WFL;supported;standing  -CS     Dynamic Standing Balance  WFL;supported;standing  -CS       User Key  (r) = Recorded By, (t) = Taken By, (c) = Cosigned By    Initials Name Provider Type    CS Romy Garcia, PT Physical Therapist        Goals/Plan     Row Name 04/16/21 1340          Bed Mobility Goal 1 (PT)    Activity/Assistive Device (Bed Mobility Goal 1, PT)  sit to supine/supine to sit  -CS     [place] Level/Cues Needed (Bed Mobility Goal 1, PT)  modified independence  -CS     Time Frame (Bed Mobility Goal 1, PT)  long term goal (LTG);3 days  -CS     Progress/Outcomes (Bed Mobility Goal 1, PT)  goal met;goal revised this date;goal ongoing  -CS     Row Name 04/16/21 1340          Transfer Goal 1 (PT)    Activity/Assistive Device (Transfer Goal 1, PT)  sit-to-stand/stand-to-sit;walker, rolling  -CS     [place] Level/Cues Needed (Transfer Goal 1, PT)  modified independence  -CS     Time Frame (Transfer Goal 1, PT)  long term goal (LTG);3 days  -CS     Row Name 04/16/21 1340          Gait Training Goal 1 (PT)    Activity/Assistive Device (Gait Training Goal 1, PT)  gait (walking locomotion);walker, rolling  -CS     [place] Level (Gait Training Goal 1, PT)  contact guard assist  -CS     Distance (Gait Training Goal 1, PT)  150 feet  -CS     Time Frame (Gait Training Goal 1, PT)  long term goal (LTG);3 days  -CS       User Key  (r) = Recorded By, (t) = Taken By, (c) = Cosigned By    Initials Name Provider Type    Romy Rodrigues, PT Physical Therapist        Clinical Impression     Row Name 04/16/21 1340          Pain    Additional Documentation  Pain Scale: Numbers Pre/Post-Treatment (Group)  -CS     Row Name 04/16/21 1340          Pain Scale: Numbers Pre/Post-Treatment    Pretreatment Pain Rating  8/10  -CS     Posttreatment Pain Rating  9/10  -CS     Pain Location - Side  Right  -CS     Pain Location  groin  -CS     Pre/Posttreatment Pain Comment  Nsg notified of  patient's pain  -CS     Pain Intervention(s)  Repositioned;Ambulation/increased activity  -CS     Row Name 04/16/21 1340          Plan of Care Review    Plan of Care Reviewed With  patient  -CS     Progress  improving  -CS     Outcome Summary  Pt able to amb 40' with RW CGA and able to maintain PWB on R LE. Pt tolerated exercises with no reports of increase in pain. Recommend patient to IP rehab at discharge. Will continue to progress patient as able.  -CS     Row Name 04/16/21 1340          Therapy Assessment/Plan (PT)    Rehab Potential (PT)  good, to achieve stated therapy goals  -CS     Criteria for Skilled Interventions Met (PT)  yes;meets criteria;skilled treatment is necessary  -CS     Row Name 04/16/21 1340          Vital Signs    Pre Systolic BP Rehab  -- VSS  -CS     Row Name 04/16/21 1340          Positioning and Restraints    Pre-Treatment Position  in bed  -CS     Post Treatment Position  bed  -CS     In Bed  notified nsg;fowlers;call light within reach;encouraged to call for assist;exit alarm on  -CS       User Key  (r) = Recorded By, (t) = Taken By, (c) = Cosigned By    Initials Name Provider Type    CS Romy Garcia, PT Physical Therapist        Outcome Measures     Row Name 04/16/21 1340          How much help from another person do you currently need...    Turning from your back to your side while in flat bed without using bedrails?  3  -CS     Moving from lying on back to sitting on the side of a flat bed without bedrails?  3  -CS     Moving to and from a bed to a chair (including a wheelchair)?  3  -CS     Standing up from a chair using your arms (e.g., wheelchair, bedside chair)?  3  -CS     Climbing 3-5 steps with a railing?  2  -CS     To walk in hospital room?  3  -CS     AM-PAC 6 Clicks Score (PT)  17  -CS     Row Name 04/16/21 1340          Functional Assessment    Outcome Measure Options  AM-PAC 6 Clicks Basic Mobility (PT)  -CS       User Key  (r) = Recorded By, (t) = Taken By, (c)  = Cosigned By    Initials Name Provider Type    Romy Rodrigues PT Physical Therapist        Physical Therapy Education                 Title: PT OT SLP Therapies (Done)     Topic: Physical Therapy (Done)     Point: Mobility training (Done)     Learning Progress Summary           Patient Acceptance, E,D,H, VU,DU by JOSE at 4/16/2021 1340    Comment: Educated patient on bed mobility sequencing, safe hand placement with transfers, gait mechanics, ther ex, and plan for progression of care.    Acceptance, E,D, VU by LOUISE at 4/15/2021 1330    Comment: Educated on safe sequencing with bed mobility, ambulatory transfers, and gait. Reviewed HEP, hip precautions, and PWB precautions.   Family Acceptance, E,D, VU by LOUISE at 4/15/2021 1330    Comment: Educated on safe sequencing with bed mobility, ambulatory transfers, and gait. Reviewed HEP, hip precautions, and PWB precautions.                   Point: Home exercise program (Done)     Learning Progress Summary           Patient Acceptance, E,D,H, VU,DU by JOSE at 4/16/2021 1340    Comment: Educated patient on bed mobility sequencing, safe hand placement with transfers, gait mechanics, ther ex, and plan for progression of care.    Acceptance, E,D, VU by LOUISE at 4/15/2021 1330    Comment: Educated on safe sequencing with bed mobility, ambulatory transfers, and gait. Reviewed HEP, hip precautions, and PWB precautions.   Family Acceptance, E,D, VU by LOUISE at 4/15/2021 1330    Comment: Educated on safe sequencing with bed mobility, ambulatory transfers, and gait. Reviewed HEP, hip precautions, and PWB precautions.                   Point: Body mechanics (Done)     Learning Progress Summary           Patient Acceptance, E,D,H, VU,DU by JOSE at 4/16/2021 1340    Comment: Educated patient on bed mobility sequencing, safe hand placement with transfers, gait mechanics, ther ex, and plan for progression of care.    Acceptance, E,D, VU by LOUISE at 4/15/2021 1330    Comment: Educated on safe  sequencing with bed mobility, ambulatory transfers, and gait. Reviewed HEP, hip precautions, and PWB precautions.   Family Acceptance, E,D, VU by LOUISE at 4/15/2021 1330    Comment: Educated on safe sequencing with bed mobility, ambulatory transfers, and gait. Reviewed HEP, hip precautions, and PWB precautions.                   Point: Precautions (Done)     Learning Progress Summary           Patient Acceptance, E,D,H, VU,DU by  at 4/16/2021 1340    Comment: Educated patient on bed mobility sequencing, safe hand placement with transfers, gait mechanics, ther ex, and plan for progression of care.    Acceptance, E,D, VU by LOUISE at 4/15/2021 1330    Comment: Educated on safe sequencing with bed mobility, ambulatory transfers, and gait. Reviewed HEP, hip precautions, and PWB precautions.   Family Acceptance, E,D, VU by LOUISE at 4/15/2021 1330    Comment: Educated on safe sequencing with bed mobility, ambulatory transfers, and gait. Reviewed HEP, hip precautions, and PWB precautions.                               User Key     Initials Effective Dates Name Provider Type Discipline     03/26/19 -  Romy Garcia, PT Physical Therapist PT    LOUISE 09/10/19 -  Clarence Ken, ASHLEY Physical Therapist PT              PT Recommendation and Plan  Planned Therapy Interventions (PT): balance training, bed mobility training, gait training, home exercise program, neuromuscular re-education, patient/family education, ROM (range of motion), strengthening, transfer training  Plan of Care Reviewed With: patient  Progress: improving  Outcome Summary: Pt able to amb 40' with RW CGA and able to maintain PWB on R LE. Pt tolerated exercises with no reports of increase in pain. Recommend patient to IP rehab at discharge. Will continue to progress patient as able.     Time Calculation:   PT Charges     Row Name 04/16/21 4070             Time Calculation    Start Time  1340  -      PT Received On  04/16/21  -         Time Calculation- PT    Total  Timed Code Minutes- PT  23 minute(s)  -CS         Timed Charges    41006 - PT Therapeutic Exercise Minutes  8  -CS      11984 - Gait Training Minutes   10  -CS      27373 - PT Therapeutic Activity Minutes  5  -CS        User Key  (r) = Recorded By, (t) = Taken By, (c) = Cosigned By    Initials Name Provider Type    CS Romy Garcia, PT Physical Therapist        Therapy Charges for Today     Code Description Service Date Service Provider Modifiers Qty    15898648833 HC PT THER PROC EA 15 MIN 4/16/2021 Romy Garcia, PT GP 1    [id number] HC GAIT TRAINING EA 15 MIN 4/16/2021 Romy Garcia, PT GP 1          PT G-Codes  Outcome Measure Options: AM-PAC 6 Clicks Basic Mobility (PT)  AM-PAC 6 Clicks Score (PT): 17    Romy Garcia PT  4/16/2021

## 2021-04-16 NOTE — PLAN OF CARE
Goal Outcome Evaluation:  Plan of Care Reviewed With: patient  Progress: improving  Outcome Summary: Pt able to amb 40' with RW CGA and able to maintain PWB on R LE. Pt tolerated exercises with no reports of increase in pain. Recommend patient to IP rehab at discharge. Will continue to progress patient as able.

## 2021-04-17 LAB
ANION GAP SERPL CALCULATED.3IONS-SCNC: 8 MMOL/L (ref 5–15)
BH BB BLOOD EXPIRATION DATE: NORMAL
BH BB BLOOD EXPIRATION DATE: NORMAL
BH BB BLOOD TYPE BARCODE: 6200
BH BB BLOOD TYPE BARCODE: 6200
BH BB DISPENSE STATUS: NORMAL
BH BB DISPENSE STATUS: NORMAL
BH BB PRODUCT CODE: NORMAL
BH BB PRODUCT CODE: NORMAL
BH BB UNIT NUMBER: NORMAL
BH BB UNIT NUMBER: NORMAL
BUN SERPL-MCNC: 9 MG/DL (ref 6–20)
BUN/CREAT SERPL: 12.5 (ref 7–25)
CALCIUM SPEC-SCNC: 8.5 MG/DL (ref 8.6–10.5)
CHLORIDE SERPL-SCNC: 104 MMOL/L (ref 98–107)
CO2 SERPL-SCNC: 27 MMOL/L (ref 22–29)
CREAT SERPL-MCNC: 0.72 MG/DL (ref 0.57–1)
CROSSMATCH INTERPRETATION: NORMAL
CROSSMATCH INTERPRETATION: NORMAL
DEPRECATED RDW RBC AUTO: 61.5 FL (ref 37–54)
ERYTHROCYTE [DISTWIDTH] IN BLOOD BY AUTOMATED COUNT: 19.2 % (ref 12.3–15.4)
GFR SERPL CREATININE-BSD FRML MDRD: 83 ML/MIN/1.73
GLUCOSE SERPL-MCNC: 85 MG/DL (ref 65–99)
HCT VFR BLD AUTO: 27.6 % (ref 34–46.6)
HGB BLD-MCNC: 8.8 G/DL (ref 12–15.9)
MCH RBC QN AUTO: 29.2 PG (ref 26.6–33)
MCHC RBC AUTO-ENTMCNC: 31.9 G/DL (ref 31.5–35.7)
MCV RBC AUTO: 91.7 FL (ref 79–97)
PLATELET # BLD AUTO: 357 10*3/MM3 (ref 140–450)
PMV BLD AUTO: 9.6 FL (ref 6–12)
POTASSIUM SERPL-SCNC: 3.6 MMOL/L (ref 3.5–5.2)
RBC # BLD AUTO: 3.01 10*6/MM3 (ref 3.77–5.28)
SODIUM SERPL-SCNC: 139 MMOL/L (ref 136–145)
UNIT  ABO: NORMAL
UNIT  ABO: NORMAL
UNIT  RH: NORMAL
UNIT  RH: NORMAL
WBC # BLD AUTO: 8.78 10*3/MM3 (ref 3.4–10.8)

## 2021-04-17 PROCEDURE — 63710000001 PREDNISONE PER 5 MG: Performed by: INTERNAL MEDICINE

## 2021-04-17 PROCEDURE — 97166 OT EVAL MOD COMPLEX 45 MIN: CPT

## 2021-04-17 PROCEDURE — 80048 BASIC METABOLIC PNL TOTAL CA: CPT | Performed by: ORTHOPAEDIC SURGERY

## 2021-04-17 PROCEDURE — 97535 SELF CARE MNGMENT TRAINING: CPT

## 2021-04-17 PROCEDURE — 25010000002 ONDANSETRON PER 1 MG: Performed by: NURSE PRACTITIONER

## 2021-04-17 PROCEDURE — 94799 UNLISTED PULMONARY SVC/PX: CPT

## 2021-04-17 PROCEDURE — 85027 COMPLETE CBC AUTOMATED: CPT | Performed by: NURSE PRACTITIONER

## 2021-04-17 PROCEDURE — 97116 GAIT TRAINING THERAPY: CPT

## 2021-04-17 RX ORDER — ONDANSETRON 2 MG/ML
4 INJECTION INTRAMUSCULAR; INTRAVENOUS EVERY 6 HOURS PRN
Status: DISCONTINUED | OUTPATIENT
Start: 2021-04-17 | End: 2021-04-19 | Stop reason: HOSPADM

## 2021-04-17 RX ORDER — HYDROXYZINE HYDROCHLORIDE 25 MG/1
25 TABLET, FILM COATED ORAL 3 TIMES DAILY PRN
Status: DISCONTINUED | OUTPATIENT
Start: 2021-04-17 | End: 2021-04-19 | Stop reason: HOSPADM

## 2021-04-17 RX ORDER — TIZANIDINE 4 MG/1
4 TABLET ORAL EVERY 8 HOURS PRN
Status: DISCONTINUED | OUTPATIENT
Start: 2021-04-17 | End: 2021-04-19 | Stop reason: HOSPADM

## 2021-04-17 RX ORDER — DOXYCYCLINE 100 MG/1
100 CAPSULE ORAL EVERY 12 HOURS SCHEDULED
Status: DISCONTINUED | OUTPATIENT
Start: 2021-04-17 | End: 2021-04-19 | Stop reason: HOSPADM

## 2021-04-17 RX ADMIN — ASPIRIN 81 MG: 81 TABLET, COATED ORAL at 09:42

## 2021-04-17 RX ADMIN — PREDNISONE 10 MG: 10 TABLET ORAL at 09:42

## 2021-04-17 RX ADMIN — DOXYCYCLINE 100 MG: 100 CAPSULE ORAL at 20:28

## 2021-04-17 RX ADMIN — OXYCODONE 10 MG: 5 TABLET ORAL at 23:26

## 2021-04-17 RX ADMIN — MELOXICAM 15 MG: 7.5 TABLET ORAL at 09:43

## 2021-04-17 RX ADMIN — BUPROPION HYDROCHLORIDE 150 MG: 150 TABLET, EXTENDED RELEASE ORAL at 09:42

## 2021-04-17 RX ADMIN — ROPINIROLE HYDROCHLORIDE 0.5 MG: 0.5 TABLET, FILM COATED ORAL at 20:28

## 2021-04-17 RX ADMIN — OXYCODONE 10 MG: 5 TABLET ORAL at 01:12

## 2021-04-17 RX ADMIN — TIZANIDINE 4 MG: 4 TABLET ORAL at 12:29

## 2021-04-17 RX ADMIN — TRAMADOL HYDROCHLORIDE 50 MG: 50 TABLET, FILM COATED ORAL at 17:57

## 2021-04-17 RX ADMIN — ONDANSETRON 4 MG: 2 INJECTION INTRAMUSCULAR; INTRAVENOUS at 12:28

## 2021-04-17 RX ADMIN — DOXYCYCLINE 100 MG: 100 CAPSULE ORAL at 11:16

## 2021-04-17 RX ADMIN — HYDROXYZINE HYDROCHLORIDE 25 MG: 25 TABLET, FILM COATED ORAL at 12:29

## 2021-04-17 RX ADMIN — ACETAMINOPHEN 1000 MG: 500 TABLET, FILM COATED ORAL at 14:23

## 2021-04-17 RX ADMIN — ASPIRIN 81 MG: 81 TABLET, COATED ORAL at 20:28

## 2021-04-17 RX ADMIN — OXYCODONE 10 MG: 5 TABLET ORAL at 07:40

## 2021-04-17 RX ADMIN — OXYCODONE 10 MG: 5 TABLET ORAL at 15:14

## 2021-04-17 RX ADMIN — OXYCODONE 10 MG: 5 TABLET ORAL at 11:16

## 2021-04-17 NOTE — THERAPY EVALUATION
Patient Name: Jessica Winston  : 1960    MRN: [id number]                              Today's Date: 2021       Admit Date: 4/15/2021    Visit Dx:     ICD-10-CM ICD-9-CM   1. 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   • Acute blood loss anemia     Past Medical History:   Diagnosis Date   • Avascular necrosis (CMS/HCC)    • Depression    • Fractures    • HTN (hypertension)    • Hyperglycemia    • Hypertension    • Osteoarthritis    • RA (rheumatoid arthritis) (CMS/HCC)     ALSO REPORTS OA   • Tooth loose     top left    • Vitamin D deficiency    • Wears glasses     readers     Past Surgical History:   Procedure Laterality Date   • ARM TENDON REPAIR Left    • BREAST SURGERY     • ENDOSCOPY N/A 3/30/2017    Procedure: ESOPHAGOGASTRODUODENOSCOPY WITH COLD FORCEP BIOPSY;  Surgeon: Anca Trevino MD;  Location: [facility] ENDOSCOPY;  Service:    • FEMUR SURGERY      right    • HERNIA REPAIR Bilateral     INGUINAL- unsure about mesh    • KNEE SURGERY Left    • MANDIBLE SURGERY     • TUBAL ABDOMINAL LIGATION     • WISDOM TOOTH EXTRACTION       General Information     Row Name 21 0859          OT Time and Intention    Document Type  evaluation  -TA     Mode of Treatment  occupational therapy  -TA     Row Name 21 0859          General Information    Patient Profile Reviewed  yes  -TA     Prior Level of Function  independent:;all household mobility;gait;transfer;bed mobility;ADL's;cooking  -TA     Existing Precautions/Restrictions  (S) fall;right;hip, anterior;weight bearing PWB RLE  -TA     Row Name 21 0859          Occupational Profile    Reason for Services/Referral  (Occupational Profile)  s/p R anterior CLIFFORD  -TA     Patient Goals (Occupational Profile)  Return to PLOF  -TA     Row Name 04/17/21 0859          Living Environment    Lives With  alone  -TA     Row Name 04/17/21 0859          Home Main Entrance    Number of Stairs, Main Entrance  none  -TA     Row Name 04/17/21 0859          Stairs Within Home, Primary    Number of Stairs, Within Home, Primary  none  -TA     Row Name 04/17/21 0859          Cognition    Orientation Status (Cognition)  oriented x 4  -TA     Row Name 04/17/21 0859          Safety Issues, Functional Mobility    Safety Issues Affecting Function (Mobility)  safety precautions follow-through/compliance  -TA     Impairments Affecting Function (Mobility)  balance;endurance/activity tolerance;pain;strength;range of motion (ROM)  -TA       User Key  (r) = Recorded By, (t) = Taken By, (c) = Cosigned By    Initials Name Provider Type    TA Joey Crump, OT Occupational Therapist          Mobility/ADL's     Row Name 04/17/21 0850          Bed Mobility    Bed Mobility  supine-sit  -TA     Supine-Sit Sunflower (Bed Mobility)  standby assist;verbal cues  -TA     Sit-Supine [place] (Bed Mobility)  not tested  -TA     Bed Mobility, Safety Issues  decreased use of legs for bridging/pushing  -TA     Assistive Device (Bed Mobility)  bed rails;head of bed elevated  -TA     Row Name 04/17/21 0850          Transfers    Transfers  sit-stand transfer  -TA     Sit-Stand [place] (Transfers)  contact guard  -TA     Row Name 04/17/21 0850          Sit-Stand Transfer    Assistive Device (Sit-Stand Transfers)  walker, front-wheeled  -TA     Row Name 04/17/21 0850          Functional Mobility    Functional Mobility- Ind. Level  contact guard assist  -TA     Functional Mobility- Device  rolling walker  -TA     Functional Mobility-Distance (Feet)  6  -TA     Functional Mobility-Maintain WBing  able to maintain weight bearing status  -TA     Functional Mobility-  Safety Issues  step length decreased;weight-shifting ability decreased  -TA     Row Name 04/17/21 0850          Activities of Daily Living    99118 - OT Self Care/Mgmt Minutes  10  -TA     BADL Assessment/Intervention  upper body dressing;lower body dressing;grooming;bathing  -TA     Row Name 04/17/21 0850          Mobility    Extremity Weight-bearing Status  right lower extremity  -TA     Right Lower Extremity (Weight-bearing Status)  (S) partial weight-bearing (PWB)  -TA     Row Name 04/17/21 0850          Upper Body Dressing Assessment/Training    [place] Level (Upper Body Dressing)  don;pajama/robe;set up;supervision  -TA     Position (Upper Body Dressing)  edge of bed sitting  -TA     Row Name 04/17/21 0850          Lower Body Dressing Assessment/Training    [place] Level (Lower Body Dressing)  don;doff;pants/bottoms;shoes/slippers;socks;minimum assist (75% patient effort);verbal cues Following AE/ADL retraining  -TA     Assistive Devices (Lower Body Dressing)  long-handled shoe horn;reacher;sock-aid  -TA     Position (Lower Body Dressing)  supported sitting  -TA     Row Name 04/17/21 0850          Grooming Assessment/Training    [place] Level (Grooming)  wash face, hands;set up;supervision  -TA     Position (Grooming)  supported sitting  -TA     Row Name 04/17/21 0850          Bathing Assessment/Intervention    [place] Level (Bathing)  distal lower extremities/feet;supervision  -TA     Assistive Devices (Bathing)  long-handled sponge  -TA     Position (Bathing)  supported sitting  -TA     Comment (Bathing)  simulation of LBB  -TA       User Key  (r) = Recorded By, (t) = Taken By, (c) = Cosigned By    Initials Name Provider Type    Joey Peres OT Occupational Therapist        Obj/Interventions     Row Name 04/17/21 0850          Sensory Assessment (Somatosensory)    Sensory Assessment (Somatosensory)  bilateral UE;sensation intact  -TA     Row Name 04/17/21 0850          Vision  Assessment/Intervention    Visual Impairment/Limitations  WFL;corrective lenses for reading  -TA     Row Name 04/17/21 0850          Range of Motion Comprehensive    General Range of Motion  bilateral upper extremity ROM WFL  -TA     Row Name 04/17/21 0850          Strength Comprehensive (MMT)    General Manual Muscle Testing (MMT) Assessment  no strength deficits identified  -TA     Comment, General Manual Muscle Testing (MMT) Assessment  BUE WFL  -TA     Row Name 04/17/21 0850          Balance    Balance Assessment  sitting dynamic balance;standing dynamic balance  -TA     Dynamic Sitting Balance  WFL;sitting, edge of bed;unsupported  -TA     Dynamic Standing Balance  mild impairment;supported;standing  -TA     Balance Interventions  sit to stand;occupation based/functional task;weight shifting activity  -TA       User Key  (r) = Recorded By, (t) = Taken By, (c) = Cosigned By    Initials Name Provider Type    Joey Peres OT Occupational Therapist        Goals/Plan     Row Name 04/17/21 0850          Transfer Goal 1 (OT)    Activity/Assistive Device (Transfer Goal 1, OT)  sit-to-stand/stand-to-sit;bed-to-chair/chair-to-bed;toilet;walker, rolling  -TA     [place] Level/Cues Needed (Transfer Goal 1, OT)  supervision required  -TA     Time Frame (Transfer Goal 1, OT)  by discharge  -TA     Progress/Outcome (Transfer Goal 1, OT)  goal ongoing  -TA     Row Name 04/17/21 0850          Dressing Goal 1 (OT)    Activity/Device (Dressing Goal 1, OT)  lower body dressing;long-handled shoe horn;reacher;sock-aid  -TA     [place]/Cues Needed (Dressing Goal 1, OT)  set-up required;supervision required  -TA     Time Frame (Dressing Goal 1, OT)  by discharge  -TA     Progress/Outcome (Dressing Goal 1, OT)  goal ongoing  -TA     [facility] Name 04/17/21 0850          Toileting Goal 1 (OT)    Activity/Device (Toileting Goal 1, OT)  adjust/manage clothing;perform perineal hygiene  -TA     [place] Level/Cues Needed  (Toileting Goal 1, OT)  supervision required  -TA     Time Frame (Toileting Goal 1, OT)  by discharge  -TA     Progress/Outcome (Toileting Goal 1, OT)  goal ongoing  -TA     Row Name 04/17/21 0850          Therapy Assessment/Plan (OT)    Planned Therapy Interventions (OT)  activity tolerance training;adaptive equipment training;BADL retraining;functional balance retraining;occupation/activity based interventions;patient/caregiver education/training;ROM/therapeutic exercise;strengthening exercise;transfer/mobility retraining  -TA       User Key  (r) = Recorded By, (t) = Taken By, (c) = Cosigned By    Initials Name Provider Type    Joey Peres, OT Occupational Therapist        Clinical Impression     Row Name 04/17/21 0850          Pain Assessment    Additional Documentation  Pain Scale: Numbers Pre/Post-Treatment (Group)  -TA     Row Name 04/17/21 0850          Pain Scale: Numbers Pre/Post-Treatment    Pretreatment Pain Rating  4/10  -TA     Posttreatment Pain Rating  4/10  -TA     Pain Location - Side  Right  -TA     Pain Location - Orientation  lower  -TA     Pain Location  epigastrium  -TA     Pre/Posttreatment Pain Comment  Pre-medicated for tx, pt tolerated  -TA     Pain Intervention(s)  Medication (See MAR);Ambulation/increased activity;Repositioned  -TA     Row Name 04/17/21 0850          Plan of Care Review    Plan of Care Reviewed With  patient  -TA     Progress  improving  -TA     Outcome Summary  VSS; Pt presents with fxl decline from PLOF, deficits in ADL performance, fxl mobility, occupational endurance. Pt limited by pain, decreased balance. Pt issued AE for LB ADLs and ADL retraining initiated. Following education, pt req'd Min A LBD, supervision LBB. Further ADL retraining indicated. Pt will benefit from skilled OT services to address deficits, facilitate increased fxl I. Recommend IRF at discharge.  -TA     Row Name 04/17/21 0850          Therapy Assessment/Plan (OT)    Patient/Family  Therapy Goal Statement (OT)  Return to PLOF  -TA     Rehab Potential (OT)  good, to achieve stated therapy goals  -TA     Criteria for Skilled Therapeutic Interventions Met (OT)  yes;skilled treatment is necessary  -TA     Therapy Frequency (OT)  daily  -TA     Predicted Duration of Therapy Intervention (OT)  5 days  -TA     Row Name 04/17/21 0850          Therapy Plan Review/Discharge Plan (OT)    Anticipated Discharge Disposition (OT)  inpatient rehabilitation facility  -TA     Row Name 04/17/21 0850          Vital Signs    Pre Systolic BP Rehab  -- VSS; RN cleared pt for tx  -TA     O2 Delivery Pre Treatment  room air  -TA     O2 Delivery Intra Treatment  room air  -TA     O2 Delivery Post Treatment  room air  -TA     Pre Patient Position  Supine  -TA     Intra Patient Position  Standing  -TA     Post Patient Position  Sitting  -TA     Row Name 04/17/21 0850          Positioning and Restraints    Pre-Treatment Position  in bed  -TA     Post Treatment Position  chair  -TA     In Chair  notified nsg;reclined;call light within reach;encouraged to call for assist;exit alarm on;legs elevated  -TA       User Key  (r) = Recorded By, (t) = Taken By, (c) = Cosigned By    Initials Name Provider Type    Joey Peres, OT Occupational Therapist        Outcome Measures     Row Name 04/17/21 0850          How much help from another is currently needed...    Putting on and taking off regular lower body clothing?  3  -TA     Bathing (including washing, rinsing, and drying)  3  -TA     Toileting (which includes using toilet bed pan or urinal)  3  -TA     Putting on and taking off regular upper body clothing  3  -TA     Taking care of personal grooming (such as brushing teeth)  3  -TA     Eating meals  4  -TA     AM-PAC 6 Clicks Score (OT)  19  -TA     Row Name 04/17/21 0850          Functional Assessment    Outcome Measure Options  AM-PAC 6 Clicks Daily Activity (OT)  -TA       User Key  (r) = Recorded By, (t) = Taken  By, (c) = Cosigned By    Initials Name Provider Type    Joey Peres OT Occupational Therapist        Occupational Therapy Education                 Title: PT OT SLP Therapies (Done)     Topic: Occupational Therapy (Done)     Point: ADL training (Done)     Description:   Instruct learner(s) on proper safety adaptation and remediation techniques during self care or transfers.   Instruct in proper use of assistive devices.              Learning Progress Summary           Patient Acceptance, E,D, DU,NR by TA at 4/17/2021 0936                   Point: Home exercise program (Done)     Description:   Instruct learner(s) on appropriate technique for monitoring, assisting and/or progressing therapeutic exercises/activities.              Learning Progress Summary           Patient Acceptance, E,D, DU,NR by TA at 4/17/2021 0936                   Point: Precautions (Done)     Description:   Instruct learner(s) on prescribed precautions during self-care and functional transfers.              Learning Progress Summary           Patient Acceptance, E,D, DU,NR by TA at 4/17/2021 0936                   Point: Body mechanics (Done)     Description:   Instruct learner(s) on proper positioning and spine alignment during self-care, functional mobility activities and/or exercises.              Learning Progress Summary           Patient Acceptance, E,D, DU,NR by TA at 4/17/2021 0936                               User Key     Initials Effective Dates Name Provider Type Discipline    SONIA 03/14/16 -  Joey Crump OT Occupational Therapist OT              OT Recommendation and Plan  Planned Therapy Interventions (OT): activity tolerance training, adaptive equipment training, BADL retraining, functional balance retraining, occupation/activity based interventions, patient/caregiver education/training, ROM/therapeutic exercise, strengthening exercise, transfer/mobility retraining  Therapy Frequency (OT): daily  Plan of Care  Review  Plan of Care Reviewed With: patient  Progress: improving  Outcome Summary: VSS; Pt presents with fxl decline from PLOF, deficits in ADL performance, fxl mobility, occupational endurance. Pt limited by pain, decreased balance. Pt issued AE for LB ADLs and ADL retraining initiated. Following education, pt req'd Min A LBD, supervision LBB. Further ADL retraining indicated. Pt will benefit from skilled OT services to address deficits, facilitate increased fxl I. Recommend IRF at discharge.     Time Calculation:   Time Calculation- OT     Row Name 04/17/21 0850             Time Calculation- OT    OT Start Time  0850 ttc 10 minutes  -TA      Total Timed Code Minutes- OT  10 minute(s)  -TA      OT Received On  04/17/21  -TA      OT Goal Re-Cert Due Date  04/27/21  -TA         Timed Charges    02784 - OT Self Care/Mgmt Minutes  10  -TA        User Key  (r) = Recorded By, (t) = Taken By, (c) = Cosigned By    Initials Name Provider Type    TA Joey Crump, OT Occupational Therapist        Therapy Charges for Today     Code Description Service Date Service Provider Modifiers Qty    85841217004  OT EVAL MOD COMPLEXITY 4 4/17/2021 Joey Crump, OT GO 1    [id number]  OT SELF CARE/MGMT/TRAIN EA 15 MIN 4/17/2021 Joey Crump OT GO 1               Joey Crump OT  4/17/2021

## 2021-04-17 NOTE — PLAN OF CARE
Problem: Adult Inpatient Plan of Care  Goal: Plan of Care Review  Outcome: Ongoing, Progressing  Flowsheets  Taken 4/17/2021 0306 by Nikia Mallory RN  Progress: improving  Outcome Summary: Pt VSS. A&O x4. RA. Ambulating and voiding well. PO PRN pain meds given. Partial weight bearing to right lower extremity. Awaiting placement. Slept well. Will continue to monitor.  Taken 4/16/2021 1848 by Hanh Yañez RN  Plan of Care Reviewed With: patient  Goal: Patient-Specific Goal (Individualized)  Outcome: Ongoing, Progressing  Goal: Absence of Hospital-Acquired Illness or Injury  Outcome: Ongoing, Progressing  Intervention: Identify and Manage Fall Risk  Recent Flowsheet Documentation  Taken 4/17/2021 0200 by Nikia Mallory RN  Safety Promotion/Fall Prevention:   activity supervised   room organization consistent   safety round/check completed   toileting scheduled  Taken 4/17/2021 0000 by Nikia Mallory RN  Safety Promotion/Fall Prevention:   activity supervised   room organization consistent   safety round/check completed   toileting scheduled  Taken 4/16/2021 2200 by Nikia Mallory RN  Safety Promotion/Fall Prevention:   activity supervised   safety round/check completed   room organization consistent   toileting scheduled  Taken 4/16/2021 2002 by Nikia Mallory RN  Safety Promotion/Fall Prevention:   toileting scheduled   safety round/check completed   room organization consistent   activity supervised  Intervention: Prevent Skin Injury  Recent Flowsheet Documentation  Taken 4/17/2021 0200 by Nikia Mallory RN  Body Position: position changed independently  Taken 4/17/2021 0000 by Nikia Mallory RN  Body Position: position changed independently  Taken 4/16/2021 2200 by Nikia Mallory RN  Body Position: position changed independently  Taken 4/16/2021 2002 by Nikia Mallory RN  Body Position: position changed independently  Intervention: Prevent and Manage VTE (venous  thromboembolism) Risk  Recent Flowsheet Documentation  Taken 4/17/2021 0000 by Nikia Mallory RN  VTE Prevention/Management: patient refused intervention  Taken 4/16/2021 2002 by Nikia Mallory RN  VTE Prevention/Management: patient refused intervention  Intervention: Prevent Infection  Recent Flowsheet Documentation  Taken 4/17/2021 0200 by Nikia Mallory RN  Infection Prevention:   environmental surveillance performed   hand hygiene promoted   rest/sleep promoted  Taken 4/17/2021 0000 by Nikia Mallory RN  Infection Prevention:   environmental surveillance performed   hand hygiene promoted   rest/sleep promoted  Taken 4/16/2021 2200 by Nikia Mallory RN  Infection Prevention:   environmental surveillance performed   hand hygiene promoted   rest/sleep promoted  Taken 4/16/2021 2002 by Nikia Mallory RN  Infection Prevention:   environmental surveillance performed   hand hygiene promoted   rest/sleep promoted  Goal: Optimal Comfort and Wellbeing  Outcome: Ongoing, Progressing  Intervention: Provide Person-Centered Care  Recent Flowsheet Documentation  Taken 4/16/2021 2002 by Nikia Mallory RN  Trust Relationship/Rapport: care explained  Goal: Readiness for Transition of Care  Outcome: Ongoing, Progressing     Problem: Ongoing Anesthesia Effects (Hip Arthroplasty)  Goal: Anesthesia/Sedation Recovery  Outcome: Ongoing, Progressing  Intervention: Optimize Anesthesia Recovery  Recent Flowsheet Documentation  Taken 4/17/2021 0200 by Nikia Mallory RN  Safety Promotion/Fall Prevention:   activity supervised   room organization consistent   safety round/check completed   toileting scheduled  Taken 4/17/2021 0000 by Nikia Mallory RN  Safety Promotion/Fall Prevention:   activity supervised   room organization consistent   safety round/check completed   toileting scheduled  Taken 4/16/2021 2200 by Nikia Mallory RN  Safety Promotion/Fall Prevention:   activity supervised   safety  round/check completed   room organization consistent   toileting scheduled  Taken 4/16/2021 2002 by Nikia Mallory RN  Safety Promotion/Fall Prevention:   toileting scheduled   safety round/check completed   room organization consistent   activity supervised     Problem: Fall Injury Risk  Goal: Absence of Fall and Fall-Related Injury  Outcome: Ongoing, Progressing  Intervention: Identify and Manage Contributors to Fall Injury Risk  Recent Flowsheet Documentation  Taken 4/17/2021 0200 by Nikia Mallory RN  Medication Review/Management: medications reviewed  Taken 4/17/2021 0000 by Nikia Mallory RN  Medication Review/Management: medications reviewed  Taken 4/16/2021 2200 by Nikia Mallory RN  Medication Review/Management: medications reviewed  Taken 4/16/2021 2002 by Nikia Mallory RN  Medication Review/Management: medications reviewed  Intervention: Promote Injury-Free Environment  Recent Flowsheet Documentation  Taken 4/17/2021 0200 by Nikia Mallory RN  Safety Promotion/Fall Prevention:   activity supervised   room organization consistent   safety round/check completed   toileting scheduled  Taken 4/17/2021 0000 by Nikia Mallory RN  Safety Promotion/Fall Prevention:   activity supervised   room organization consistent   safety round/check completed   toileting scheduled  Taken 4/16/2021 2200 by Nikia Mallory RN  Safety Promotion/Fall Prevention:   activity supervised   safety round/check completed   room organization consistent   toileting scheduled  Taken 4/16/2021 2002 by Nikia Mallory RN  Safety Promotion/Fall Prevention:   toileting scheduled   safety round/check completed   room organization consistent   activity supervised   Goal Outcome Evaluation:     Progress: improving  Outcome Summary: Pt VSS. A&O x4. RA. Ambulating and voiding well. PO PRN pain meds given. Partial weight bearing to right lower extremity. Awaiting placement. Slept well. Will continue to monitor.

## 2021-04-17 NOTE — PLAN OF CARE
Goal Outcome Evaluation:  Plan of Care Reviewed With: patient, daughter  Progress: improving  Outcome Summary: IND tx, 300 ft gait with RW PWB. OOB in chair.  [facility]ixous to get to [facility].

## 2021-04-17 NOTE — PLAN OF CARE
Goal Outcome Evaluation:  Plan of Care Reviewed With: patient, daughter  Progress: improving  Outcome Summary: Vitals WNL. Pt reports RLE numbness but denies tingling. Chief complaint this shift has been right hip pain rated 5-8/10. PRN oxycodone given X 3 and PRN ultram given X 1 in addition to scheduled mobic and tylenol. Gel pack applied to incision. Pt reports improvement in pain with these interventions. PRN zanaflex started today. Pt tolerates ambulation with assist X 1 and a walker. Partial weight bearing status maintained. Three optifoam dressings remain in place, once of which has a moderate amount of dried sanguineous drainage present. Mepilex to knee is clean, dry, and intact. Pt voids spontaneously. Multiple BMs this shift. Anticipate transfer to [facility] Monday if transportation can be arranged.

## 2021-04-17 NOTE — PLAN OF CARE
Problem: Adult Inpatient Plan of Care  Goal: Plan of Care Review  Recent Flowsheet Documentation  Taken 4/17/2021 0850 by Joey Crump, OT  Progress: improving  Plan of Care Reviewed With: patient  Outcome Summary:   VSS   Pt presents with fxl decline from PLOF, deficits in ADL performance, fxl mobility, occupational endurance. Pt limited by pain, decreased balance. Pt issued AE for LB ADLs and ADL retraining initiated. Following education, pt req'd Min A LBD, supervision LBB. Further ADL retraining indicated. Pt will benefit from skilled OT services to address deficits, facilitate increased fxl I. Recommend IRF at discharge.   Goal Outcome Evaluation:  Plan of Care Reviewed With: patient  Progress: improving  Outcome Summary: VSS; Pt presents with fxl decline from PLOF, deficits in ADL performance, fxl mobility, occupational endurance. Pt limited by pain, decreased balance. Pt issued AE for LB ADLs and ADL retraining initiated. Following education, pt req'd Min A LBD, supervision LBB. Further ADL retraining indicated. Pt will benefit from skilled OT services to address deficits, facilitate increased fxl I. Recommend IRF at discharge.

## 2021-04-17 NOTE — PROGRESS NOTES
"IM progress note      Jessica Winston  [id number]  1960     LOS: 2 days     Attending: Riaz Man MD    Primary Care Provider: Iraida Demarco APRN      Chief Complaint/Reason for visit:  No chief complaint on file.      Subjective   Having increased right hip pain today. Having some nausea. Feels anxious due to smoking cessation. Denies f/c/sob/cp.    Objective        Visit Vitals  /81   Pulse 99   Temp 98.2 °F (36.8 °C) (Oral)   Resp 16   Ht 165.1 cm (65\")   Wt 51.3 kg (113 lb)   LMP 2010 (Approximate)   SpO2 99%   Breastfeeding No   BMI 18.80 kg/m²     Temp (24hrs), Av.1 °F (36.7 °C), Min:97.4 °F (36.3 °C), Max:98.3 °F (36.8 °C)      Intake/Output:    Intake/Output Summary (Last 24 hours) at 2021 1209  Last data filed at 2021 0847  Gross per 24 hour   Intake 1080 ml   Output 725 ml   Net 355 ml        Physical Therapy: 21: Pt able to amb 40' with RW CGA and able to maintain PWB on R LE. Pt tolerated exercises with no reports of increase in pain. Recommend patient to IP rehab at discharge. Will continue to progress patient as able.    Physical Exam:     General Appearance:    Alert, cooperative, in no acute distress   Head:    Normocephalic, without obvious abnormality, atraumatic    Lungs:     Normal effort, symmetric chest rise,  clear to      auscultation bilaterally              Heart:    Regular rhythm and normal rate, normal S1 and S2    Abdomen:     Normal bowel sounds, no masses, no organomegaly, soft        non-tender, non-distended, no guarding, no rebound                tenderness   Extremities:  Intact dressings over right hip incisions, swelling and ecchymosis noted.  No deformities.  Intact flexion dorsiflexion bilateral feet.   Pulses:   Pulses palpable and equal bilaterally   Skin:   No bleeding, bruising or rash          Results Review:     I reviewed the patient's new clinical results.   Results from last 7 days   Lab Units 21  0958 " 04/16/21  0824 04/16/21  0616 04/13/21  1241   WBC 10*3/mm3 8.78  --   --  11.18*   HEMOGLOBIN g/dL 8.8* 6.6* 6.1* 10.8*   HEMATOCRIT % 27.6* 21.3* 19.5* 34.3   PLATELETS 10*3/mm3 357  --   --  633*     Results from last 7 days   Lab Units 04/17/21  0958 04/16/21  0616 04/13/21  1241   SODIUM mmol/L 139 135* 135*   POTASSIUM mmol/L 3.6 4.5 4.7   CHLORIDE mmol/L 104 104 100   CO2 mmol/L 27.0 23.0 23.0   BUN mg/dL 9 15 7   CREATININE mg/dL 0.72 0.88 0.74   CALCIUM mg/dL 8.5* 7.9* 9.8   BILIRUBIN mg/dL  --   --  0.3   ALK PHOS U/L  --   --  89   ALT (SGPT) U/L  --   --  11   AST (SGOT) U/L  --   --  15   GLUCOSE mg/dL 85 104* 157*     I reviewed the patient's new imaging including images and reports.    All medications reviewed.   acetaminophen, 1,000 mg, Oral, Q8H  aspirin, 81 mg, Oral, Q12H  buPROPion SR, 150 mg, Oral, Daily  doxycycline, 100 mg, Oral, Q12H  lisinopril, 10 mg, Oral, Daily  meloxicam, 15 mg, Oral, Daily  predniSONE, 10 mg, Oral, Daily  rOPINIRole, 0.5 mg, Oral, Nightly      HYDROmorphone, 0.5 mg, Q2H PRN   And  naloxone, 0.1 mg, Q5 Min PRN  hydrOXYzine, 25 mg, TID PRN  ketorolac, 15 mg, Q6H PRN  labetalol, 10 mg, Q4H PRN  ondansetron, 4 mg, Q6H PRN  oxyCODONE, 10 mg, Q4H PRN  oxyCODONE, 5 mg, Q4H PRN  sodium chloride, 500 mL, TID PRN  tiZANidine, 4 mg, Q8H PRN  traMADol, 50 mg, Q8H PRN        Assessment/Plan       Status post total hip replacement, right, 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 PT/OT,  Weight bearing as tolerated right LE.  Total hip precautions  2. Pain control-prns. Added muscle relaxant 4/17  3. IS-encourage  4. DVT proph- Mechanicals and aspirin  5. Bowel regimen  6. Resume home medications as appropriate  7. Monitor post-op labs  8. DC planning .   following.  [facility],  Hospital referral has been made.  Apparently working through [employer].    Antiemetics PRN  Atarax PRN anxiety     - Hypertension:  Resume home medications as appropriate, formulary substitution when indicated.  Holding parameters.  Prn medications for elevated blood pressure.     -RA: Stress dose of steroids postoperatively. Resume today.  Enbrel resumption when okay with Dr. Man.      Lakeisha Clemente, APRN  04/17/21  12:09 EDT

## 2021-04-17 NOTE — PROGRESS NOTES
Orthopedic Progress Note      Patient: Jessica Winston  YOB: 1960     Date of Admission: 4/15/2021  6:20 AM Medical Record Number: [id number]     Attending Physician: Riaz Man MD    Status Post:  Procedure(s):  COVERSION TO RIGHT TOTAL HIP ARTHROPLASTY Post Operative Day Number: 2    Subjective : No new orthopaedic complaints     Pain Relief: some relief with present medication.     Systemic Complaints: No Complaints  Vitals:    04/17/21 0300 04/17/21 0500 04/17/21 0811 04/17/21 0942   BP:  140/89 150/84 119/81   BP Location:  Right arm Right arm    Patient Position:  Lying Lying    Pulse:  84 84 99   Resp:  16 16    Temp:  97.9 °F (36.6 °C) 98.2 °F (36.8 °C)    TempSrc:  Oral Oral    SpO2: 94% 97% 99%    Weight:       Height:           Physical Exam: 60 y.o. female    General Appearance:       Alert, cooperative, in no acute distress                  Extremities:    Dressing Clean, Dry and Intact             No clinical sign of DVT        Able to do good movements of digits    Pulses:   Pulses palpable and equal bilaterally           Diagnostic Tests:     Results from last 7 days   Lab Units 04/17/21  0958 04/16/21  0824 04/16/21  0616 04/13/21  1241   WBC 10*3/mm3 8.78  --   --  11.18*   HEMOGLOBIN g/dL 8.8* 6.6* 6.1* 10.8*   HEMATOCRIT % 27.6* 21.3* 19.5* 34.3   PLATELETS 10*3/mm3 357  --   --  633*     Results from last 7 days   Lab Units 04/17/21  0958 04/16/21  0616 04/13/21  1241   SODIUM mmol/L 139 135* 135*   POTASSIUM mmol/L 3.6 4.5 4.7   CHLORIDE mmol/L 104 104 100   CO2 mmol/L 27.0 23.0 23.0   BUN mg/dL 9 15 7   CREATININE mg/dL 0.72 0.88 0.74   GLUCOSE mg/dL 85 104* 157*   CALCIUM mg/dL 8.5* 7.9* 9.8     Results from last 7 days   Lab Units 04/13/21  1241   INR  0.93   APTT seconds 24.2     Lab Results   Component Value Date    URICACID 5.7 08/12/2019     No results found for: CRYSTAL  Microbiology Results (last 10 days)     Procedure Component Value - Date/Time    Tissue /  Bone Culture - Tissue, Hip, Right [[id number]] Collected: 04/15/21 0921    Lab Status: Preliminary result Specimen: Tissue from Hip, Right Updated: 04/17/21 0652     Tissue Culture No growth at 2 days     Gram Stain No WBCs or organisms seen    AFB Culture - Tissue, Hip, Right [[id number]] Collected: 04/15/21 0921    Lab Status: Preliminary result Specimen: Tissue from Hip, Right Updated: 04/16/21 1208     AFB Stain No acid fast bacilli seen on concentrated smear    Tissue / Bone Culture - Tissue, Hip, Right [[id number]] Collected: 04/15/21 0808    Lab Status: Preliminary result Specimen: Tissue from Hip, Right Updated: 04/17/21 0652     Tissue Culture No growth at 2 days     Gram Stain No WBCs or organisms seen    AFB Culture - Tissue, Hip, Right [[id number]] Collected: 04/15/21 0808    Lab Status: Preliminary result Specimen: Tissue from Hip, Right Updated: 04/16/21 1208     AFB Stain No acid fast bacilli seen on concentrated smear    MRSA Screen, PCR (Inpatient) - Swab, Nares [[id number]]  (Normal) Collected: 04/13/21 1241    Lab Status: Final result Specimen: Swab from Nares Updated: 04/13/21 1517     MRSA PCR Negative    Narrative:      MRSA Negative    COVID PRE-OP / PRE-PROCEDURE SCREENING ORDER (NO ISOLATION) - Swab, Nasopharynx [[id number]]  (Normal) Collected: 04/13/21 1041    Lab Status: Final result Specimen: Swab from Nasopharynx Updated: 04/13/21 2009    Narrative:      The following orders were created for panel order COVID PRE-OP / PRE-PROCEDURE SCREENING ORDER (NO ISOLATION) - Swab, Nasopharynx.  Procedure                               Abnormality         Status                     ---------                               -----------         ------                     COVID-19, M,T,W, APTIMA ...[[id number]]  Normal              Final result                 Please view results for these tests on the individual orders.    COVID-19, M,T,W, APTIMA [facility] IN-HOUSE NP/OP SWAB IN UTM/VTM/SALINE TRANSPORT  MEDIA 24HR TAT - Swab, Nasopharynx [[id number]]  (Normal) Collected: 04/13/21 1041    Lab Status: Final result Specimen: Swab from Nasopharynx Updated: 04/13/21 2009     COVID19 Not Detected    Narrative:      Fact sheet for providers: https://www.fda.gov/media/546710/download     Fact sheet for patients: https://www.fda.gov/media/560942/download    Test performed by RT PCR.        XR Chest 2 View    Result Date: 4/13/2021  Chronic emphysematous changes of the lung fields, otherwise without evidence of acute cardiopulmonary abnormality.  This report was finalized on 4/13/2021 2:39 PM by Manny Ramirez.      FL C Arm During Surgery    Result Date: 4/15/2021  57 seconds of fluoroscopy time provided with 2 images stored during right hip arthroplasty  This report was finalized on 4/15/2021 11:12 AM by Manny Ramirez.      XR Hip With or Without Pelvis 2 - 3 View Right    Result Date: 4/15/2021  Total right hip prosthesis in anatomic alignment.  D:  04/15/2021 E:  04/15/2021    This report was finalized on 4/15/2021 9:39 PM by Dr. Neeraj Moore MD.          Current Medications:  Scheduled Meds:acetaminophen, 1,000 mg, Oral, Q8H  aspirin, 81 mg, Oral, Q12H  buPROPion SR, 150 mg, Oral, Daily  lisinopril, 10 mg, Oral, Daily  meloxicam, 15 mg, Oral, Daily  predniSONE, 10 mg, Oral, Daily  rOPINIRole, 0.5 mg, Oral, Nightly      Continuous Infusions:lactated ringers, 9 mL/hr, Last Rate: 1,300 mL/hr (04/15/21 1018)  lactated ringers, 100 mL/hr, Last Rate: 100 mL/hr (04/15/21 1229)      PRN Meds:.HYDROmorphone **AND** naloxone  •  ketorolac  •  labetalol  •  oxyCODONE  •  oxyCODONE  •  sodium chloride  •  traMADol    Assessment: Status post  TX REVISE TOTAL HIP REPLACEMENT [[id number]] (COVERSION TO RIGHT TOTAL HIP ARTHROPLASTY)    Patient Active Problem List   Diagnosis   • RUQ pain   • Cardiac mass   • PVD (peripheral vascular disease) with claudication (CMS/HCC)   • Tobacco dependence   • Pain in right hip   • HTN (hypertension)      • Rheumatoid arthritis (CMS/HCC)   • Status post total hip replacement, right, 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    Riaz Man MD    Date: 4/17/2021    Time: 11:02 EDT

## 2021-04-17 NOTE — THERAPY TREATMENT NOTE
Patient Name: Jessica Winston  : 1960    MRN: [id number]                              Today's Date: 2021       Admit Date: 4/15/2021    Visit Dx:     ICD-10-CM ICD-9-CM   1. 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   • Acute blood loss anemia     Past Medical History:   Diagnosis Date   • Avascular necrosis (CMS/HCC)    • Depression    • Fractures    • HTN (hypertension)    • Hyperglycemia    • Hypertension    • Osteoarthritis    • RA (rheumatoid arthritis) (CMS/HCC)     ALSO REPORTS OA   • Tooth loose     top left    • Vitamin D deficiency    • Wears glasses     readers     Past Surgical History:   Procedure Laterality Date   • ARM TENDON REPAIR Left    • BREAST SURGERY     • ENDOSCOPY N/A 3/30/2017    Procedure: ESOPHAGOGASTRODUODENOSCOPY WITH COLD FORCEP BIOPSY;  Surgeon: Anca Trevino MD;  Location: [facility] ENDOSCOPY;  Service:    • FEMUR SURGERY      right    • HERNIA REPAIR Bilateral     INGUINAL- unsure about mesh    • KNEE SURGERY Left    • MANDIBLE SURGERY     • TUBAL ABDOMINAL LIGATION     • WISDOM TOOTH EXTRACTION       General Information     Row Name 21 1452          Physical Therapy Time and Intention    Document Type  therapy note (daily note)  -CT     Mode of Treatment  physical therapy  -CT     Row Name 21 1452          General Information    Patient Profile Reviewed  yes  -CT     Row Name 21 1452          Living Environment    Lives With  alone  -CT       User Key  (r) = Recorded By, (t) = Taken By, (c) = Cosigned By    Initials Name Provider Type    CT Ivan Park, ASHLEY Physical Therapist        Mobility     Row Name 21 1455           Bed Mobility    Bed Mobility  bed mobility (all) activities  -CT     All Activities, Dickey (Bed Mobility)  independent  -CT     Supine-Sit Dickey (Bed Mobility)  independent  -CT     Sit-Supine Dickey (Bed Mobility)  independent  -CT     Row Name 04/17/21 1452          Bed-Chair Transfer    Bed-Chair Dickey (Transfers)  independent  -CT     Row Name 04/17/21 1452          Sit-Stand Transfer    Sit-Stand Dickey (Transfers)  independent  -CT     Row Name 04/17/21 1452          Gait/Stairs (Locomotion)    Dickey Level (Gait)  independent  -CT     Assistive Device (Gait)  walker, front-wheeled  -CT     Distance in Feet (Gait)  300 ft with RW  -CT     Row Name 04/17/21 1452          Mobility    Extremity Weight-bearing Status  right lower extremity  -CT     Right Lower Extremity (Weight-bearing Status)  partial weight-bearing (PWB)  -CT       User Key  (r) = Recorded By, (t) = Taken By, (c) = Cosigned By    Initials Name Provider Type    CT Ivan Park PT Physical Therapist        Obj/Interventions     Row Name 04/17/21 1453          Balance    Balance Assessment  sitting static balance;sitting dynamic balance;standing static balance;standing dynamic balance  -CT     Static Sitting Balance  WNL  -CT     Dynamic Sitting Balance  WNL  -CT     Static Standing Balance  WNL  -CT     Dynamic Standing Balance  mild impairment  -CT     Balance Interventions  sitting;standing;sit to stand;dynamic;tandem gait  -CT       User Key  (r) = Recorded By, (t) = Taken By, (c) = Cosigned By    Initials Name Provider Type    CT Ivan Park PT Physical Therapist        Goals/Plan    No documentation.       Clinical Impression     Row Name 04/17/21 1459          Pain    Additional Documentation  Pain Scale: Numbers Pre/Post-Treatment (Group)  -CT     Row Name 04/17/21 1458          Pain Scale: Numbers Pre/Post-Treatment    Pretreatment Pain Rating  2/10  -CT     Posttreatment  Pain Rating  2/10  -CT     Pain Location - Side  Right  -CT     Pain Location - Orientation  incisional  -CT     Pain Location  hip  -CT     Pain Intervention(s)  Medication (See MAR);Ambulation/increased activity  -CT     Row Name 04/17/21 1454          Plan of Care Review    Plan of Care Reviewed With  patient;daughter  -CT     Progress  improving  -CT     Outcome Summary  IND tx, 300 ft gait with RW PWB. OOB in chair.  Anixous to get to [facility].  -CT     Row Name 04/17/21 1454          Therapy Assessment/Plan (PT)    Rehab Potential (PT)  good, to achieve stated therapy goals  -CT     Criteria for Skilled Interventions Met (PT)  yes  -CT     Row Name 04/17/21 1454          Positioning and Restraints    Pre-Treatment Position  in bed  -CT     Post Treatment Position  chair  -CT     In Chair  legs elevated;notified nsg;reclined;call light within reach;encouraged to call for assist;exit alarm on;with family/caregiver  -CT       User Key  (r) = Recorded By, (t) = Taken By, (c) = Cosigned By    Initials Name Provider Type    CT Ivan Park, PT Physical Therapist        Outcome Measures     Row Name 04/17/21 1456          How much help from another person do you currently need...    Turning from your back to your side while in flat bed without using bedrails?  4  -CT     Moving from lying on back to sitting on the side of a flat bed without bedrails?  4  -CT     Moving to and from a bed to a chair (including a wheelchair)?  4  -CT     Standing up from a chair using your arms (e.g., wheelchair, bedside chair)?  4  -CT     Climbing 3-5 steps with a railing?  4  -CT     To walk in hospital room?  3  -CT     AM-PAC 6 Clicks Score (PT)  23  -CT     Row Name 04/17/21 1456          Functional Assessment    Outcome Measure Options  AM-PAC 6 Clicks Basic Mobility (PT)  -CT       User Key  (r) = Recorded By, (t) = Taken By, (c) = Cosigned By    Initials Name Provider Type    CT Ivan Park, ASHLEY Physical  Therapist        Physical Therapy Education                 Title: PT OT SLP Therapies (Done)     Topic: Physical Therapy (Done)     Point: Mobility training (Done)     Learning Progress Summary           Patient Acceptance, E,D, VU,DU by CT at 4/17/2021 1503    Nonacceptance, E,D, VU,DU by CT at 4/17/2021 1457    Acceptance, E,D,H, VU,DU by CS at 4/16/2021 1340    Comment: Educated patient on bed mobility sequencing, safe hand placement with transfers, gait mechanics, ther ex, and plan for progression of care.    Acceptance, E,D, VU by LOUISE at 4/15/2021 1330    Comment: Educated on safe sequencing with bed mobility, ambulatory transfers, and gait. Reviewed HEP, hip precautions, and PWB precautions.   Family Acceptance, E,D, VU by LOUISE at 4/15/2021 1330    Comment: Educated on safe sequencing with bed mobility, ambulatory transfers, and gait. Reviewed HEP, hip precautions, and PWB precautions.                   Point: Home exercise program (Done)     Learning Progress Summary           Patient Acceptance, E,D, VU,DU by CT at 4/17/2021 1503    Nonacceptance, E,D, VU,DU by CT at 4/17/2021 1457    Acceptance, E,D,H, VU,DU by CS at 4/16/2021 1340    Comment: Educated patient on bed mobility sequencing, safe hand placement with transfers, gait mechanics, ther ex, and plan for progression of care.    Acceptance, E,D, VU by LOUISE at 4/15/2021 1330    Comment: Educated on safe sequencing with bed mobility, ambulatory transfers, and gait. Reviewed HEP, hip precautions, and PWB precautions.   Family Acceptance, E,D, VU by LOUISE at 4/15/2021 1330    Comment: Educated on safe sequencing with bed mobility, ambulatory transfers, and gait. Reviewed HEP, hip precautions, and PWB precautions.                   Point: Body mechanics (Done)     Learning Progress Summary           Patient Acceptance, E,D, VU,DU by CT at 4/17/2021 1503    Nonacceptance, E,D, VU,DU by CT at 4/17/2021 1457    Acceptance, E,D,H, VU,DU by CS at 4/16/2021 1340     Comment: Educated patient on bed mobility sequencing, safe hand placement with transfers, gait mechanics, ther ex, and plan for progression of care.    Acceptance, E,D, VU by LOUISE at 4/15/2021 1330    Comment: Educated on safe sequencing with bed mobility, ambulatory transfers, and gait. Reviewed HEP, hip precautions, and PWB precautions.   Family Acceptance, E,D, VU by LOUISE at 4/15/2021 1330    Comment: Educated on safe sequencing with bed mobility, ambulatory transfers, and gait. Reviewed HEP, hip precautions, and PWB precautions.                   Point: Precautions (Done)     Learning Progress Summary           Patient Acceptance, E,D, VU,DU by CT at 4/17/2021 1503    Nonacceptance, E,D, VU,DU by CT at 4/17/2021 1457    Acceptance, E,D,H, VU,DU by  at 4/16/2021 1340    Comment: Educated patient on bed mobility sequencing, safe hand placement with transfers, gait mechanics, ther ex, and plan for progression of care.    Acceptance, E,D, VU by LOUISE at 4/15/2021 1330    Comment: Educated on safe sequencing with bed mobility, ambulatory transfers, and gait. Reviewed HEP, hip precautions, and PWB precautions.   Family Acceptance, E,D, VU by LOUISE at 4/15/2021 1330    Comment: Educated on safe sequencing with bed mobility, ambulatory transfers, and gait. Reviewed HEP, hip precautions, and PWB precautions.                               User Key     Initials Effective Dates Name Provider Type Discipline    CT 06/19/15 -  Ivan Park, PT Physical Therapist PT    CS 03/26/19 -  Romy Garcia, PT Physical Therapist PT    LOUISE 09/10/19 -  Clarence Ken, PT Physical Therapist PT              PT Recommendation and Plan     Plan of Care Reviewed With: patient, daughter  Progress: improving  Outcome Summary: IND tx, 300 ft gait with RW PWB. OOB in chair.  Anixous to get to [facility].     Time Calculation:   PT Charges     Row Name 04/17/21 1503             Time Calculation    Start Time  1430  -CT      PT Received On  04/17/21   -CT         Time Calculation- PT    Total Timed Code Minutes- PT  35 minute(s)  -CT        User Key  (r) = Recorded By, (t) = Taken By, (c) = Cosigned By    Initials Name Provider Type    CT Ivan Park, PT Physical Therapist        Therapy Charges for Today     Code Description Service Date Service Provider Modifiers Qty    [id number] HC GAIT TRAINING EA 15 MIN 4/17/2021 Ivan Park, ASHLEY GP 2          PT G-Codes  Outcome Measure Options: AM-PAC 6 Clicks Basic Mobility (PT)  AM-PAC 6 Clicks Score (PT): 23  AM-PAC 6 Clicks Score (OT): 19    Ivan Park PT  4/17/2021

## 2021-04-17 NOTE — PROGRESS NOTES
Continued Stay Note  Psychiatric     Patient Name: Jessica Winston  MRN: [id number]  Today's Date: 4/17/2021    Admit Date: 4/15/2021    Discharge Plan     Row Name 04/17/21 1407       Plan    Plan  Rehab    Patient/Family in Agreement with Plan  yes    Plan Comments  Spoke anastasia Yuen at [facility].  She has a bed avialable for patient when medically ready.  Transportation cannot be arranged for weekend due to avialibility.  Will need to arrange either WC van or caliber for patient on Monday for transfer.    Final Discharge Disposition Code  62 - inpatient rehab facility        Discharge Codes    No documentation.       Expected Discharge Date and Time     Expected Discharge Date Expected Discharge Time    Apr 17, 2021             Monse Garay RN

## 2021-04-18 LAB
BACTERIA SPEC AEROBE CULT: NORMAL
BACTERIA SPEC AEROBE CULT: NORMAL
GRAM STN SPEC: NORMAL
GRAM STN SPEC: NORMAL

## 2021-04-18 PROCEDURE — 97116 GAIT TRAINING THERAPY: CPT

## 2021-04-18 PROCEDURE — 94799 UNLISTED PULMONARY SVC/PX: CPT

## 2021-04-18 PROCEDURE — 63710000001 PREDNISONE PER 5 MG: Performed by: INTERNAL MEDICINE

## 2021-04-18 RX ADMIN — MELOXICAM 15 MG: 7.5 TABLET ORAL at 08:22

## 2021-04-18 RX ADMIN — ASPIRIN 81 MG: 81 TABLET, COATED ORAL at 08:21

## 2021-04-18 RX ADMIN — PREDNISONE 10 MG: 10 TABLET ORAL at 08:22

## 2021-04-18 RX ADMIN — DOXYCYCLINE 100 MG: 100 CAPSULE ORAL at 08:21

## 2021-04-18 RX ADMIN — OXYCODONE 10 MG: 5 TABLET ORAL at 11:30

## 2021-04-18 RX ADMIN — OXYCODONE 10 MG: 5 TABLET ORAL at 05:16

## 2021-04-18 RX ADMIN — TIZANIDINE 4 MG: 4 TABLET ORAL at 21:20

## 2021-04-18 RX ADMIN — ASPIRIN 81 MG: 81 TABLET, COATED ORAL at 19:51

## 2021-04-18 RX ADMIN — ROPINIROLE HYDROCHLORIDE 0.5 MG: 0.5 TABLET, FILM COATED ORAL at 19:51

## 2021-04-18 RX ADMIN — DOXYCYCLINE 100 MG: 100 CAPSULE ORAL at 19:51

## 2021-04-18 RX ADMIN — ACETAMINOPHEN 1000 MG: 500 TABLET, FILM COATED ORAL at 05:16

## 2021-04-18 RX ADMIN — ACETAMINOPHEN 1000 MG: 500 TABLET, FILM COATED ORAL at 14:16

## 2021-04-18 RX ADMIN — ACETAMINOPHEN 1000 MG: 500 TABLET, FILM COATED ORAL at 19:51

## 2021-04-18 RX ADMIN — LISINOPRIL 10 MG: 10 TABLET ORAL at 08:21

## 2021-04-18 RX ADMIN — OXYCODONE 10 MG: 5 TABLET ORAL at 17:41

## 2021-04-18 RX ADMIN — BUPROPION HYDROCHLORIDE 150 MG: 150 TABLET, EXTENDED RELEASE ORAL at 08:21

## 2021-04-18 NOTE — PLAN OF CARE
Goal Outcome Evaluation:  Plan of Care Reviewed With: patient  Progress: improving   Pt has ambulated in the hallway and in the room during shift, pt VSS and pt states pain has been controlled with current pain management

## 2021-04-18 NOTE — PLAN OF CARE
Goal Outcome Evaluation:  Plan of Care Reviewed With: patient  Progress: improving     Problem: Adult Inpatient Plan of Care  Goal: Plan of Care Review  Outcome: Ongoing, Progressing  Flowsheets (Taken 4/18/2021 0604)  Progress: improving  Plan of Care Reviewed With: patient  Goal: Patient-Specific Goal (Individualized)  Outcome: Ongoing, Progressing  Goal: Absence of Hospital-Acquired Illness or Injury  Outcome: Ongoing, Progressing  Intervention: Identify and Manage Fall Risk  Recent Flowsheet Documentation  Taken 4/18/2021 0400 by Claudia Gill RN  Safety Promotion/Fall Prevention:   activity supervised   assistive device/personal items within reach   safety round/check completed  Taken 4/18/2021 0200 by Claudia Gill RN  Safety Promotion/Fall Prevention:   activity supervised   assistive device/personal items within reach   safety round/check completed  Taken 4/18/2021 0000 by Claudia Gill RN  Safety Promotion/Fall Prevention:   activity supervised   assistive device/personal items within reach   safety round/check completed  Taken 4/17/2021 2200 by Claudia Gill RN  Safety Promotion/Fall Prevention:   activity supervised   assistive device/personal items within reach   safety round/check completed  Taken 4/17/2021 2000 by Claudia Gill RN  Safety Promotion/Fall Prevention:   activity supervised   assistive device/personal items within reach   safety round/check completed   clutter free environment maintained   fall prevention program maintained   gait belt   muscle strengthening facilitated   nonskid shoes/slippers when out of bed   room organization consistent  Intervention: Prevent Skin Injury  Recent Flowsheet Documentation  Taken 4/18/2021 0400 by Claudia Gill RN  Body Position: position changed independently  Taken 4/18/2021 0200 by Claudia Gill RN  Body Position:   position changed independently   supine  Taken 4/18/2021 0000 by Claudia Gill RN  Body Position:   supine    position changed independently  Taken 4/17/2021 2200 by Claudia Gill RN  Body Position:   position changed independently   supine, legs elevated  Taken 4/17/2021 2000 by Claudia Gill RN  Body Position: supine  Intervention: Prevent and Manage VTE (venous thromboembolism) Risk  Recent Flowsheet Documentation  Taken 4/18/2021 0400 by Claudia Gill RN  VTE Prevention/Management:   bilateral   sequential compression devices off  Taken 4/18/2021 0200 by Claudia Gill RN  VTE Prevention/Management:   bilateral   sequential compression devices off  Taken 4/18/2021 0000 by Claudia Gill RN  VTE Prevention/Management:   bilateral   sequential compression devices off  Taken 4/17/2021 2200 by Claudia Gill RN  VTE Prevention/Management:   bilateral   sequential compression devices on  Taken 4/17/2021 2000 by Claudia Gill RN  VTE Prevention/Management:   bilateral   sequential compression devices on  Intervention: Prevent Infection  Recent Flowsheet Documentation  Taken 4/18/2021 0400 by Claudia Gill RN  Infection Prevention:   hand hygiene promoted   rest/sleep promoted  Taken 4/18/2021 0200 by Claudia Gill RN  Infection Prevention:   hand hygiene promoted   rest/sleep promoted  Taken 4/18/2021 0000 by Claudia Gill RN  Infection Prevention:   hand hygiene promoted   rest/sleep promoted  Taken 4/17/2021 2200 by Claudia Gill RN  Infection Prevention:   hand hygiene promoted   rest/sleep promoted  Taken 4/17/2021 2000 by Claudia Gill RN  Infection Prevention:   hand hygiene promoted   rest/sleep promoted  Goal: Optimal Comfort and Wellbeing  Outcome: Ongoing, Progressing  Intervention: Provide Person-Centered Care  Recent Flowsheet Documentation  Taken 4/18/2021 0400 by Claudia Gill RN  Trust Relationship/Rapport: care explained  Taken 4/18/2021 0000 by Claudia Gill RN  Trust Relationship/Rapport: care explained  Taken 4/17/2021 2326 by Claudia Gill RN  Trust  Relationship/Rapport: care explained  Taken 4/17/2021 2200 by Claudia Gill RN  Trust Relationship/Rapport: care explained  Taken 4/17/2021 2000 by Claudia Gill RN  Trust Relationship/Rapport:   care explained   choices provided   questions answered   questions encouraged   reassurance provided   thoughts/feelings acknowledged  Goal: Readiness for Transition of Care  Outcome: Ongoing, Progressing     Problem: Bleeding (Hip Arthroplasty)  Goal: Absence of Bleeding  Outcome: Ongoing, Progressing     Problem: Bowel Elimination Impaired (Hip Arthroplasty)  Goal: Effective Bowel Elimination  Outcome: Ongoing, Progressing     Problem: Infection (Hip Arthroplasty)  Goal: Absence of Infection Signs and Symptoms  Outcome: Ongoing, Progressing     Problem: Joint Function Impaired (Hip Arthroplasty)  Goal: Optimal Functional Ability  Outcome: Ongoing, Progressing  Intervention: Protect Joint Integrity  Recent Flowsheet Documentation  Taken 4/18/2021 0400 by Claudia Gill RN  Positioning/Transfer Devices:   pillows   in use  Taken 4/18/2021 0200 by Claudia Gill RN  Positioning/Transfer Devices:   pillows   in use  Taken 4/18/2021 0000 by Claudia Gill RN  Positioning/Transfer Devices:   pillows   in use  Taken 4/17/2021 2200 by Claudia Gill RN  Positioning/Transfer Devices:   pillows   in use  Taken 4/17/2021 2000 by Claudia Gill RN  Positioning/Transfer Devices:   pillows   in use  Intervention: Promote Functional Status  Recent Flowsheet Documentation  Taken 4/18/2021 0400 by Claudia Gill RN  Self-Care Promotion: independence encouraged  Taken 4/18/2021 0200 by Claudia Gill RN  Self-Care Promotion: independence encouraged  Taken 4/18/2021 0000 by Claudia Gill RN  Self-Care Promotion: independence encouraged  Taken 4/17/2021 2200 by Claudia Gill RN  Self-Care Promotion: independence encouraged  Taken 4/17/2021 2000 by Claudia Gill RN  Self-Care Promotion: independence encouraged      Problem: Pain (Hip Arthroplasty)  Goal: Acceptable Pain Control  Outcome: Ongoing, Progressing  Intervention: Prevent or Manage Pain  Recent Flowsheet Documentation  Taken 4/18/2021 0400 by Claudia Gill RN  Pain Management Interventions: quiet environment facilitated  Taken 4/18/2021 0200 by Claudia Gill RN  Pain Management Interventions: quiet environment facilitated  Taken 4/18/2021 0000 by Claudia Gill RN  Pain Management Interventions: quiet environment facilitated  Taken 4/17/2021 2326 by Claudia Gill RN  Pain Management Interventions:   quiet environment facilitated   see MAR  Taken 4/17/2021 2200 by Claudia Gill RN  Pain Management Interventions: quiet environment facilitated  Taken 4/17/2021 2000 by Claudia Gill RN  Pain Management Interventions: quiet environment facilitated  Diversional Activities: television     Problem: Postoperative Nausea and Vomiting (Hip Arthroplasty)  Goal: Nausea and Vomiting Relief  Outcome: Ongoing, Progressing     Problem: Postoperative Urinary Retention (Hip Arthroplasty)  Goal: Effective Urinary Elimination  Outcome: Ongoing, Progressing     Problem: Fall Injury Risk  Goal: Absence of Fall and Fall-Related Injury  Outcome: Ongoing, Progressing  Intervention: Identify and Manage Contributors to Fall Injury Risk  Recent Flowsheet Documentation  Taken 4/18/2021 0400 by Claudia Gill RN  Self-Care Promotion: independence encouraged  Taken 4/18/2021 0200 by Claudia Gill RN  Self-Care Promotion: independence encouraged  Taken 4/18/2021 0000 by Claudia Gill RN  Self-Care Promotion: independence encouraged  Taken 4/17/2021 2200 by Claudia Gill RN  Self-Care Promotion: independence encouraged  Taken 4/17/2021 2000 by Claudia Gill RN  Medication Review/Management: medications reviewed  Self-Care Promotion: independence encouraged  Intervention: Promote Injury-Free Environment  Recent Flowsheet Documentation  Taken 4/18/2021 0400 by Bridget  Claudia WAGNER RN  Safety Promotion/Fall Prevention:   activity supervised   assistive device/personal items within reach   safety round/check completed  Taken 4/18/2021 0200 by Claudia Gill RN  Safety Promotion/Fall Prevention:   activity supervised   assistive device/personal items within reach   safety round/check completed  Taken 4/18/2021 0000 by Claudia Gill RN  Safety Promotion/Fall Prevention:   activity supervised   assistive device/personal items within reach   safety round/check completed  Taken 4/17/2021 2200 by Claudia Gill RN  Safety Promotion/Fall Prevention:   activity supervised   assistive device/personal items within reach   safety round/check completed  Taken 4/17/2021 2000 by Claudia Gill RN  Safety Promotion/Fall Prevention:   activity supervised   assistive device/personal items within reach   safety round/check completed   clutter free environment maintained   fall prevention program maintained   gait belt   muscle strengthening facilitated   nonskid shoes/slippers when out of bed   room organization consistent   Pt current in bed resting quietly. No complaints of pain or discomfort at this time. Pt ambulatory on the unit and to toilet. Voiding well. Vitals WNL on room air. Dressing CDI with dried drainage to one dressing. Minimal swelling noted. No other observations at this time. Will continue to monitor, call bell in reach.

## 2021-04-18 NOTE — THERAPY TREATMENT NOTE
Patient Name: Jessica Winston  : 1960    MRN: [id number]                              Today's Date: 2021       Admit Date: 4/15/2021    Visit Dx:     ICD-10-CM ICD-9-CM   1. 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   • Acute blood loss anemia     Past Medical History:   Diagnosis Date   • Avascular necrosis (CMS/HCC)    • Depression    • Fractures    • HTN (hypertension)    • Hyperglycemia    • Hypertension    • Osteoarthritis    • RA (rheumatoid arthritis) (CMS/HCC)     ALSO REPORTS OA   • Tooth loose     top left    • Vitamin D deficiency    • Wears glasses     readers     Past Surgical History:   Procedure Laterality Date   • ARM TENDON REPAIR Left    • BREAST SURGERY     • ENDOSCOPY N/A 3/30/2017    Procedure: ESOPHAGOGASTRODUODENOSCOPY WITH COLD FORCEP BIOPSY;  Surgeon: Anca Trevino MD;  Location: [facility] ENDOSCOPY;  Service:    • FEMUR SURGERY      right    • HERNIA REPAIR Bilateral     INGUINAL- unsure about mesh    • KNEE SURGERY Left    • MANDIBLE SURGERY     • TUBAL ABDOMINAL LIGATION     • WISDOM TOOTH EXTRACTION       General Information     Row Name 21 150          Physical Therapy Time and Intention    Document Type  therapy note (daily note)  -CT     Mode of Treatment  physical therapy  -CT     Row Name 21 1507          General Information    Patient Profile Reviewed  yes  -CT     Prior Level of Function  independent:  -CT       User Key  (r) = Recorded By, (t) = Taken By, (c) = Cosigned By    Initials Name Provider Type    CT Ivan Park, ASHLEY Physical Therapist        Mobility     Row Name 21 9554          Bed Mobility    Bed Mobility   bed mobility (all) activities  -CT     All Activities, [place] (Bed Mobility)  independent  -CT     Supine-Sit [place] (Bed Mobility)  independent  -CT     Sit-Supine [place] (Bed Mobility)  independent  -CT     Row Name 04/18/21 1505          Bed-Chair Transfer    Bed-Chair [place] (Transfers)  independent  -CT     Row Name 04/18/21 1505          Sit-Stand Transfer    Sit-Stand [place] (Transfers)  independent  -CT     Assistive Device (Sit-Stand Transfers)  walker, front-wheeled  -CT     Row Name 04/18/21 1505          Gait/Stairs (Locomotion)    [place] Level (Gait)  independent  -CT     Assistive Device (Gait)  walker, front-wheeled  -CT     Distance in Feet (Gait)  180 ft with RW IND  -CT       User Key  (r) = Recorded By, (t) = Taken By, (c) = Cosigned By    Initials Name Provider Type    CT Ivan Park, ASHLEY Physical Therapist        Obj/Interventions     Row Name 04/18/21 1506          Balance    Balance Assessment  sitting static balance;sitting dynamic balance;standing static balance;standing dynamic balance  -CT     Static Sitting Balance  WNL  -CT     Dynamic Sitting Balance  WNL  -CT     Static Standing Balance  WNL  -CT     Dynamic Standing Balance  WNL;mild impairment  -CT     Balance Interventions  sitting;standing;sit to stand;tandem standing  -CT       User Key  (r) = Recorded By, (t) = Taken By, (c) = Cosigned By    Initials Name Provider Type    CT Ivan Park, ASHLEY Physical Therapist        Goals/Plan    No documentation.       Clinical Impression     Row Name 04/18/21 1506          Pain    Additional Documentation  Pain Scale: Numbers Pre/Post-Treatment (Group)  -CT     Row Name 04/18/21 1507          Pain Scale: Numbers Pre/Post-Treatment    Pretreatment Pain Rating  0/10 - no pain  -CT     Posttreatment Pain Rating  0/10 - no pain  -CT     Pain Intervention(s)  Ambulation/increased activity;Repositioned  -CT     Row Name 04/18/21 6135           Plan of Care Review    Plan of Care Reviewed With  patient  -CT     Progress  improving  -CT     Outcome Summary  Good gait quality to 10 ft with RW PWB ing on R.  Plan [facility] tomorrow;  -CT     Row Name 04/18/21 1507          Therapy Assessment/Plan (PT)    Rehab Potential (PT)  good, to achieve stated therapy goals  -CT     Criteria for Skilled Interventions Met (PT)  yes  -CT     Row Name 04/18/21 1507          Positioning and Restraints    Pre-Treatment Position  in bed  -CT     Post Treatment Position  chair  -CT     In Chair  notified nsg;reclined;sitting;call light within reach;encouraged to call for assist;legs elevated  -CT       User Key  (r) = Recorded By, (t) = Taken By, (c) = Cosigned By    Initials Name Provider Type    CT Ivan Park, ASHLEY Physical Therapist        Outcome Measures     Row Name 04/18/21 1508          How much help from another person do you currently need...    Turning from your back to your side while in flat bed without using bedrails?  4  -CT     Moving from lying on back to sitting on the side of a flat bed without bedrails?  4  -CT     Moving to and from a bed to a chair (including a wheelchair)?  4  -CT     Standing up from a chair using your arms (e.g., wheelchair, bedside chair)?  4  -CT     Climbing 3-5 steps with a railing?  4  -CT     To walk in hospital room?  3  -CT     AM-PAC 6 Clicks Score (PT)  23  -CT     Row Name 04/18/21 1508          PADD    Diagnosis  2  -CT     Gender  1  -CT     Age Group  2  -CT     Gait Distance  1  -CT     Assist Level  1  -CT     Home Support  3  -CT     PADD Score  10  -CT     Prediction by PADD Score  directly home (with home health or out-patient rehab)  -CT     Row Name 04/18/21 1508          Functional Assessment    Outcome Measure Options  AM-PAC 6 Clicks Basic Mobility (PT)  -CT       User Key  (r) = Recorded By, (t) = Taken By, (c) = Cosigned By    Initials Name Provider Type    CT Ivan Park, ASHLEY Physical  Therapist        Physical Therapy Education                 Title: PT OT SLP Therapies (Done)     Topic: Physical Therapy (Done)     Point: Mobility training (Done)     Learning Progress Summary           Patient Acceptance, TB, VU,DU by CT at 4/18/2021 1509    Acceptance, E,D, VU,DU by CT at 4/17/2021 1503    Nonacceptance, E,D, VU,DU by CT at 4/17/2021 1457    Acceptance, E,D,H, VU,DU by  at 4/16/2021 1340    Comment: Educated patient on bed mobility sequencing, safe hand placement with transfers, gait mechanics, ther ex, and plan for progression of care.    Acceptance, E,D, VU by LOUISE at 4/15/2021 1330    Comment: Educated on safe sequencing with bed mobility, ambulatory transfers, and gait. Reviewed HEP, hip precautions, and PWB precautions.   Family Acceptance, E,D, VU by LOUISE at 4/15/2021 1330    Comment: Educated on safe sequencing with bed mobility, ambulatory transfers, and gait. Reviewed HEP, hip precautions, and PWB precautions.                   Point: Home exercise program (Done)     Learning Progress Summary           Patient Acceptance, TB, VU,DU by CT at 4/18/2021 1509    Acceptance, E,D, VU,DU by CT at 4/17/2021 1503    Nonacceptance, E,D, VU,DU by CT at 4/17/2021 1457    Acceptance, E,D,H, VU,DU by  at 4/16/2021 1340    Comment: Educated patient on bed mobility sequencing, safe hand placement with transfers, gait mechanics, ther ex, and plan for progression of care.    Acceptance, E,D, VU by LOUISE at 4/15/2021 1330    Comment: Educated on safe sequencing with bed mobility, ambulatory transfers, and gait. Reviewed HEP, hip precautions, and PWB precautions.   Family Acceptance, E,D, VU by LOUISE at 4/15/2021 1330    Comment: Educated on safe sequencing with bed mobility, ambulatory transfers, and gait. Reviewed HEP, hip precautions, and PWB precautions.                   Point: Body mechanics (Done)     Learning Progress Summary           Patient Acceptance, TB, VU,DU by CT at 4/18/2021 1509    Acceptance,  E,D, VU,DU by CT at 4/17/2021 1503    Nonacceptance, E,D, VU,DU by CT at 4/17/2021 1457    Acceptance, E,D,H, VU,DU by CS at 4/16/2021 1340    Comment: Educated patient on bed mobility sequencing, safe hand placement with transfers, gait mechanics, ther ex, and plan for progression of care.    Acceptance, E,D, VU by LOUISE at 4/15/2021 1330    Comment: Educated on safe sequencing with bed mobility, ambulatory transfers, and gait. Reviewed HEP, hip precautions, and PWB precautions.   Family Acceptance, E,D, VU by LOUISE at 4/15/2021 1330    Comment: Educated on safe sequencing with bed mobility, ambulatory transfers, and gait. Reviewed HEP, hip precautions, and PWB precautions.                   Point: Precautions (Done)     Learning Progress Summary           Patient Acceptance, TB, VU,DU by CT at 4/18/2021 1509    Acceptance, E,D, VU,DU by CT at 4/17/2021 1503    Nonacceptance, E,D, VU,DU by CT at 4/17/2021 1457    Acceptance, E,D,H, VU,DU by CS at 4/16/2021 1340    Comment: Educated patient on bed mobility sequencing, safe hand placement with transfers, gait mechanics, ther ex, and plan for progression of care.    Acceptance, E,D, VU by LOUISE at 4/15/2021 1330    Comment: Educated on safe sequencing with bed mobility, ambulatory transfers, and gait. Reviewed HEP, hip precautions, and PWB precautions.   Family Acceptance, E,D, VU by LOUISE at 4/15/2021 1330    Comment: Educated on safe sequencing with bed mobility, ambulatory transfers, and gait. Reviewed HEP, hip precautions, and PWB precautions.                               User Key     Initials Effective Dates Name Provider Type Discipline    CT 06/19/15 -  Ivan Park, PT Physical Therapist PT    CS 03/26/19 -  Romy Garcia, PT Physical Therapist PT    LOUISE 09/10/19 -  Clarence Ken, PT Physical Therapist PT              PT Recommendation and Plan     Plan of Care Reviewed With: patient  Progress: improving  Outcome Summary: Good gait quality to 10 ft with RW  TYB ing on R.  Plan [facility] tomorrow;     Time Calculation:   PT Charges     Row Name 04/18/21 1512             Time Calculation    Start Time  1450  -CT      PT Received On  04/18/21  -CT         Time Calculation- PT    Total Timed Code Minutes- PT  25 minute(s)  -CT        User Key  (r) = Recorded By, (t) = Taken By, (c) = Cosigned By    Initials Name Provider Type    CT Ivan Park, PT Physical Therapist        Therapy Charges for Today     Code Description Service Date Service Provider Modifiers Qty    70926156702 HC GAIT TRAINING EA 15 MIN 4/17/2021 Ivan Park, PT GP 2    [id number] HC GAIT TRAINING EA 15 MIN 4/18/2021 Ivan Park, PT GP 2          PT G-Codes  Outcome Measure Options: AM-PAC 6 Clicks Basic Mobility (PT)  AM-PAC 6 Clicks Score (PT): 23  AM-PAC 6 Clicks Score (OT): 19    Ivan Park PT  4/18/2021

## 2021-04-18 NOTE — PROGRESS NOTES
"IM progress note      Jessica Winston  [id number]  1960     LOS: 3 days     Attending: Riaz Man MD    Primary Care Provider: Iraida Demarco APRN      Chief Complaint/Reason for visit:  No chief complaint on file.      Subjective   Doing better today. Pain control adequate. Denies f/c/n/v/sob/cp.    Objective        Visit Vitals  /90 (BP Location: Right arm, Patient Position: Lying)   Pulse 78   Temp 98.4 °F (36.9 °C) (Oral)   Resp 16   Ht 165.1 cm (65\")   Wt 51.3 kg (113 lb)   LMP 2010 (Approximate)   SpO2 95%   Breastfeeding No   BMI 18.80 kg/m²     Temp (24hrs), Av.2 °F (36.8 °C), Min:97.9 °F (36.6 °C), Max:98.4 °F (36.9 °C)      Intake/Output:    Intake/Output Summary (Last 24 hours) at 2021 1022  Last data filed at 2021 0735  Gross per 24 hour   Intake 240 ml   Output 1150 ml   Net -910 ml        Physical Therapy: improving  Outcome Summary: IND tx, 300 ft gait with RW PWB. OOB in chair.  Anixous to get to [facility].    Physical Exam:     General Appearance:    Alert, cooperative, in no acute distress   Head:    Normocephalic, without obvious abnormality, atraumatic    Lungs:     Normal effort, symmetric chest rise,  clear to      auscultation bilaterally              Heart:    Regular rhythm and normal rate, normal S1 and S2    Abdomen:     Normal bowel sounds, no masses, no organomegaly, soft        non-tender, non-distended, no guarding, no rebound                tenderness   Extremities:  Intact dressings over right hip incisions, swelling and ecchymosis noted.  No deformities.  Intact flexion dorsiflexion bilateral feet.   Pulses:   Pulses palpable and equal bilaterally   Skin:   No bleeding, bruising or rash          Results Review:     I reviewed the patient's new clinical results.   Results from last 7 days   Lab Units 21  0958 21  0824 21  0616 21  1241   WBC 10*3/mm3 8.78  --   --  11.18*   HEMOGLOBIN g/dL 8.8* 6.6* 6.1* 10.8* "   HEMATOCRIT % 27.6* 21.3* 19.5* 34.3   PLATELETS 10*3/mm3 357  --   --  633*     Results from last 7 days   Lab Units 04/17/21  0958 04/16/21  0616 04/13/21  1241   SODIUM mmol/L 139 135* 135*   POTASSIUM mmol/L 3.6 4.5 4.7   CHLORIDE mmol/L 104 104 100   CO2 mmol/L 27.0 23.0 23.0   BUN mg/dL 9 15 7   CREATININE mg/dL 0.72 0.88 0.74   CALCIUM mg/dL 8.5* 7.9* 9.8   BILIRUBIN mg/dL  --   --  0.3   ALK PHOS U/L  --   --  89   ALT (SGPT) U/L  --   --  11   AST (SGOT) U/L  --   --  15   GLUCOSE mg/dL 85 104* 157*     I reviewed the patient's new imaging including images and reports.    All medications reviewed.   acetaminophen, 1,000 mg, Oral, Q8H  aspirin, 81 mg, Oral, Q12H  buPROPion SR, 150 mg, Oral, Daily  doxycycline, 100 mg, Oral, Q12H  lisinopril, 10 mg, Oral, Daily  meloxicam, 15 mg, Oral, Daily  predniSONE, 10 mg, Oral, Daily  rOPINIRole, 0.5 mg, Oral, Nightly      HYDROmorphone, 0.5 mg, Q2H PRN   And  naloxone, 0.1 mg, Q5 Min PRN  hydrOXYzine, 25 mg, TID PRN  ketorolac, 15 mg, Q6H PRN  labetalol, 10 mg, Q4H PRN  ondansetron, 4 mg, Q6H PRN  oxyCODONE, 10 mg, Q4H PRN  oxyCODONE, 5 mg, Q4H PRN  sodium chloride, 500 mL, TID PRN  tiZANidine, 4 mg, Q8H PRN  traMADol, 50 mg, Q8H PRN        Assessment/Plan       Status post total hip replacement, right, 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 PT/OT,  Weight bearing as tolerated right LE.  Total hip precautions  2. Pain control-prns. Added muscle relaxant 4/17  3. IS-encourage  4. DVT proph- Mechanicals and aspirin  5. Bowel regimen  6. Resume home medications as appropriate  7. Monitor post-op labs  8. DC planning for [facility] tomorrow.   following. Apparently working through [employer].    Antiemetics PRN  Atarax PRN anxiety     - Hypertension:  Resume home  medications as appropriate, formulary substitution when indicated.  Holding parameters.  Prn medications for elevated blood pressure.     -RA: Stress dose of steroids postoperatively. Resume today.  Enbrel resumption when okay with Dr. Man.      Lakeisha Clemente, DENISE  04/18/21  10:22 EDT

## 2021-04-18 NOTE — PROGRESS NOTES
Orthopedic Progress Note      Patient: Jessica Winston  YOB: 1960     Date of Admission: 4/15/2021  6:20 AM Medical Record Number: [id number]     Attending Physician: Riaz Man MD    Status Post:  Procedure(s):  COVERSION TO RIGHT TOTAL HIP ARTHROPLASTY Post Operative Day Number: 3    Subjective : No new orthopaedic complaints     Pain Relief: some relief with present medication.     Systemic Complaints: No Complaints  Vitals:    04/17/21 1900 04/18/21 0100 04/18/21 0500 04/18/21 0735   BP: 133/81 134/81 147/82 130/90   BP Location: Right arm Right arm Right arm Right arm   Patient Position: Lying Lying Lying Lying   Pulse: 71 83 95 78   Resp: 16 16 16 16   Temp: 98.2 °F (36.8 °C) 97.9 °F (36.6 °C) 98.1 °F (36.7 °C) 98.4 °F (36.9 °C)   TempSrc: Axillary Axillary Axillary Oral   SpO2: 94% 95% 95% 95%   Weight:       Height:           Physical Exam: 60 y.o. female    General Appearance:       Alert, cooperative, in no acute distress                  Extremities:    Dressing Clean, Dry and Intact             No clinical sign of DVT        Able to do good movements of digits    Pulses:   Pulses palpable and equal bilaterally           Diagnostic Tests:     Results from last 7 days   Lab Units 04/17/21  0958 04/16/21  0824 04/16/21  0616 04/13/21  1241   WBC 10*3/mm3 8.78  --   --  11.18*   HEMOGLOBIN g/dL 8.8* 6.6* 6.1* 10.8*   HEMATOCRIT % 27.6* 21.3* 19.5* 34.3   PLATELETS 10*3/mm3 357  --   --  633*     Results from last 7 days   Lab Units 04/17/21  0958 04/16/21  0616 04/13/21  1241   SODIUM mmol/L 139 135* 135*   POTASSIUM mmol/L 3.6 4.5 4.7   CHLORIDE mmol/L 104 104 100   CO2 mmol/L 27.0 23.0 23.0   BUN mg/dL 9 15 7   CREATININE mg/dL 0.72 0.88 0.74   GLUCOSE mg/dL 85 104* 157*   CALCIUM mg/dL 8.5* 7.9* 9.8     Results from last 7 days   Lab Units 04/13/21  1241   INR  0.93   APTT seconds 24.2     Lab Results   Component Value Date    URICACID 5.7 08/12/2019     No results found for:  CRYSTAL  Microbiology Results (last 10 days)     Procedure Component Value - Date/Time    Tissue / Bone Culture - Tissue, Hip, Right [[id number]] Collected: 04/15/21 0921    Lab Status: Final result Specimen: Tissue from Hip, Right Updated: 04/18/21 0637     Tissue Culture No growth at 3 days     Gram Stain No WBCs or organisms seen    AFB Culture - Tissue, Hip, Right [[id number]] Collected: 04/15/21 0921    Lab Status: Preliminary result Specimen: Tissue from Hip, Right Updated: 04/16/21 1208     AFB Stain No acid fast bacilli seen on concentrated smear    Tissue / Bone Culture - Tissue, Hip, Right [[id number]] Collected: 04/15/21 0808    Lab Status: Final result Specimen: Tissue from Hip, Right Updated: 04/18/21 0637     Tissue Culture No growth at 3 days     Gram Stain No WBCs or organisms seen    AFB Culture - Tissue, Hip, Right [[id number]] Collected: 04/15/21 0808    Lab Status: Preliminary result Specimen: Tissue from Hip, Right Updated: 04/16/21 1208     AFB Stain No acid fast bacilli seen on concentrated smear    MRSA Screen, PCR (Inpatient) - Swab, Nares [[id number]]  (Normal) Collected: 04/13/21 1241    Lab Status: Final result Specimen: Swab from Nares Updated: 04/13/21 1517     MRSA PCR Negative    Narrative:      MRSA Negative    COVID PRE-OP / PRE-PROCEDURE SCREENING ORDER (NO ISOLATION) - Swab, Nasopharynx [[id number]]  (Normal) Collected: 04/13/21 1041    Lab Status: Final result Specimen: Swab from Nasopharynx Updated: 04/13/21 2009    Narrative:      The following orders were created for panel order COVID PRE-OP / PRE-PROCEDURE SCREENING ORDER (NO ISOLATION) - Swab, Nasopharynx.  Procedure                               Abnormality         Status                     ---------                               -----------         ------                     COVID-19, M,T,W, APTIMA ...[[id number]]  Normal              Final result                 Please view results for these tests on the individual orders.     COVID-19, M,T,W, APTIMA [facility] IN-HOUSE NP/OP SWAB IN UTM/VTM/SALINE TRANSPORT MEDIA 24HR TAT - Swab, Nasopharynx [[id number]]  (Normal) Collected: 04/13/21 1041    Lab Status: Final result Specimen: Swab from Nasopharynx Updated: 04/13/21 2009     COVID19 Not Detected    Narrative:      Fact sheet for providers: https://www.fda.gov/media/318491/download     Fact sheet for patients: https://www.fda.gov/media/360641/download    Test performed by RT PCR.        XR Chest 2 View    Result Date: 4/13/2021  Chronic emphysematous changes of the lung fields, otherwise without evidence of acute cardiopulmonary abnormality.  This report was finalized on 4/13/2021 2:39 PM by Manny Ramirez.      FL C Arm During Surgery    Result Date: 4/15/2021  57 seconds of fluoroscopy time provided with 2 images stored during right hip arthroplasty  This report was finalized on 4/15/2021 11:12 AM by Manny Ramirez.      XR Hip With or Without Pelvis 2 - 3 View Right    Result Date: 4/15/2021  Total right hip prosthesis in anatomic alignment.  D:  04/15/2021 E:  04/15/2021    This report was finalized on 4/15/2021 9:39 PM by Dr. Neeraj Moore MD.          Current Medications:  Scheduled Meds:acetaminophen, 1,000 mg, Oral, Q8H  aspirin, 81 mg, Oral, Q12H  buPROPion SR, 150 mg, Oral, Daily  doxycycline, 100 mg, Oral, Q12H  lisinopril, 10 mg, Oral, Daily  meloxicam, 15 mg, Oral, Daily  predniSONE, 10 mg, Oral, Daily  rOPINIRole, 0.5 mg, Oral, Nightly      Continuous Infusions:lactated ringers, 9 mL/hr, Last Rate: 1,300 mL/hr (04/15/21 1018)  lactated ringers, 100 mL/hr, Last Rate: 100 mL/hr (04/15/21 1229)      PRN Meds:.HYDROmorphone **AND** naloxone  •  hydrOXYzine  •  ketorolac  •  labetalol  •  ondansetron  •  oxyCODONE  •  oxyCODONE  •  sodium chloride  •  tiZANidine  •  traMADol    Assessment: Status post  UT REVISE TOTAL HIP REPLACEMENT [02152] (COVERSION TO RIGHT TOTAL HIP ARTHROPLASTY)    Patient Active Problem List   Diagnosis      • RUQ pain   • Cardiac mass   • PVD (peripheral vascular disease) with claudication (CMS/HCC)   • Tobacco dependence   • Pain in right hip   • HTN (hypertension)   • Rheumatoid arthritis (CMS/HCC)   • Status post total hip replacement, right, 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    Riaz Man MD    Date: 4/18/2021    Time: 10:32 EDT

## 2021-04-18 NOTE — PLAN OF CARE
Goal Outcome Evaluation:  Plan of Care Reviewed With: patient  Progress: improving  Outcome Summary: Good gait quality to 10 ft with RW PWB ing on R.  Plan [facility] tomorrow;

## 2021-04-19 VITALS
HEIGHT: 65 IN | HEART RATE: 82 BPM | BODY MASS INDEX: 18.83 KG/M2 | RESPIRATION RATE: 20 BRPM | TEMPERATURE: 98 F | DIASTOLIC BLOOD PRESSURE: 87 MMHG | SYSTOLIC BLOOD PRESSURE: 129 MMHG | WEIGHT: 113 LBS | OXYGEN SATURATION: 94 %

## 2021-04-19 PROBLEM — G89.18 POSTOPERATIVE PAIN: Status: ACTIVE | Noted: 2021-04-19

## 2021-04-19 PROCEDURE — 97535 SELF CARE MNGMENT TRAINING: CPT | Performed by: OCCUPATIONAL THERAPIST

## 2021-04-19 PROCEDURE — 97116 GAIT TRAINING THERAPY: CPT | Performed by: PHYSICAL THERAPIST

## 2021-04-19 PROCEDURE — 97110 THERAPEUTIC EXERCISES: CPT | Performed by: PHYSICAL THERAPIST

## 2021-04-19 PROCEDURE — 63710000001 PREDNISONE PER 5 MG: Performed by: INTERNAL MEDICINE

## 2021-04-19 PROCEDURE — 94799 UNLISTED PULMONARY SVC/PX: CPT

## 2021-04-19 RX ORDER — DOXYCYCLINE HYCLATE 100 MG/1
100 TABLET, DELAYED RELEASE ORAL 2 TIMES DAILY
Qty: 28 TABLET | Refills: 0
Start: 2021-04-19 | End: 2021-05-03

## 2021-04-19 RX ORDER — ASPIRIN 81 MG/1
81 TABLET, DELAYED RELEASE ORAL EVERY 12 HOURS
Qty: 84 TABLET | Refills: 0
Start: 2021-04-19 | End: 2021-05-31

## 2021-04-19 RX ORDER — TRAMADOL HYDROCHLORIDE 50 MG/1
50 TABLET ORAL EVERY 6 HOURS PRN
Start: 2021-04-19 | End: 2021-06-04 | Stop reason: HOSPADM

## 2021-04-19 RX ORDER — OXYCODONE HYDROCHLORIDE 5 MG/1
5 TABLET ORAL EVERY 4 HOURS PRN
Refills: 0
Start: 2021-04-19 | End: 2021-06-04 | Stop reason: HOSPADM

## 2021-04-19 RX ORDER — DOCUSATE SODIUM 100 MG/1
100 CAPSULE, LIQUID FILLED ORAL 2 TIMES DAILY
Start: 2021-04-19 | End: 2021-06-04 | Stop reason: HOSPADM

## 2021-04-19 RX ORDER — ACETAMINOPHEN 500 MG
1000 TABLET ORAL EVERY 8 HOURS
Qty: 42 TABLET | Refills: 0
Start: 2021-04-19 | End: 2021-04-26

## 2021-04-19 RX ORDER — ONDANSETRON 4 MG/1
4 TABLET, FILM COATED ORAL EVERY 8 HOURS PRN
Start: 2021-04-19 | End: 2021-06-04 | Stop reason: HOSPADM

## 2021-04-19 RX ADMIN — LISINOPRIL 10 MG: 10 TABLET ORAL at 08:06

## 2021-04-19 RX ADMIN — ASPIRIN 81 MG: 81 TABLET, COATED ORAL at 08:06

## 2021-04-19 RX ADMIN — TRAMADOL HYDROCHLORIDE 50 MG: 50 TABLET, FILM COATED ORAL at 02:20

## 2021-04-19 RX ADMIN — OXYCODONE 10 MG: 5 TABLET ORAL at 14:04

## 2021-04-19 RX ADMIN — OXYCODONE 10 MG: 5 TABLET ORAL at 04:46

## 2021-04-19 RX ADMIN — OXYCODONE 10 MG: 5 TABLET ORAL at 00:09

## 2021-04-19 RX ADMIN — OXYCODONE 10 MG: 5 TABLET ORAL at 08:33

## 2021-04-19 RX ADMIN — BUPROPION HYDROCHLORIDE 150 MG: 150 TABLET, EXTENDED RELEASE ORAL at 08:06

## 2021-04-19 RX ADMIN — ACETAMINOPHEN 1000 MG: 500 TABLET, FILM COATED ORAL at 04:46

## 2021-04-19 RX ADMIN — PREDNISONE 10 MG: 10 TABLET ORAL at 08:07

## 2021-04-19 RX ADMIN — DOXYCYCLINE 100 MG: 100 CAPSULE ORAL at 08:06

## 2021-04-19 RX ADMIN — ACETAMINOPHEN 1000 MG: 500 TABLET, FILM COATED ORAL at 14:04

## 2021-04-19 RX ADMIN — MELOXICAM 15 MG: 7.5 TABLET ORAL at 08:06

## 2021-04-19 NOTE — PLAN OF CARE
Pt ambulated 300ft assist x1 gait belt and walker. C/o burning pain RLE, PRN vida 10mgx1 tramadol 50mg x1, and ice packs utilized. C/o RLE spasms as well PRN zanaflex effective. Dr Man and Y tra. Plan to d/c to [facility] 4-19-21, transport to be determined.         Goal Outcome Evaluation:

## 2021-04-19 NOTE — PROGRESS NOTES
Case Management Discharge Note      Final Note: Ms. Winston has an inpatient rehab bed at [facility] today, if medically ready.  Notified Ms. Winston at the bedside and she is agreeable to transfer and is agreeable to using the [facility] for transport to [facility].  [facility] is scheduled at 2:30pm today.  Please have Ms. Winston at the [address] by transport time.  Call report to [facility] at ph [phone] and have a copy of the DC summary and AVS in the DC packet. Thanks.    Provided Post Acute Provider List?: Yes  Post Acute Provider List: Inpatient Rehab  Provided Post Acute Provider Quality & Resource List?: Yes  Post Acute Provider Quality and Resource List: Inpatient Rehab  Delivered To: Patient  Method of Delivery: In person    Selected Continued Care - Admitted Since 4/15/2021     Destination Coordination complete    Service Provider Selected Services Address Phone Fax Patient Preferred    [facility]  Inpatient Rehabilitation [address] [address] [phone] --          Durable Medical Equipment    No services have been selected for the patient.              Dialysis/Infusion    No services have been selected for the patient.              Home Medical Care    No services have been selected for the patient.              Therapy    No services have been selected for the patient.              Community Resources    No services have been selected for the patient.                  Transportation Services  Ambulance: [facility] Transportation    Final Discharge Disposition Code: 62 - inpatient rehab facility

## 2021-04-19 NOTE — PLAN OF CARE
Goal Outcome Evaluation:  Plan of Care Reviewed With: patient  Progress: improving  Outcome Summary: Pt able to amb 150' with RW CGA while maintaining PWB status on R LE. As patient fatigues with gait, patient's balance does become unsteady but patient has no LOB. Pt requires min assist for supine <> sit for bed mobility. Mobility limited due to fatigue and pain. Recommend patient to IP rehab due to balance deficits, history of falls, and living alone and needing assistance.

## 2021-04-19 NOTE — PLAN OF CARE
Goal Outcome Evaluation:  Plan of Care Reviewed With: patient  Progress: improving   Discharge to rehab

## 2021-04-19 NOTE — DISCHARGE INSTRUCTIONS
INCISION CARE Revision Hip and Knee:  1. You have a sterile dressing in place. Only exchange the dressing if it becomes saturated (fluid draining out the sides of dressing) and notify the office. The dressing is water resistant. During the first 7 days after surgery, it is ok to shower. DO NOT scrub on or around the dressing. Do not submerge the dressing.  2. After 7 days, you can remove your dressing. You will have staples in place on your skin. It is OK to shower with the incision exposed. DO NOT scrub on or around the incision. DO NOT submerge the incision in pools, baths, or hot tubs for 1 month after surgery. Do not touch or pick at the incision. If you have any drainage from the incision after 7 days, cover the incision with sterile gauze and paper tape and alert the office.   3. Staples will be removed between 10-14 days postoperation.  This may be done by either the home health nurse, rehabilitation nurse, or during your return visit to Dr. Man's office.  4. No creams or ointments to the incision for 1 month after surgery. After 1 month, it is recommended to massage the scar with vitamin E cream to help decrease scar formation.  5. Check incision every day and notify surgeon immediately if any of the following signs or symptoms are noted:  o Increase in redness  o Increase in swelling around the incision and of the entire extremity  o Increase in pain  o Drainage oozing from the incision  o Pulling apart of the edges of the incision  o Increase in overall body temperature (greater than 100.5 degrees)    Anticoagulants: You will be discharged on an anticoagulant. This is a prophylactic medication that helps prevent blood clots during your post-operative period. Most patients will be on  Aspirin 81 mg Enteric coated every 12 hours orally for 30 days. Some patients, due to increased risk factors, will need to be on a stronger anticoagulant. Dr. Man will discuss this need with you.   ? While taking the  anticoagulant, you should avoid taking any additional aspirin, and limit ibuprofen (Advil or Motrin), Aleve (Naprosyn) or other non-steroidal anti-inflammatory medications.   ? Notify your surgeon immediately if any naye bleeding is noted in the urine, stool, vomit, or from the nose or the incision. Blood in the stool will often appear as black rather than red. Blood in urine may appear as pink. Blood in vomit may appear as brown/black like coffee grounds.  ? You will need to apply pressure for longer periods of time to any cuts or abrasions to stop bleeding  ? Avoid alcohol while taking anticoagulants  Sequential Compression Device: You maybe be discharged home with a compression device that helps promote blood flow and prevent clots in your legs. Wear these at all times for the first two weeks.   Mobilization: The best way to avoid a blood clot is to get up and walk. 10 times a day get up and walk for 5 minutes for the first two weeks. Walking for longer periods of time will increase pain and swelling, making therapy more difficult. If taking any long travel (car or plane) in the first 1-2 months, be sure to get up and walk at least every one hour.     Stool Softeners: You will be at greater risk of constipation after surgery because of being less mobile and taking the pain medications.   ? Take stool softeners as instructed by your surgeon while on pain medications. Use over the counter Colace 100 mg 1-2 capsules twice daily.   ? If stools become too loose or too frequent, decreases the dosage or stop the stool softener.  ? If constipation occurs despite use of stool softeners, you are to continue the stool softeners and add a laxative (Milk of Magnesia 1 ounce daily as needed).  ? Dulcolax oral tabs or suppository or a fleets enema can also be utilized for constipation and can be obtained over the counter.   ? If above interventions are unsuccessful in inducing bowel movements, please contact your family  physician's office/surgeon's office.  ? Drink plenty of fluids and eat fruits and vegetables during your recovery time    Pain Medications utilized after surgery are narcotics and the law requires that the following information be given to all patients that are prescribed narcotics:  ? CLASSIFICATION: Pain medications are called Opioids and are narcotics  ? LEGALITIES: It is illegal to share narcotics with others and to drive within 24 hours of taking narcotics  ? POTENTIAL SIDE EFFECTS: Potential side effects of opioids include: nausea, vomiting, itching, dizziness, drowsiness, dry mouth, constipation, and difficulty urinating.  ? POTENTIAL ADVERSE EFFECTS:   o Opioid tolerance can develop with use of pain medications and this simply means that it requires more and more of the medication to control pain; however, this is seen more in patients that use Opioids for longer periods of time.  o Opioid dependence can develop with use of Opioids and this simply means that to stop the medication can cause withdrawal symptoms; however, this is seen with patients that use Opioids for longer periods of time.  o Opioid addiction can develop with use of Opioids and the incidence of this is very unlikely in patients who take the medications as ordered and stop the medications as instructed.  o Opioid overdose can be dangerous, but is unlikely when the medication is taken as ordered and stopped when ordered. It is important not to mix opioids with alcohol or with any type of sedative, such as Benadryl, as this can lead to over sedation and respiratory difficulty.  ? DOSAGE:   o Pain medications may be needed consistently for the first week to decrease pain and promote adequate pain relief and participation in physical therapy.  o After the initial surgical pain begins to resolve, you may begin to decrease the pain medication and only take it as needed. By the end of 6 weeks, you should be off of pain medications.  o You can  decrease your pain medication consumption by slowly spacing out the time in between the medication, and using 650mg Tylenol when the pain is not as severe. Do not exceed 3500mg of Tylenol in 24 hours.   o Refills will not be given by the office during evening hours, on weekends, or after 6 weeks post-op.  o To seek refills on pain medications during the initial 6 week post-operative period, you must call the office 48 hours in advance to request the refill. The office will then notify you when to  the prescription. DO NOT wait until you are out of the medication to request a refill.

## 2021-04-19 NOTE — THERAPY TREATMENT NOTE
Patient Name: Jessica Winston  : 1960    MRN: [id number]                              Today's Date: 2021       Admit Date: 4/15/2021    Visit Dx:     ICD-10-CM ICD-9-CM   1. 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   • Acute blood loss anemia     Past Medical History:   Diagnosis Date   • Avascular necrosis (CMS/HCC)    • Depression    • Fractures    • HTN (hypertension)    • Hyperglycemia    • Hypertension    • Osteoarthritis    • RA (rheumatoid arthritis) (CMS/HCC)     ALSO REPORTS OA   • Tooth loose     top left    • Vitamin D deficiency    • Wears glasses     readers     Past Surgical History:   Procedure Laterality Date   • ARM TENDON REPAIR Left    • BREAST SURGERY     • ENDOSCOPY N/A 3/30/2017    Procedure: ESOPHAGOGASTRODUODENOSCOPY WITH COLD FORCEP BIOPSY;  Surgeon: Anca Trevino MD;  Location: [facility];  Service:    • FEMUR SURGERY      right    • HERNIA REPAIR Bilateral     INGUINAL- unsure about mesh    • KNEE SURGERY Left    • MANDIBLE SURGERY     • TUBAL ABDOMINAL LIGATION     • WISDOM TOOTH EXTRACTION       General Information     Row Name 21 0852          Physical Therapy Time and Intention    Document Type  therapy note (daily note)  -CS     Mode of Treatment  physical therapy  -CS     Row Name 21 0852          General Information    Patient Profile Reviewed  yes  -CS     Existing Precautions/Restrictions  fall;right;hip, anterior;partial weight bearing;other (see comments) R LE PWB  -CS     Row Name 21 0852          Cognition    Orientation Status  (Cognition)  oriented x 4  -CS     Row Name 04/19/21 0852          Safety Issues, Functional Mobility    Safety Issues Affecting Function (Mobility)  safety precautions follow-through/compliance;safety precaution awareness;insight into deficits/self-awareness  -     Impairments Affecting Function (Mobility)  balance;endurance/activity tolerance;pain;strength;range of motion (ROM)  -       User Key  (r) = Recorded By, (t) = Taken By, (c) = Cosigned By    Initials Name Provider Type    CS Romy Garcia, ASHLEY Physical Therapist        Mobility     Row Name 04/19/21 0852          Bed Mobility    Bed Mobility  supine-sit;sit-supine;scooting/bridging  -CS     Scooting/Bridging [place] (Bed Mobility)  verbal cues;standby assist  -CS     Supine-Sit [place] (Bed Mobility)  verbal cues;minimum assist (75% patient effort)  -CS     Sit-Supine [place] (Bed Mobility)  verbal cues;minimum assist (75% patient effort)  -CS     Assistive Device (Bed Mobility)  head of bed elevated;bed rails  -CS     Comment (Bed Mobility)  VC's to move LE's to EOB and to push off of bed to raise trunk. Pt needed min assist to move R LE to EOB and to raise R LE into bed.  -CS     Row Name 04/19/21 0852          Transfers    Comment (Transfers)  VC's to push off of bed to stand and to reach back for bed to sit. VC's to step R LE out with sitting for comfort. VC's to maintain PWB on R LE.  -     Row Name 04/19/21 0852          Sit-Stand Transfer    Sit-Stand [place] (Transfers)  verbal cues;standby assist  -     Assistive Device (Sit-Stand Transfers)  walker, front-wheeled  -     Row Name 04/19/21 0852          Gait/Stairs (Locomotion)    [place] Level (Gait)  verbal cues;contact guard  -     Assistive Device (Gait)  walker, front-wheeled  -     Distance in Feet (Gait)  150'  -     Deviations/Abnormal Patterns (Gait)  bilateral deviations;queta decreased;gait speed decreased;stride length decreased  -      Bilateral Gait Deviations  forward flexed posture  -     Right Sided Gait Deviations  heel strike decreased;weight shift ability decreased Weight shift limited due to PWB  -     [place] Level (Stairs)  not tested  -CS     Comment (Gait/Stairs)  Pt able to amb with slow, step through gait mechanics with RW. Pt able to maintain PWB throughout entire treatment session. As patient fatigues, patient's balance does become slightly unsteady. Pt had no LOB. VC's to stay in walker and to maintain upright posture. Pt able to maintain PWB on R LE with gait. Gait limited due to fatigue.  -     Row Name 04/19/21 0852          Mobility    Extremity Weight-bearing Status  right lower extremity  -     Right Lower Extremity (Weight-bearing Status)  partial weight-bearing (PWB)  -       User Key  (r) = Recorded By, (t) = Taken By, (c) = Cosigned By    Initials Name Provider Type    Romy Rodrigues PT Physical Therapist        Obj/Interventions     Row Name 04/19/21 0852          Motor Skills    Therapeutic Exercise  hip;knee;ankle  -     Row Name 04/19/21 0852          Hip (Therapeutic Exercise)    Hip Isometrics (Therapeutic Exercise)  bilateral;gluteal sets;5 repetitions;10 repetitions  -     Hip Strengthening (Therapeutic Exercise)  right;aBduction;aDduction;10 repetitions;5 repetitions  -     Row Name 04/19/21 0852          Knee (Therapeutic Exercise)    Knee Isometrics (Therapeutic Exercise)  bilateral;quad sets;5 repetitions;10 repetitions  -     Knee Strengthening (Therapeutic Exercise)  right;heel slides;LAQ (long arc quad);10 repetitions;5 repetitions  -     Row Name 04/19/21 0852          Ankle (Therapeutic Exercise)    Ankle AROM (Therapeutic Exercise)  bilateral;dorsiflexion;plantarflexion;10 repetitions  -     Row Name 04/19/21 0852          Balance    Balance Assessment  sitting static balance;standing static balance;standing dynamic balance  -     Static Sitting Balance   WFL;unsupported;sitting, edge of bed  -CS     Static Standing Balance  WFL;supported;standing  -CS     Dynamic Standing Balance  mild impairment;supported;standing  -CS       User Key  (r) = Recorded By, (t) = Taken By, (c) = Cosigned By    Initials Name Provider Type    CS Romy Garcia, PT Physical Therapist        Goals/Plan     Row Name 04/19/21 0852          Bed Mobility Goal 1 (PT)    Activity/Assistive Device (Bed Mobility Goal 1, PT)  sit to supine/supine to sit  -CS     [place] Level/Cues Needed (Bed Mobility Goal 1, PT)  modified independence  -CS     Time Frame (Bed Mobility Goal 1, PT)  long term goal (LTG);3 days  -CS     Progress/Outcomes (Bed Mobility Goal 1, PT)  goal ongoing  -CS     Row Name 04/19/21 0852          Transfer Goal 1 (PT)    Activity/Assistive Device (Transfer Goal 1, PT)  sit-to-stand/stand-to-sit;walker, rolling  -CS     [place] Level/Cues Needed (Transfer Goal 1, PT)  modified independence  -CS     Time Frame (Transfer Goal 1, PT)  long term goal (LTG);3 days  -CS     Progress/Outcome (Transfer Goal 1, PT)  goal ongoing  -CS     Row Name 04/19/21 0852          Gait Training Goal 1 (PT)    Activity/Assistive Device (Gait Training Goal 1, PT)  gait (walking locomotion);walker, rolling  -CS     [place] Level (Gait Training Goal 1, PT)  modified independence  -CS     Distance (Gait Training Goal 1, PT)  500'  -CS     Time Frame (Gait Training Goal 1, PT)  long term goal (LTG);3 days  -CS     Progress/Outcome (Gait Training Goal 1, PT)  goal met;goal revised this date;goal ongoing  -CS       User Key  (r) = Recorded By, (t) = Taken By, (c) = Cosigned By    Initials Name Provider Type    Romy Rodrigues PT Physical Therapist        Clinical Impression     Row Name 04/19/21 0852          Pain    Additional Documentation  Pain Scale: Numbers Pre/Post-Treatment (Group)  -CS     Row Name 04/19/21 0852          Pain Scale: Numbers Pre/Post-Treatment    Pretreatment  Pain Rating  5/10  -CS     Posttreatment Pain Rating  5/10  -CS     Pain Location - Side  Right  -CS     Pain Location - Orientation  incisional  -CS     Pain Location  hip  -CS     Pain Intervention(s)  Repositioned;Ambulation/increased activity  -CS     Row Name 04/19/21 0852          Plan of Care Review    Plan of Care Reviewed With  patient  -CS     Progress  improving  -CS     Outcome Summary  Pt able to amb 150' with RW CGA while maintaining PWB status on R LE. As patient fatigues with gait, patient's balance does become unsteady but patient has no LOB. Pt requires min assist for supine <> sit for bed mobility. Mobility limited due to fatigue and pain. Recommend patient to IP rehab due to balance deficits, history of falls, and living alone and needing assistance.  -CS     Row Name 04/19/21 0852          Therapy Assessment/Plan (PT)    Rehab Potential (PT)  good, to achieve stated therapy goals  -CS     Criteria for Skilled Interventions Met (PT)  yes  -CS     Row Name 04/19/21 0852          Positioning and Restraints    Pre-Treatment Position  in bed  -CS     Post Treatment Position  bed  -CS     In Bed  notified nsg;fowlers;call light within reach;encouraged to call for assist;side rails up x2 Nsg aware of no bed alarm on  -CS       User Key  (r) = Recorded By, (t) = Taken By, (c) = Cosigned By    Initials Name Provider Type    CS Romy Garcia, PT Physical Therapist        Outcome Measures     Row Name 04/19/21 0852          How much help from another person do you currently need...    Turning from your back to your side while in flat bed without using bedrails?  3  -CS     Moving from lying on back to sitting on the side of a flat bed without bedrails?  3  -CS     Moving to and from a bed to a chair (including a wheelchair)?  3  -CS     Standing up from a chair using your arms (e.g., wheelchair, bedside chair)?  3  -CS     Climbing 3-5 steps with a railing?  3  -CS     To walk in hospital room?  3   -     AM-PAC 6 Clicks Score (PT)  18  -CS     Row Name 04/19/21 0852          Functional Assessment    Outcome Measure Options  AM-PAC 6 Clicks Basic Mobility (PT)  -       User Key  (r) = Recorded By, (t) = Taken By, (c) = Cosigned By    Initials Name Provider Type    Romy Rodrigues PT Physical Therapist        Physical Therapy Education                 Title: PT OT SLP Therapies (Done)     Topic: Physical Therapy (Done)     Point: Mobility training (Done)     Learning Progress Summary           Patient Acceptance, E,D, VU,DU by  at 4/19/2021 0852    Comment: Educated patient on bed mobility sequencing, safe hand placement on transfers, gait mechanics, ther ex, and plan for progression of  care.    Acceptance, TB, VU,DU by CT at 4/18/2021 1509    Acceptance, E,D, VU,DU by CT at 4/17/2021 1503    Nonacceptance, E,D, VU,DU by CT at 4/17/2021 1457    Acceptance, E,D,H, VU,DU by CS at 4/16/2021 1340    Comment: Educated patient on bed mobility sequencing, safe hand placement with transfers, gait mechanics, ther ex, and plan for progression of care.    Acceptance, E,D, VU by LOUISE at 4/15/2021 1330    Comment: Educated on safe sequencing with bed mobility, ambulatory transfers, and gait. Reviewed HEP, hip precautions, and PWB precautions.   Family Acceptance, E,D, VU by LOUISE at 4/15/2021 1330    Comment: Educated on safe sequencing with bed mobility, ambulatory transfers, and gait. Reviewed HEP, hip precautions, and PWB precautions.                   Point: Home exercise program (Done)     Learning Progress Summary           Patient Acceptance, E,D, VU,DU by  at 4/19/2021 0852    Comment: Educated patient on bed mobility sequencing, safe hand placement on transfers, gait mechanics, ther ex, and plan for progression of  care.    Acceptance, TB, VU,DU by CT at 4/18/2021 1509    Acceptance, E,D, VU,DU by CT at 4/17/2021 1503    Nonacceptance, E,D, VU,DU by CT at 4/17/2021 1457    Acceptance, E,D,H, VU,DU by   at 4/16/2021 1340    Comment: Educated patient on bed mobility sequencing, safe hand placement with transfers, gait mechanics, ther ex, and plan for progression of care.    Acceptance, E,D, VU by LOUISE at 4/15/2021 1330    Comment: Educated on safe sequencing with bed mobility, ambulatory transfers, and gait. Reviewed HEP, hip precautions, and PWB precautions.   Family Acceptance, E,D, VU by LOUISE at 4/15/2021 1330    Comment: Educated on safe sequencing with bed mobility, ambulatory transfers, and gait. Reviewed HEP, hip precautions, and PWB precautions.                   Point: Body mechanics (Done)     Learning Progress Summary           Patient Acceptance, E,D, VU,DU by CS at 4/19/2021 0852    Comment: Educated patient on bed mobility sequencing, safe hand placement on transfers, gait mechanics, ther ex, and plan for progression of  care.    Acceptance, TB, VU,DU by CT at 4/18/2021 1509    Acceptance, E,D, VU,DU by CT at 4/17/2021 1503    Nonacceptance, E,D, VU,DU by CT at 4/17/2021 1457    Acceptance, E,D,H, VU,DU by CS at 4/16/2021 1340    Comment: Educated patient on bed mobility sequencing, safe hand placement with transfers, gait mechanics, ther ex, and plan for progression of care.    Acceptance, E,D, VU by LOUISE at 4/15/2021 1330    Comment: Educated on safe sequencing with bed mobility, ambulatory transfers, and gait. Reviewed HEP, hip precautions, and PWB precautions.   Family Acceptance, E,D, VU by LOUISE at 4/15/2021 1330    Comment: Educated on safe sequencing with bed mobility, ambulatory transfers, and gait. Reviewed HEP, hip precautions, and PWB precautions.                   Point: Precautions (Done)     Learning Progress Summary           Patient Acceptance, E,D, VU,DU by CS at 4/19/2021 0852    Comment: Educated patient on bed mobility sequencing, safe hand placement on transfers, gait mechanics, ther ex, and plan for progression of  care.    Acceptance, TB, VU,DU by CT at 4/18/2021 1509    Acceptance, E,D,  VU,DU by CT at 4/17/2021 1503    Nonacceptance, E,D, VU,DU by CT at 4/17/2021 1457    Acceptance, E,D,H, VU,DU by  at 4/16/2021 1340    Comment: Educated patient on bed mobility sequencing, safe hand placement with transfers, gait mechanics, ther ex, and plan for progression of care.    Acceptance, E,D, VU by LOUISE at 4/15/2021 1330    Comment: Educated on safe sequencing with bed mobility, ambulatory transfers, and gait. Reviewed HEP, hip precautions, and PWB precautions.   Family Acceptance, E,D, VU by LOUISE at 4/15/2021 1330    Comment: Educated on safe sequencing with bed mobility, ambulatory transfers, and gait. Reviewed HEP, hip precautions, and PWB precautions.                               User Key     Initials Effective Dates Name Provider Type Discipline    CT 06/19/15 -  Ivan Park, PT Physical Therapist PT    CS 03/26/19 -  Romy Garcia, PT Physical Therapist PT    LOUISE 09/10/19 -  Clarence Ken, PT Physical Therapist PT              PT Recommendation and Plan  Planned Therapy Interventions (PT): balance training, bed mobility training, home exercise program, gait training, neuromuscular re-education, patient/family education, ROM (range of motion), strengthening, transfer training  Plan of Care Reviewed With: patient  Progress: improving  Outcome Summary: Pt able to amb 150' with RW CGA while maintaining PWB status on R LE. As patient fatigues with gait, patient's balance does become unsteady but patient has no LOB. Pt requires min assist for supine <> sit for bed mobility. Mobility limited due to fatigue and pain. Recommend patient to IP rehab due to balance deficits, history of falls, and living alone and needing assistance.     Time Calculation:   PT Charges     Row Name 04/19/21 0852             Time Calculation    Start Time  0852  -      PT Received On  04/19/21  -         Time Calculation- PT    Total Timed Code Minutes- PT  23 minute(s)  -         Timed Charges    03014 - PT  Therapeutic Exercise Minutes  8  -CS      53045 - Gait Training Minutes   15  -CS        User Key  (r) = Recorded By, (t) = Taken By, (c) = Cosigned By    Initials Name Provider Type    Romy Rodrigues, ASHLEY Physical Therapist        Therapy Charges for Today     Code Description Service Date Service Provider Modifiers Qty    [id number]  PT THER PROC EA 15 MIN 4/19/2021 Romy Garcia, PT GP 1    [id number] HC GAIT TRAINING EA 15 MIN 4/19/2021 Romy Garcia, PT GP 1          PT G-Codes  Outcome Measure Options: AM-PAC 6 Clicks Basic Mobility (PT)  AM-PAC 6 Clicks Score (PT): 18  AM-PAC 6 Clicks Score (OT): 19    Romy Garcia PT  4/19/2021

## 2021-04-19 NOTE — DISCHARGE SUMMARY
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 female presented for scheduled surgery by Dr. Man.     Per his note (60 y.o. female who was admitted to [facility] about 1 year ago patient had a fall and sustained a intertrochanteric hip fracture that was fixed at outside facility [facility].  Unfortunately the patient went on to a presignificant malunion with screw cut out of the femoral head and erosion of the femoral head with screw impingement into the acetabulum.  She became progressively and more pain and difficulty with ambulation and daily functions I met her she was mostly wheelchair-bound and had pain with any motion of her hip.  She was a active smoker a long discussion about smoking cessation prior to surgery in order to improve her chance had a positive outcome.  We discussed when she was at risk for things like infection or fracture due to her smoking and wound healing issues she was also rheumatoid arthritis .  Unfortunately patient progressed with significant pain and difficulty with ambulation and daily function.  The patient was indicated for a conversion to total hip arthroplasty. Likely risk benefits of the procedure including but not limited to infection, DVT, pulmonary embolism, leg length discrepancy, recurrent dislocation, possibility of injury to nerves and vessels, and periprosthetic fractures have  been discussed with the patient and her daughter in detail. Despite the risks involved the patient elected to proceed with an informed consent was obtained.).     She underwent conversion to right total hip arthroplasty under spinal anesthesia, tolerated surgery well, is admitted for further management.     Seen in her room postoperatively, doing fairly well, no complains of nausea, vomiting, or shortness of breath.     She has no history of DVT or PE.     She has history of rheumatoid arthritis for which she is on prednisone daily as well as Enbrel.  She has some joint pains related to rheumatoid arthritis.     After admit  Patient was provided pain medications as needed for pain control.  Adjustments were made to pain medications to optimize postop pain management when indicated. Risks and benefits of opiate medications discussed with patient. GERI report was reviewed.    He was seen by PT and OT and has progressed   over her stay.    Patient used an IS for atelectasis prophylaxis and aspirin along with mechanicals for DVT prophylaxis.    Home medications were resumed as appropriate, and labs were monitored.  Noted that she has chronic anemia, she had mild acute blood loss anemia following surgery for which she received packed red blood cell transfusion with good response.    With the progress she has made, Ms. Winston is ready for DC to [facility] today.      Discussed with patient regarding plan and she shows understanding and agreement.       She will need to finish 6 weeks of aspirin 81 mg every 12 hours.  She will need to finish 2 weeks of doxycycline prescribed below.     Procedures Performed  Procedure(s):  COVERSION TO RIGHT TOTAL HIP ARTHROPLASTY       Pertinent Test Results:    I reviewed the patient's new clinical results.   Results from last 7 days   Lab Units 04/17/21  0958 04/16/21  0824 04/16/21  0616 04/13/21  1241   WBC 10*3/mm3 8.78  --   --  11.18*   HEMOGLOBIN g/dL 8.8* 6.6*  "6.1* 10.8*   HEMATOCRIT % 27.6* 21.3* 19.5* 34.3   PLATELETS 10*3/mm3 357  --   --  633*     Results from last 7 days   Lab Units 21  0958 21  0616 21  1241   SODIUM mmol/L 139 135* 135*   POTASSIUM mmol/L 3.6 4.5 4.7   CHLORIDE mmol/L 104 104 100   CO2 mmol/L 27.0 23.0 23.0   BUN mg/dL 9 15 7   CREATININE mg/dL 0.72 0.88 0.74   CALCIUM mg/dL 8.5* 7.9* 9.8   BILIRUBIN mg/dL  --   --  0.3   ALK PHOS U/L  --   --  89   ALT (SGPT) U/L  --   --  11   AST (SGOT) U/L  --   --  15   GLUCOSE mg/dL 85 104* 157*     I reviewed the patient's new imaging including images and reports.          Physical therapy  2021  Plan of Care Reviewed With: patient  Progress: improving  Outcome Summary: Good gait quality to 10 ft with RW PWB ing on R.  Plan [facility] tomorrow  Discharge Assessment:      Visit Vitals  /87   Pulse 82   Temp 98 °F (36.7 °C) (Oral)   Resp 20   Ht 165.1 cm (65\")   Wt 51.3 kg (113 lb)   LMP 2010 (Approximate)   SpO2 94%   Breastfeeding No   BMI 18.80 kg/m²     Temp (24hrs), Av.9 °F (36.6 °C), Min:97.4 °F (36.3 °C), Max:98.3 °F (36.8 °C)      General Appearance:    Alert, cooperative, in no acute distress   Lungs:     Clear to auscultation,respirations regular, even and                   unlabored    Heart:    Regular rhythm and normal rate, normal S1 and S2   Abdomen:     Normal bowel sounds, no masses, no organomegaly, soft        non-tender, non-distended, no guarding, no rebound                 tenderness   Extremities:  Dry and intact dressing over right hip incisions.  No clubbing, cyanosis, or edema distally.   Pulses:   Pulses palpable and equal bilaterally   Skin:   No bleeding, bruising or rash   Neurologic:   Cranial nerves 2 - 12 grossly intact, sensation intact, Flexion and dorsiflexion intact bilateral feet.         [facility]         Discharge Medications      New Medications      Instructions Start Date   aspirin 81 MG EC " tablet   81 mg, Oral, Every 12 Hours      docusate sodium 100 MG capsule  Commonly known as: Colace   100 mg, Oral, 2 Times Daily      doxycycline 100 MG enteric coated tablet  Commonly known as: DORYX   100 mg, Oral, 2 Times Daily      ondansetron 4 MG tablet  Commonly known as: Zofran   4 mg, Oral, Every 8 Hours PRN      oxyCODONE 5 MG immediate release tablet  Commonly known as: Roxicodone   5 mg, Oral, Every 4 Hours PRN      traMADol 50 MG tablet  Commonly known as: ULTRAM   50 mg, Oral, Every 6 Hours PRN         Changes to Medications      Instructions Start Date   acetaminophen 500 MG tablet  Commonly known as: TYLENOL  What changed: Another medication with the same name was added. Make sure you understand how and when to take each.   1,000 mg, Oral, Every 6 Hours PRN      acetaminophen 500 MG tablet  Commonly known as: TYLENOL  What changed: You were already taking a medication with the same name, and this prescription was added. Make sure you understand how and when to take each.   1,000 mg, Oral, Every 8 Hours, Take every 8 hours  as needed after 1 week         Continue These Medications      Instructions Start Date   buPROPion  MG 12 hr tablet  Commonly known as: WELLBUTRIN SR   150 mg, Oral, Daily      cyanocobalamin 1000 MCG/ML injection   Intramuscular, Every 30 Days, Once per month- usually first of month      lisinopril 10 MG tablet  Commonly known as: PRINIVIL,ZESTRIL   10 mg, Oral, Daily      meloxicam 15 MG tablet  Commonly known as: MOBIC   15 mg, Oral, Daily      methocarbamol 750 MG tablet  Commonly known as: ROBAXIN   750 mg, Oral, Every 6 Hours PRN      predniSONE 10 MG tablet  Commonly known as: DELTASONE   10 mg, Oral, Daily      rOPINIRole 0.5 MG tablet  Commonly known as: REQUIP   0.5 mg, Oral, Nightly, Take 1 hour before bedtime.      vitamin D 1.25 MG (47735 UT) capsule capsule  Commonly known as: ERGOCALCIFEROL   50,000 Units, Oral, Weekly, Tuesday         Stop These Medications     NON FORMULARY            Discharge Diet: Regular    Activity at Discharge: Weightbearing as tolerated right lower extremity with total hip precautions    Follow-up Appointments  Riaz Reynaga MD per his orders    Enbrel can be resumed in 2 weeks    Discharge took over 30 min    Dragon disclaimer:  Part of this encounter note is an electronic transcription/translation of spoken language to printed text. The electronic translation of spoken language may permit erroneous, or at times, nonsensical words or phrases to be inadvertently transcribed; Although I have reviewed the note for such errors, some may still exist.       Demi Bermudez MD  04/19/21  13:11 EDT

## 2021-04-19 NOTE — THERAPY TREATMENT NOTE
Acute Care - Occupational Therapy Discharge   [facility]    Patient Name: Jessica Winston  : 1960    MRN: [id number]                              Today's Date: 2021       Admit Date: 4/15/2021    Visit Dx:     ICD-10-CM ICD-9-CM   1. 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   • Acute blood loss anemia     Past Medical History:   Diagnosis Date   • Avascular necrosis (CMS/HCC)    • Depression    • Fractures    • HTN (hypertension)    • Hyperglycemia    • Hypertension    • Osteoarthritis    • RA (rheumatoid arthritis) (CMS/HCC)     ALSO REPORTS OA   • Tooth loose     top left    • Vitamin D deficiency    • Wears glasses     readers     Past Surgical History:   Procedure Laterality Date   • ARM TENDON REPAIR Left    • BREAST SURGERY     • ENDOSCOPY N/A 3/30/2017    Procedure: ESOPHAGOGASTRODUODENOSCOPY WITH COLD FORCEP BIOPSY;  Surgeon: Anca Trevino MD;  Location: [facility] ENDOSCOPY;  Service:    • FEMUR SURGERY      right    • HERNIA REPAIR Bilateral     INGUINAL- unsure about mesh    • KNEE SURGERY Left    • MANDIBLE SURGERY     • TUBAL ABDOMINAL LIGATION     • WISDOM TOOTH EXTRACTION       General Information     Row Name 21 1009          OT Time and Intention    Document Type  therapy note (daily note)  -AR     Mode of Treatment  individual therapy;occupational therapy  -AR     Row Name 21 1009          General Information    Patient Profile Reviewed  yes  -AR     Existing Precautions/Restrictions  fall;right;hip, anterior;partial weight bearing;other (see comments) 50% WB RLE  -AR      Row Name 04/19/21 1009          Cognition    Orientation Status (Cognition)  oriented x 4  -AR     Row Name 04/19/21 1009          Safety Issues, Functional Mobility    Safety Issues Affecting Function (Mobility)  safety precaution awareness;safety precautions follow-through/compliance;insight into deficits/self-awareness;awareness of need for assistance  -AR     Impairments Affecting Function (Mobility)  balance;endurance/activity tolerance;pain;strength;range of motion (ROM)  -AR       User Key  (r) = Recorded By, (t) = Taken By, (c) = Cosigned By    Initials Name Provider Type    AR Lakeisha Grace, LAWANDA Occupational Therapist        Mobility/ADL's     Row Name 04/19/21 1009          Bed Mobility    Comment (Bed Mobility)  Issued leg  per PT recommendation and educated pt on use.  -AR     Row Name 04/19/21 1009          Transfers    Transfers  sit-stand transfer;toilet transfer  -AR     Comment (Transfers)  Cues for hand placement and chair approach. Cues needed for safety awareness.  -AR     Sit-Stand Ware (Transfers)  contact guard;verbal cues  -AR     Ware Level (Toilet Transfer)  contact guard;verbal cues  -AR     Assistive Device (Toilet Transfer)  walker, front-wheeled  -AR     Row Name 04/19/21 1009          Sit-Stand Transfer    Assistive Device (Sit-Stand Transfers)  walker, front-wheeled  -AR     [facility] Name 04/19/21 1009          Toilet Transfer    Type (Toilet Transfer)  sit-stand;stand-sit  -AR     [facility] Name 04/19/21 1009          Functional Mobility    Functional Mobility- Ind. Level  contact guard assist  -AR     Functional Mobility- Device  rolling walker  -AR     Functional Mobility-Distance (Feet)  10  -AR     Functional Mobility-Maintain WBing  able to maintain weight bearing status  -AR     Row Name 04/19/21 1009          Activities of Daily Living    14424 - OT Self Care/Mgmt Minutes  25  -AR     BADL Assessment/Intervention  bathing;lower body  dressing;grooming;toileting  -AR     Row Name 04/19/21 1009          Mobility    Extremity Weight-bearing Status  right lower extremity  -AR     Right Lower Extremity (Weight-bearing Status)  partial weight-bearing (PWB)  -AR     Row Name 04/19/21 1009          Lower Body Dressing Assessment/Training    Oriental Level (Lower Body Dressing)  don;doff;verbal cues;contact guard assist  -AR     Assistive Devices (Lower Body Dressing)  reacher;sock-aid  -AR     Position (Lower Body Dressing)  supported sitting  -AR     Comment (Lower Body Dressing)  Reviewed ADL retraining including incorporation of messi-dressing and AE use. Pt unable to  -AR     Row Name 04/19/21 1009          Grooming Assessment/Training    Oriental Level (Grooming)  wash face, hands;set up;contact guard assist  -AR     Position (Grooming)  sink side  -AR     Comment (Grooming)  Cues for safe placement of walker at sinkside  -AR     Row Name 04/19/21 1009          Bathing Assessment/Intervention    Oriental Level (Bathing)  distal lower extremities/feet;contact guard assist  -AR     Position (Bathing)  supported sitting  -AR     Comment (Bathing)  Simulated LBB using LH sponge  -AR     Row Name 04/19/21 1009          Toileting Assessment/Training    Oriental Level (Toileting)  toileting skills;contact guard assist;verbal cues  -AR     Position (Toileting)  supported standing;supported sitting  -AR     Comment (Toileting)  Good ability to maintain PRW RLE  -AR       User Key  (r) = Recorded By, (t) = Taken By, (c) = Cosigned By    Initials Name Provider Type    Lakeisha Carrasquillo OT Occupational Therapist        Obj/Interventions     Row Name 04/19/21 1019          Balance    Balance Assessment  sitting static balance;sitting dynamic balance;standing static balance;standing dynamic balance  -AR     Static Sitting Balance  WNL;supported;sitting in chair  -AR     Dynamic Sitting Balance  WFL;supported;sitting in chair  -AR     Static  Standing Balance  WFL;supported;standing  -AR     Dynamic Standing Balance  supported;standing;mild impairment  -AR       User Key  (r) = Recorded By, (t) = Taken By, (c) = Cosigned By    Initials Name Provider Type    Lakeisha Carrasquillo OT Occupational Therapist        Goals/Plan     Row Name 04/19/21 1027          Transfer Goal 1 (OT)    Progress/Outcome (Transfer Goal 1, OT)  goal ongoing  -AR     Row Name 04/19/21 1027          Dressing Goal 1 (OT)    Progress/Outcome (Dressing Goal 1, OT)  goal ongoing  -AR     Row Name 04/19/21 1027          Toileting Goal 1 (OT)    Progress/Outcome (Toileting Goal 1, OT)  goal ongoing  -AR       User Key  (r) = Recorded By, (t) = Taken By, (c) = Cosigned By    Initials Name Provider Type    Lakeisha Carrasquillo OT Occupational Therapist        Clinical Impression     Row Name 04/19/21 1020          Pain Scale: Numbers Pre/Post-Treatment    Pretreatment Pain Rating  5/10  -AR     Posttreatment Pain Rating  5/10  -AR     Pain Location - Side  Left  -AR     Pain Location - Orientation  incisional  -AR     Pain Location  hip  -AR     Pre/Posttreatment Pain Comment  Pt dclined need for pain medication or cold pack  -AR     Pain Intervention(s)  Repositioned;Ambulation/increased activity  -AR     Row Name 04/19/21 1020          Plan of Care Review    Plan of Care Reviewed With  patient  -AR     Progress  improving  -AR     Outcome Summary  Pt educated on ADL retraining including AE use and transfer training. She completed LB dressing with CGA using AE, toileting with CGA. Pt with good ability to maintain PWB RLE, however limited with impaired balance, decreased safety awareness and pain with ADLS and mobility. Pt lives aone, has h/o recent falls. Recommend IPR, pt in agreement.  -AR     Row Name 04/19/21 1020          Therapy Plan Review/Discharge Plan (OT)    Anticipated Discharge Disposition (OT)  inpatient rehabilitation facility  -AR     Row Name 04/19/21 1020           Vital Signs    Pre Patient Position  Sitting  -AR     Intra Patient Position  Standing  -AR     Post Patient Position  Sitting  -AR     Row Name 04/19/21 1020          Positioning and Restraints    Pre-Treatment Position  bathroom  -AR     Post Treatment Position  chair  -AR     In Chair  reclined;call light within reach;encouraged to call for assist;exit alarm on;legs elevated pt declined SCD pump and cold pack application  -AR       User Key  (r) = Recorded By, (t) = Taken By, (c) = Cosigned By    Initials Name Provider Type    Lakeisha Carrasquillo OT Occupational Therapist        Outcome Measures     Row Name 04/19/21 1027          How much help from another is currently needed...    Putting on and taking off regular lower body clothing?  3  -AR     Bathing (including washing, rinsing, and drying)  2  -AR     Toileting (which includes using toilet bed pan or urinal)  3  -AR     Putting on and taking off regular upper body clothing  3  -AR     Taking care of personal grooming (such as brushing teeth)  3  -AR     Eating meals  3  -AR     AM-[facility] 6 Clicks Score (OT)  17  -AR     Row Name 04/19/21 1027          Functional Assessment    Outcome Measure Options  AM-PAC 6 Clicks Daily Activity (OT)  -AR       User Key  (r) = Recorded By, (t) = Taken By, (c) = Cosigned By    Initials Name Provider Type    Lakeisha Carrasquillo OT Occupational Therapist        Occupational Therapy Education                 Title: PT OT SLP Therapies (Done)     Topic: Occupational Therapy (Done)     Point: ADL training (Done)     Description:   Instruct learner(s) on proper safety adaptation and remediation techniques during self care or transfers.   Instruct in proper use of assistive devices.              Learning Progress Summary           Patient Eager, E,TB,D, VU,NR by AR at 4/19/2021 1028    Acceptance, E,D, DU,NR by TA at 4/17/2021 0936                   Point: Home exercise program (Done)     Description:   Instruct learner(s)  on appropriate technique for monitoring, assisting and/or progressing therapeutic exercises/activities.              Learning Progress Summary           Patient Acceptance, E,D, DU,NR by TA at 4/17/2021 0936                   Point: Precautions (Done)     Description:   Instruct learner(s) on prescribed precautions during self-care and functional transfers.              Learning Progress Summary           Patient Eager, E,TB,D, VU,NR by AR at 4/19/2021 1028    Acceptance, E,D, DU,NR by TA at 4/17/2021 0936                   Point: Body mechanics (Done)     Description:   Instruct learner(s) on proper positioning and spine alignment during self-care, functional mobility activities and/or exercises.              Learning Progress Summary           Patient Eager, E,TB,D, VU,NR by AR at 4/19/2021 1028    Acceptance, E,D, DU,NR by TA at 4/17/2021 0936                               User Key     Initials Effective Dates Name Provider Type Discipline    AR 06/22/15 -  Lakeisha Grace OT Occupational Therapist OT    SONIA 03/14/16 -  Joey Crump OT Occupational Therapist OT              OT Recommendation and Plan  Retired Outcome Summary/Treatment Plan (OT)  Anticipated Discharge Disposition (OT): inpatient rehabilitation facility  Plan of Care Review  Plan of Care Reviewed With: patient  Progress: improving  Outcome Summary: Pt educated on ADL retraining including AE use and transfer training. She completed LB dressing with CGA using AE, toileting with CGA. Pt with good ability to maintain PWB RLE, however limited with impaired balance, decreased safety awareness and pain with ADLS and mobility. Pt lives aone, has h/o recent falls. Recommend IPR, pt in agreement.  Plan of Care Reviewed With: patient  Outcome Summary: Pt educated on ADL retraining including AE use and transfer training. She completed LB dressing with CGA using AE, toileting with CGA. Pt with good ability to maintain PWB RLE, however limited with  impaired balance, decreased safety awareness and pain with ADLS and mobility. Pt lives aone, has h/o recent falls. Recommend IPR, pt in agreement.     Time Calculation:   Time Calculation- OT     Row Name 04/19/21 1028 04/19/21 1009 04/19/21 0852       Time Calculation- OT    OT Start Time  1028  -AR  --  --    OT Received On  04/19/21  -AR  --  --    OT Goal Re-Cert Due Date  04/27/21  -AR  --  --       Timed Charges    82124 - Gait Training Minutes   --  --  15  -    64004 - OT Self Care/Mgmt Minutes  --  25  -AR  --      User Key  (r) = Recorded By, (t) = Taken By, (c) = Cosigned By    Initials Name Provider Type    Lakeisha Carrasquillo OT Occupational Therapist    CS Romy Garcia, ASHLEY Physical Therapist        Therapy Charges for Today     Code Description Service Date Service Provider Modifiers Qty    [id number] HC OT SELF CARE/MGMT/TRAIN EA 15 MIN 4/19/2021 Lakeisha Grace OT GO 2               Lakeisha Grace OT  4/19/2021

## 2021-04-19 NOTE — PLAN OF CARE
Goal Outcome Evaluation:  Plan of Care Reviewed With: patient  Progress: improving  Outcome Summary: Pt educated on ADL retraining including AE use and transfer training. She completed LB dressing with CGA using AE, toileting with CGA. Pt with good ability to maintain PWB RLE, however limited with impaired balance, decreased safety awareness and pain with ADLS and mobility. Pt lives aone, has h/o recent falls. Recommend IPR, pt in agreement.

## 2021-04-20 LAB
COTININE SERPL-MCNC: 89.3 NG/ML
NICOTINE SERPL-MCNC: 9.6 NG/ML

## 2021-04-22 ENCOUNTER — TELEPHONE (OUTPATIENT)
Dept: PRIMARY CARE CLINIC | Age: 61
End: 2021-04-22

## 2021-04-25 LAB
BACTERIA SPEC ANAEROBE CULT: NORMAL
BACTERIA SPEC ANAEROBE CULT: NORMAL

## 2021-04-27 ENCOUNTER — IMMUNIZATION (OUTPATIENT)
Dept: VACCINE CLINIC | Facility: HOSPITAL | Age: 61
End: 2021-04-27

## 2021-04-27 PROCEDURE — 0002A: CPT | Performed by: INTERNAL MEDICINE

## 2021-04-27 PROCEDURE — 91300 HC SARSCOV02 VAC 30MCG/0.3ML IM: CPT | Performed by: INTERNAL MEDICINE

## 2021-05-06 ENCOUNTER — OFFICE VISIT (OUTPATIENT)
Dept: PRIMARY CARE CLINIC | Age: 61
End: 2021-05-06
Payer: MEDICAID

## 2021-05-06 ENCOUNTER — TELEPHONE (OUTPATIENT)
Dept: PRIMARY CARE CLINIC | Age: 61
End: 2021-05-06

## 2021-05-06 VITALS
TEMPERATURE: 97.5 F | OXYGEN SATURATION: 98 % | RESPIRATION RATE: 16 BRPM | HEIGHT: 66 IN | DIASTOLIC BLOOD PRESSURE: 62 MMHG | WEIGHT: 116.2 LBS | HEART RATE: 90 BPM | SYSTOLIC BLOOD PRESSURE: 90 MMHG | BODY MASS INDEX: 18.68 KG/M2

## 2021-05-06 DIAGNOSIS — F32.9 REACTIVE DEPRESSION: ICD-10-CM

## 2021-05-06 DIAGNOSIS — M05.79 RHEUMATOID ARTHRITIS INVOLVING MULTIPLE SITES WITH POSITIVE RHEUMATOID FACTOR (HCC): ICD-10-CM

## 2021-05-06 DIAGNOSIS — Z96.641 STATUS POST RIGHT HIP REPLACEMENT: Primary | ICD-10-CM

## 2021-05-06 DIAGNOSIS — M81.0 OSTEOPOROSIS WITHOUT CURRENT PATHOLOGICAL FRACTURE, UNSPECIFIED OSTEOPOROSIS TYPE: ICD-10-CM

## 2021-05-06 DIAGNOSIS — K59.03 DRUG-INDUCED CONSTIPATION: ICD-10-CM

## 2021-05-06 PROCEDURE — 4004F PT TOBACCO SCREEN RCVD TLK: CPT | Performed by: NURSE PRACTITIONER

## 2021-05-06 PROCEDURE — 99214 OFFICE O/P EST MOD 30 MIN: CPT | Performed by: NURSE PRACTITIONER

## 2021-05-06 PROCEDURE — 3017F COLORECTAL CA SCREEN DOC REV: CPT | Performed by: NURSE PRACTITIONER

## 2021-05-06 PROCEDURE — G8420 CALC BMI NORM PARAMETERS: HCPCS | Performed by: NURSE PRACTITIONER

## 2021-05-06 PROCEDURE — G8427 DOCREV CUR MEDS BY ELIG CLIN: HCPCS | Performed by: NURSE PRACTITIONER

## 2021-05-06 RX ORDER — OXYCODONE HYDROCHLORIDE 5 MG/1
5 TABLET ORAL EVERY 4 HOURS PRN
COMMUNITY
Start: 2021-04-19 | End: 2021-06-18 | Stop reason: ALTCHOICE

## 2021-05-06 RX ORDER — ALENDRONATE SODIUM 70 MG/1
70 TABLET ORAL
Qty: 4 TABLET | Refills: 5 | Status: SHIPPED | OUTPATIENT
Start: 2021-05-06 | End: 2021-06-18 | Stop reason: SDUPTHER

## 2021-05-06 RX ORDER — PSEUDOEPHEDRINE HCL 30 MG
100 TABLET ORAL 2 TIMES DAILY
COMMUNITY
Start: 2021-04-19 | End: 2021-12-15 | Stop reason: SDUPTHER

## 2021-05-06 RX ORDER — ASPIRIN 81 MG/1
81 TABLET ORAL EVERY 12 HOURS
COMMUNITY
Start: 2021-04-19 | End: 2021-05-31

## 2021-05-06 ASSESSMENT — ENCOUNTER SYMPTOMS
CONSTIPATION: 1
RESPIRATORY NEGATIVE: 1

## 2021-05-06 NOTE — TELEPHONE ENCOUNTER
Per the orders of henrry Mcdonald, 3 box/boxes of Linzess 145 mcg were given to patient today. Qty. 12, Lot # [id number], Exp. Date 7/21. Patient given instructions with samples.

## 2021-05-06 NOTE — PROGRESS NOTES
Chief Complaint   Patient presents with    Follow-Up from Hospital     [facility]       Have you seen any other physician or provider since your last visit yes - [address] 04/15/2021    Have you had any other diagnostic tests since your last visit? yes     Have you changed or stopped any medications since your last visit? yes - started aspirin    I have recommended that this patient have a immunization for pneumonia and sigmoidoscopy but she declines at this time. I have discussed the risks and benefits of this examination with her. The patient verbalizes understanding. Patient is here to follow up on hospital visit after having right hip surgery. She had rehab at [facility].

## 2021-05-06 NOTE — PATIENT INSTRUCTIONS
· Keep a list of your medicines with you. List all of the prescription medicines, nonprescription medicines, supplements, natural remedies, and vitamins that you take. Tell your healthcare providers who treat you about all of the products you are taking. Your provider can provide you with a form to keep track of them. Just ask. · Follow the directions that come with your medicine, including information about food or alcohol. Make sure you know how and when to take your medicine. Do not take more or less than you are supposed to take. · Keep all medicines out of the reach of children. · Store medicines according to the directions on the label. · Monitor yourself. Learn to know how your body reacts to your new medicine and keep track of how it makes you feel before attempting (If your provider has allowed you to do so) to drive or go to work. · Seek emergency medical attention if you think you have used too much of this medicine. An overdose of any prescription medicine can be fatal. Overdose symptoms may include extreme drowsiness, muscle weakness, confusion, cold and clammy skin, pinpoint pupils, shallow breathing, slow heart rate, fainting, or coma. · Don't share prescription medicines with others, even when they seem to have the same symptoms. What may be good for you may be harmful to others. · If you are no longer taking a prescribed medication and you have pills left please take your pills out of their original containers. Mix crushed pills with an undesirable substance, such as cat litter or used coffee grounds. Put the mixture into a disposable container with a lid, such as an empty margarine tub, or into a sealable bag. Cover up or remove any of your personal information on the empty containers by covering it with black permanent marker or duct tape. Place the sealed container with the mixture, and the empty drug containers, in the trash.    · If you use a medication that is in the form of a patch, dispose of used patches by folding them in half so that the sticky sides meet, and then flushing them down a toilet. They should not be placed in the household trash where children or pets can find them. · If you have any questions, ask your provider or pharmacist for more information. · Be sure to keep all appointments for provider visits or tests. We are committed to providing you with the best care possible. In order to help us achieve these goals please remember to bring all medications, herbal products, and over the counter supplements with you to each visit. If your provider has ordered testing for you, please be sure to follow up with our office if you have not received results within 7 days after the testing took place. *If you receive a survey after visiting one of our offices, please take time to share your experience concerning your physician office visit. These surveys are confidential and no health information about you is shared. We are eager to improve for you and we are counting on your feedback to help make that happen. ips to Help You Stop Smoking       Cigarette smoking is a preventable cause of death in the [place]. If you have thought about quitting but haven't been able to, here are some reasons why you should and some ways to do it. Here's Why   Quitting smoking now can decrease your risk of getting smoking-related illnesses like:   Heart disease   Stroke   Several types of cancer, including:   Lung   Mouth   Esophagus   Larynx   Bladder   Pancreas   Kidney   Chronic lung diseases:   Bronchitis   Emphysema   Asthma   Cataracts   Macular degeneration   Thyroid conditions   Hearing loss   Erectile dysfunction   Dementia   Osteoporosis   Here's How   Once you've decided to quit smoking, set your target quit date a few weeks away.  In the time leading up to your quit day, try some of these ideas offered by the [address] [place] [address] [place] to help you successfully quit smoking. For the best results, work with your doctor. Together, you can test your lung function and compare the results to those of a nonsmoking person. The results can be given to you as your lung age. Finding out your lung age right after having the test done may help you to stop smoking. Your doctor can also discuss with you all of your options and refer you to smoking-cessation support groups. You may wish to use nicotine replacement (gum, patches, inhaler) or one of the prescription medications that have been shown to increase quit rates and prolong abstinence from smoking. But whatever you and your doctor decide on these matters, it will still be you who decides when an how to quit. Here are some techniques:   Switch Brands   Switch to a brand you find distasteful. Change to a brand that is low in tar and nicotine a couple of weeks before your target quit date. This will help change your smoking behavior. However, do not smoke more cigarettes, inhale them more often or more deeply, or place your fingertips over the holes in the filters. All of these actions will increase your nicotine intake, and the idea is to get your body used to functioning without nicotine. Cut Down the Number of Cigarettes You Smoke   Smoke only half of each cigarette. Each day, postpone the lighting of your first cigarette by one hour. Decide you'll only smoke during odd or even hours of the day. Decide beforehand how many cigarettes you'll smoke during the day. For each additional cigarette, give a dollar to your favorite irina. Change your eating habits to help you cut down. For example, drink milk, which many people consider incompatible with smoking. End meals or snacks with something that won't lead to a cigarette. Reach for a glass of juice instead of a cigarette for a \"pick-me-up. \"   Remember: Cutting down can help you quit, but it's not a substitute for quitting.  If you're down to about seven cigarettes a day, it's time to set your target quit date, and get ready to stick to it. Don't Smoke \"Automatically\"   Smoke only those cigarettes you really want. Catch yourself before you light up a cigarette out of pure habit. Don't empty your ashtrays. This will remind you of how many cigarettes you've smoked each day, and the sight and the smell of stale cigarettes butts will be very unpleasant. Make yourself aware of each cigarette by using the opposite hand or putting cigarettes in an unfamiliar location or a different pocket to break the automatic reach. If you light up many times during the day without even thinking about it, try to look in a mirror each time you put a match to your cigarette. You may decide you don't need it. Make Smoking Inconvenient   Stop buying cigarettes by the carton. Wait until one pack is empty before you buy another. Stop carrying cigarettes with you at home or at work. Make them difficult to get to. Make Smoking Unpleasant   Smoke only under circumstances that aren't especially pleasurable for you. If you like to smoke with others, smoke alone. Turn your chair to an empty corner and focus only on the cigarette you are smoking and all its many negative effects. Collect all your cigarette butts in one large glass container as a visual reminder of the filth made by smoking. Just Before Quitting   Practice going without cigarettes. Don't think of never smoking again. Think of quitting in terms of one day at a time . Tell yourself you won't smoke today, and then don't. Clean your clothes to rid them of the cigarette smell, which can linger a long time. On the Day You Quit   Throw away all your cigarettes and matches. Hide your lighters and ashtrays. Visit the dentist and have your teeth cleaned to get rid of tobacco stains. Notice how nice they look and resolve to keep them that way.    Make a list of things you'd like to buy for

## 2021-05-06 NOTE — PROGRESS NOTES
SUBJECTIVE:    Patient ID: Socorro Finn is a 62 y.o. female. Chief Complaint   Patient presents with    Follow-Up from [facility]         HPI:  She has had her hip replacement surgery. She had fallen 1 year ago and suffered a hip fracture and sustained an intertrochanteric hip fracture and had it pinned at [facility]. Unfortunately the screw cut out of the fermoral head with erosion and impingement into the acetabulum. She could not walk. Prior to surgery she fell in her apartment and could not get up. She laid for 2 days before her brother found her. She underwent hip replacement at [facility] on April 15 and she hasn't smoked since. She has significant Osteoporosis. I have been waiting to start medication until after the surgery. She has RA with chronic steroid use. She went to [facility] after her surgery. She started the Enbrel back after 2 weeks. She is taking vit D3. She is walking a walker. She has some complaints of constipation. She is starting outpatient therapy this week. She is going back to cardiology soon. 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 She was a patient of [facility]. Patient's medications,allergies, past medical, surgical, social and family histories were reviewed and updated as appropriate. .  Current Outpatient Medications on File Prior to Visit   Medication Sig Dispense Refill    aspirin EC 81 MG EC tablet Take 81 mg by mouth every 12 hours      docusate (COLACE, DULCOLAX) 100 MG CAPS Take 100 mg by mouth 2 times daily      oxyCODONE (ROXICODONE) 5 MG immediate release tablet Take 5 mg by mouth every 4 hours as needed.       buPROPion (WELLBUTRIN XL) 150 MG extended release tablet TAKE 1 TABLET BY MOUTH EVERY MORNING 30 tablet 3    ENBREL SURECLICK 50 MG/ML SOAJ INJECT 1ML UNDER THE SKIN ONCE WEEKLY 4 mL 1    rOPINIRole (REQUIP) 0.5 MG tablet TAKE 1 TABLET BY MOUTH NIGHTLY 30 tablet 2    vitamin D (ERGOCALCIFEROL) 1.25 MG (35131 UT) CAPS capsule TAKE 1 CAPSULE BY MOUTH ONCE A WEEK 4 capsule 3    cyanocobalamin 1000 MCG/ML injection INJECT 1 ML ONCE A WEEK FOR 4 WEEKS AND THEN ONCE A MONTH. 4 mL 5    leflunomide (ARAVA) 20 MG tablet Take 1 tablet by mouth daily 30 tablet 0    meloxicam (MOBIC) 15 MG tablet Take 1 tablet by mouth daily 30 tablet 3    lisinopril (PRINIVIL;ZESTRIL) 10 MG tablet Take 1 tablet by mouth daily 30 tablet 3    omeprazole (PRILOSEC) 40 MG delayed release capsule Take 1 capsule by mouth daily 30 capsule 3    Syringe/Needle, Disp, (SYRINGE 3CC/25GX1\") 25G X 1\" 3 ML MISC 1 each by Does not apply route once a week 50 each 1    predniSONE (DELTASONE) 10 MG tablet Take 10 mg by mouth as needed       No current facility-administered medications on file prior to visit. Review of Systems   Constitutional: Negative. HENT: Negative. Respiratory: Negative. Cardiovascular: Negative. Gastrointestinal: Positive for constipation. Genitourinary: Negative. Musculoskeletal: Positive for arthralgias (right hip). Skin: Positive for wound (right hip, no drainage dry). Neurological: Negative. Psychiatric/Behavioral: Negative. OBJECTIVE:  BP 90/62 (Site: Right Upper Arm, Position: Sitting, Cuff Size: Medium Adult)   Pulse 90   Temp 97.5 °F (36.4 °C) (Temporal)   Resp 16   Ht 5' 6\" (1.676 m)   Wt 116 lb 3.2 oz (52.7 kg)   SpO2 98% Comment: room air  BMI 18.76 kg/m²    Physical Exam  Vitals signs and nursing note reviewed. Constitutional:       Appearance: She is well-developed. HENT:      Head: Normocephalic and atraumatic. Eyes:      Conjunctiva/sclera: Conjunctivae normal.      Pupils: Pupils are equal, round, and reactive to light. Neck:      Musculoskeletal: Normal range of motion and neck supple. Thyroid: No thyromegaly. Vascular: No JVD. Cardiovascular:      Rate and Rhythm: Normal rate and regular rhythm. Heart sounds:  No murmur. No friction rub. No gallop. Pulmonary:      Effort: Pulmonary effort is normal. No respiratory distress. Breath sounds: Normal breath sounds. No wheezing or rales. Abdominal:      General: Bowel sounds are normal. There is no distension. Palpations: Abdomen is soft. Tenderness: There is no abdominal tenderness. There is no guarding. Musculoskeletal:         General: No tenderness. Skin:     General: Skin is warm and dry. Findings: Wound (right hip with hard dry eschar) present. No erythema or rash. Neurological:      Mental Status: She is alert and oriented to person, place, and time. Psychiatric:         Judgment: Judgment normal.         No results found for requested labs within last 30 days. Hemoglobin A1C (%)   Date Value   03/04/2016 5.5     LDL Calculated (mg/dL)   Date Value   08/12/2019 143 (H)           Lab Results   Component Value Date    TSH 4.00 03/05/2021         ASSESSMENT/PLAN:     Treasure Pop was seen today for follow-up from hospital.    Diagnoses and all orders for this visit:    Status post right hip replacement    Reactive depression    Osteoporosis without current pathological fracture, unspecified osteoporosis type  -     alendronate (FOSAMAX) 70 MG tablet; Take 1 tablet by mouth every 7 days    Drug-induced constipation  -     linaCLOtide (LINZESS) 72 MCG CAPS capsule;  Take 1 capsule by mouth every morning (before breakfast)    Rheumatoid arthritis involving multiple sites with positive rheumatoid factor (HCC)      Medications Discontinued During This Encounter   Medication Reason    etanercept (ENBREL) 50 MG/ML injection DUPLICATE    HYDROcodone-acetaminophen (NORCO) 7.5-325 MG per tablet Therapy completed    gabapentin (NEURONTIN) 300 MG capsule Therapy completed    albuterol sulfate HFA (VENTOLIN HFA) 108 (90 Base) MCG/ACT inhaler Therapy completed    senna-docusate (PERICOLACE) 8.6-50 MG per tablet Alternate therapy

## 2021-05-11 ENCOUNTER — HOSPITAL ENCOUNTER (OUTPATIENT)
Dept: PHYSICAL THERAPY | Facility: HOSPITAL | Age: 61
Setting detail: THERAPIES SERIES
Discharge: HOME | End: 2021-05-11
Payer: COMMERCIAL

## 2021-05-11 PROCEDURE — 97161 PT EVAL LOW COMPLEX 20 MIN: CPT

## 2021-05-11 PROCEDURE — 97110 THERAPEUTIC EXERCISES: CPT

## 2021-05-11 ASSESSMENT — PAIN DESCRIPTION - ORIENTATION: ORIENTATION: RIGHT

## 2021-05-11 ASSESSMENT — PAIN SCALES - GENERAL: PAINLEVEL_OUTOF10: 1

## 2021-05-11 ASSESSMENT — PAIN DESCRIPTION - LOCATION: LOCATION: HIP

## 2021-05-11 NOTE — PROGRESS NOTES
Physical Therapy  Initial Assessment  Date: 2021  Patient Name: Angelia Schultz  MRN: [id number]  : 1960     Treatment Diagnosis: s/p conversion to Rt LISSET    Restrictions  Restrictions/Precautions  Restrictions/Precautions: Weight Bearing  Lower Extremity Weight Bearing Restrictions  Right Lower Extremity Weight Bearing: Weight Bearing As Tolerated  Position Activity Restriction  Hip Precautions: No ADduction, No hip internal rotation    Subjective   General  Chart Reviewed: Yes  Referring Practitioner: Jo Machado MD  Referral Date : 21  Diagnosis: s/p conversion to R LISSET  PT Visit Information  PT Insurance Information: [facility]  Subjective  Subjective: Pt underwent R hip LISSET on 4/15/21 by Dr. Kat Reyes to convert R hip ORIF to LISSET. Pt originally injured her R hip from a fall at work on 20 and underwent R hip fixation. Pt has had extensive rehab but continued to have pain, weakness, and functional limitations which led to having LISSET. Pt states she can see improvement already in her mobility and pain since having the surgery. Pt states she had surgery on 4/15, stayed in the hospital until  and was transferred to [facility] for rehab. Pt presents for OP PT. Pt is ambulating with a RW.   Pain Screening  Patient Currently in Pain: Yes  Pain Assessment  Pain Assessment: 0-10  Pain Level: 1(1/10 at rest, 5/10 with daily activities, 8-9/10 at night)  Pain Type: Surgical pain  Pain Location: Hip  Pain Orientation: Right  Vital Signs  Patient Currently in Pain: Yes    Orientation  Orientation  Overall Orientation Status: Within Normal Limits    Social/Functional History  Social/Functional History  Lives With: Alone  Type of Home: Apartment  Home Layout: One level  Bathroom Shower/Tub: Walk-in shower  Home Equipment: Rolling walker(uses BSC at night)  Active : No(hasn't driven since surgery; eager to drive again)    Objective     Observation/Palpation  Observation: Pt ambulates into PT clinic with RW, WBAT. Mild shortening of steps but symmetrical.  Surgical incision at posterior / lateral hip is well healed, however incision at anterior hip has eschar present with erythema surrounding eschar. Pt states she is putting triple antibiotic cream on the incision and her pcp has been following the incision as well. AROM RLE (degrees)  RLE AROM: Exceptions  R Hip Flexion 0-125: 0-100 deg  R Hip ABduction 0-45: 0-15 deg  R Knee Flexion 0-145: 0-130 deg  R Knee Extension 0: 0    Strength RLE  Strength RLE: Exception  R Hip Flexion: 3+/5  R Knee Flexion: 4+/5  R Knee Extension: 4+/5  R Ankle Dorsiflexion: 4+/5     Additional Measures  Special Tests: LEFS: 32/80          Exercises  Exercise 1: QS 3x10  Exercise 2: Gluteal sets 3x10  Exercise 3: SAQ 3x10  Exercise 4: Supine heel slides 3x10  Exercise 5: Supine hip abd 3x10  Exercise 6: LAQ 3x10  Exercise 7: Nustep L3 x 5'   Plan to add at next visit:  Calf raises with support 3x10  Mini squats 3x10  Standing hip flexion - RLE: 3x10 (progress to marching as tolerated)       Assessment   Conditions Requiring Skilled Therapeutic Intervention  Assessment: Pt will benefit from skilled PT to address deficits due to s/p R hip LISSET conversion to restore optimal functional level. Pt's incision at her anterior hip demonstrates eschar and erythema. She states her pcp has seen the incision and is following it and recommended application of triple antibiotic cream. Pt is keeping the incision dressed with an island dressing. Pt was advised to contact surgeon's office regarding the incision for monitoring, as well.   Treatment Diagnosis: s/p conversion to Rt LISSET  Prognosis: Good  Decision Making: Low Complexity  REQUIRES PT FOLLOW UP: Yes  Activity Tolerance  Activity Tolerance: Patient Tolerated treatment well         Plan   Plan  Times per week: 2-3x/week  Plan weeks: 8-10 weeks  Current Treatment Recommendations: Strengthening, Neuromuscular Re-education, Home Exercise Program, Safety Education & Training, ROM, Balance Training, Endurance Training, Patient/Caregiver Education & Training, Equipment Evaluation, Education, & procurement, Functional Mobility Training, Gait Training, Modalities      Goals  Short term goals  Time Frame for Short term goals: 4 weeks  Short term goal 1: Pt to be I with HEP  Short term goal 2: Pt to demonstrate R hip flexion strength of 4-/5 or greater. Short term goal 3: Pt to progress to use of cane for household ambulation with good safety awareness. Long term goals  Time Frame for Long term goals : 8 weeks  Long term goal 1: LEFS score to improve to 50/80 or greater indicating improved function. Long term goal 2: Pt to demonstrate R hip flexion strength of 4+/5. Long term goal 3: Pt to ambulate community distances with cane or no AD with minimal gait deviations. Long term goal 4: Pt to perform daily activities with pain of 3/10 or less at greatest.         Haylee Garcia PT     Certification of Medical Necessity: It will be understood that this treatment plan is certified medically necessary by the documenting therapist and referring physician mentioned in this report. Unless the physician indicated otherwise through written correspondence with our office, all further referrals will act as certification of medical necessity on this treatment plan. Thank you for this referral.  If you have questions regarding this plan of care, please call [phone].           Revisions to this plan (optional):                     Please sign and return this plan to:   FAX: [phone]      Signature:                                 Date:

## 2021-05-13 ENCOUNTER — HOSPITAL ENCOUNTER (OUTPATIENT)
Dept: PHYSICAL THERAPY | Facility: HOSPITAL | Age: 61
Setting detail: THERAPIES SERIES
Discharge: HOME | End: 2021-05-13
Payer: COMMERCIAL

## 2021-05-13 PROCEDURE — 97110 THERAPEUTIC EXERCISES: CPT

## 2021-05-13 NOTE — FLOWSHEET NOTE
Physical Therapy Daily Treatment Note   Date:  2021    TIme In:                      Time Out:   [address]    Patient Name:  Karthik Goins    :  1960  MRN: [id number]    Restrictions/Precautions:    Pertinent Medical History:  Medical/Treatment Diagnosis Information:  ·   s/p conversion to Rt LISSET  ·    Insurance/Certification information:    MI / Ashlie Kunz  Physician Information:    Roberto Flores MD  Plan of care signed (Y/N):    Visit# / total visits:     2/    G-Code (if applicable):      Date / Visit # G-Code Applied:         Progress Note: []  Yes  [x]  No  Next due by: Visit #10      Pain level:   -2/10  Subjective: Pt states she has some difficulty standing on her R LE when showering, but was able to take a shower with improved confidence. Objective:   Observation:    Test measurements:      Palpation:    Exercises:  Exercise Resistance/Repetitions Other comments   Nustep L3 x 7' 13   QS 3x10 13   Glut sets 3x10 13   SAQ 3x10 13   Supine heel slides 3x10 13   Supine hip abd 3x10 13   LAQ 3x10 13   Calf raises with support 3x10 13   Mini squats  3x10 13   Standing hip flexion  3x10 13   Hip abd / add OTB / ball 3x10 13   HS curls  OTB 3x10 13     Other Therapeutic Activities:      Manual Treatments:      Modalities:        Timed Code Treatment Minutes:  40      Total Treatment Minutes:  42    Treatment/Activity Tolerance:  [x] Patient tolerated treatment well [] Patient limited by fatigue  [] Patient limited by pain  [] Patient limited by other medical complications  [x] Other: Therex was advanced today with good tolerance and no increase in pain.       Pain after treatment:      1-2/10    Prognosis: [x] Good [] Fair  [] Poor    Patient Requires Follow-up: [x] Yes  [] No    Plan:   [x] Continue per plan of care [] Alter current plan (see comments)  [] Plan of care initiated [] Hold pending MD visit [] Discharge    Plan for Next Session:        Electronically signed by:  Frank Thomson PT

## 2021-05-18 ENCOUNTER — TELEPHONE (OUTPATIENT)
Dept: PRIMARY CARE CLINIC | Age: 61
End: 2021-05-18

## 2021-05-18 ENCOUNTER — HOSPITAL ENCOUNTER (OUTPATIENT)
Dept: PHYSICAL THERAPY | Facility: HOSPITAL | Age: 61
Setting detail: THERAPIES SERIES
Discharge: HOME | End: 2021-05-18
Payer: COMMERCIAL

## 2021-05-18 PROCEDURE — 97110 THERAPEUTIC EXERCISES: CPT

## 2021-05-18 NOTE — FLOWSHEET NOTE
Physical Therapy Daily Treatment Note   Date:  2021    TIme In:  1100                   Time Out:  9286    Patient Name:  Glo Echavarria    :  1960  MRN: [id number]    Restrictions/Precautions:    Pertinent Medical History:  Medical/Treatment Diagnosis Information:  ·   s/p conversion to Rt LISSET     Insurance/Certification information:    MI / Autumn Miller  Physician Information:    Pinky Chapman MD  Plan of care signed (Y/N):    Visit# / total visits:     3/    G-Code (if applicable):      Date / Visit # G-Code Applied:         Progress Note: []  Yes  [x]  No  Next due by: Visit #10      Pain level:   2-3/10  Subjective: Pt reports she is doing okay and her glutes have gotten better with the exercise. Objective:   Observation:    Test measurements:      Palpation:    Exercises:  Exercise Resistance/Repetitions Other comments   Nustep L3 x 7' 13   QS 3x10 13   Glut sets 3x10 13   SAQ 3x10 13   Supine heel slides 3x10 13   Supine hip abd 3x10 13   LAQ 3x10 13   Calf raises with support 3x10 13   Mini squats  3x10 13   Standing hip flexion  3x10 13   Hip abd / add OTB / ball 3x10 13   HS curls  OTB 3x10 13     Other Therapeutic Activities:      Manual Treatments:      Modalities:        Timed Code Treatment Minutes:  32      Total Treatment Minutes:  36    Treatment/Activity Tolerance:  [x] Patient tolerated treatment well [] Patient limited by fatigue  [] Patient limited by pain  [] Patient limited by other medical complications  [x] Other:  Pt completed tx with mod c/o pain but is performing all activity well.     Pain after treatment:      4-5/10    Prognosis: [x] Good [] Fair  [] Poor    Patient Requires Follow-up: [x] Yes  [] No    Plan:   [x] Continue per plan of care [] Alter current plan (see comments)  [] Plan of care initiated [] Hold pending MD visit [] Discharge    Plan for Next Session:        Electronically signed by:  Diana Major PTA

## 2021-05-20 ENCOUNTER — HOSPITAL ENCOUNTER (OUTPATIENT)
Dept: PHYSICAL THERAPY | Facility: HOSPITAL | Age: 61
Setting detail: THERAPIES SERIES
Discharge: HOME | End: 2021-05-20
Payer: COMMERCIAL

## 2021-05-20 PROCEDURE — 97110 THERAPEUTIC EXERCISES: CPT

## 2021-05-20 NOTE — FLOWSHEET NOTE
Physical Therapy Daily Treatment Note   Date:  2021    TIme In:  1057                   Time Out:  5323    Patient Name:  Tobi Garcia    :  1960  MRN: [id number]    Restrictions/Precautions:    Pertinent Medical History:  Medical/Treatment Diagnosis Information:  ·   s/p conversion to Rt LISSET     Insurance/Certification information:     / Pat Mireles  Physician Information:    Margarita Tracey MD  Plan of care signed (Y/N):    Visit# / total visits:     4/    G-Code (if applicable):      Date / Visit # G-Code Applied:         Progress Note: []  Yes  [x]  No  Next due by: Visit #10      Pain level:   1-2/10  Subjective: Pt reports she is doing okay today. Objective:   Observation:    Test measurements:      Palpation:    Exercises:  Exercise Resistance/Repetitions Other comments   Nustep L3 x 7' 20   QS 3x10 20   Glut sets 3x10 20   SAQ 3x10 20   Supine heel slides 3x10 20   Supine hip abd 3x10 20   LAQ 3x10 20   Calf raises with support 3x10 20   Mini squats  3x10 20   Standing hip flexion  3x10 20   Hip abd / add OTB / ball 3x10 20   HS curls  OTB 3x10 20     Other Therapeutic Activities:      Manual Treatments:      Modalities:        Timed Code Treatment Minutes:  40      Total Treatment Minutes:  47    Treatment/Activity Tolerance:  [x] Patient tolerated treatment well [] Patient limited by fatigue  [] Patient limited by pain  [] Patient limited by other medical complications  [x] Other:  Pt completed tx with mild c/o pain and few rest breaks throughout.     Pain after treatment:      3-4/10    Prognosis: [x] Good [] Fair  [] Poor    Patient Requires Follow-up: [x] Yes  [] No    Plan:   [x] Continue per plan of care [] Alter current plan (see comments)  [] Plan of care initiated [] Hold pending MD visit [] Discharge    Plan for Next Session:        Electronically signed by:  Helena Major PTA

## 2021-05-25 ENCOUNTER — HOSPITAL ENCOUNTER (OUTPATIENT)
Dept: PHYSICAL THERAPY | Facility: HOSPITAL | Age: 61
Setting detail: THERAPIES SERIES
Discharge: HOME | End: 2021-05-25
Payer: COMMERCIAL

## 2021-05-25 PROCEDURE — 97110 THERAPEUTIC EXERCISES: CPT

## 2021-05-25 NOTE — FLOWSHEET NOTE
Physical Therapy Daily Treatment Note   Date:  2021    TIme In:  1030                   Time Out:  1120    Patient Name:  Socorro Finn    :  1960  MRN: [id number]    Restrictions/Precautions:    Pertinent Medical History:  Medical/Treatment Diagnosis Information:  ·   s/p conversion to Rt LISSET     Insurance/Certification information:    MI / Jessica Mohs  Physician Information:    Kindra Cabral MD  Plan of care signed (Y/N):    Visit# / total visits:     5/    G-Code (if applicable):      Date / Visit # G-Code Applied:         Progress Note: []  Yes  [x]  No  Next due by: Visit #10      Pain level:   8/10  Subjective: Pt reports she started having severe pain in her L lumbar / posterior hip area over the weekend. She reports having pain radiate down the back of her L leg to the knee. She states her R hip is doing well. She reports that she has a follow up with her surgeon on Friday and will discuss her left side pain with him. Objective:   Observation:    Test measurements:      Palpation:    Exercises:  Exercise Resistance/Repetitions Other comments   Nustep L3 x 7' Held due to pain   QS 3x10 25   Glut sets 3x10 25   SAQ 3x10 25   Supine heel slides 3x10 25   Supine hip abd 3x10 Held due to pain   LAQ 3x10 25   Calf raises with support 3x10 25   Mini squats  3x10 25   Standing hip flexion  3x10 25   Hip abd / add OTB / ball 3x10 25   HS curls  OTB 3x10 25     Other Therapeutic Activities:      Manual Treatments:      Modalities:        Timed Code Treatment Minutes:  35      Total Treatment Minutes:  50    Treatment/Activity Tolerance:  [x] Patient tolerated treatment well [] Patient limited by fatigue  [] Patient limited by pain  [] Patient limited by other medical complications  [x] Other:  Pt did well regarding R hip rehab today, but presents with a new onset of severe R lumbar / posterior hip pain that appears to be consistent with possible nerve root irritation vs SI irritation. Gentle LLE SAD as attempted with good tolerance. MH was used for pain modulation at end of session. Pt was educated on log rolling for moving supine to sit due to severe pain with transitional movements. PT encouraged pt to discuss her onset of pain with her ortho MD on Friday.       Pain after treatment:      8/10    Prognosis: [x] Good [] Fair  [] Poor    Patient Requires Follow-up: [x] Yes  [] No    Plan:   [x] Continue per plan of care [] Alter current plan (see comments)  [] Plan of care initiated [] Hold pending MD visit [] Discharge    Plan for Next Session:        Electronically signed by:  Peggy Greene, PT

## 2021-05-27 ENCOUNTER — HOSPITAL ENCOUNTER (OUTPATIENT)
Dept: PHYSICAL THERAPY | Facility: HOSPITAL | Age: 61
Setting detail: THERAPIES SERIES
Discharge: HOME | End: 2021-05-27
Payer: COMMERCIAL

## 2021-05-27 LAB
FUNGUS WND CULT: NORMAL
FUNGUS WND CULT: NORMAL
MYCOBACTERIUM SPEC CULT: NORMAL
MYCOBACTERIUM SPEC CULT: NORMAL
NIGHT BLUE STAIN TISS: NORMAL
NIGHT BLUE STAIN TISS: NORMAL

## 2021-05-27 PROCEDURE — 97110 THERAPEUTIC EXERCISES: CPT

## 2021-05-27 NOTE — FLOWSHEET NOTE
Physical Therapy Daily Treatment Note   Date:  2021    TIme In:  1322                   Time Out:  1402    Patient Name:  Marcus Klein    :  1960  MRN: [id number]    Restrictions/Precautions:    Pertinent Medical History:  Medical/Treatment Diagnosis Information:  ·   s/p conversion to Rt LISSET     Insurance/Certification information:    MI / Gabby Mcqueen  Physician Information:    Johnathan Bosworth, MD  Plan of care signed (Y/N):    Visit# / total visits:     6/    G-Code (if applicable):      Date / Visit # G-Code Applied:         Progress Note: []  Yes  [x]  No  Next due by: Visit #10      Pain level:   8/10  Subjective: Pt reports she is still hurting and is unable to do the bike again. She returns to MD tomorrow. Objective:   Observation:    Test measurements:      Palpation:    Exercises:  Exercise Resistance/Repetitions Other comments   Nustep L3 x 7' Held due to pain   QS 3x10 27   Glut sets 3x10 27   SAQ 3x10 27   Supine heel slides 3x10 27   Supine hip abd 3x10 Held due to pain   LAQ 3x10 27   Calf raises with support 3x10 27   Mini squats  3x10 27   Standing hip flexion  3x10 27   Hip abd / add OTB / ball 3x10 27   HS curls  OTB 3x10 27     Other Therapeutic Activities:      Manual Treatments:      Modalities:  MH to low back in sitting x 15'. Timed Code Treatment Minutes:  25      Total Treatment Minutes:  40    Treatment/Activity Tolerance:  [x] Patient tolerated treatment well [] Patient limited by fatigue  [] Patient limited by pain  [] Patient limited by other medical complications  [x] Other:  Pt continues to have high pain levels in left hip and back and was unable to tolerate select exercises due to pain.     Pain after treatment:      8/10    Prognosis: [x] Good [] Fair  [] Poor    Patient Requires Follow-up: [x] Yes  [] No    Plan:   [x] Continue per plan of care [] Alter current plan (see comments)  [] Plan of care initiated [] Hold pending MD visit [] Discharge    Plan for

## 2021-05-28 DIAGNOSIS — G25.81 RLS (RESTLESS LEGS SYNDROME): ICD-10-CM

## 2021-05-28 RX ORDER — MELOXICAM 15 MG/1
15 TABLET ORAL DAILY
Qty: 30 TABLET | Refills: 3 | Status: SHIPPED | OUTPATIENT
Start: 2021-05-28 | End: 2021-09-15 | Stop reason: SDUPTHER

## 2021-05-28 RX ORDER — ROPINIROLE 0.5 MG/1
0.5 TABLET, FILM COATED ORAL NIGHTLY
Qty: 30 TABLET | Refills: 2 | Status: SHIPPED | OUTPATIENT
Start: 2021-05-28 | End: 2021-09-15 | Stop reason: SDUPTHER

## 2021-05-28 RX ORDER — ETANERCEPT 50 MG/ML
SOLUTION SUBCUTANEOUS
Qty: 4 ML | Refills: 1 | Status: SHIPPED | OUTPATIENT
Start: 2021-05-28 | End: 2021-09-20

## 2021-05-31 ENCOUNTER — APPOINTMENT (OUTPATIENT)
Dept: PREADMISSION TESTING | Facility: HOSPITAL | Age: 61
End: 2021-05-31

## 2021-06-01 ENCOUNTER — ANESTHESIA EVENT (OUTPATIENT)
Dept: PERIOP | Facility: HOSPITAL | Age: 61
End: 2021-06-01

## 2021-06-01 RX ORDER — FAMOTIDINE 10 MG/ML
20 INJECTION, SOLUTION INTRAVENOUS ONCE
Status: CANCELLED | OUTPATIENT
Start: 2021-06-01 | End: 2021-06-01

## 2021-06-02 ENCOUNTER — APPOINTMENT (OUTPATIENT)
Dept: GENERAL RADIOLOGY | Facility: HOSPITAL | Age: 61
End: 2021-06-02

## 2021-06-02 ENCOUNTER — HOSPITAL ENCOUNTER (INPATIENT)
Facility: HOSPITAL | Age: 61
LOS: 2 days | Discharge: HOME | End: 2021-06-04
Attending: ORTHOPAEDIC SURGERY | Admitting: ORTHOPAEDIC SURGERY

## 2021-06-02 ENCOUNTER — ANESTHESIA (OUTPATIENT)
Dept: PERIOP | Facility: HOSPITAL | Age: 61
End: 2021-06-02

## 2021-06-02 ENCOUNTER — PRE-ADMISSION TESTING (OUTPATIENT)
Dept: PREADMISSION TESTING | Facility: HOSPITAL | Age: 61
End: 2021-06-02

## 2021-06-02 DIAGNOSIS — B99.9 INFECTION: ICD-10-CM

## 2021-06-02 DIAGNOSIS — M25.551 RIGHT HIP PAIN: ICD-10-CM

## 2021-06-02 DIAGNOSIS — Z96.641 STATUS POST TOTAL HIP REPLACEMENT, RIGHT: Primary | ICD-10-CM

## 2021-06-02 LAB
APPEARANCE FLD: ABNORMAL
COLOR FLD: ABNORMAL
EOSINOPHIL NFR FLD MANUAL: 6 %
FLUAV SUBTYP SPEC NAA+PROBE: NOT DETECTED
FLUBV RNA ISLT QL NAA+PROBE: NOT DETECTED
LYMPHOCYTES NFR FLD MANUAL: 44 %
MACROPHAGE FLUID: 1 %
MESOTHL CELL NFR FLD MANUAL: 2 %
MONOCYTES NFR FLD: 21 %
NEUTROPHILS NFR FLD MANUAL: 26 %
RBC # FLD AUTO: ABNORMAL /MM3
SARS-COV-2 RNA PNL SPEC NAA+PROBE: NOT DETECTED
WBC # FLD AUTO: 968 /MM3

## 2021-06-02 PROCEDURE — C9803 HOPD COVID-19 SPEC COLLECT: HCPCS

## 2021-06-02 PROCEDURE — 89051 BODY FLUID CELL COUNT: CPT | Performed by: ORTHOPAEDIC SURGERY

## 2021-06-02 PROCEDURE — 25010000002 PROPOFOL 10 MG/ML EMULSION: Performed by: NURSE ANESTHETIST, CERTIFIED REGISTERED

## 2021-06-02 PROCEDURE — 25010000002 DEXAMETHASONE PER 1 MG: Performed by: NURSE ANESTHETIST, CERTIFIED REGISTERED

## 2021-06-02 PROCEDURE — 87176 TISSUE HOMOGENIZATION CULTR: CPT | Performed by: ORTHOPAEDIC SURGERY

## 2021-06-02 PROCEDURE — 87070 CULTURE OTHR SPECIMN AEROBIC: CPT | Performed by: ORTHOPAEDIC SURGERY

## 2021-06-02 PROCEDURE — 25010000002 NEOSTIGMINE 10 MG/10ML SOLUTION: Performed by: NURSE ANESTHETIST, CERTIFIED REGISTERED

## 2021-06-02 PROCEDURE — 97116 GAIT TRAINING THERAPY: CPT

## 2021-06-02 PROCEDURE — 87206 SMEAR FLUORESCENT/ACID STAI: CPT | Performed by: ORTHOPAEDIC SURGERY

## 2021-06-02 PROCEDURE — 25010000003 CEFAZOLIN IN DEXTROSE 2-4 GM/100ML-% SOLUTION: Performed by: ORTHOPAEDIC SURGERY

## 2021-06-02 PROCEDURE — 87116 MYCOBACTERIA CULTURE: CPT | Performed by: ORTHOPAEDIC SURGERY

## 2021-06-02 PROCEDURE — 87075 CULTR BACTERIA EXCEPT BLOOD: CPT | Performed by: ORTHOPAEDIC SURGERY

## 2021-06-02 PROCEDURE — 25010000002 FENTANYL CITRATE (PF) 50 MCG/ML SOLUTION: Performed by: NURSE ANESTHETIST, CERTIFIED REGISTERED

## 2021-06-02 PROCEDURE — 25010000003 CEFAZOLIN IN DEXTROSE 2-4 GM/100ML-% SOLUTION: Performed by: NURSE ANESTHETIST, CERTIFIED REGISTERED

## 2021-06-02 PROCEDURE — 87205 SMEAR GRAM STAIN: CPT | Performed by: ORTHOPAEDIC SURGERY

## 2021-06-02 PROCEDURE — 25010000002 HYDROCORTISONE SODIUM SUCCINATE 100 MG RECONSTITUTED SOLUTION: Performed by: NURSE PRACTITIONER

## 2021-06-02 PROCEDURE — 87636 SARSCOV2 & INF A&B AMP PRB: CPT

## 2021-06-02 PROCEDURE — 87102 FUNGUS ISOLATION CULTURE: CPT | Performed by: ORTHOPAEDIC SURGERY

## 2021-06-02 PROCEDURE — 0JBL0ZZ EXCISION OF RIGHT UPPER LEG SUBCUTANEOUS TISSUE AND FASCIA, OPEN APPROACH: ICD-10-PCS | Performed by: ORTHOPAEDIC SURGERY

## 2021-06-02 PROCEDURE — 25010000002 ONDANSETRON PER 1 MG: Performed by: NURSE ANESTHETIST, CERTIFIED REGISTERED

## 2021-06-02 PROCEDURE — 76000 FLUOROSCOPY <1 HR PHYS/QHP: CPT

## 2021-06-02 PROCEDURE — 97161 PT EVAL LOW COMPLEX 20 MIN: CPT

## 2021-06-02 RX ORDER — MAGNESIUM HYDROXIDE 1200 MG/15ML
LIQUID ORAL AS NEEDED
Status: DISCONTINUED | OUTPATIENT
Start: 2021-06-02 | End: 2021-06-02 | Stop reason: HOSPADM

## 2021-06-02 RX ORDER — PROMETHAZINE HYDROCHLORIDE 25 MG/1
25 TABLET ORAL ONCE AS NEEDED
Status: DISCONTINUED | OUTPATIENT
Start: 2021-06-02 | End: 2021-06-02 | Stop reason: HOSPADM

## 2021-06-02 RX ORDER — GLYCOPYRROLATE 0.2 MG/ML
INJECTION INTRAMUSCULAR; INTRAVENOUS AS NEEDED
Status: DISCONTINUED | OUTPATIENT
Start: 2021-06-02 | End: 2021-06-02 | Stop reason: SURG

## 2021-06-02 RX ORDER — DOCUSATE SODIUM 100 MG/1
100 CAPSULE, LIQUID FILLED ORAL 2 TIMES DAILY
Status: DISCONTINUED | OUTPATIENT
Start: 2021-06-02 | End: 2021-06-04 | Stop reason: HOSPADM

## 2021-06-02 RX ORDER — CEFAZOLIN SODIUM 2 G/100ML
INJECTION, SOLUTION INTRAVENOUS AS NEEDED
Status: DISCONTINUED | OUTPATIENT
Start: 2021-06-02 | End: 2021-06-02 | Stop reason: SURG

## 2021-06-02 RX ORDER — NEOSTIGMINE METHYLSULFATE 1 MG/ML
INJECTION, SOLUTION INTRAVENOUS AS NEEDED
Status: DISCONTINUED | OUTPATIENT
Start: 2021-06-02 | End: 2021-06-02 | Stop reason: SURG

## 2021-06-02 RX ORDER — ROPINIROLE 0.5 MG/1
0.5 TABLET, FILM COATED ORAL NIGHTLY
Status: DISCONTINUED | OUTPATIENT
Start: 2021-06-02 | End: 2021-06-04 | Stop reason: HOSPADM

## 2021-06-02 RX ORDER — ACETAMINOPHEN 500 MG
1000 TABLET ORAL EVERY 8 HOURS SCHEDULED
Status: DISCONTINUED | OUTPATIENT
Start: 2021-06-02 | End: 2021-06-04 | Stop reason: HOSPADM

## 2021-06-02 RX ORDER — KETOROLAC TROMETHAMINE 15 MG/ML
15 INJECTION, SOLUTION INTRAMUSCULAR; INTRAVENOUS EVERY 6 HOURS PRN
Status: DISCONTINUED | OUTPATIENT
Start: 2021-06-02 | End: 2021-06-04 | Stop reason: HOSPADM

## 2021-06-02 RX ORDER — SODIUM CHLORIDE, SODIUM LACTATE, POTASSIUM CHLORIDE, CALCIUM CHLORIDE 600; 310; 30; 20 MG/100ML; MG/100ML; MG/100ML; MG/100ML
9 INJECTION, SOLUTION INTRAVENOUS CONTINUOUS
Status: DISCONTINUED | OUTPATIENT
Start: 2021-06-02 | End: 2021-06-04 | Stop reason: HOSPADM

## 2021-06-02 RX ORDER — GUAIFENESIN 600 MG/1
1200 TABLET, EXTENDED RELEASE ORAL 2 TIMES DAILY
Status: DISCONTINUED | OUTPATIENT
Start: 2021-06-02 | End: 2021-06-04 | Stop reason: HOSPADM

## 2021-06-02 RX ORDER — LIDOCAINE HYDROCHLORIDE 10 MG/ML
INJECTION, SOLUTION EPIDURAL; INFILTRATION; INTRACAUDAL; PERINEURAL AS NEEDED
Status: DISCONTINUED | OUTPATIENT
Start: 2021-06-02 | End: 2021-06-02 | Stop reason: SURG

## 2021-06-02 RX ORDER — CEFAZOLIN SODIUM 2 G/100ML
2 INJECTION, SOLUTION INTRAVENOUS EVERY 8 HOURS
Status: COMPLETED | OUTPATIENT
Start: 2021-06-02 | End: 2021-06-03

## 2021-06-02 RX ORDER — SODIUM CHLORIDE 0.9 % (FLUSH) 0.9 %
10 SYRINGE (ML) INJECTION EVERY 12 HOURS SCHEDULED
Status: DISCONTINUED | OUTPATIENT
Start: 2021-06-02 | End: 2021-06-02 | Stop reason: HOSPADM

## 2021-06-02 RX ORDER — MIDAZOLAM HYDROCHLORIDE 1 MG/ML
1 INJECTION INTRAMUSCULAR; INTRAVENOUS
Status: DISCONTINUED | OUTPATIENT
Start: 2021-06-02 | End: 2021-06-02 | Stop reason: HOSPADM

## 2021-06-02 RX ORDER — BUPROPION HYDROCHLORIDE 150 MG/1
150 TABLET, EXTENDED RELEASE ORAL NIGHTLY
Status: DISCONTINUED | OUTPATIENT
Start: 2021-06-02 | End: 2021-06-04 | Stop reason: HOSPADM

## 2021-06-02 RX ORDER — SODIUM CHLORIDE 0.9 % (FLUSH) 0.9 %
10 SYRINGE (ML) INJECTION AS NEEDED
Status: DISCONTINUED | OUTPATIENT
Start: 2021-06-02 | End: 2021-06-02 | Stop reason: HOSPADM

## 2021-06-02 RX ORDER — LABETALOL HYDROCHLORIDE 5 MG/ML
10 INJECTION, SOLUTION INTRAVENOUS EVERY 4 HOURS PRN
Status: DISCONTINUED | OUTPATIENT
Start: 2021-06-02 | End: 2021-06-04 | Stop reason: HOSPADM

## 2021-06-02 RX ORDER — PROMETHAZINE HYDROCHLORIDE 25 MG/1
25 SUPPOSITORY RECTAL ONCE AS NEEDED
Status: DISCONTINUED | OUTPATIENT
Start: 2021-06-02 | End: 2021-06-02 | Stop reason: HOSPADM

## 2021-06-02 RX ORDER — NALOXONE HCL 0.4 MG/ML
0.1 VIAL (ML) INJECTION
Status: DISCONTINUED | OUTPATIENT
Start: 2021-06-02 | End: 2021-06-04 | Stop reason: HOSPADM

## 2021-06-02 RX ORDER — OXYCODONE HYDROCHLORIDE 5 MG/1
5 TABLET ORAL EVERY 4 HOURS PRN
Status: DISCONTINUED | OUTPATIENT
Start: 2021-06-02 | End: 2021-06-04 | Stop reason: HOSPADM

## 2021-06-02 RX ORDER — ACETAMINOPHEN 500 MG
1000 TABLET ORAL EVERY 8 HOURS
Status: DISCONTINUED | OUTPATIENT
Start: 2021-06-02 | End: 2021-06-02

## 2021-06-02 RX ORDER — ONDANSETRON 2 MG/ML
4 INJECTION INTRAMUSCULAR; INTRAVENOUS EVERY 6 HOURS PRN
Status: DISCONTINUED | OUTPATIENT
Start: 2021-06-02 | End: 2021-06-04 | Stop reason: HOSPADM

## 2021-06-02 RX ORDER — OXYCODONE HYDROCHLORIDE 5 MG/1
10 TABLET ORAL EVERY 4 HOURS PRN
Status: DISCONTINUED | OUTPATIENT
Start: 2021-06-02 | End: 2021-06-04 | Stop reason: HOSPADM

## 2021-06-02 RX ORDER — ROCURONIUM BROMIDE 10 MG/ML
INJECTION, SOLUTION INTRAVENOUS AS NEEDED
Status: DISCONTINUED | OUTPATIENT
Start: 2021-06-02 | End: 2021-06-02 | Stop reason: SURG

## 2021-06-02 RX ORDER — PROPOFOL 10 MG/ML
VIAL (ML) INTRAVENOUS AS NEEDED
Status: DISCONTINUED | OUTPATIENT
Start: 2021-06-02 | End: 2021-06-02 | Stop reason: SURG

## 2021-06-02 RX ORDER — LIDOCAINE HYDROCHLORIDE 10 MG/ML
0.5 INJECTION, SOLUTION EPIDURAL; INFILTRATION; INTRACAUDAL; PERINEURAL ONCE AS NEEDED
Status: COMPLETED | OUTPATIENT
Start: 2021-06-02 | End: 2021-06-02

## 2021-06-02 RX ORDER — FENTANYL CITRATE 50 UG/ML
INJECTION, SOLUTION INTRAMUSCULAR; INTRAVENOUS AS NEEDED
Status: DISCONTINUED | OUTPATIENT
Start: 2021-06-02 | End: 2021-06-02 | Stop reason: SURG

## 2021-06-02 RX ORDER — HYDROMORPHONE HYDROCHLORIDE 1 MG/ML
0.5 INJECTION, SOLUTION INTRAMUSCULAR; INTRAVENOUS; SUBCUTANEOUS
Status: DISCONTINUED | OUTPATIENT
Start: 2021-06-02 | End: 2021-06-04 | Stop reason: HOSPADM

## 2021-06-02 RX ORDER — FAMOTIDINE 20 MG/1
20 TABLET, FILM COATED ORAL ONCE
Status: COMPLETED | OUTPATIENT
Start: 2021-06-02 | End: 2021-06-02

## 2021-06-02 RX ORDER — PREDNISONE 10 MG/1
10 TABLET ORAL DAILY
Status: DISCONTINUED | OUTPATIENT
Start: 2021-06-03 | End: 2021-06-04 | Stop reason: HOSPADM

## 2021-06-02 RX ORDER — MELOXICAM 7.5 MG/1
15 TABLET ORAL DAILY
Status: DISCONTINUED | OUTPATIENT
Start: 2021-06-03 | End: 2021-06-04 | Stop reason: HOSPADM

## 2021-06-02 RX ORDER — ONDANSETRON 2 MG/ML
INJECTION INTRAMUSCULAR; INTRAVENOUS AS NEEDED
Status: DISCONTINUED | OUTPATIENT
Start: 2021-06-02 | End: 2021-06-02 | Stop reason: SURG

## 2021-06-02 RX ORDER — ESMOLOL HYDROCHLORIDE 10 MG/ML
INJECTION INTRAVENOUS AS NEEDED
Status: DISCONTINUED | OUTPATIENT
Start: 2021-06-02 | End: 2021-06-02 | Stop reason: SURG

## 2021-06-02 RX ORDER — TRAMADOL HYDROCHLORIDE 50 MG/1
50 TABLET ORAL EVERY 8 HOURS PRN
Status: DISCONTINUED | OUTPATIENT
Start: 2021-06-02 | End: 2021-06-04 | Stop reason: HOSPADM

## 2021-06-02 RX ORDER — LISINOPRIL 10 MG/1
10 TABLET ORAL DAILY
Status: DISCONTINUED | OUTPATIENT
Start: 2021-06-02 | End: 2021-06-04 | Stop reason: HOSPADM

## 2021-06-02 RX ORDER — SODIUM CHLORIDE, SODIUM LACTATE, POTASSIUM CHLORIDE, CALCIUM CHLORIDE 600; 310; 30; 20 MG/100ML; MG/100ML; MG/100ML; MG/100ML
100 INJECTION, SOLUTION INTRAVENOUS CONTINUOUS
Status: DISCONTINUED | OUTPATIENT
Start: 2021-06-02 | End: 2021-06-04 | Stop reason: HOSPADM

## 2021-06-02 RX ORDER — DEXAMETHASONE SODIUM PHOSPHATE 4 MG/ML
INJECTION, SOLUTION INTRA-ARTICULAR; INTRALESIONAL; INTRAMUSCULAR; INTRAVENOUS; SOFT TISSUE AS NEEDED
Status: DISCONTINUED | OUTPATIENT
Start: 2021-06-02 | End: 2021-06-02 | Stop reason: SURG

## 2021-06-02 RX ORDER — FLUTICASONE PROPIONATE 50 MCG
2 SPRAY, SUSPENSION (ML) NASAL DAILY
Status: DISCONTINUED | OUTPATIENT
Start: 2021-06-02 | End: 2021-06-04 | Stop reason: HOSPADM

## 2021-06-02 RX ORDER — FENTANYL CITRATE 50 UG/ML
50 INJECTION, SOLUTION INTRAMUSCULAR; INTRAVENOUS
Status: DISCONTINUED | OUTPATIENT
Start: 2021-06-02 | End: 2021-06-02 | Stop reason: HOSPADM

## 2021-06-02 RX ORDER — ASPIRIN 81 MG/1
81 TABLET ORAL EVERY 12 HOURS SCHEDULED
Status: DISCONTINUED | OUTPATIENT
Start: 2021-06-03 | End: 2021-06-04 | Stop reason: HOSPADM

## 2021-06-02 RX ADMIN — ESMOLOL HYDROCHLORIDE 50 MG: 10 INJECTION, SOLUTION INTRAVENOUS at 13:05

## 2021-06-02 RX ADMIN — FENTANYL CITRATE 50 MCG: 50 INJECTION, SOLUTION INTRAMUSCULAR; INTRAVENOUS at 14:20

## 2021-06-02 RX ADMIN — HYDROCORTISONE SODIUM SUCCINATE 50 MG: 100 INJECTION, POWDER, FOR SOLUTION INTRAMUSCULAR; INTRAVENOUS at 17:44

## 2021-06-02 RX ADMIN — PROPOFOL 25 MCG/KG/MIN: 10 INJECTION, EMULSION INTRAVENOUS at 13:05

## 2021-06-02 RX ADMIN — ROPINIROLE HYDROCHLORIDE 0.5 MG: 0.5 TABLET, FILM COATED ORAL at 21:46

## 2021-06-02 RX ADMIN — HYDROCORTISONE SODIUM SUCCINATE 50 MG: 100 INJECTION, POWDER, FOR SOLUTION INTRAMUSCULAR; INTRAVENOUS at 23:51

## 2021-06-02 RX ADMIN — NEOSTIGMINE 4 MG: 1 INJECTION INTRAVENOUS at 14:09

## 2021-06-02 RX ADMIN — BUPROPION HYDROCHLORIDE 150 MG: 150 TABLET, EXTENDED RELEASE ORAL at 21:45

## 2021-06-02 RX ADMIN — TRAMADOL HYDROCHLORIDE 50 MG: 50 TABLET, FILM COATED ORAL at 17:44

## 2021-06-02 RX ADMIN — GLYCOPYRROLATE 0.4 MG: 0.4 INJECTION INTRAMUSCULAR; INTRAVENOUS at 14:09

## 2021-06-02 RX ADMIN — LIDOCAINE HYDROCHLORIDE 50 MG: 10 INJECTION, SOLUTION EPIDURAL; INFILTRATION; INTRACAUDAL; PERINEURAL at 13:05

## 2021-06-02 RX ADMIN — SODIUM CHLORIDE, POTASSIUM CHLORIDE, SODIUM LACTATE AND CALCIUM CHLORIDE 9 ML/HR: 600; 310; 30; 20 INJECTION, SOLUTION INTRAVENOUS at 11:08

## 2021-06-02 RX ADMIN — CEFAZOLIN SODIUM 2 G: 10 INJECTION, POWDER, FOR SOLUTION INTRAVENOUS at 13:00

## 2021-06-02 RX ADMIN — ONDANSETRON 4 MG: 2 INJECTION INTRAMUSCULAR; INTRAVENOUS at 13:58

## 2021-06-02 RX ADMIN — OXYCODONE 10 MG: 5 TABLET ORAL at 15:23

## 2021-06-02 RX ADMIN — CEFAZOLIN SODIUM 2 G: 2 INJECTION, SOLUTION INTRAVENOUS at 21:46

## 2021-06-02 RX ADMIN — DOCUSATE SODIUM 100 MG: 100 CAPSULE, LIQUID FILLED ORAL at 21:46

## 2021-06-02 RX ADMIN — FAMOTIDINE 20 MG: 20 TABLET ORAL at 11:08

## 2021-06-02 RX ADMIN — PROPOFOL 150 MG: 10 INJECTION, EMULSION INTRAVENOUS at 13:05

## 2021-06-02 RX ADMIN — OXYCODONE 10 MG: 5 TABLET ORAL at 21:46

## 2021-06-02 RX ADMIN — DEXAMETHASONE SODIUM PHOSPHATE 8 MG: 4 INJECTION, SOLUTION INTRA-ARTICULAR; INTRALESIONAL; INTRAMUSCULAR; INTRAVENOUS; SOFT TISSUE at 13:05

## 2021-06-02 RX ADMIN — FENTANYL CITRATE 50 MCG: 50 INJECTION, SOLUTION INTRAMUSCULAR; INTRAVENOUS at 14:37

## 2021-06-02 RX ADMIN — ACETAMINOPHEN 1000 MG: 500 TABLET ORAL at 15:23

## 2021-06-02 RX ADMIN — LIDOCAINE HYDROCHLORIDE 0.5 ML: 10 INJECTION, SOLUTION EPIDURAL; INFILTRATION; INTRACAUDAL; PERINEURAL at 11:08

## 2021-06-02 RX ADMIN — ROCURONIUM BROMIDE 50 MG: 10 INJECTION, SOLUTION INTRAVENOUS at 13:05

## 2021-06-02 RX ADMIN — FENTANYL CITRATE 50 MCG: 50 INJECTION, SOLUTION INTRAMUSCULAR; INTRAVENOUS at 14:26

## 2021-06-02 RX ADMIN — GUAIFENESIN 1200 MG: 600 TABLET, EXTENDED RELEASE ORAL at 17:44

## 2021-06-02 RX ADMIN — FENTANYL CITRATE 50 MCG: 50 INJECTION, SOLUTION INTRAMUSCULAR; INTRAVENOUS at 15:26

## 2021-06-02 RX ADMIN — ACETAMINOPHEN 1000 MG: 500 TABLET, FILM COATED ORAL at 21:46

## 2021-06-02 RX ADMIN — SODIUM CHLORIDE, POTASSIUM CHLORIDE, SODIUM LACTATE AND CALCIUM CHLORIDE 100 ML/HR: 600; 310; 30; 20 INJECTION, SOLUTION INTRAVENOUS at 17:43

## 2021-06-02 RX ADMIN — FENTANYL CITRATE 100 MCG: 50 INJECTION, SOLUTION INTRAMUSCULAR; INTRAVENOUS at 13:05

## 2021-06-02 NOTE — ANESTHESIA POSTPROCEDURE EVALUATION
Patient: Jessica Winston    Procedure Summary     Date: 06/02/21 Room / Location:  [facility]    Anesthesia Start: 1300 Anesthesia Stop: 1417    Procedure: RIGHT HIP SUPERFICIAL IRRIGATION AND DEBRIDEMENT, ARTHOCENTESIS (N/A Hip) Diagnosis:     Surgeons: Riaz Man MD Provider: Joey Hernandez MD    Anesthesia Type: general ASA Status: 3          Anesthesia Type: general    Vitals  Vitals Value Taken Time   BP     Temp     Pulse 89 06/02/21 1417   Resp     SpO2     Vitals shown include unvalidated device data.        Post Anesthesia Care and Evaluation    Patient location during evaluation: PACU  Patient participation: complete - patient participated  Level of consciousness: awake and alert  Pain management: adequate  Airway patency: patent  Anesthetic complications: No anesthetic complications  PONV Status: none  Cardiovascular status: hemodynamically stable and acceptable  Respiratory status: nonlabored ventilation, acceptable and nasal cannula  Hydration status: acceptable

## 2021-06-02 NOTE — OP NOTE
HIP ANTERIOR INCISION AND DRAINAGE WITH HANA TABLE  Procedure Report    Patient Name:  Jessica Winston  YOB: 1960    Date of Surgery:  6/2/2021     Indications:    60 y.o. female who was admitted to [facility] with wound break down from CLIFFORD incision done approximately 6 weeks ago. The patient had been recovering well she was seen in 2-week postoperative visit and wound appeared to be healing well unfortunately the next couple weeks she began to have some superficial necrosis of the skin and some fibrinous exudate.  She was seen in the office last Friday and a superficial swab was positive for several bacteria she had elevated ESR CRP at that point we discussed surgical intervention in order to ensure that there was no deep infection in the hip as well as debrided superficial infection.  Patient continued to smoke postoperatively have been a chronic lifetime smoker and had cachexia. Likely risk and benefits of the procedure including but not limited to infection, DVT, pulmonary embolism, leg length discrepancy, recurrent dislocation, possibility of injury to nerves and vessels, and periprosthetic fractures have been discussed with the patient in detail. Despite the risks involved, the patient elected to proceed and informed consent was obtained. The patient was seen in the preoperative holding area and the operative extremity was marked.    Pre-op Diagnosis:   Superficial injury of hip with infection, right, initial encounter [[id number]]       Post-Op Diagnosis Codes:     * Superficial injury of hip with infection, right, initial encounter [S70.911A, L08.9]    Procedure/CPT® Codes:      Procedure(s):  RIGHT HIP SUPERFICIAL IRRIGATION AND DEBRIDEMENT, ARTHOCENTESIS    Staff:  Surgeon(s):  Riaz Man MD    Anesthesia: General    Estimated Blood Loss: minimal    Implants:    Nothing was implanted during the procedure    Specimen:          Specimens     ID Source Type Tests Collected  By Collected At Frozen?    1 Hip, Right Body Fluid · BODY FLUID CELL COUNT WITH DIFFERENTIAL   Riaz Man MD 6/2/21 1339     Description: STAT Cell Count     2 Hip, Right Body Fluid · FUNGAL CULTURE  · BODY FLUID CULTURE  · AFB CULTURE  · ANAEROBIC CULTURE 10 DAY INCUBATION   Riaz Man MD 6/2/21 1343     3 Hip, Right Tissue · FUNGAL CULTURE  · TISSUE / BONE CULTURE  · AFB CULTURE  · ANAEROBIC CULTURE 10 DAY INCUBATION   Riaz Man MD 6/2/21 1346     Description: Superficial tissue             Findings: Aspiration revealed clear synovial fluid without purulence deep fascia was intact    Complications: none    Description of Procedure:   The patient was transferred to [facility] operating room and preoperative antibiotics given IV prior to skin incision  received general anesthesia and was transferred to the [place] table. All bony prominences were padded adequately. The operative hip was then prepped and draped in the usual sterile fashion. Multiple timeouts were done identifying the correct patient, surgical site, and planned procedure.    Prior to making any incision we left the lateral aspect of the hip inserted a spinal needle under fluoroscopy and aspirated approximate 5 cc of clear synovial fluid that did not appear purulent it was sent for cell count as well as culture.    The old incision was used. superficial necrosis was noted. The fascia appeared intact.   We debrided all superficial necrotic tissue back to healthy bleeding bone debridement was done as combination of scalpel curettes and Scott we did send several cultures of the superficial tissue.  Again with even with deep aggressive probing the deep fascia appeared intact following this we then ran a liter of dilute Betadine over the superficial wound and then 3 L of saline with cystoscopy tubing and then again debrided any nonviable tissues.  There is a 2 is necrosis to allow for full wound closure we did get proximal  distal aspects of the skin approximated with some 2-0 PDS and 3 oh nylons and then the central area of the incision was backed approximately 3 x 6 cm      Sponge, needle, and instrument counts were correct. It was found to have a good seal. The patient was then transferred to the recovery room in stable condition. The patient tolerated the procedure well and there were no complications. The patient had adequate distal pulses and good cap refill.    The patient will be mobilized by physical therapy and receive antibiotic's per ID. The patient was started on DVT prophylaxis .  I discussed the satisfactory performance of the procedure with the patient's family and discussed the postoperative management.      Riaz Man MD     Date: 6/2/2021  Time: 14:36 EDT

## 2021-06-02 NOTE — PLAN OF CARE
Problem: Adult Inpatient Plan of Care  Goal: Plan of Care Review  Flowsheets (Taken 6/2/2021 1640)  Progress: improving  Plan of Care Reviewed With: patient  Outcome Summary: PT eval complete. Pt ambulated 320 feet using RW and CGA x2. Gait limited by pain and fatigue. Bed mobility performed with min A and STS with CGA. No knee buckling noted with ambulation. Reviewed HEP and hip precautions. ADLs assessed, pt does not require OT eval at this time. Recommend pt d/c home with assist and HHPT when appropriate.   Goal Outcome Evaluation:  Plan of Care Reviewed With: patient  Progress: improving  Outcome Summary: PT eval complete. Pt ambulated 320 feet using RW and CGA x2. Gait limited by pain and fatigue. Bed mobility performed with min A and STS with CGA. No knee buckling noted with ambulation. Reviewed HEP and hip precautions. ADLs assessed, pt does not require OT eval at this time. Recommend pt d/c home with assist and HHPT when appropriate.

## 2021-06-02 NOTE — THERAPY EVALUATION
Patient Name: Jessica Winston  : 1960    MRN: [id number]                              Today's Date: 2021       Admit Date: 2021    Visit Dx:     ICD-10-CM ICD-9-CM   1. Infection  B99.9 136.9   2. Right hip pain  M25.551 719.45     Patient Active Problem List   Diagnosis   • RUQ pain   • Cardiac mass   • PVD (peripheral vascular disease) with claudication (CMS/HCC)   • Tobacco dependence   • Pain in right hip   • HTN (hypertension)   • Rheumatoid arthritis (CMS/HCC)   • S/P right hip superficial irrigation and debridement arthrocentesis   • Acute blood loss anemia   • Postoperative pain   • Infection     Past Medical History:   Diagnosis Date   • Avascular necrosis (CMS/HCC)    • Depression    • Fractures    • HTN (hypertension)    • Hyperglycemia    • Hypertension    • Osteoarthritis    • RA (rheumatoid arthritis) (CMS/HCC)     ALSO REPORTS OA   • Tooth loose     top left    • Vitamin D deficiency    • Wears glasses     readers     Past Surgical History:   Procedure Laterality Date   • ARM TENDON REPAIR Left    • BREAST SURGERY     • ENDOSCOPY N/A 3/30/2017    Procedure: ESOPHAGOGASTRODUODENOSCOPY WITH COLD FORCEP BIOPSY;  Surgeon: Anca Trevino MD;  Location: [facility] ENDOSCOPY;  Service:    • FEMUR SURGERY      right    • HERNIA REPAIR Bilateral     INGUINAL- unsure about mesh    • KNEE SURGERY Left    • MANDIBLE SURGERY     • TOTAL HIP ARTHROPLASTY REVISION Right 4/15/2021    Procedure: COVERSION TO RIGHT TOTAL HIP ARTHROPLASTY;  Surgeon: Riaz Man MD;  Location: [facility];  Service: Orthopedics;  Laterality: Right;   • TUBAL ABDOMINAL LIGATION     • WISDOM TOOTH EXTRACTION       General Information     Row Name 21 1640          Physical Therapy Time and Intention    Document Type  evaluation  -LOUISE     Mode of Treatment  individual therapy;physical therapy  -LOUISE     Row Name 21 1640          General Information    Patient Profile Reviewed  yes  -LOUISE     Prior Level  of Function  min assist:;all household mobility;transfer;bed mobility;ADL's  -LOUISE     Existing Precautions/Restrictions  fall;right;hip, anterior;other (see comments) wound vac  -LOUISE     Barriers to Rehab  none identified  -     Row Name 06/02/21 1640          Living Environment    Lives With  alone;other (see comments) daughter will be able to assist initially  -     Row Name 06/02/21 1640          Home Main Entrance    Number of Stairs, Main Entrance  none  -LOUISE     Row Name 06/02/21 1640          Stairs Within Home, Primary    Stairs, Within Home, Primary  0  -LOUISE     Number of Stairs, Within Home, Primary  none  -LOUISE     Row Name 06/02/21 1640          Cognition    Orientation Status (Cognition)  oriented x 4  -LOUISE     Row Name 06/02/21 1640          Safety Issues, Functional Mobility    Safety Issues Affecting Function (Mobility)  safety precaution awareness;safety precautions follow-through/compliance  -     Impairments Affecting Function (Mobility)  strength;endurance/activity tolerance;pain;range of motion (ROM)  -       User Key  (r) = Recorded By, (t) = Taken By, (c) = Cosigned By    Initials Name Provider Type    Clarence Song, PT Physical Therapist        Mobility     Row Name 06/02/21 1640          Bed Mobility    Bed Mobility  scooting/bridging;supine-sit;sit-supine  -LOUISE     Scooting/Bridging [place] (Bed Mobility)  verbal cues;contact guard  -     Supine-Sit [place] (Bed Mobility)  verbal cues;minimum assist (75% patient effort)  -LOUISE     Sit-Supine [place] (Bed Mobility)  verbal cues;minimum assist (75% patient effort)  -     Assistive Device (Bed Mobility)  bed rails;head of bed elevated  -     Comment (Bed Mobility)  Min A for LE management off of/onto EOB and trunk control into sitting/supine  -     Row Name 06/02/21 1640          Transfers    Comment (Transfers)  Verbal cues for safe hand placement during standing/sitting and moving R LE out for comfort prior to sitting   -     Row Name 06/02/21 1640          Sit-Stand Transfer    Sit-Stand [place] (Transfers)  verbal cues;contact guard;2 person assist  -     Assistive Device (Sit-Stand Transfers)  walker, front-wheeled  -LOUISE     Row Name 06/02/21 1640          Gait/Stairs (Locomotion)    [place] Level (Gait)  verbal cues;2 person assist;contact guard  -LOUISE     Assistive Device (Gait)  walker, front-wheeled  -LOUISE     Distance in Feet (Gait)  320 feet  -LOUISE     Deviations/Abnormal Patterns (Gait)  bilateral deviations;antalgic;queta decreased;gait speed decreased;stride length decreased  -     Bilateral Gait Deviations  forward flexed posture  -LOUISE     Right Sided Gait Deviations  heel strike decreased;weight shift ability decreased  -     [place] Level (Stairs)  not tested  -     Comment (Gait/Stairs)  Pt ambulated with step through pattern and decreased speed. Verbal cues for maintaining upright posture, body within walker, increase step length, WB through LEs, and decrease WB through UEs. No knee buckling noted. Gait limited by pain and weakness.  -     Row Name 06/02/21 1640          Mobility    Extremity Weight-bearing Status  right lower extremity  -     Right Lower Extremity (Weight-bearing Status)  weight-bearing as tolerated (WBAT)  -       User Key  (r) = Recorded By, (t) = Taken By, (c) = Cosigned By    Initials Name Provider Type    LOUISE Clarence Ken, PT Physical Therapist        Obj/Interventions     Row Name 06/02/21 1640          Range of Motion Comprehensive    General Range of Motion  lower extremity range of motion deficits identified  -     Comment, General Range of Motion  R LE AROM impaired 25%; L LE AROM WFL; able to actively DF/PF  -     Row Name 06/02/21 1640          Strength Comprehensive (MMT)    General Manual Muscle Testing (MMT) Assessment  lower extremity strength deficits identified  -     Comment, General Manual Muscle Testing (MMT) Assessment  R LE functionally 4-/5; L LE  functionally 4+/5;  -[facility] Name 06/02/21 1640          Motor Skills    Therapeutic Exercise  hip;knee;ankle  -[facility] Name 06/02/21 1640          Hip (Therapeutic Exercise)    Hip (Therapeutic Exercise)  isometric exercises  -     Hip Isometrics (Therapeutic Exercise)  gluteal sets;10 repetitions  -[facility] Name 06/02/21 1640          Knee (Therapeutic Exercise)    Knee (Therapeutic Exercise)  isometric exercises  -     Knee Isometrics (Therapeutic Exercise)  quad sets;10 repetitions  -[facility] Name 06/02/21 1640          Ankle (Therapeutic Exercise)    Ankle (Therapeutic Exercise)  AROM (active range of motion)  -     Ankle AROM (Therapeutic Exercise)  bilateral;dorsiflexion;plantarflexion;10 repetitions  -[facility] Name 06/02/21 1640          Sensory Assessment (Somatosensory)    Sensory Assessment (Somatosensory)  LE sensation intact  -       User Key  (r) = Recorded By, (t) = Taken By, (c) = Cosigned By    Initials Name Provider Type    Clarence Song, PT Physical Therapist        Goals/Plan     [facility] Name 06/02/21 1640          Bed Mobility Goal 1 (PT)    Activity/Assistive Device (Bed Mobility Goal 1, PT)  sit to supine/supine to sit  -LOUISE     [place] Level/Cues Needed (Bed Mobility Goal 1, PT)  contact guard assist  -LOUISE     Time Frame (Bed Mobility Goal 1, PT)  long term goal (LTG);3 days  -[facility] Name 06/02/21 1640          Transfer Goal 1 (PT)    Activity/Assistive Device (Transfer Goal 1, PT)  sit-to-stand/stand-to-sit;walker, rolling  -LOUISE     [place] Level/Cues Needed (Transfer Goal 1, PT)  modified independence  -LOUISE     Time Frame (Transfer Goal 1, PT)  long term goal (LTG);3 days  -[facility] Name 06/02/21 1640          Gait Training Goal 1 (PT)    Activity/Assistive Device (Gait Training Goal 1, PT)  gait (walking locomotion);walker, rolling  -LOUISE     [place] Level (Gait Training Goal 1, PT)  modified independence  -LOUISE     Distance (Gait Training Goal 1, PT)  500 feet   -LOUISE     Time Frame (Gait Training Goal 1, PT)  long term goal (LTG);3 days  -LOUISE       User Key  (r) = Recorded By, (t) = Taken By, (c) = Cosigned By    Initials Name Provider Type    Clarence Song, PT Physical Therapist        Clinical Impression     Row Name 06/02/21 1640          Pain    Additional Documentation  Pain Scale: Numbers Pre/Post-Treatment (Group)  -LOUISE     Row Name 06/02/21 1640          Pain Scale: Numbers Pre/Post-Treatment    Pretreatment Pain Rating  7/10  -LOUISE     Posttreatment Pain Rating  8/10  -LOUISE     Pain Location - Side  Right  -LOUISE     Pain Location - Orientation  lower  -LOUISE     Pain Location  extremity  -LOUISE     Pain Intervention(s)  Ambulation/increased activity;Repositioned  -LOUISE     Row Name 06/02/21 1640          Therapy Assessment/Plan (PT)    Patient/Family Therapy Goals Statement (PT)  To return home  -LOUISE     Rehab Potential (PT)  good, to achieve stated therapy goals  -LOUISE     Criteria for Skilled Interventions Met (PT)  yes;meets criteria;skilled treatment is necessary  -LOUISE     Row Name 06/02/21 1640          Positioning and Restraints    Pre-Treatment Position  in bed  -LOUISE     Post Treatment Position  bed  -LOUISE     In Bed  notified nsg;supine;call light within reach;encouraged to call for assist;exit alarm on;SCD pump applied  -LOUISE       User Key  (r) = Recorded By, (t) = Taken By, (c) = Cosigned By    Initials Name Provider Type    Clarence Song, PT Physical Therapist        Outcome Measures     Row Name 06/02/21 1640          How much help from another person do you currently need...    Turning from your back to your side while in flat bed without using bedrails?  2  -LOUISE     Moving from lying on back to sitting on the side of a flat bed without bedrails?  3  -LOUISE     Moving to and from a bed to a chair (including a wheelchair)?  3  -LOUISE     Standing up from a chair using your arms (e.g., wheelchair, bedside chair)?  3  -LOUISE     Climbing 3-5 steps with a railing?  2  -LOUISE     To walk in  hospital room?  3  -LOUISE     AM-PAC 6 Clicks Score (PT)  16  -     Row Name 06/02/21 [facility]          Functional Assessment    Outcome Measure Options  AM-PAC 6 Clicks Basic Mobility (PT)  -       User Key  (r) = Recorded By, (t) = Taken By, (c) = Cosigned By    Initials Name Provider Type    Clarence Song, PT Physical Therapist        Physical Therapy Education                 Title: PT OT SLP Therapies (In Progress)     Topic: Physical Therapy (Done)     Point: Mobility training (Done)     Learning Progress Summary           Patient Acceptance, E,D, VU by LOUISE at 6/2/2021 1640    Comment: Educated on safe sequencing with bed mobility, ambulatory transfesr, and gait. Reviewed HEP and hip precautions.                   Point: Home exercise program (Done)     Learning Progress Summary           Patient Acceptance, E,D, VU by LOUISE at 6/2/2021 1640    Comment: Educated on safe sequencing with bed mobility, ambulatory transfesr, and gait. Reviewed HEP and hip precautions.                   Point: Body mechanics (Done)     Learning Progress Summary           Patient Acceptance, E,D, VU by LOUISE at 6/2/2021 1640    Comment: Educated on safe sequencing with bed mobility, ambulatory transfesr, and gait. Reviewed HEP and hip precautions.                   Point: Precautions (Done)     Learning Progress Summary           Patient Acceptance, E,D, VU by LOUISE at 6/2/2021 1640    Comment: Educated on safe sequencing with bed mobility, ambulatory transfesr, and gait. Reviewed HEP and hip precautions.                               User Key     Initials Effective Dates Name Provider Type Discipline    LOUISE 09/10/19 -  Clarence Ken, PT Physical Therapist PT              PT Recommendation and Plan  Planned Therapy Interventions (PT): balance training, bed mobility training, gait training, home exercise program, patient/family education, transfer training, strengthening, ROM (range of motion)  Plan of Care Reviewed With: patient  Progress:  improving  Outcome Summary: PT eval complete. Pt ambulated 320 feet using RW and CGA x2. Gait limited by pain and fatigue. Bed mobility performed with min A and STS with CGA. No knee buckling noted with ambulation. Reviewed HEP and hip precautions. ADLs assessed, pt does not require OT eval at this time. Recommend pt d/c home with assist and HHPT when appropriate.     Time Calculation:   PT Charges     Row Name 06/02/21 1640             Time Calculation    Start Time  1640  -LOUISE      PT Received On  06/02/21  -LOUISE      PT Goal Re-Cert Due Date  06/12/21  -LOUISE         Time Calculation- PT    Total Timed Code Minutes- PT  10 minute(s)  -LOUISE         Timed Charges    73824 - PT Therapeutic Exercise Minutes  2  -LOUISE      24280 - Gait Training Minutes   8  -LOUISE         Untimed Charges    PT Eval/Re-eval Minutes  34  -LOUISE         Total Minutes    Timed Charges Total Minutes  10  -LOUISE      Untimed Charges Total Minutes  34  -LOUISE       Total Minutes  44  -LOUISE        User Key  (r) = Recorded By, (t) = Taken By, (c) = Cosigned By    Initials Name Provider Type    Clarence Song, PT Physical Therapist        Therapy Charges for Today     Code Description Service Date Service Provider Modifiers Qty    00915027092 HC GAIT TRAINING EA 15 MIN 6/2/2021 Clarence Ken, PT GP 1    34341318624 HC PT EVAL LOW COMPLEXITY 3 6/2/2021 Clarence Ken, PT GP 1    93973440279 HC PT THER SUPP EA 15 MIN 6/2/2021 Clarence Ken, PT GP 2          PT G-Codes  Outcome Measure Options: AM-PAC 6 Clicks Basic Mobility (PT)  AM-PAC 6 Clicks Score (PT): 16    Clarence Ken PT  6/2/2021

## 2021-06-02 NOTE — ANESTHESIA PREPROCEDURE EVALUATION
Anesthesia Evaluation                  Airway   Mallampati: II  Dental      Pulmonary    Cardiovascular     (+) hypertension,       Neuro/Psych  GI/Hepatic/Renal/Endo      Musculoskeletal     Abdominal    Substance History      OB/GYN          Other   arthritis,                      Anesthesia Plan    ASA 3     general     intravenous induction     Anesthetic plan, all risks, benefits, and alternatives have been provided, discussed and informed consent has been obtained with: patient.

## 2021-06-02 NOTE — H&P
Patient Name: Jessica Winston  MRN: [id number]  : 1960  DOS: 2021    Attending: Riaz Man MD    Primary Care Provider: Iraida Demarco APRN      Chief complaint: Postop prosthetic joint infection    Subjective   Patient is a pleasant 60 y.o. female presented for scheduled surgery by Dr. Man.  She underwent right hip superficial irrigation and debridement arthrocentesis under general anesthesia.  She tolerated surgery well and was admitted for further medical management.    Per Dr. Man's note:(((60 y.o. female who was admitted to [facility] with wound break down from [place] incision done approximately 6 weeks ago. The patient had been recovering well she was seen in 2-week postoperative visit and wound appeared to be healing well unfortunately the next couple weeks she began to have some superficial necrosis of the skin and some fibrinous exudate.  She was seen in the office last Friday and a superficial swab was positive for several bacteria she had elevated ESR CRP at that point we discussed surgical intervention in order to ensure that there was no deep infection in the hip as well as debrided superficial infection.  Patient continued to smoke postoperatively have been a chronic lifetime smoker and had cachexia. Likely risk and benefits of the procedure including but not limited to infection, DVT, pulmonary embolism, leg length discrepancy, recurrent dislocation, possibility of injury to nerves and vessels, and periprosthetic fractures have been discussed with the patient in detail. Despite the risks involved, the patient elected to proceed and informed consent was obtained. The patient was seen in the preoperative holding area and the operative extremity was marked.)))    She is known to us from previous admission on 4/15/2021 for right hip revision from prior repair.    She has history of rheumatoid arthritis for which she is on prednisone daily as well as Enbrel.  She  has some joint pains related to rheumatoid arthritis.    When seen in PACU she is doing well.  Her pain is well controlled.  She denies nausea, shortness of breath or chest pain.  She denies recent fevers chills or night sweats.  No history of DVT or PE.  She is continued to use a walker since her previous surgery.  She has had falls.    Allergies:  Allergies   Allergen Reactions   • Clindamycin/Lincomycin Anaphylaxis and Swelling     took whole dose and then throat started to swell -    • Penicillins Anaphylaxis and Swelling     Tolerated cefazolin 4/15/21   • Codeine Swelling       Meds:  Medications Prior to Admission   Medication Sig Dispense Refill Last Dose   • acetaminophen (TYLENOL) 500 MG tablet Take 1,000 mg by mouth Every 6 (Six) Hours As Needed.   Past Week at Unknown time   • buPROPion SR (WELLBUTRIN SR) 150 MG 12 hr tablet Take 150 mg by mouth Daily.   6/1/2021 at 2000   • docusate sodium (Colace) 100 MG capsule Take 1 capsule by mouth 2 (Two) Times a Day.   6/1/2021 at 0500   • lisinopril (PRINIVIL,ZESTRIL) 10 MG tablet Take 10 mg by mouth Daily.   6/1/2021 at 0500   • ondansetron (Zofran) 4 MG tablet Take 1 tablet by mouth Every 8 (Eight) Hours As Needed for Nausea or Vomiting.   Past Month at Unknown time   • oxyCODONE (Roxicodone) 5 MG immediate release tablet Take 1 tablet by mouth Every 4 (Four) Hours As Needed for Moderate Pain .  0 6/1/2021 at 2200   • rOPINIRole (REQUIP) 0.5 MG tablet Take 0.5 mg by mouth Every Night. Take 1 hour before bedtime.   6/1/2021 at 2200   • traMADol (ULTRAM) 50 MG tablet Take 1 tablet by mouth Every 6 (Six) Hours As Needed for Moderate Pain .   Past Week at Unknown time   • cyanocobalamin 1000 MCG/ML injection Inject  into the appropriate muscle as directed by prescriber Every 30 (Thirty) Days. Once per month- usually first of month   5/29/2021   • meloxicam (MOBIC) 15 MG tablet Take 15 mg by mouth Daily.   5/28/2021   • predniSONE (DELTASONE) 10 MG tablet Take 10  mg by mouth Daily.   5/26/2021   • vitamin D (ERGOCALCIFEROL) 1.25 MG (04529 UT) capsule capsule Take 50,000 Units by mouth 1 (One) Time Per Week. Tuesday 6/1/2021         History:   Past Medical History:   Diagnosis Date   • Avascular necrosis (CMS/HCC)    • Depression    • Fractures    • HTN (hypertension)    • Hyperglycemia    • Hypertension    • Osteoarthritis    • RA (rheumatoid arthritis) (CMS/HCC)     ALSO REPORTS OA   • Tooth loose     top left    • Vitamin D deficiency    • Wears glasses     readers     Past Surgical History:   Procedure Laterality Date   • ARM TENDON REPAIR Left    • BREAST SURGERY     • ENDOSCOPY N/A 3/30/2017    Procedure: ESOPHAGOGASTRODUODENOSCOPY WITH COLD FORCEP BIOPSY;  Surgeon: Anca Trevino MD;  Location: [facility] ENDOSCOPY;  Service:    • FEMUR SURGERY      right    • HERNIA REPAIR Bilateral     INGUINAL- unsure about mesh    • KNEE SURGERY Left    • MANDIBLE SURGERY     • TOTAL HIP ARTHROPLASTY REVISION Right 4/15/2021    Procedure: COVERSION TO RIGHT TOTAL HIP ARTHROPLASTY;  Surgeon: Riaz Man MD;  Location: [facility] OR;  Service: Orthopedics;  Laterality: Right;   • TUBAL ABDOMINAL LIGATION     • WISDOM TOOTH EXTRACTION       Family History   Problem Relation Age of Onset   • Arthritis Mother    • Cancer Mother         leukemia   • Hypertension Mother    • Heart disease Father         heart attack   • Hypertension Father    • Migraines Daughter    • Cancer Maternal Grandmother         uterus     Social History     Tobacco Use   • Smoking status: Current Every Day Smoker     Packs/day: 0.50     Years: 30.00     Pack years: 15.00     Types: Cigarettes   • Smokeless tobacco: Never Used   • Tobacco comment: HX OF SMOKING 1 PPD FOR THE PAST 30 YEARS    Vaping Use   • Vaping Use: Never used   Substance Use Topics   • Alcohol use: No   • Drug use: No   She lives alone.  Has 3 children.  She has been on Workmen's Comp. from [employer] since her fall at work last  "year.    Review of Systems  Pertinent items are noted in HPI, all other systems reviewed and negative    Vital Signs  /67 (BP Location: Right arm, Patient Position: Lying)   Pulse 82   Temp 97.8 °F (36.6 °C) (Temporal)   Resp 16   Ht 165.1 cm (65\")   Wt 56.7 kg (125 lb)   LMP 08/01/2010 (Approximate)   SpO2 94%   BMI 20.80 kg/m²     Physical Exam:    General Appearance:    Alert, cooperative, in no acute distress   Head:    Normocephalic, without obvious abnormality, atraumatic   Eyes:            Lids and lashes normal, conjunctivae and sclerae normal, no   icterus, no pallor, corneas clear,    Ears:    Ears appear intact with no abnormalities noted   Throat:   No oral lesions, no thrush, oral mucosa moist   Neck:   No adenopathy, supple, trachea midline, no thyromegaly    Lungs:     Clear to auscultation,respirations regular, even and unlabored    Heart:    Regular rhythm and normal rate, normal S1 and S2, no murmur, no gallop   Abdomen:     Normal bowel sounds, no masses, no organomegaly, soft non-tender, non-distended, no guarding, no rebound  tenderness   Genitalia:    Deferred   Extremities:  Right hip wound VAC CDI   Pulses:   Pulses palpable and equal bilaterally   Skin:   No bleeding, bruising or rash   Neurologic:   Cranial nerves 2 - 12 grossly intact. Flexion and dorsiflexion intact bilateral feet.        I reviewed the patient's new clinical results.     Lab Results   Component Value Date    HGBA1C 5.20 04/13/2021     Results for JORGE GALLEGOS (MRN [id number]) as of 6/2/2021 15:08   Ref. Range 4/17/2021 09:58   Glucose Latest Ref Range: 65 - 99 mg/dL 85   Sodium Latest Ref Range: 136 - 145 mmol/L 139   Potassium Latest Ref Range: 3.5 - 5.2 mmol/L 3.6   CO2 Latest Ref Range: 22.0 - 29.0 mmol/L 27.0   Chloride Latest Ref Range: 98 - 107 mmol/L 104   Anion Gap Latest Ref Range: 5.0 - 15.0 mmol/L 8.0   Creatinine Latest Ref Range: 0.57 - 1.00 mg/dL 0.72   BUN Latest Ref Range: 6 - 20 mg/dL " 9   BUN/Creatinine Ratio Latest Ref Range: 7.0 - 25.0  12.5   Calcium Latest Ref Range: 8.6 - 10.5 mg/dL 8.5 (L)   eGFR Non African Am Latest Ref Range: >60 mL/min/1.73 83   WBC Latest Ref Range: 3.40 - 10.80 10*3/mm3 8.78   RBC Latest Ref Range: 3.77 - 5.28 10*6/mm3 3.01 (L)   Hemoglobin Latest Ref Range: 12.0 - 15.9 g/dL 8.8 (L)   Hematocrit Latest Ref Range: 34.0 - 46.6 % 27.6 (L)   RDW Latest Ref Range: 12.3 - 15.4 % 19.2 (H)   MCV Latest Ref Range: 79.0 - 97.0 fL 91.7   MCH Latest Ref Range: 26.6 - 33.0 pg 29.2   MCHC Latest Ref Range: 31.5 - 35.7 g/dL 31.9   MPV Latest Ref Range: 6.0 - 12.0 fL 9.6   Platelets Latest Ref Range: 140 - 450 10*3/mm3 357       Assessment and Plan:     S/P right hip superficial irrigation and debridement arthrocentesis    Infection    PVD (peripheral vascular disease) with claudication (CMS/[facility])    Tobacco dependence    HTN (hypertension)    Rheumatoid arthritis (CMS/[facility])      Plan  1. PT/OT- WBAT LLE  2. Pain control-prns   3. IS-encourage  4. DVT proph- Mechs/ASA  5. Bowel regimen  6. Resume home medications as appropriate  7. Monitor post-op labs  8. DC planning for home versus rehab depending on PT progress    [facility] consult for antibiotic management.    - Hypertension:  Resume home medications as appropriate, formulary substitution when indicated.  Holding parameters.  Prn medications for elevated blood pressure.     -RA: Stress dose of steroids today, resume prednisone tomorrow.  Enbrel resumption when okay with Dr. Man.      DENISE Martel  06/02/21  16:21 EDT

## 2021-06-02 NOTE — ANESTHESIA PROCEDURE NOTES
Airway  Urgency: elective    Date/Time: 6/2/2021 1:10 PM  Airway not difficult    General Information and Staff    Patient location during procedure: OR  CRNA: Kostas Victoria CRNA    Indications and Patient Condition  Indications for airway management: airway protection    Preoxygenated: yes  MILS not maintained throughout  Mask difficulty assessment: 1 - vent by mask    Final Airway Details  Final airway type: endotracheal airway      Successful airway: ETT  Cuffed: yes   Successful intubation technique: direct laryngoscopy  Endotracheal tube insertion site: oral  Blade: Mirna  Blade size: 3  ETT size (mm): 7.0  Cormack-Lehane Classification: grade I - full view of glottis  Placement verified by: chest auscultation and capnometry   Cuff volume (mL): 8  Measured from: lips  ETT/EBT  to lips (cm): 20  Number of attempts at approach: 1  Assessment: lips, teeth, and gum same as pre-op and atraumatic intubation    Additional Comments  Negative epigastric sounds, Breath sound equal bilaterally with symmetric chest rise and fall

## 2021-06-03 LAB
ANION GAP SERPL CALCULATED.3IONS-SCNC: 12 MMOL/L (ref 5–15)
BUN SERPL-MCNC: 8 MG/DL (ref 6–20)
BUN/CREAT SERPL: 11.4 (ref 7–25)
CALCIUM SPEC-SCNC: 9.6 MG/DL (ref 8.6–10.5)
CHLORIDE SERPL-SCNC: 96 MMOL/L (ref 98–107)
CO2 SERPL-SCNC: 22 MMOL/L (ref 22–29)
CREAT SERPL-MCNC: 0.7 MG/DL (ref 0.57–1)
DEPRECATED RDW RBC AUTO: 68.3 FL (ref 37–54)
ERYTHROCYTE [DISTWIDTH] IN BLOOD BY AUTOMATED COUNT: 19.8 % (ref 12.3–15.4)
GFR SERPL CREATININE-BSD FRML MDRD: 86 ML/MIN/1.73
GLUCOSE SERPL-MCNC: 131 MG/DL (ref 65–99)
HCT VFR BLD AUTO: 29.4 % (ref 34–46.6)
HGB BLD-MCNC: 9.6 G/DL (ref 12–15.9)
MCH RBC QN AUTO: 30.9 PG (ref 26.6–33)
MCHC RBC AUTO-ENTMCNC: 32.7 G/DL (ref 31.5–35.7)
MCV RBC AUTO: 94.5 FL (ref 79–97)
PLATELET # BLD AUTO: 459 10*3/MM3 (ref 140–450)
PMV BLD AUTO: 9.8 FL (ref 6–12)
POTASSIUM SERPL-SCNC: 3.9 MMOL/L (ref 3.5–5.2)
RBC # BLD AUTO: 3.11 10*6/MM3 (ref 3.77–5.28)
SODIUM SERPL-SCNC: 130 MMOL/L (ref 136–145)
WBC # BLD AUTO: 5.79 10*3/MM3 (ref 3.4–10.8)

## 2021-06-03 PROCEDURE — 97164 PT RE-EVAL EST PLAN CARE: CPT

## 2021-06-03 PROCEDURE — 25010000002 HYDROCORTISONE SODIUM SUCCINATE 100 MG RECONSTITUTED SOLUTION: Performed by: NURSE PRACTITIONER

## 2021-06-03 PROCEDURE — 63710000001 PREDNISONE PER 5 MG: Performed by: NURSE PRACTITIONER

## 2021-06-03 PROCEDURE — 85027 COMPLETE CBC AUTOMATED: CPT | Performed by: NURSE PRACTITIONER

## 2021-06-03 PROCEDURE — 25010000003 CEFAZOLIN IN DEXTROSE 2-4 GM/100ML-% SOLUTION: Performed by: ORTHOPAEDIC SURGERY

## 2021-06-03 PROCEDURE — 80048 BASIC METABOLIC PNL TOTAL CA: CPT | Performed by: NURSE PRACTITIONER

## 2021-06-03 PROCEDURE — 25010000002 DAPTOMYCIN PER 1 MG: Performed by: INTERNAL MEDICINE

## 2021-06-03 PROCEDURE — 97605 NEG PRS WND THER DME<=50SQCM: CPT

## 2021-06-03 PROCEDURE — 97110 THERAPEUTIC EXERCISES: CPT

## 2021-06-03 PROCEDURE — 97116 GAIT TRAINING THERAPY: CPT

## 2021-06-03 RX ADMIN — MELOXICAM 15 MG: 7.5 TABLET ORAL at 08:33

## 2021-06-03 RX ADMIN — OXYCODONE 10 MG: 5 TABLET ORAL at 08:33

## 2021-06-03 RX ADMIN — HYDROCORTISONE SODIUM SUCCINATE 50 MG: 100 INJECTION, POWDER, FOR SOLUTION INTRAMUSCULAR; INTRAVENOUS at 05:45

## 2021-06-03 RX ADMIN — OXYCODONE 10 MG: 5 TABLET ORAL at 12:30

## 2021-06-03 RX ADMIN — PREDNISONE 10 MG: 10 TABLET ORAL at 08:34

## 2021-06-03 RX ADMIN — ASPIRIN 81 MG: 81 TABLET, COATED ORAL at 08:34

## 2021-06-03 RX ADMIN — OXYCODONE 10 MG: 5 TABLET ORAL at 03:43

## 2021-06-03 RX ADMIN — ROPINIROLE HYDROCHLORIDE 0.5 MG: 0.5 TABLET, FILM COATED ORAL at 20:29

## 2021-06-03 RX ADMIN — TRAMADOL HYDROCHLORIDE 50 MG: 50 TABLET, FILM COATED ORAL at 18:08

## 2021-06-03 RX ADMIN — GUAIFENESIN 1200 MG: 600 TABLET, EXTENDED RELEASE ORAL at 08:34

## 2021-06-03 RX ADMIN — ACETAMINOPHEN 1000 MG: 500 TABLET, FILM COATED ORAL at 05:45

## 2021-06-03 RX ADMIN — BUPROPION HYDROCHLORIDE 150 MG: 150 TABLET, EXTENDED RELEASE ORAL at 20:30

## 2021-06-03 RX ADMIN — OXYCODONE 10 MG: 5 TABLET ORAL at 20:30

## 2021-06-03 RX ADMIN — GUAIFENESIN 1200 MG: 600 TABLET, EXTENDED RELEASE ORAL at 20:30

## 2021-06-03 RX ADMIN — DAPTOMYCIN 350 MG: 500 INJECTION, POWDER, LYOPHILIZED, FOR SOLUTION INTRAVENOUS at 09:51

## 2021-06-03 RX ADMIN — OXYCODONE 10 MG: 5 TABLET ORAL at 16:17

## 2021-06-03 RX ADMIN — FLUTICASONE PROPIONATE 2 SPRAY: 50 SPRAY, METERED NASAL at 08:34

## 2021-06-03 RX ADMIN — ASPIRIN 81 MG: 81 TABLET, COATED ORAL at 20:30

## 2021-06-03 RX ADMIN — ACETAMINOPHEN 1000 MG: 500 TABLET, FILM COATED ORAL at 20:29

## 2021-06-03 RX ADMIN — CEFAZOLIN SODIUM 2 G: 2 INJECTION, SOLUTION INTRAVENOUS at 05:45

## 2021-06-03 RX ADMIN — DOCUSATE SODIUM 100 MG: 100 CAPSULE, LIQUID FILLED ORAL at 08:34

## 2021-06-03 RX ADMIN — LISINOPRIL 10 MG: 10 TABLET ORAL at 08:34

## 2021-06-03 RX ADMIN — ACETAMINOPHEN 1000 MG: 500 TABLET, FILM COATED ORAL at 14:18

## 2021-06-03 NOTE — THERAPY TREATMENT NOTE
Patient Name: Jessica Winston  : 1960    MRN: [id number]                              Today's Date: 6/3/2021       Admit Date: 2021    Visit Dx:     ICD-10-CM ICD-9-CM   1. Infection  B99.9 136.9   2. Right hip pain  M25.551 719.45     Patient Active Problem List   Diagnosis   • RUQ pain   • Cardiac mass   • PVD (peripheral vascular disease) with claudication (CMS/HCC)   • Tobacco dependence   • Pain in right hip   • HTN (hypertension)   • Rheumatoid arthritis (CMS/HCC)   • S/P right hip superficial irrigation and debridement arthrocentesis   • Acute blood loss anemia   • Postoperative pain   • Infection     Past Medical History:   Diagnosis Date   • Avascular necrosis (CMS/HCC)    • Depression    • Fractures    • HTN (hypertension)    • Hyperglycemia    • Hypertension    • Osteoarthritis    • RA (rheumatoid arthritis) (CMS/HCC)     ALSO REPORTS OA   • Tooth loose     top left    • Vitamin D deficiency    • Wears glasses     readers     Past Surgical History:   Procedure Laterality Date   • ARM TENDON REPAIR Left    • BREAST SURGERY     • ENDOSCOPY N/A 3/30/2017    Procedure: ESOPHAGOGASTRODUODENOSCOPY WITH COLD FORCEP BIOPSY;  Surgeon: Anca Trevino MD;  Location: [facility] ENDOSCOPY;  Service:    • FEMUR SURGERY      right    • HERNIA REPAIR Bilateral     INGUINAL- unsure about mesh    • INCISION AND DRAINAGE HIP N/A 2021    Procedure: HIP SUPERFICIAL IRRIGATION AND DEBRIDEMENT, ARTHOCENTESIS RIGHT;  Surgeon: Riaz Man MD;  Location: [facility] OR;  Service: Orthopedics;  Laterality: N/A;   • KNEE SURGERY Left    • MANDIBLE SURGERY     • TOTAL HIP ARTHROPLASTY REVISION Right 4/15/2021    Procedure: COVERSION TO RIGHT TOTAL HIP ARTHROPLASTY;  Surgeon: Riaz Man MD;  Location:  [facility] OR;  Service: Orthopedics;  Laterality: Right;   • TUBAL ABDOMINAL LIGATION     • WISDOM TOOTH EXTRACTION       General Information     Row Name 21 1507 21 1047       Physical Therapy  Time and Intention    Document Type  therapy note (daily note)  -  therapy note (daily note)  -    Mode of Treatment  physical therapy  -  physical therapy  -    Row Name 06/03/21 1507 06/03/21 1047       General Information    Patient Profile Reviewed  yes  -  yes  -    Existing Precautions/Restrictions  fall;right;hip, anterior;other (see comments) wound vac  -  fall;right;hip, anterior;other (see comments) wound vac  -    Row Name 06/03/21 1507 06/03/21 1047       Cognition    Orientation Status (Cognition)  oriented x 4  -  oriented x 4  -    Row Name 06/03/21 1507 06/03/21 1047       Safety Issues, Functional Mobility    Safety Issues Affecting Function (Mobility)  safety precautions follow-through/compliance;safety precaution awareness;sequencing abilities  -  safety precautions follow-through/compliance;sequencing abilities;safety precaution awareness  -    Impairments Affecting Function (Mobility)  strength;endurance/activity tolerance;pain;range of motion (ROM)  -  strength;endurance/activity tolerance;pain;range of motion (ROM)  -      User Key  (r) = Recorded By, (t) = Taken By, (c) = Cosigned By    Initials Name Provider Type     Trent Cunha, PT Physical Therapist        Mobility     Row Name 06/03/21 1507 06/03/21 1047       Bed Mobility    Comment (Bed Mobility)  [facility]  -  Received sitting EOB  -    Row Name 06/03/21 1507 06/03/21 1047       Transfers    Comment (Transfers)  Verbal cues for safe hand placement with use of RW and safe mangement around IV pole and wound vac. Pt showing improved sequencing and progressed to SBA.  -  Verbal cues for safe hand placement with use of RW and safe mangement around IV pole and wound vac.  -    Row Name 06/03/21 1507 06/03/21 1047       Sit-Stand Transfer    Sit-Stand [place] (Transfers)  standby assist;verbal cues  -  verbal cues;contact guard  -    Assistive Device (Sit-Stand Transfers)  walker, front-wheeled  -   walker, front-wheeled  -    Row Name 06/03/21 1507 06/03/21 1047       Gait/Stairs (Locomotion)    [place] Level (Gait)  verbal cues;contact guard  -  verbal cues;contact guard  -    Assistive Device (Gait)  walker, front-wheeled  -  walker, front-wheeled  -    Distance in Feet (Gait)  350  -  350  -    Deviations/Abnormal Patterns (Gait)  bilateral deviations;antalgic;queta decreased;gait speed decreased;stride length decreased  -  bilateral deviations;antalgic;queta decreased;gait speed decreased;stride length decreased  -    Bilateral Gait Deviations  forward flexed posture  -  forward flexed posture  -    Right Sided Gait Deviations  heel strike decreased;weight shift ability decreased  -  heel strike decreased;weight shift ability decreased  -    Comment (Gait/Stairs)  Verbal cues provided for step through pattern, increasing wbing through LE, decreasing wbing through UEs, and safe management of RW in hallway. No LOB or buckling noted.  -  Gait training focused on safe sequencing and management of RW in hallway. Pt ambulated with step through pattern and decreased heel strike on RLE. VCs provided to work on improving weight bearing through LEs and increasing step length.  -    Row Name 06/03/21 1507 06/03/21 1047       Mobility    Extremity Weight-bearing Status  right lower extremity  -  right lower extremity  -    Right Lower Extremity (Weight-bearing Status)  weight-bearing as tolerated (WBAT)  -  weight-bearing as tolerated (WBAT)  -      User Key  (r) = Recorded By, (t) = Taken By, (c) = Cosigned By    Initials Name Provider Type     Trent Cunha, PT Physical Therapist        Obj/Interventions     Row Name 06/03/21 1508 06/03/21 1049       Motor Skills    Therapeutic Exercise  hip;knee;ankle  -  hip;knee;ankle  -    Row Name 06/03/21 1508 06/03/21 1049       Hip (Therapeutic Exercise)    Hip (Therapeutic Exercise)  isometric exercises;AROM (active  range of motion)  -  isometric exercises;AROM (active range of motion)  -    Hip AROM (Therapeutic Exercise)  aBduction;10 repetitions  -  aBduction;10 repetitions;right  -    Hip Isometrics (Therapeutic Exercise)  gluteal sets;10 repetitions  -  gluteal sets;10 repetitions  -    Row Name 06/03/21 1508 06/03/21 1049       Knee (Therapeutic Exercise)    Knee (Therapeutic Exercise)  strengthening exercise;isometric exercises  -  strengthening exercise;isometric exercises  -    Knee Isometrics (Therapeutic Exercise)  quad sets;10 repetitions  -  quad sets;10 repetitions  -    Knee Strengthening (Therapeutic Exercise)  SAQ (short arc quad);LAQ (long arc quad);10 repetitions  -  SAQ (short arc quad);LAQ (long arc quad);10 repetitions  -    Row Name 06/03/21 1508 06/03/21 1049       Ankle (Therapeutic Exercise)    Ankle (Therapeutic Exercise)  AROM (active range of motion)  -  AROM (active range of motion)  -    Ankle AROM (Therapeutic Exercise)  bilateral;dorsiflexion;plantarflexion;10 repetitions;2 sets  -  bilateral;dorsiflexion;plantarflexion;10 repetitions;2 sets  -    Row Name 06/03/21 1508 06/03/21 1049       Balance    Balance Assessment  sitting static balance;sitting dynamic balance;standing static balance;standing dynamic balance  -  sitting static balance;sitting dynamic balance;standing static balance;standing dynamic balance  -    Static Sitting Balance  [facility]  WFL;unsupported;sitting in chair;sitting, edge of bed  -    Dynamic Sitting Balance  [facility]  WFL;unsupported;sitting in chair;sitting, edge of bed  -    Static Standing Balance  [facility]  WFL;supported  -    Dynamic Standing Balance  mild impairment  -  mild impairment;supported  -    Balance Interventions  sitting;standing;sit to stand;supported;static;dynamic  -  sitting;standing;sit to stand;supported;static;dynamic;minimal challenge  -    Comment, Balance  up with RW and CGA/SBA  -  up with  RW and CGA  -      User Key  (r) = Recorded By, (t) = Taken By, (c) = Cosigned By    Initials Name Provider Type     Trent Cunha, PT Physical Therapist        Goals/Plan    No documentation.       Clinical Impression     Row Name 06/03/21 1509 06/03/21 1052       Pain    Additional Documentation  Pain Scale: Numbers Pre/Post-Treatment (Group)  -  Pain Scale: Numbers Pre/Post-Treatment (Group)  -    Row Name 06/03/21 1509 06/03/21 1052       Pain Scale: Numbers Pre/Post-Treatment    Pretreatment Pain Rating  2/10  -  0/10 - no pain  -    Posttreatment Pain Rating  2/10  -  5/10  -JH    Pain Location - Side  Right  -  Right  -    Pain Location - Orientation  anterior  -  anterior  -    Pain Location  hip  -  hip  -    Pre/Posttreatment Pain Comment  tolerated  -  pt reports mild increase in right hip pain with gait  -    Pain Intervention(s)  Repositioned;Ambulation/increased activity  -  Repositioned;Ambulation/increased activity  -[facility] Name 06/03/21 1509 06/03/21 1052       Plan of Care Review    Plan of Care Reviewed With  patient  -  patient  -    Progress  improving  -  improving  -    Outcome Summary  Pt ambulated 350 feet with RW and CGA/SBA. She is progressing well with her gait and overall mobility. Continue d/c recs for home with assist and HHPT.  -  Pt ambulated 350 feet with RW and CGA. Pt performing HEP with good recall and effort. Will progress as able this PM. Cont d/c recs for Home with assist and HHPT.  -    Row Name 06/03/21 1509 06/03/21 1052       Therapy Assessment/Plan (PT)    Rehab Potential (PT)  good, to achieve stated therapy goals  -  good, to achieve stated therapy goals  -    Criteria for Skilled Interventions Met (PT)  yes;meets criteria;skilled treatment is necessary  -  yes;meets criteria;skilled treatment is necessary  -    Row Name 06/03/21 1509 06/03/21 1052       Vital Signs    Pre Systolic BP Rehab  --  149  -    Pre  Treatment Diastolic BP  --  98  -    O2 Delivery Pre Treatment  room air  -  room air  -    O2 Delivery Intra Treatment  room air  -  room air  -    O2 Delivery Post Treatment  room air  -  room air  -    Pre Patient Position  Sitting  -  Sitting  -    Intra Patient Position  Standing  -  Standing  -    Post Patient Position  Sitting  -  Sitting  -    Row Name 06/03/21 1509 06/03/21 1052       Positioning and Restraints    Pre-Treatment Position  sitting in chair/recliner  -  in bed  -    Post Treatment Position  chair  -  chair  -    In Chair  reclined;call light within reach;encouraged to call for assist;exit alarm on;legs elevated  -  notified nsg;reclined;call light within reach;encouraged to call for assist;exit alarm on;legs elevated;compression device;waffle cushion  -      User Key  (r) = Recorded By, (t) = Taken By, (c) = Cosigned By    Initials Name Provider Type     Trent Cunha, PT Physical Therapist        Outcome Measures     Row Name 06/03/21 1511 06/03/21 1054       How much help from another person do you currently need...    Turning from your back to your side while in flat bed without using bedrails?  3  -  3  -JH    Moving from lying on back to sitting on the side of a flat bed without bedrails?  3  -  3  -JH    Moving to and from a bed to a chair (including a wheelchair)?  3  -  3  -JH    Standing up from a chair using your arms (e.g., wheelchair, bedside chair)?  3  -  3  -    Climbing 3-5 steps with a railing?  2  -  2  -    To walk in hospital room?  3  -  3  -    AM-PAC 6 Clicks Score (PT)  17  -  17  -    Row Name 06/03/21 1511 06/03/21 1054       Functional Assessment    Outcome Measure Options  AM-PAC 6 Clicks Basic Mobility (PT)  -  AM-PAC 6 Clicks Basic Mobility (PT)  -      User Key  (r) = Recorded By, (t) = Taken By, (c) = Cosigned By    Initials Name Provider Type    Trent Brown, PT Physical Therapist         Physical Therapy Education                 Title: PT OT SLP Therapies (In Progress)     Topic: Physical Therapy (Done)     Point: Mobility training (Done)     Learning Progress Summary           Patient Acceptance, E,TB, VU by  at 6/3/2021 1511    Comment: Edu on safe sequencing with transfers and gait. Reviewed HEP.    Acceptance, E,TB, VU by  at 6/3/2021 1054    Comment: Edu on safe sequencing with transfers and gait. Reviewed HEP.    Acceptance, E,D, VU by LOUISE at 6/2/2021 1640    Comment: Educated on safe sequencing with bed mobility, ambulatory transfesr, and gait. Reviewed HEP and hip precautions.                   Point: Home exercise program (Done)     Learning Progress Summary           Patient Acceptance, E,TB, VU by  at 6/3/2021 1511    Comment: Edu on safe sequencing with transfers and gait. Reviewed HEP.    Acceptance, E,TB, VU by  at 6/3/2021 1054    Comment: Edu on safe sequencing with transfers and gait. Reviewed HEP.    Acceptance, E,D, VU by LOUISE at 6/2/2021 1640    Comment: Educated on safe sequencing with bed mobility, ambulatory transfesr, and gait. Reviewed HEP and hip precautions.                   Point: Body mechanics (Done)     Learning Progress Summary           Patient Acceptance, E,TB, VU by  at 6/3/2021 1511    Comment: Edu on safe sequencing with transfers and gait. Reviewed HEP.    Acceptance, E,TB, VU by  at 6/3/2021 1054    Comment: Edu on safe sequencing with transfers and gait. Reviewed HEP.    Acceptance, E,D, VU by LOUISE at 6/2/2021 1640    Comment: Educated on safe sequencing with bed mobility, ambulatory transfesr, and gait. Reviewed HEP and hip precautions.                   Point: Precautions (Done)     Learning Progress Summary           Patient Acceptance, E,TB, VU by  at 6/3/2021 1511    Comment: Edu on safe sequencing with transfers and gait. Reviewed HEP.    Acceptance, E,TB, VU by  at 6/3/2021 1054    Comment: Edu on safe sequencing with transfers and  gait. Reviewed HEP.    Acceptance, E,D, VU by  at 6/2/2021 1640    Comment: Educated on safe sequencing with bed mobility, ambulatory transfesr, and gait. Reviewed HEP and hip precautions.                               User Key     Initials Effective Dates Name Provider Type Discipline     09/10/19 -  Clarence Ken, PT Physical Therapist PT     09/22/20 -  Trent Cunha, PT Physical Therapist PT              PT Recommendation and Plan     Plan of Care Reviewed With: patient  Progress: improving  Outcome Summary: Pt ambulated 350 feet with RW and CGA/SBA. She is progressing well with her gait and overall mobility. Continue d/c recs for home with assist and HHPT.     Time Calculation:   PT Charges     Row Name 06/03/21 1512 06/03/21 1055 06/03/21 0800       Time Calculation    Start Time  1350  -  1012  -  0800  -    PT Received On  06/03/21  -  06/03/21  [facility]  --    PT Goal Re-Cert Due Date  06/02/21  -  06/02/21  -  06/12/21  -       Time Calculation- PT    Total Timed Code Minutes- PT  23 minute(s)  -  31 minute(s)  -  --       Timed Charges    76816 - PT Therapeutic Exercise Minutes  8  -  13  -  --    25910 - Gait Training Minutes   15  -  18  -  --       Untimed Charges    PT Eval/Re-eval Minutes  --  --  25  -    Wound Care  --  --  79164 Neg Press (DME) wound TO 50 sqcm  -    [id number] Pressure wound to 50 sqcm  --  --  40  -       Total Minutes    Timed Charges Total Minutes  23  -  31  -  --    Untimed Charges Total Minutes  --  --  65  -     Total Minutes  23  -  31  -  65  -      User Key  (r) = Recorded By, (t) = Taken By, (c) = Cosigned By    Initials Name Provider Type     Jimy Hernandez, PT Physical Therapist     Trent Cunha, PT Physical Therapist        Therapy Charges for Today     Code Description Service Date Service Provider Modifiers Qty    52774953025 HC PT THER PROC EA 15 MIN 6/3/2021 Trent Cunha, PT GP 1    62116031847 HC GAIT  TRAINING EA 15 MIN 6/3/2021 Trent Cunha, PT GP 1    11521506340 HC PT THER PROC EA 15 MIN 6/3/2021 Trent Cunha, PT GP 1    66230020947 HC GAIT TRAINING EA 15 MIN 6/3/2021 Trent Cunha, PT GP 1          PT G-Codes  Outcome Measure Options: AM-PAC 6 Clicks Basic Mobility (PT)  AM-PAC 6 Clicks Score (PT): 17    Trent Cunha, PT  6/3/2021

## 2021-06-03 NOTE — PLAN OF CARE
Goal Outcome Evaluation:  Plan of Care Reviewed With: patient  Progress: improving  Pt. Has had moderate pain throughout the day, roxicodone given q 4. Pt. Wound vac is c/d/I. She worked with therapy and sat up in the chair twice. VSS, on room air. Pt. Voiding well. Will continue to monitor.

## 2021-06-03 NOTE — PAYOR COMM NOTE
"Gris Fuller RN  Utilization Review  P: [phone]  F: [phone]    Claim/ref #: [id number]  Clinicals for inpatient admission    Attn: Jorge Young (60 y.o. Female)     Date of Birth Social Security Number Address Home Phone MRN    1960  [address]  [address]  [facility] [id number] [phone] [id number]    Taoist Marital Status          None        Admission Date Admission Type Admitting Provider Attending Provider Department, Room/Bed    21 Elective Riaz Man MD Denehy, Kevin M, MD [address], [address]/1    Discharge Date Discharge Disposition Discharge Destination                       Attending Provider: Riaz Man MD    Allergies: Clindamycin/lincomycin, Penicillins, Codeine    Isolation: None   Infection: None   Code Status: CPR    Ht: 165.1 cm (65\")   Wt: 56.7 kg (125 lb)    Admission Cmt: None   Principal Problem: S/P right hip superficial irrigation and debridement arthrocentesis [Z96.641]                 Active Insurance as of 2021     Primary Coverage     Payor Plan Insurance Group Employer/Plan Group    [place] [id number]     Payor Plan Address Payor Plan Phone Number Payor Plan Fax Number Effective Dates    [address] [phone]  2020 - None Entered    [facility] [address]       Subscriber Name Subscriber Birth Date Member ID       JORGE GALLEGOS 1960 [id number]                 Emergency Contacts      (Rel.) Home Phone Work Phone Mobile Phone    Vielka Rodriguez (Daughter) [phone] -- [phone]               History & Physical      Lakeisha Clemente APRN at 21 1503          Patient Name: Jorge Gallegos  MRN: [id number]  : 1960  DOS: 2021    Attending: Riaz Man MD    Primary Care Provider: Iraida Demarco APRN      Chief complaint: Postop prosthetic joint infection    Subjective   Patient is a pleasant 60 y.o. " female presented for scheduled surgery by Dr. Man.  She underwent right hip superficial irrigation and debridement arthrocentesis under general anesthesia.  She tolerated surgery well and was admitted for further medical management.    Per Dr. Man's note:(((60 y.o. female who was admitted to [facility] with wound break down from CLIFFORD incision done approximately 6 weeks ago. The patient had been recovering well she was seen in 2-week postoperative visit and wound appeared to be healing well unfortunately the next couple weeks she began to have some superficial necrosis of the skin and some fibrinous exudate.  She was seen in the office last Friday and a superficial swab was positive for several bacteria she had elevated ESR CRP at that point we discussed surgical intervention in order to ensure that there was no deep infection in the hip as well as debrided superficial infection.  Patient continued to smoke postoperatively have been a chronic lifetime smoker and had cachexia. Likely risk and benefits of the procedure including but not limited to infection, DVT, pulmonary embolism, leg length discrepancy, recurrent dislocation, possibility of injury to nerves and vessels, and periprosthetic fractures have been discussed with the patient in detail. Despite the risks involved, the patient elected to proceed and informed consent was obtained. The patient was seen in the preoperative holding area and the operative extremity was marked.)))    She is known to us from previous admission on 4/15/2021 for right hip revision from prior repair.    She has history of rheumatoid arthritis for which she is on prednisone daily as well as Enbrel.  She has some joint pains related to rheumatoid arthritis.    When seen in PACU she is doing well.  Her pain is well controlled.  She denies nausea, shortness of breath or chest pain.  She denies recent fevers chills or night sweats.  No history of DVT or PE.  She is  continued to use a walker since her previous surgery.  She has had falls.    Allergies:  Allergies   Allergen Reactions   • Clindamycin/Lincomycin Anaphylaxis and Swelling     took whole dose and then throat started to swell -    • Penicillins Anaphylaxis and Swelling     Tolerated cefazolin 4/15/21   • Codeine Swelling       Meds:  Medications Prior to Admission   Medication Sig Dispense Refill Last Dose   • acetaminophen (TYLENOL) 500 MG tablet Take 1,000 mg by mouth Every 6 (Six) Hours As Needed.   Past Week at Unknown time   • buPROPion SR (WELLBUTRIN SR) 150 MG 12 hr tablet Take 150 mg by mouth Daily.   6/1/2021 at 2000   • docusate sodium (Colace) 100 MG capsule Take 1 capsule by mouth 2 (Two) Times a Day.   6/1/2021 at 0500   • lisinopril (PRINIVIL,ZESTRIL) 10 MG tablet Take 10 mg by mouth Daily.   6/1/2021 at 0500   • ondansetron (Zofran) 4 MG tablet Take 1 tablet by mouth Every 8 (Eight) Hours As Needed for Nausea or Vomiting.   Past Month at Unknown time   • oxyCODONE (Roxicodone) 5 MG immediate release tablet Take 1 tablet by mouth Every 4 (Four) Hours As Needed for Moderate Pain .  0 6/1/2021 at 2200   • rOPINIRole (REQUIP) 0.5 MG tablet Take 0.5 mg by mouth Every Night. Take 1 hour before bedtime.   6/1/2021 at 2200   • traMADol (ULTRAM) 50 MG tablet Take 1 tablet by mouth Every 6 (Six) Hours As Needed for Moderate Pain .   Past Week at Unknown time   • cyanocobalamin 1000 MCG/ML injection Inject  into the appropriate muscle as directed by prescriber Every 30 (Thirty) Days. Once per month- usually first of month   5/29/2021   • meloxicam (MOBIC) 15 MG tablet Take 15 mg by mouth Daily.   5/28/2021   • predniSONE (DELTASONE) 10 MG tablet Take 10 mg by mouth Daily.   5/26/2021   • vitamin D (ERGOCALCIFEROL) 1.25 MG (61047 UT) capsule capsule Take 50,000 Units by mouth 1 (One) Time Per Week. Tuesday 6/1/2021         History:   Past Medical History:   Diagnosis Date   • Avascular necrosis (CMS/HCC)    •  "Depression    • Fractures    • HTN (hypertension)    • Hyperglycemia    • Hypertension    • Osteoarthritis    • RA (rheumatoid arthritis) (CMS/[facility])     ALSO REPORTS OA   • Tooth loose     top left    • Vitamin D deficiency    • Wears glasses     readers     Past Surgical History:   Procedure Laterality Date   • ARM TENDON REPAIR Left    • BREAST SURGERY     • ENDOSCOPY N/A 3/30/2017    Procedure: ESOPHAGOGASTRODUODENOSCOPY WITH COLD FORCEP BIOPSY;  Surgeon: Anca Trevino MD;  Location: [facility] ENDOSCOPY;  Service:    • FEMUR SURGERY      right    • HERNIA REPAIR Bilateral     INGUINAL- unsure about mesh    • KNEE SURGERY Left    • MANDIBLE SURGERY     • TOTAL HIP ARTHROPLASTY REVISION Right 4/15/2021    Procedure: COVERSION TO RIGHT TOTAL HIP ARTHROPLASTY;  Surgeon: Riaz Man MD;  Location: [facility] OR;  Service: Orthopedics;  Laterality: Right;   • TUBAL ABDOMINAL LIGATION     • WISDOM TOOTH EXTRACTION       Family History   Problem Relation Age of Onset   • Arthritis Mother    • Cancer Mother         leukemia   • Hypertension Mother    • Heart disease Father         heart attack   • Hypertension Father    • Migraines Daughter    • Cancer Maternal Grandmother         uterus     Social History     Tobacco Use   • Smoking status: Current Every Day Smoker     Packs/day: 0.50     Years: 30.00     Pack years: 15.00     Types: Cigarettes   • Smokeless tobacco: Never Used   • Tobacco comment: HX OF SMOKING 1 PPD FOR THE PAST 30 YEARS    Vaping Use   • Vaping Use: Never used   Substance Use Topics   • Alcohol use: No   • Drug use: No   She lives alone.  Has 3 children.  She has been on [employer] from [employer] since her fall at work last year.    Review of Systems  Pertinent items are noted in HPI, all other systems reviewed and negative    Vital Signs  /67 (BP Location: Right arm, Patient Position: Lying)   Pulse 82   Temp 97.8 °F (36.6 °C) (Temporal)   Resp 16   Ht 165.1 cm (65\")   Wt " 56.7 kg (125 lb)   LMP 08/01/2010 (Approximate)   SpO2 94%   BMI 20.80 kg/m²     Physical Exam:    General Appearance:    Alert, cooperative, in no acute distress   Head:    Normocephalic, without obvious abnormality, atraumatic   Eyes:            Lids and lashes normal, conjunctivae and sclerae normal, no   icterus, no pallor, corneas clear,    Ears:    Ears appear intact with no abnormalities noted   Throat:   No oral lesions, no thrush, oral mucosa moist   Neck:   No adenopathy, supple, trachea midline, no thyromegaly    Lungs:     Clear to auscultation,respirations regular, even and unlabored    Heart:    Regular rhythm and normal rate, normal S1 and S2, no murmur, no gallop   Abdomen:     Normal bowel sounds, no masses, no organomegaly, soft non-tender, non-distended, no guarding, no rebound  tenderness   Genitalia:    Deferred   Extremities:  Right hip wound VAC CDI   Pulses:   Pulses palpable and equal bilaterally   Skin:   No bleeding, bruising or rash   Neurologic:   Cranial nerves 2 - 12 grossly intact. Flexion and dorsiflexion intact bilateral feet.        I reviewed the patient's new clinical results.     Lab Results   Component Value Date    HGBA1C 5.20 04/13/2021     Results for JORGE GALLEGOS (MRN [id number]) as of 6/2/2021 15:08   Ref. Range 4/17/2021 09:58   Glucose Latest Ref Range: 65 - 99 mg/dL 85   Sodium Latest Ref Range: 136 - 145 mmol/L 139   Potassium Latest Ref Range: 3.5 - 5.2 mmol/L 3.6   CO2 Latest Ref Range: 22.0 - 29.0 mmol/L 27.0   Chloride Latest Ref Range: 98 - 107 mmol/L 104   Anion Gap Latest Ref Range: 5.0 - 15.0 mmol/L 8.0   Creatinine Latest Ref Range: 0.57 - 1.00 mg/dL 0.72   BUN Latest Ref Range: 6 - 20 mg/dL 9   BUN/Creatinine Ratio Latest Ref Range: 7.0 - 25.0  12.5   Calcium Latest Ref Range: 8.6 - 10.5 mg/dL 8.5 (L)   eGFR Non African Am Latest Ref Range: >60 mL/min/1.73 83   WBC Latest Ref Range: 3.40 - 10.80 10*3/mm3 8.78   RBC Latest Ref Range: 3.77 - 5.28  10*6/mm3 3.01 (L)   Hemoglobin Latest Ref Range: 12.0 - 15.9 g/dL 8.8 (L)   Hematocrit Latest Ref Range: 34.0 - 46.6 % 27.6 (L)   RDW Latest Ref Range: 12.3 - 15.4 % 19.2 (H)   MCV Latest Ref Range: 79.0 - 97.0 fL 91.7   MCH Latest Ref Range: 26.6 - 33.0 pg 29.2   MCHC Latest Ref Range: 31.5 - 35.7 g/dL 31.9   MPV Latest Ref Range: 6.0 - 12.0 fL 9.6   Platelets Latest Ref Range: 140 - 450 10*3/mm3 357       Assessment and Plan:     S/P right hip superficial irrigation and debridement arthrocentesis    Infection    PVD (peripheral vascular disease) with claudication (CMS/[facility])    Tobacco dependence    HTN (hypertension)    Rheumatoid arthritis (CMS/[facility])      Plan  1. PT/OT- WBAT LLE  2. Pain control-prns   3. IS-encourage  4. DVT proph- Mechs/ASA  5. Bowel regimen  6. Resume home medications as appropriate  7. Monitor post-op labs  8. DC planning for home versus rehab depending on PT progress    [facility] consult for antibiotic management.    - Hypertension:  Resume home medications as appropriate, formulary substitution when indicated.  Holding parameters.  Prn medications for elevated blood pressure.     -RA: Stress dose of steroids today, resume prednisone tomorrow.  Enbrel resumption when okay with Dr. Man.      DENISE Martel  06/02/21  16:21 EDT    Electronically signed by Lakeisha Clemente APRN at 06/02/21 1621     Yolande Garner APRN at 06/02/21 1041     Attestation signed by Riaz Man MD at 06/02/21 1245    agree                  Pre-Op H&P  Jessica Winston  [id number]  1960    Chief complaint: Post-op right hip wound infection    HPI:    Patient is a 60 y.o.female who is s/p conversion to a right total hip arthroplasty on 4/15/21 that subsequently developed a post-operative infection. Surgical intervention is recommended and she is agreeable. She is here today for a right hip superficial irrigation and debridedment.    Review of Systems:  General ROS: negative for chills, fever or  skin lesions;  No changes since last office visit.  Neg for recent sick exposure  Cardiovascular ROS: no chest pain; +baseline dyspnea on exertion, +HTN  Respiratory ROS: no cough, shortness of breath, or wheezing; former cigarette smoker (1/2 ppd x 30 years)- quit 4/15/21    Allergies:   Allergies   Allergen Reactions   • Clindamycin/Lincomycin Anaphylaxis and Swelling     took whole dose and then throat started to swell -    • Penicillins Anaphylaxis and Swelling     Tolerated cefazolin 4/15/21   • Codeine Swelling       Home Meds:    No current facility-administered medications on file prior to encounter.     Current Outpatient Medications on File Prior to Encounter   Medication Sig Dispense Refill   • acetaminophen (TYLENOL) 500 MG tablet Take 1,000 mg by mouth Every 6 (Six) Hours As Needed.     • buPROPion SR (WELLBUTRIN SR) 150 MG 12 hr tablet Take 150 mg by mouth Daily.     • cyanocobalamin 1000 MCG/ML injection Inject  into the appropriate muscle as directed by prescriber Every 30 (Thirty) Days. Once per month- usually first of month     • docusate sodium (Colace) 100 MG capsule Take 1 capsule by mouth 2 (Two) Times a Day.     • lisinopril (PRINIVIL,ZESTRIL) 10 MG tablet Take 10 mg by mouth Daily.     • meloxicam (MOBIC) 15 MG tablet Take 15 mg by mouth Daily.     • methocarbamol (ROBAXIN) 750 MG tablet Take 750 mg by mouth Every 6 (Six) Hours As Needed.     • ondansetron (Zofran) 4 MG tablet Take 1 tablet by mouth Every 8 (Eight) Hours As Needed for Nausea or Vomiting.     • oxyCODONE (Roxicodone) 5 MG immediate release tablet Take 1 tablet by mouth Every 4 (Four) Hours As Needed for Moderate Pain .  0   • predniSONE (DELTASONE) 10 MG tablet Take 10 mg by mouth Daily.     • rOPINIRole (REQUIP) 0.5 MG tablet Take 0.5 mg by mouth Every Night. Take 1 hour before bedtime.     • traMADol (ULTRAM) 50 MG tablet Take 1 tablet by mouth Every 6 (Six) Hours As Needed for Moderate Pain .     • vitamin D  (ERGOCALCIFEROL) 1.25 MG (15345 UT) capsule capsule Take 50,000 Units by mouth 1 (One) Time Per Week. Tuesday         PMH:   Past Medical History:   Diagnosis Date   • Avascular necrosis (CMS/HCC)    • Depression    • Fractures    • HTN (hypertension)    • Hyperglycemia    • Hypertension    • Osteoarthritis    • RA (rheumatoid arthritis) (CMS/HCC)     ALSO REPORTS OA   • Tooth loose     top left    • Vitamin D deficiency    • Wears glasses     readers     PSH:    Past Surgical History:   Procedure Laterality Date   • ARM TENDON REPAIR Left    • BREAST SURGERY     • ENDOSCOPY N/A 3/30/2017    Procedure: ESOPHAGOGASTRODUODENOSCOPY WITH COLD FORCEP BIOPSY;  Surgeon: Anca Trevino MD;  Location: [facility] ENDOSCOPY;  Service:    • FEMUR SURGERY      right    • HERNIA REPAIR Bilateral     INGUINAL- unsure about mesh    • KNEE SURGERY Left    • MANDIBLE SURGERY     • TOTAL HIP ARTHROPLASTY REVISION Right 4/15/2021    Procedure: COVERSION TO RIGHT TOTAL HIP ARTHROPLASTY;  Surgeon: Riaz Man MD;  Location: [facility] OR;  Service: Orthopedics;  Laterality: Right;   • TUBAL ABDOMINAL LIGATION     • WISDOM TOOTH EXTRACTION         Social History:   Tobacco:   Social History     Tobacco Use   Smoking Status Current Every Day Smoker   • Packs/day: 0.50   • Years: 30.00   • Pack years: 15.00   • Types: Cigarettes   Smokeless Tobacco Never Used   Tobacco Comment    HX OF SMOKING 1 PPD FOR THE PAST 30 YEARS       Alcohol:     Social History     Substance and Sexual Activity   Alcohol Use No       Vitals:           /84 (BP Location: Right arm, Patient Position: Sitting)   Pulse 97   Temp 98.7 °F (37.1 °C) (Temporal)   Resp 16   LMP 08/01/2010 (Approximate)   SpO2 100%     Physical Exam:  General Appearance:    Alert, cooperative, no distress, appears stated age   Head:    Normocephalic, without obvious abnormality, atraumatic   Lungs:     Clear to auscultation anterior CHAPARRO, inspiratory wheeze anterior RUL,  CTA posteriorly, respirations unlabored    Heart:   Regular rate and rhythm, S1 and S2 normal, no murmur, rub    or gallop    Abdomen:    Soft, non-tender, +bowel sounds   Breast Exam:    deferred   Genitalia:    deferred   Extremities:   BUE and LLE are normal, atraumatic, no cyanosis or edema; right hip with wound vac intact, scant amount of serosanguinous drainage in tubing, mild edema   Skin:   Skin color, texture, turgor normal, no rashes or lesions   Neurologic:   Grossly intact   Results Review  LABS:  Lab Results   Component Value Date    WBC 8.78 04/17/2021    HGB 8.8 (L) 04/17/2021    HCT 27.6 (L) 04/17/2021    MCV 91.7 04/17/2021     04/17/2021    NEUTROABS 9.53 (H) 04/13/2021    GLUCOSE 85 04/17/2021    BUN 9 04/17/2021    CREATININE 0.72 04/17/2021    EGFRIFNONA 83 04/17/2021     04/17/2021    K 3.6 04/17/2021     04/17/2021    CO2 27.0 04/17/2021    CALCIUM 8.5 (L) 04/17/2021    ALBUMIN 3.80 04/13/2021    AST 15 04/13/2021    ALT 11 04/13/2021    BILITOT 0.3 04/13/2021    PTT 24.2 04/13/2021    INR 0.93 04/13/2021       RADIOLOGY:  No radiology results for the last 3 days     I reviewed the patient's new clinical results.    Cancer Staging (if applicable)  Cancer Patient: __ yes _X_no __unknown; If yes, clinical stage T:__ N:__M:__, stage group or __N/A    Impression: s/p conversion to right total hip arthroplasty on 4/15/21 with subsequent wound infection    Plan: Right hip superficial irrigation and debridedment      Yolande Garner, APRN   6/2/2021   10:52 EDT    Electronically signed by Riaz Man MD at 06/02/21 1245          Operative/Procedure Notes (all)      Riaz Man MD at 06/02/21 1337  Version 1 of 1         HIP ANTERIOR INCISION AND DRAINAGE WITH HANA TABLE  Procedure Report    Patient Name:  Jessica Winston  YOB: 1960    Date of Surgery:  6/2/2021     Indications:    60 y.o. female who was admitted to [facility] with wound  break down from CLIFFORD incision done approximately 6 weeks ago. The patient had been recovering well she was seen in 2-week postoperative visit and wound appeared to be healing well unfortunately the next couple weeks she began to have some superficial necrosis of the skin and some fibrinous exudate.  She was seen in the office last Friday and a superficial swab was positive for several bacteria she had elevated ESR CRP at that point we discussed surgical intervention in order to ensure that there was no deep infection in the hip as well as debrided superficial infection.  Patient continued to smoke postoperatively have been a chronic lifetime smoker and had cachexia. Likely risk and benefits of the procedure including but not limited to infection, DVT, pulmonary embolism, leg length discrepancy, recurrent dislocation, possibility of injury to nerves and vessels, and periprosthetic fractures have been discussed with the patient in detail. Despite the risks involved, the patient elected to proceed and informed consent was obtained. The patient was seen in the preoperative holding area and the operative extremity was marked.    Pre-op Diagnosis:   Superficial injury of hip with infection, right, initial encounter [4222642]       Post-Op Diagnosis Codes:     * Superficial injury of hip with infection, right, initial encounter [S70.911A, L08.9]    Procedure/CPT® Codes:      Procedure(s):  RIGHT HIP SUPERFICIAL IRRIGATION AND DEBRIDEMENT, ARTHOCENTESIS    Staff:  Surgeon(s):  Riaz Man MD    Anesthesia: General    Estimated Blood Loss: minimal    Implants:    Nothing was implanted during the procedure    Specimen:          Specimens     ID Source Type Tests Collected By Collected At Frozen?    1 Hip, Right Body Fluid · BODY FLUID CELL COUNT WITH DIFFERENTIAL   Riaz Man MD 6/2/21 6261     Description: STAT Cell Count     2 Hip, Right Body Fluid · FUNGAL CULTURE  · BODY FLUID CULTURE  · AFB CULTURE  · ANAEROBIC  CULTURE 10 DAY INCUBATION   Riaz Man MD 6/2/21 5030     3 Hip, Right Tissue · FUNGAL CULTURE  · TISSUE / BONE CULTURE  · AFB CULTURE  · ANAEROBIC CULTURE 10 DAY INCUBATION   Riaz Man MD 6/2/21 1296     Description: Superficial tissue             Findings: Aspiration revealed clear synovial fluid without purulence deep fascia was intact    Complications: none    Description of Procedure:   The patient was transferred to [facility] operating room and preoperative antibiotics given IV prior to skin incision  received general anesthesia and was transferred to the Bicknell table. All bony prominences were padded adequately. The operative hip was then prepped and draped in the usual sterile fashion. Multiple timeouts were done identifying the correct patient, surgical site, and planned procedure.    Prior to making any incision we left the lateral aspect of the hip inserted a spinal needle under fluoroscopy and aspirated approximate 5 cc of clear synovial fluid that did not appear purulent it was sent for cell count as well as culture.    The old incision was used. superficial necrosis was noted. The fascia appeared intact.   We debrided all superficial necrotic tissue back to healthy bleeding bone debridement was done as combination of scalpel curettes and Scott we did send several cultures of the superficial tissue.  Again with even with deep aggressive probing the deep fascia appeared intact following this we then ran a liter of dilute Betadine over the superficial wound and then 3 L of saline with cystoscopy tubing and then again debrided any nonviable tissues.  There is a 2 is necrosis to allow for full wound closure we did get proximal distal aspects of the skin approximated with some 2-0 PDS and 3 oh nylons and then the central area of the incision was backed approximately 3 x 6 cm      Sponge, needle, and instrument counts were correct. It was found to have a good seal. The patient was  then transferred to the recovery room in stable condition. The patient tolerated the procedure well and there were no complications. The patient had adequate distal pulses and good cap refill.    The patient will be mobilized by physical therapy and receive antibiotic's per ID. The patient was started on DVT prophylaxis .  I discussed the satisfactory performance of the procedure with the patient's family and discussed the postoperative management.      Riaz Man MD     Date: 6/2/2021  Time: 14:36 EDT      Electronically signed by Riaz Man MD at 06/02/21 1444       Physician Progress Notes (all)    No notes of this type exist for this encounter.         Consult Notes (all)    No notes of this type exist for this encounter.

## 2021-06-03 NOTE — CONSULTS
INFECTIOUS DISEASE CONSULT/INITIAL HOSPITAL VISIT    Jessica Winston  1960  [id number]    Date of Consult: 6/3/2021    Admission Date: 6/2/2021      Requesting Provider: Riaz Man MD  Evaluating Physician: Woo Turner MD    Reason for Consultation: Right total hip arthroplasty infection    History of present illness:    Patient is a 60 y.o. female who is seen today for evaluation of right total hip arthroplasty infection in the setting of rheumatoid arthritis.  She developed an intertrochanteric right hip fracture last year at work that was fixed at .  She developed a significant malunion with erosion of the femoral head and screw impingement into the acetabulum.  She required right total hip arthroplasty on 4/15/21. Approximately one and half weeks after her surgery she noted some progressively worsening right hip wound dehiscence.  She noticed slight drainage on the dressings with mild erythema.  Yesterday she underwent a right hip superficial irrigation and debridement with arthrocentesis. Prior to the procedure she underwent aspiration of the hip with fluid sent for culture. Right hip wound Gram stain revealed few gram-positive cocci in pairs.  She has a penicillin allergy with anaphylaxis. She has tolerated perioperative cefazolin. She denies severe right hip pain. Denies fever, chills, sweats.    Past Medical History:   Diagnosis Date   • Avascular necrosis (CMS/HCC)    • Depression    • Fractures    • HTN (hypertension)    • Hyperglycemia    • Hypertension    • Osteoarthritis    • RA (rheumatoid arthritis) (CMS/HCC)     ALSO REPORTS OA   • Tooth loose     top left    • Vitamin D deficiency    • Wears glasses     readers       Past Surgical History:   Procedure Laterality Date   • ARM TENDON REPAIR Left    • BREAST SURGERY     • ENDOSCOPY N/A 3/30/2017    Procedure: ESOPHAGOGASTRODUODENOSCOPY WITH COLD FORCEP BIOPSY;  Surgeon: nAca Trevino MD;  Location: [facility] ENDOSCOPY;   Service:    • FEMUR SURGERY      right    • HERNIA REPAIR Bilateral     INGUINAL- unsure about mesh    • KNEE SURGERY Left    • MANDIBLE SURGERY     • TOTAL HIP ARTHROPLASTY REVISION Right 4/15/2021    Procedure: COVERSION TO RIGHT TOTAL HIP ARTHROPLASTY;  Surgeon: Riaz Man MD;  [facility]: [facility];  Service: Orthopedics;  Laterality: Right;   • TUBAL ABDOMINAL LIGATION     • WISDOM TOOTH EXTRACTION         Family History   Problem Relation Age of Onset   • Arthritis Mother    • Cancer Mother         leukemia   • Hypertension Mother    • Heart disease Father         heart attack   • Hypertension Father    • Migraines Daughter    • Cancer Maternal Grandmother         uterus       Social History     Socioeconomic History   • Marital status:      Spouse name: Not on file   • Number of children: Not on file   • Years of education: Not on file   • Highest education level: Not on file   Tobacco Use   • Smoking status: Current Every Day Smoker     Packs/day: 0.50     Years: 30.00     Pack years: 15.00     Types: Cigarettes   • Smokeless tobacco: Never Used   • Tobacco comment: HX OF SMOKING 1 PPD FOR THE PAST 30 YEARS    Vaping Use   • Vaping Use: Never used   Substance and Sexual Activity   • Alcohol use: No   • Drug use: No   • Sexual activity: Defer       Allergies   Allergen Reactions   • Clindamycin/Lincomycin Anaphylaxis and Swelling     took whole dose and then throat started to swell -    • Penicillins Anaphylaxis and Swelling     Tolerated cefazolin 4/15/21   • Codeine Swelling         Medication:    Current Facility-Administered Medications:   •  acetaminophen (TYLENOL) tablet 1,000 mg, 1,000 mg, Oral, Q8H, Riaz Man MD, 1,000 mg at 06/03/21 0545  •  aspirin EC tablet 81 mg, 81 mg, Oral, Q12H, Riaz Man MD  •  buPROPion SR (WELLBUTRIN SR) 12 hr tablet 150 mg, 150 mg, Oral, Nightly, Lakeisha Clemente APRN, 150 mg at 06/02/21 7390  •  ceFAZolin in dextrose (ANCEF) IVPB solution 2 g, 2  g, Intravenous, Q8H, Riaz Man MD, 2 g at 06/02/21 2146  •  docusate sodium (COLACE) capsule 100 mg, 100 mg, Oral, BID, Lakeisha Clemente, APRKALPANA, 100 mg at 06/02/21 2146  •  fluticasone (FLONASE) 50 MCG/ACT nasal spray 2 spray, 2 spray, Each Nare, Daily, Lakeisha Clemente APRN, Stopped at 06/02/21 1736  •  guaiFENesin (MUCINEX) 12 hr tablet 1,200 mg, 1,200 mg, Oral, BID, Lakeisha Clemente, APRN, 1,200 mg at 06/02/21 1744  •  hydrocortisone sodium succinate (Solu-CORTEF) injection 50 mg, 50 mg, Intravenous, Q6H, Lakeisha Clemente, APRN, 50 mg at 06/02/21 2351  •  HYDROmorphone (DILAUDID) injection 0.5 mg, 0.5 mg, Intravenous, Q2H PRN **AND** naloxone (NARCAN) injection 0.1 mg, 0.1 mg, Intravenous, Q5 Min PRN, Riaz Man MD  •  ketorolac (TORADOL) injection 15 mg, 15 mg, Intravenous, Q6H PRN, Riaz Man MD  •  labetalol (NORMODYNE,TRANDATE) injection 10 mg, 10 mg, Intravenous, Q4H PRN, Lakeisha Clemente, APRN  •  lactated ringers infusion, 9 mL/hr, Intravenous, Continuous, Riaz Man MD, Last Rate: 9 mL/hr at 06/02/21 1108, 9 mL/hr at 06/02/21 1108  •  lactated ringers infusion, 100 mL/hr, Intravenous, Continuous, Riaz Man MD, Last Rate: 100 mL/hr at 06/02/21 1743, 100 mL/hr at 06/02/21 1743  •  lisinopril (PRINIVIL,ZESTRIL) tablet 10 mg, 10 mg, Oral, Daily, Lakeisha Clemente, APRKALPANA, Stopped at 06/02/21 1734  •  meloxicam (MOBIC) tablet 15 mg, 15 mg, Oral, Daily, Riaz Man MD  •  ondansetron (ZOFRAN) injection 4 mg, 4 mg, Intravenous, Q6H PRN, Lakeisha Clemente APRN  •  oxyCODONE (ROXICODONE) immediate release tablet 10 mg, 10 mg, Oral, Q4H PRN, Riaz Man MD, 10 mg at 06/03/21 0343  •  oxyCODONE (ROXICODONE) immediate release tablet 5 mg, 5 mg, Oral, Q4H PRN, Riaz Man MD  •  predniSONE (DELTASONE) tablet 10 mg, 10 mg, Oral, Daily, Lakeisha Clemente APRN  •  rOPINIRole (REQUIP) tablet 0.5 mg, 0.5 mg, Oral, Nightly, Lakeisha Clemente APRN, 0.5 mg at 06/02/21 2146  •   sodium chloride 0.9 % bolus 500 mL, 500 mL, Intravenous, TID PRN, Lakeisha Clemente APRN  •  traMADol (ULTRAM) tablet 50 mg, 50 mg, Oral, Q8H PRN, Riaz Man MD, 50 mg at 21 1744    Antibiotics:  Anti-Infectives (From admission, onward)    Ordered     Dose/Rate Route Frequency Start Stop    21 1724  ceFAZolin in dextrose (ANCEF) IVPB solution 2 g     Ordering Provider: Riaz Man MD    2 g  over 30 Minutes Intravenous Every 8 Hours 21 2200 21 1359            Review of Systems:  Constitutional-- No Fever, chills or sweats.   HEENT-- No new vision, hearing or throat complaints.    CV-- No chest pain, palpitation or syncope  Resp-- No SOB/cough  GI- No nausea, vomiting, or diarrhea.   -- No dysuria, hematuria, or flank pain.   Heme- No active bruising  bleeding;   MS-- She complains of right hip pain.  Neuro-- No acute focal weakness or numbness in the arms or Legs.  Skin--No rashes or lesions      Physical Exam:   Vital Signs  Temp (24hrs), Av °F (36.7 °C), Min:97.4 °F (36.3 °C), Max:98.7 °F (37.1 °C)    Temp  Min: 97.4 °F (36.3 °C)  Max: 98.7 °F (37.1 °C)  BP  Min: 93/61  Max: 154/99  Pulse  Min: 62  Max: 97  Resp  Min: 16  Max: 20  SpO2  Min: 92 %  Max: 100 %    GENERAL: Awake and alert, in no acute distress.   HEENT: Normocephalic, atraumatic.  PERRL. EOMI. No conjunctival injection. No icterus. Oropharynx clear without evidence of thrush or exudate.  NECK: Supple   LYMPH: No cervical, axillary or inguinal lymphadenopathy.  HEART: RRR; No murmur, rubs, gallops.   LUNGS: Clear to auscultation bilaterally without wheezing, rales, rhonchi. Normal respiratory effort. Nonlabored.  ABDOMEN: Soft, nontender, nondistended.No rebound or guarding.   EXT:  No cyanosis, clubbing or edema. No cord.  :  Without Macario catheter.  MSK:  No joint swelling or erythema.  Her right hip is dressed.  SKIN: Warm and dry without cutaneous eruptions on Inspection/palpation.    NEURO: Oriented to  PPT.Motor 5/5 strength bilaterally  PSYCHIATRIC: Normal insight and judgement. Cooperative with PE    Laboratory Data                                    CrCl cannot be calculated (Patient's most recent lab result is older than the maximum 30 days allowed.).      Microbiology:  No results found for: ACANTHNAEG, AFBCX, BPERTUSSISCX, BLOODCX  No results found for: BCIDPCR, CXREFLEX, CSFCX, CULTURETIS  No results found for: CULTURES, HSVCX, URCX  No results found for: EYECULTURE, GCCX, HSVCULTURE, LABHSV  No results found for: LEGIONELLA, MRSACX, MUMPSCX, MYCOPLASCX  No results found for: NOCARDIACX, STOOLCX  No results found for: THROATCX, UNSTIMCULT, URINECX, CULTURE, VZVCULTUR  No results found for: VIRALCULTU, WOUNDCX        Radiology:  Imaging Results (Last 72 Hours)     Procedure Component Value Units Date/Time    FL C Arm During Surgery [[id number]] Collected: 21 1449     Updated: 21 1453    Narrative:      EXAMINATION: FL C ARM DURING SURGERY-      INDICATION: Right hip I & D; B99.9-Unspecified infectious disease;  M25.551-Pain in right hip      COMPARISON: NONE     FINDINGS: 60 seconds of fluoroscopy time provided with 1 image stored  during right hip I&D       Impression:      60 seconds of fluoroscopy time provided with 1 image stored  during right hip I&D     This report was finalized on 2021 2:50 PM by Manny Ramirez.               Impression:   1.  Right total hip arthroplasty infection-status post debridement on 21.  This appeared to be superficial at the time of surgery. The Gram stain suggests a streptococcal or staphylococcal infection. Cultures are pending.   2.  Status post right total hip arthroplasty-4/15/21  3.  Rheumatoid arthritis-on prednisone and Enbrel  4.  Penicillin allergy-with anaphylaxis    PLAN/RECOMMENDATIONS:   Thank you for asking us to see Jessica Winston, I recommend the followin.  Right hip wound debridement-performed on   2.  Right hip wound  cultures-pending  3.  Hold Enbrel therapy  4.  Continue prednisone  5.  Avoid Penicillin therapy  6.  Intravenous daptomycin pending culture data  7.  PICC line placement    I discussed her complex situation with Dr. Man.  We think she will require intravenous followed by oral antibiotic therapy.  She will likely require at least 2-4 weeks of intravenous antibiotic therapy.       Woo Turner MD  6/3/2021  06:33 EDT

## 2021-06-03 NOTE — PROGRESS NOTES
"IM progress note      Jessica Winston  [id number]  1960     LOS: 1 day     Attending: Riaz Man MD    Primary Care Provider: Iraida Demarco APRN      Chief Complaint/Reason for visit:  No chief complaint on file.      Subjective    Feels ok. Fair pain control. No f/c/n/vom/sob.     Objective        Visit Vitals  /98 (BP Location: Left arm, Patient Position: Lying)   Pulse 78   Temp 97.8 °F (36.6 °C) (Oral)   Resp 16   Ht 165.1 cm (65\")   Wt 56.7 kg (125 lb)   LMP 2010 (Approximate)   SpO2 90%   BMI 20.80 kg/m²     Temp (24hrs), Av.8 °F (36.6 °C), Min:97.4 °F (36.3 °C), Max:98.7 °F (37.1 °C)      Intake/Output:    Intake/Output Summary (Last 24 hours) at 6/3/2021 1207  Last data filed at 6/3/2021 1000  Gross per 24 hour   Intake 360 ml   Output 2700 ml   Net -2340 ml        Physical Therapy:    Physical Exam:     General Appearance:    Alert, cooperative, in no acute distress   Head:    Normocephalic, without obvious abnormality, atraumatic    Lungs:     Normal effort, symmetric chest rise,  clear to      auscultation bilaterally              Heart:    Regular rhythm and normal rate, normal S1 and S2    Abdomen:     Normal bowel sounds, no masses, no organomegaly, soft        non-tender, non-distended, no guarding, no rebound                tenderness   Extremities:   Right hip with suction vac on. Flexion and dorsiflexion intact bilateral feet.  No clubbing, cyanosis or edema.  No deformities.    Pulses:   Pulses palpable and equal bilaterally   Skin:   No bleeding, bruising or rash          Results Review:     I reviewed the patient's new clinical results.   Results from last 7 days   Lab Units 21  0758   WBC 10*3/mm3 5.79   HEMOGLOBIN g/dL 9.6*   HEMATOCRIT % 29.4*   PLATELETS 10*3/mm3 459*     Results from last 7 days   Lab Units 21  0758   SODIUM mmol/L 130*   POTASSIUM mmol/L 3.9   CHLORIDE mmol/L 96*   CO2 mmol/L 22.0   BUN mg/dL 8   CREATININE mg/dL 0.70 "   CALCIUM mg/dL 9.6   GLUCOSE mg/dL 131*     Collected Updated Procedure Result Status    06/02/2021 1346 06/02/2021 1413 Fungus Culture - Tissue, Hip, Right [[id number]]   Tissue from Hip, Right    In process Component Value   No component results              06/02/2021 1346 06/03/2021 0655 Tissue / Bone Culture - Tissue, Hip, Right [[id number]]   Tissue from Hip, Right    Preliminary result Component Value   Tissue Culture Growth present, too young to evaluate P   Gram Stain Few (2+) WBCs seen P    Few (2+) Gram positive cocci in pairs P              06/02/2021 1346 06/03/2021 1146 AFB Culture - Tissue, Hip, Right [[id number]]   Tissue from Hip, Right    Preliminary result Component Value   AFB Stain No acid fast bacilli seen on concentrated smear P              06/02/2021 1346 06/02/2021 1413 Anaerobic Culture 10 Day Incubation - Tissue, Hip, Right [[id number]]   Tissue from Hip, Right    In process Component Value   No component results              06/02/2021 1343 06/02/2021 1413 Fungus Culture - Body Fluid, Hip, Right [[id number]]   Body Fluid from Hip, Right    In process Component Value   No component results              06/02/2021 1343 06/03/2021 0611 Body Fluid Culture - Body Fluid, Hip, Right [[id number]]   Body Fluid from Hip, Right    Preliminary result Component Value   Body Fluid Culture No growth P   Gram Stain Few (2+) WBCs seen P    No organisms seen P              06/02/2021 1343 06/03/2021 1146 AFB Culture - Body Fluid, Hip, Right [[id number]]   Body Fluid from Hip, Right    Preliminary result Component Value   AFB Stain No acid fast bacilli seen on concentrated smear P              06/02/2021 1343 06/02/2021 1413 Anaerobic Culture 10 Day Incubation - Body Fluid, Hip, Right [[id number]]   Body Fluid from Hip, Right    In process Component Value   No component results              06/02/2021 0945 06/02/2021 1029 COVID PRE-OP / PRE-PROCEDURE SCREENING ORDER (NO ISOLATION) - Swab, Nasopharynx [[id number]]     Swab from Nasopharynx    Final result Component Value   No component results              06/02/2021 0945 06/02/2021 1029 COVID-19 and FLU A/B PCR - Swab, Nasopharynx [[id number]]    Swab from Nasopharynx    Final result Component Value   COVID19 Not Detected   Influenza A PCR Not Detected   Influenza B PCR Not Detected                    I reviewed the patient's new imaging including images and reports.    All medications reviewed.   acetaminophen, 1,000 mg, Oral, Q8H  aspirin, 81 mg, Oral, Q12H  buPROPion SR, 150 mg, Oral, Nightly  DAPTOmycin, 6 mg/kg, Intravenous, Q24H  docusate sodium, 100 mg, Oral, BID  fluticasone, 2 spray, Each Nare, Daily  guaiFENesin, 1,200 mg, Oral, BID  lisinopril, 10 mg, Oral, Daily  meloxicam, 15 mg, Oral, Daily  predniSONE, 10 mg, Oral, Daily  rOPINIRole, 0.5 mg, Oral, Nightly      HYDROmorphone, 0.5 mg, Q2H PRN   And  naloxone, 0.1 mg, Q5 Min PRN  ketorolac, 15 mg, Q6H PRN  labetalol, 10 mg, Q4H PRN  ondansetron, 4 mg, Q6H PRN  oxyCODONE, 10 mg, Q4H PRN  oxyCODONE, 5 mg, Q4H PRN  sodium chloride, 500 mL, TID PRN  traMADol, 50 mg, Q8H PRN        Assessment/Plan       S/P right hip superficial irrigation and debridement arthrocentesis    PVD (peripheral vascular disease) with claudication (CMS/[facility])    Tobacco dependence    HTN (hypertension)    Rheumatoid arthritis (CMS/HCC)    Infection    S/p right CLIFFORD, (conversion from prior repair  , April 2021).         Plan  1. PT/OT.Weight bearing as tolerated RLE.  2. Pain control-prns   3. IS-encouraged  4. DVT proph-ASA , mech  5. Bowel regimen  6. Monitor post-op labs  7. DC planning      [facility] consult for antibiotic management. Dr.Mark Turner is following.      - Hypertension:  Resume home medications as appropriate, formulary substitution when indicated.  Holding parameters.  Prn medications for elevated blood pressure.     -RA:  resumed prednisone tomorrow.  Enbrel resumption when okay with Dr. Man and ID. Lopez for  now..        Demi Bermudez MD  06/03/21  12:07 EDT

## 2021-06-03 NOTE — PLAN OF CARE
Goal Outcome Evaluation:           Pt alert and oriented x4. VSS. RA. Prevena wound vac in place. Ambulating well and voiding spontaneously. Pain controlled with PRN pain medication. Possible d/c Friday

## 2021-06-03 NOTE — PLAN OF CARE
Goal Outcome Evaluation:  Plan of Care Reviewed With: patient  Progress: improving  Outcome Summary: Pt ambulated 350 feet with RW and CGA/SBA. She is progressing well with her gait and overall mobility. Continue d/c recs for home with assist and HHPT.

## 2021-06-03 NOTE — CASE MANAGEMENT/SOCIAL WORK
Discharge Planning Assessment  [facility]     Patient Name: Jessica Winston  MRN: [id number]  Today's Date: 6/3/2021    Admit Date: 6/2/2021    Discharge Needs Assessment     Row Name 06/03/21 1634       Living Environment    Lives With  alone    Current Living Arrangements  home/apartment/condo    Primary Care Provided by  self    Provides Primary Care For  no one, unable/limited ability to care for self    Family Caregiver if Needed  child(linnette), adult    Family Caregiver Names  two daughters    Quality of Family Relationships  helpful;involved;supportive    Able to Return to Prior Arrangements  yes       Resource/Environmental Concerns    Resource/Environmental Concerns  none    Transportation Concerns  car, none       Transition Planning    Patient/Family Anticipates Transition to  home    Patient/Family Anticipated Services at Transition  outpatient care    Transportation Anticipated  family or friend will provide       Discharge Needs Assessment    Readmission Within the Last 30 Days  no previous admission in last 30 days    Equipment Currently Used at Home  commode;walker, rolling;cane, straight;wheelchair    Concerns to be Addressed  basic needs;discharge planning    Equipment Needed After Discharge  none    Discharge Facility/Level of Care Needs  outpatient therapy    Current Discharge Risk  lives alone;physical impairment        Discharge Plan     Row Name 06/03/21 1634       Plan    Plan  Home    Patient/Family in Agreement with Plan  yes    Plan Comments  I met with Ms Winston to initiate d/c planning. she lives alone. She was recently at [facility]  in April after her total hip surgery on 4/15/21. She was then d/cd home and going to outpt PT at local hospital ( [facility]). we discussed need for IV antibiotics and wound vac at home. she states her daughters will be available this summer to assist. She is aware of weekly outpt visits needed to [facility] for PICC care and possible outpt visits 3 times per week  for wound vac dressing changes, she is agreeable to this. Will await final orders        Continued Care and Services - Admitted Since 6/2/2021    Coordination has not been started for this encounter.     Selected Continued Care - Prior Encounters Includes selections from prior encounters from 3/4/2021 to 6/3/2021    Discharged on 4/19/2021 Admission date: 4/15/2021 - Discharge disposition: Rehab Facility or Unit (DC - External)    Destination     Service Provider Selected Services Address Phone Fax Patient Preferred    [facility]  Inpatient [facility] [address] [id number] [phone] --                      Demographic Summary     Row Name 06/03/21 1627       General Information    Admission Type  inpatient    Arrived From  home    Referral Source  admission list    Reason for Consult  discharge planning    Preferred Language  English    General Information Comments  PCP- redd Demarco       Contact Information    Permission Granted to Share Info With      Contact Information Comments  Steve martinez (daughter) [phone]        Functional Status     Row Name 06/03/21 1628       Functional Status    Usual Activity Tolerance  moderate    Current Activity Tolerance  -- see PT eval       Functional Status, IADL    Medications  independent    Meal Preparation  independent    Housekeeping  independent    Laundry  independent    Shopping  independent       Mental Status    General Appearance WDL  WDL       Mental Status Summary    Recent Changes in Mental Status/Cognitive Functioning  no changes       Employment/    Employment Status  other (see comments) currently on workers comp    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       Abuse/Neglect    No  documentation.       Legal    No documentation.       Substance Abuse    No documentation.       Patient Forms    No documentation.           Sonja C Kellerman, RN

## 2021-06-03 NOTE — THERAPY TREATMENT NOTE
Patient Name: Jessica Winston  : 1960    MRN: [id number]                              Today's Date: 6/3/2021       Admit Date: 2021    Visit Dx:     ICD-10-CM ICD-9-CM   1. Infection  B99.9 136.9   2. Right hip pain  M25.551 719.45     Patient Active Problem List   Diagnosis   • RUQ pain   • Cardiac mass   • PVD (peripheral vascular disease) with claudication (CMS/HCC)   • Tobacco dependence   • Pain in right hip   • HTN (hypertension)   • Rheumatoid arthritis (CMS/HCC)   • S/P right hip superficial irrigation and debridement arthrocentesis   • Acute blood loss anemia   • Postoperative pain   • Infection     Past Medical History:   Diagnosis Date   • Avascular necrosis (CMS/HCC)    • Depression    • Fractures    • HTN (hypertension)    • Hyperglycemia    • Hypertension    • Osteoarthritis    • RA (rheumatoid arthritis) (CMS/HCC)     ALSO REPORTS OA   • Tooth loose     top left    • Vitamin D deficiency    • Wears glasses     readers     Past Surgical History:   Procedure Laterality Date   • ARM TENDON REPAIR Left    • BREAST SURGERY     • ENDOSCOPY N/A 3/30/2017    Procedure: ESOPHAGOGASTRODUODENOSCOPY WITH COLD FORCEP BIOPSY;  Surgeon: Anca Trevino MD;  Location: [facility] ENDOSCOPY;  Service:    • FEMUR SURGERY      right    • HERNIA REPAIR Bilateral     INGUINAL- unsure about mesh    • INCISION AND DRAINAGE HIP N/A 2021    Procedure: HIP SUPERFICIAL IRRIGATION AND DEBRIDEMENT, ARTHOCENTESIS RIGHT;  Surgeon: Riaz Man MD;  Location: [facility] OR;  Service: Orthopedics;  Laterality: N/A;   • KNEE SURGERY Left    • MANDIBLE SURGERY     • TOTAL HIP ARTHROPLASTY REVISION Right 4/15/2021    Procedure: COVERSION TO RIGHT TOTAL HIP ARTHROPLASTY;  Surgeon: Riaz Man MD;  Location: [facility] OR;  Service: Orthopedics;  Laterality: Right;   • TUBAL ABDOMINAL LIGATION     • WISDOM TOOTH EXTRACTION       General Information     Row Name 21 1047          Physical Therapy Time and  Intention    Document Type  therapy note (daily note)  -     Mode of Treatment  physical therapy  -     Row Name 06/03/21 1047          General Information    Patient Profile Reviewed  yes  -     Existing Precautions/Restrictions  fall;right;hip, anterior;other (see comments) wound vac  -     Row Name 06/03/21 1047          Cognition    Orientation Status (Cognition)  oriented x 4  -     Row Name 06/03/21 1047          Safety Issues, Functional Mobility    Safety Issues Affecting Function (Mobility)  safety precautions follow-through/compliance;sequencing abilities;safety precaution awareness  -     Impairments Affecting Function (Mobility)  strength;endurance/activity tolerance;pain;range of motion (ROM)  -       User Key  (r) = Recorded By, (t) = Taken By, (c) = Cosigned By    Initials Name Provider Type     Trent Cunha, PT Physical Therapist        Mobility     Row Name 06/03/21 1047          Bed Mobility    Comment (Bed Mobility)  Received sitting EOB  -[facility] Name 06/03/21 1047          Transfers    Comment (Transfers)  Verbal cues for safe hand placement with use of RW and safe mangement around IV pole and wound vac.  -     Row Name 06/03/21 1047          Sit-Stand Transfer    Sit-Stand [place] (Transfers)  verbal cues;contact guard  -     Assistive Device (Sit-Stand Transfers)  walker, front-wheeled  -     Row Name 06/03/21 1047          Gait/Stairs (Locomotion)    [place] Level (Gait)  verbal cues;contact guard  -     Assistive Device (Gait)  walker, front-wheeled  -     Distance in Feet (Gait)  350  -     Deviations/Abnormal Patterns (Gait)  bilateral deviations;antalgic;queta decreased;gait speed decreased;stride length decreased  -     Bilateral Gait Deviations  forward flexed posture  -     Right Sided Gait Deviations  heel strike decreased;weight shift ability decreased  -     Comment (Gait/Stairs)  Gait training focused on safe sequencing and  management of RW in hallway. Pt ambulated with step through pattern and decreased heel strike on RLE. VCs provided to work on improving weight bearing through LEs and increasing step length.  -[facility] Name 06/03/21 1047          Mobility    Extremity Weight-bearing Status  right lower extremity  -     Right Lower Extremity (Weight-bearing Status)  weight-bearing as tolerated (WBAT)  -       User Key  (r) = Recorded By, (t) = Taken By, (c) = Cosigned By    Initials Name Provider Type     Trent Cunha PT Physical Therapist        Obj/Interventions     [facility] Name 06/03/21 1049          Motor Skills    Therapeutic Exercise  hip;knee;ankle  -[facility] Name 06/03/21 1049          Hip (Therapeutic Exercise)    Hip (Therapeutic Exercise)  isometric exercises;AROM (active range of motion)  -     Hip AROM (Therapeutic Exercise)  aBduction;10 repetitions;right  -     Hip Isometrics (Therapeutic Exercise)  gluteal sets;10 repetitions  -[facility] Name 06/03/21 1049          Knee (Therapeutic Exercise)    Knee (Therapeutic Exercise)  strengthening exercise;isometric exercises  -     Knee Isometrics (Therapeutic Exercise)  quad sets;10 repetitions  -     Knee Strengthening (Therapeutic Exercise)  SAQ (short arc quad);LAQ (long arc quad);10 repetitions  -[facility] Name 06/03/21 1049          Ankle (Therapeutic Exercise)    Ankle (Therapeutic Exercise)  AROM (active range of motion)  -     Ankle AROM (Therapeutic Exercise)  bilateral;dorsiflexion;plantarflexion;10 repetitions;2 sets  -[facility] Name 06/03/21 1049          Balance    Balance Assessment  sitting static balance;sitting dynamic balance;standing static balance;standing dynamic balance  -     Static Sitting Balance  WFL;unsupported;sitting in chair;sitting, edge of bed  -     Dynamic Sitting Balance  WFL;unsupported;sitting in chair;sitting, edge of bed  -     Static Standing Balance  WFL;supported  -     Dynamic Standing Balance  mild  impairment;supported  -     Balance Interventions  sitting;standing;sit to stand;supported;static;dynamic;minimal challenge  -     Comment, Balance  up with RW and CGA  -       User Key  (r) = Recorded By, (t) = Taken By, (c) = Cosigned By    Initials Name Provider Type     Trent Cunha, PT Physical Therapist        Goals/Plan    No documentation.       Clinical Impression     [facility] Name 06/03/21 1052          Pain    Additional Documentation  Pain Scale: Numbers Pre/Post-Treatment (Group)  -[facility] Name 06/03/21 1052          Pain Scale: Numbers Pre/Post-Treatment    Pretreatment Pain Rating  0/10 - no pain  -     Posttreatment Pain Rating  5/10  -     Pain Location - Side  Right  -     Pain Location - Orientation  anterior  -     Pain Location  hip  -     Pre/Posttreatment Pain Comment  pt reports mild increase in right hip pain with gait  -     Pain Intervention(s)  Repositioned;Ambulation/increased activity  -[facility] Name 06/03/21 1052          Plan of Care Review    Plan of Care Reviewed With  patient  -     Progress  improving  -     Outcome Summary  Pt ambulated 350 feet with RW and CGA. Pt performing HEP with good recall and effort. Will progress as able this PM. Cont d/c recs for Home with assist and HHPT.  -[facility] Name 06/03/21 1052          Therapy Assessment/Plan (PT)    Rehab Potential (PT)  good, to achieve stated therapy goals  [facility]     Criteria for Skilled Interventions Met (PT)  yes;meets criteria;skilled treatment is necessary  -[facility] Name 06/03/21 1052          Vital Signs    Pre Systolic BP Rehab  149  -     Pre Treatment Diastolic BP  98  -     O2 Delivery Pre Treatment  room air  -     O2 Delivery Intra Treatment  room air  -     O2 Delivery Post Treatment  room air  -     Pre Patient Position  Sitting  -     Intra Patient Position  Standing  -     Post Patient Position  Sitting  -[facility] Name 06/03/21 1052          Positioning and  Restraints    Pre-Treatment Position  in bed  -     Post Treatment Position  chair  -     In Chair  notified nsg;reclined;call light within reach;encouraged to call for assist;exit alarm on;legs elevated;compression device;waffle cushion  -       User Key  (r) = Recorded By, (t) = Taken By, (c) = Cosigned By    Initials Name Provider Type    Trent Brown, PT Physical Therapist        Outcome Measures     Row Name 06/03/21 1054          How much help from another person do you currently need...    Turning from your back to your side while in flat bed without using bedrails?  3  -JH     Moving from lying on back to sitting on the side of a flat bed without bedrails?  3  -JH     Moving to and from a bed to a chair (including a wheelchair)?  3  -JH     Standing up from a chair using your arms (e.g., wheelchair, bedside chair)?  3  -JH     Climbing 3-5 steps with a railing?  2  -JH     To walk in hospital room?  3  -     AM-PAC 6 Clicks Score (PT)  17  -     Row Name 06/03/21 1054          Functional Assessment    Outcome Measure Options  AM-PAC 6 Clicks Basic Mobility (PT)  -       User Key  (r) = Recorded By, (t) = Taken By, (c) = Cosigned By    Initials Name Provider Type    Trent Brown, PT Physical Therapist        Physical Therapy Education                 Title: PT OT SLP Therapies (In Progress)     Topic: Physical Therapy (Done)     Point: Mobility training (Done)     Learning Progress Summary           Patient Acceptance, E,TB, VU by  at 6/3/2021 1054    Comment: Edu on safe sequencing with transfers and gait. Reviewed HEP.    Acceptance, E,D, VU by  at 6/2/2021 1640    Comment: Educated on safe sequencing with bed mobility, ambulatory transfesr, and gait. Reviewed HEP and hip precautions.                   Point: Home exercise program (Done)     Learning Progress Summary           Patient Acceptance, E,TB, VU by  at 6/3/2021 1054    Comment: Edu on safe sequencing with transfers and  gait. Reviewed HEP.    Acceptance, E,D, VU by  at 6/2/2021 1640    Comment: Educated on safe sequencing with bed mobility, ambulatory transfesr, and gait. Reviewed HEP and hip precautions.                   Point: Body mechanics (Done)     Learning Progress Summary           Patient Acceptance, E,TB, VU by  at 6/3/2021 1054    Comment: Edu on safe sequencing with transfers and gait. Reviewed HEP.    Acceptance, E,D, VU by  at 6/2/2021 1640    Comment: Educated on safe sequencing with bed mobility, ambulatory transfesr, and gait. Reviewed HEP and hip precautions.                   Point: Precautions (Done)     Learning Progress Summary           Patient Acceptance, E,TB, VU by  at 6/3/2021 1054    Comment: Edu on safe sequencing with transfers and gait. Reviewed HEP.    Acceptance, E,D, VU by  at 6/2/2021 1640    Comment: Educated on safe sequencing with bed mobility, ambulatory transfesr, and gait. Reviewed HEP and hip precautions.                               User Key     Initials Effective Dates Name Provider Type Discipline     09/10/19 -  Clarence Ken, PT Physical Therapist PT     09/22/20 -  Trent Cunha, PT Physical Therapist PT              PT Recommendation and Plan     Plan of Care Reviewed With: patient  Progress: improving  Outcome Summary: Pt ambulated 350 feet with RW and CGA. Pt performing HEP with good recall and effort. Will progress as able this PM. Cont d/c recs for Home with assist and HHPT.     Time Calculation:   PT Charges     Row Name 06/03/21 1055 06/03/21 0800          Time Calculation    Start Time  1012  -  0800  -     PT Received On  06/03/21  -  --     PT Goal Re-Cert Due Date  06/02/21  -  06/12/21  -        Time Calculation- PT    Total Timed Code Minutes- PT  31 minute(s)  -  --        Timed Charges    11216 - PT Therapeutic Exercise Minutes  13  -  --     99547 - Gait Training Minutes   18  -  --        Untimed Charges    PT Eval/Re-eval Minutes  --  25   -     Wound Care  --  90362 Neg Press (DME) wound TO 50 sqcm  -     71308-Wcp Pressure wound to 50 sqcm  --  40  -        Total Minutes    Timed Charges Total Minutes  31  -JH  --     Untimed Charges Total Minutes  --  65  -      Total Minutes  31  -JH  65  -       User Key  (r) = Recorded By, (t) = Taken By, (c) = Cosigned By    Initials Name Provider Type     Jimy Hernandez, PT Physical Therapist     Trent Cunha, ASHLEY Physical Therapist        Therapy Charges for Today     Code Description Service Date Service Provider Modifiers Qty    36233465948 HC PT THER PROC EA 15 MIN 6/3/2021 Trent Cunha, PT GP 1    39896131317 HC GAIT TRAINING EA 15 MIN 6/3/2021 Trent Cunha, PT GP 1          PT G-Codes  Outcome Measure Options: AM-PAC 6 Clicks Basic Mobility (PT)  AM-PAC 6 Clicks Score (PT): 17    Trent Cunha PT  6/3/2021

## 2021-06-03 NOTE — PLAN OF CARE
Goal Outcome Evaluation:  Plan of Care Reviewed With: patient  Progress: improving  Outcome Summary: Pt ambulated 350 feet with RW and CGA. Pt performing HEP with good recall and effort. Will progress as able this PM. Cont d/c recs for Home with assist and HHPT.

## 2021-06-03 NOTE — THERAPY EVALUATION
Acute Care - Wound/Debridement Initial Evaluation   [place]     Patient Name: Jessica Winston  : 1960  MRN: [id number]  Today's Date: 6/3/2021                Admit Date: 2021    Visit Dx:    ICD-10-CM ICD-9-CM   1. Infection  B99.9 136.9   2. Right hip pain  M25.551 719.45       Patient Active Problem List   Diagnosis   • RUQ pain   • Cardiac mass   • PVD (peripheral vascular disease) with claudication (CMS/HCC)   • Tobacco dependence   • Pain in right hip   • HTN (hypertension)   • Rheumatoid arthritis (CMS/HCC)   • S/P right hip superficial irrigation and debridement arthrocentesis   • Acute blood loss anemia   • Postoperative pain   • Infection        Past Medical History:   Diagnosis Date   • Avascular necrosis (CMS/HCC)    • Depression    • Fractures    • HTN (hypertension)    • Hyperglycemia    • Hypertension    • Osteoarthritis    • RA (rheumatoid arthritis) (CMS/HCC)     ALSO REPORTS OA   • Tooth loose     top left    • Vitamin D deficiency    • Wears glasses     readers        Past Surgical History:   Procedure Laterality Date   • ARM TENDON REPAIR Left    • BREAST SURGERY     • ENDOSCOPY N/A 3/30/2017    Procedure: ESOPHAGOGASTRODUODENOSCOPY WITH COLD FORCEP BIOPSY;  Surgeon: Anca Trevino MD;  Location: [facility] ENDOSCOPY;  Service:    • FEMUR SURGERY      right    • HERNIA REPAIR Bilateral     INGUINAL- unsure about mesh    • INCISION AND DRAINAGE HIP N/A 2021    Procedure: HIP SUPERFICIAL IRRIGATION AND DEBRIDEMENT, ARTHOCENTESIS RIGHT;  Surgeon: Riaz Man MD;  Location: [facility] OR;  Service: Orthopedics;  Laterality: N/A;   • KNEE SURGERY Left    • MANDIBLE SURGERY     • TOTAL HIP ARTHROPLASTY REVISION Right 4/15/2021    Procedure: COVERSION TO RIGHT TOTAL HIP ARTHROPLASTY;  Surgeon: Riaz Man MD;  Location:  [facility] OR;  Service: Orthopedics;  Laterality: Right;   • TUBAL ABDOMINAL LIGATION     • WISDOM TOOTH EXTRACTION             Wound 21 Right  anterior hip Incision (Active)   Dressing Appearance dry;intact;no drainage 06/03/21 0833   Closure AYAAN 06/03/21 0833   Periwound intact 06/03/21 0800   Periwound Temperature warm 06/03/21 0800   Periwound Skin Turgor soft 06/03/21 0800   Wound Length (cm) 6 cm 06/03/21 0800   Wound Width (cm) 3 cm 06/03/21 0800   Wound Depth (cm) 3 cm 06/03/21 0800   Drainage Characteristics/Odor serosanguineous 06/03/21 0800   Drainage Amount none 06/03/21 0833   Care, Wound negative pressure wound therapy 06/03/21 0800   Dressing Care other (see comments) 06/02/21 2000         WOUND DEBRIDEMENT                        PT Assessment (last 12 hours)      PT Evaluation and Treatment     Row Name 06/03/21 0800          Physical Therapy Time and Intention    Subjective Information  complains of;weakness;fatigue;pain  -     Document Type  evaluation;therapy note (daily note);wound care  -     Mode of Treatment  individual therapy;physical therapy  -     Row Name 06/03/21 0800          General Information    Patient Profile Reviewed  yes  -     Patient Observations  alert;cooperative;agree to therapy  -     Pertinent History of Current Functional Problem  Pt s/p hip I and D with wound vac placement  -     Risks Reviewed  patient:;increased discomfort  -     Benefits Reviewed  patient:;improve skin integrity  -     Barriers to Rehab  medically complex;previous functional deficit;physical barrier  -     Row Name 06/03/21 0800          Cognition    Affect/Mental Status (Cognitive)  WNL  -[facility] Name 06/03/21 0800          Pain    Additional Documentation  Pain Scale: FACES Pre/Post-Treatment (Group)  -MF     Row Name 06/03/21 0800          Pain Scale: Word Pre/Post-Treatment    Pain Intervention(s)  Repositioned  -     Row Name 06/03/21 0800          Pain Scale: FACES Pre/Post-Treatment    Pain: FACES Scale, Pretreatment  4-->hurts little more  -     Posttreatment Pain Rating  4-->hurts little more  -     Pain  Location  back  -MF     Row Name 06/03/21 0800          Health Promotion    Additional Documentation  NPWT (Negative Pressure Wound Therapy) (LDA)  -MF     Row Name 06/03/21 0800          Wound 06/02/21 Right anterior hip Incision    Wound - Properties Group Placement Date: 06/02/21  -SS Side: Right  -SS Orientation: anterior  -SS Location: hip  -SS Primary Wound Type: Incision  -SS Additional Comments: XEROFORM, SPECIALTY PT WOUND VAC  -SS    Dressing Appearance  intact;dry  -MF     Base  -- wound vac intact  -MF     Periwound  intact  -MF     Periwound Temperature  warm  -MF     Periwound Skin Turgor  soft  -MF     Wound Length (cm)  6 cm  -MF     Wound Width (cm)  3 cm  -MF     Wound Depth (cm)  3 cm measurements per MD's surgical note.    -MF     Drainage Characteristics/Odor  serosanguineous  -MF     Drainage Amount  scant  -MF     Care, Wound  negative pressure wound therapy  -MF     Retired Wound - Properties Group Date first assessed: 06/02/21  -SS Side: Right  -SS Location: hip  -SS Primary Wound Type: Incision  -SS Additional Comments: XEROFORM, SPECIALTY PT WOUND VAC  -SS    Row Name 06/03/21 0800          NPWT (Negative Pressure Wound Therapy) 06/02/21 1200 R hip     NPWT (Negative Pressure Wound Therapy) - Properties Group Placement Date: 06/02/21  -MF Placement Time: 1200  -MF Location: R hip   -MF    Therapy Setting  continuous therapy  -MF     Dressing  foam, black vac dressing placed in OR   -MF     Pressure Setting  125 mmHg  -MF     Retired NPWT (Negative Pressure Wound Therapy) - Properties Group Placement Date: 06/02/21  -MF Placement Time: 1200  -MF Location: R hip   -MF    Row Name 06/03/21 0800          Coping    Observed Emotional State  calm;cooperative  -MF     Verbalized Emotional State  acceptance  -MF     Trust Relationship/Rapport  care explained  -MF     Row Name 06/03/21 0800          Plan of Care Review    Plan of Care Reviewed With  patient  -MF     Outcome Summary  Pt presents  with complex R hip wound s/p surgical debridement and wound vac placement. PT completed home wound vac paperwork to allow for d/c home with home unit.    -     Row Name 06/03/21 0800          Physical Therapy Goals    Wound Care Goal Selection (PT)  wound care, PT goal 1  -     Row Name 06/03/21 0800          Wound Care Goal 1 (PT)    Wound Care Goal 1 (PT)  Decrease R hip wound size by 10% as evidence of wound healing   -     Time Frame (Wound Care Goal 1, PT)  10 days  -     Row Name 06/03/21 0800          Positioning and Restraints    Pre-Treatment Position  in bed  -     Post Treatment Position  bed  -     In Bed  supine;call light within reach  -     Row Name 06/03/21 0800          Therapy Assessment/Plan (PT)    Patient/Family Therapy Goals Statement (PT)  go home  -     PT Diagnosis (PT)  R hip wound  -     Rehab Potential (PT)  fair, will monitor progress closely  -     Criteria for Skilled Interventions Met (PT)  yes;meets criteria;skilled treatment is necessary  -     Row Name 06/03/21 0800          Therapy Plan Review/Discharge Plan (PT)    Therapy Plan Review (PT)  evaluation/treatment results reviewed;care plan/treatment goals reviewed;risks/benefits reviewed;current/potential barriers reviewed;participants voiced agreement with care plan;participants included;patient  -       User Key  (r) = Recorded By, (t) = Taken By, (c) = Cosigned By    Initials Name Provider Type    MF Jimy Hernandez, PT Physical Therapist    Stephanie Dove, RN Registered Nurse        Physical Therapy Education                 Title: PT OT SLP Therapies (In Progress)     Topic: Physical Therapy (Done)     Point: Mobility training (Done)     Learning Progress Summary           Patient Acceptance, E,D, VU by LOUISE at 6/2/2021 1640    Comment: Educated on safe sequencing with bed mobility, ambulatory transfesr, and gait. Reviewed HEP and hip precautions.                   Point: Home exercise  program (Done)     Learning Progress Summary           Patient Acceptance, E,D, VU by LOUISE at 6/2/2021 1640    Comment: Educated on safe sequencing with bed mobility, ambulatory transfesr, and gait. Reviewed HEP and hip precautions.                   Point: Body mechanics (Done)     Learning Progress Summary           Patient Acceptance, E,D, VU by LOUISE at 6/2/2021 1640    Comment: Educated on safe sequencing with bed mobility, ambulatory transfesr, and gait. Reviewed HEP and hip precautions.                   Point: Precautions (Done)     Learning Progress Summary           Patient Acceptance, E,D, VU by LOUISE at 6/2/2021 1640    Comment: Educated on safe sequencing with bed mobility, ambulatory transfesr, and gait. Reviewed HEP and hip precautions.                               User Key     Initials Effective Dates Name Provider Type Discipline    LOUISE 09/10/19 -  Clarence Ken, PT Physical Therapist PT                Recommendation and Plan  Anticipated Equipment Needs at Discharge (PT): other (see comments) (none)  Anticipated Discharge Disposition (PT): home with assist, home with home health  Planned Therapy Interventions (PT): wound care  Therapy Frequency (PT): 2 times/day  Plan of Care Reviewed With: patient           Outcome Summary: Pt presents with complex R hip wound s/p surgical debridement and wound vac placement. PT completed home wound vac paperwork to allow for d/c home with home unit.    Plan of Care Reviewed With: patient            Time Calculation  PT Charges     Row Name 06/03/21 0800             Time Calculation    Start Time  0800  -MF      PT Goal Re-Cert Due Date  06/12/21  -MF         Untimed Charges    PT Eval/Re-eval Minutes  25  -MF      Wound Care  31100 Neg Press (DME) wound TO 50 sqcm  -MF      34469-Jki Pressure wound to 50 sqcm  40  -MF         Total Minutes    Untimed Charges Total Minutes  65  -MF       Total Minutes  65  -MF        User Key  (r) = Recorded By, (t) = Taken By, (c) =  Cosigned By    Initials Name Provider Type     Jimy Hernandez, PT Physical Therapist            Therapy Charges for Today     Code Description Service Date Service Provider Modifiers Qty    09280640545 HC PT NEG PRESS WOUND TO 50SQCM DME3 6/3/2021 Jimy Hernandez, PT GP 1    07349593349 HC PT RE-EVAL ESTABLISHED PLAN 2 6/3/2021 Jimy Hernandez, PT GP 1            PT G-Codes  Outcome Measure Options: AM-PAC 6 Clicks Basic Mobility (PT)  AM-PAC 6 Clicks Score (PT): 16       Jimy Hernandez, PT  6/3/2021

## 2021-06-03 NOTE — PLAN OF CARE
Goal Outcome Evaluation:  Plan of Care Reviewed With: patient     Outcome Summary: Pt presents with complex R hip wound s/p surgical debridement and wound vac placement. PT completed home wound vac paperwork to allow for d/c home with home unit.

## 2021-06-03 NOTE — PROGRESS NOTES
Orthopedic Progress Note      Patient: Jessica Winston  YOB: 1960     Date of Admission: 6/2/2021  9:16 AM Medical Record Number: [id number]     Attending Physician: Riaz Man MD    Status Post:  Procedure(s):  HIP SUPERFICIAL IRRIGATION AND DEBRIDEMENT, ARTHOCENTESIS RIGHT Post Operative Day Number: 1    Subjective : No new orthopaedic complaints     Pain Relief: some relief with present medication.     Systemic Complaints: No Complaints  Vitals:    06/02/21 2145 06/02/21 2328 06/03/21 0342 06/03/21 0715   BP: 129/70 124/75 135/76 149/98   BP Location:  Left arm Left arm Left arm   Patient Position:  Lying Lying Lying   Pulse: 62 74 68 78   Resp:  16 16 16   Temp:  97.8 °F (36.6 °C) 97.7 °F (36.5 °C) 97.8 °F (36.6 °C)   TempSrc:  Oral Oral Oral   SpO2:  93% 96% 90%   Weight:       Height:           Physical Exam: 60 y.o. female    General Appearance:       Alert, cooperative, in no acute distress                  Extremities:    Dressing Clean, Dry and Intact             No clinical sign of DVT        Able to do good movements of digits    Pulses:   Pulses palpable and equal bilaterally           Diagnostic Tests:     Results from last 7 days   Lab Units 06/03/21  0758   WBC 10*3/mm3 5.79   HEMOGLOBIN g/dL 9.6*   HEMATOCRIT % 29.4*   PLATELETS 10*3/mm3 459*     Results from last 7 days   Lab Units 06/03/21  0758   SODIUM mmol/L 130*   POTASSIUM mmol/L 3.9   CHLORIDE mmol/L 96*   CO2 mmol/L 22.0   BUN mg/dL 8   CREATININE mg/dL 0.70   GLUCOSE mg/dL 131*   CALCIUM mg/dL 9.6         Lab Results   Component Value Date    URICACID 5.7 08/12/2019     No results found for: CRYSTAL  Microbiology Results (last 10 days)     Procedure Component Value - Date/Time    Tissue / Bone Culture - Tissue, Hip, Right [[id number]] Collected: 06/02/21 1346    Lab Status: Preliminary result Specimen: Tissue from Hip, Right Updated: 06/03/21 0655     Tissue Culture Growth present, too young to evaluate     Gram  Stain Few (2+) WBCs seen      Few (2+) Gram positive cocci in pairs    Body Fluid Culture - Body Fluid, Hip, Right [[id number]] Collected: 06/02/21 1343    Lab Status: Preliminary result Specimen: Body Fluid from Hip, Right Updated: 06/03/21 0611     Body Fluid Culture No growth     Gram Stain Few (2+) WBCs seen      No organisms seen    COVID PRE-OP / PRE-PROCEDURE SCREENING ORDER (NO ISOLATION) - Swab, Nasopharynx [[id number]]  (Normal) Collected: 06/02/21 0945    Lab Status: Final result Specimen: Swab from Nasopharynx Updated: 06/02/21 1029    Narrative:      The following orders were created for panel order COVID PRE-OP / PRE-PROCEDURE SCREENING ORDER (NO ISOLATION) - Swab, Nasopharynx.  Procedure                               Abnormality         Status                     ---------                               -----------         ------                     COVID-19 and FLU A/B PCR...[[id number]]  Normal              Final result                 Please view results for these tests on the individual orders.    COVID-19 and FLU A/B PCR - Swab, Nasopharynx [[id number]]  (Normal) Collected: 06/02/21 0945    Lab Status: Final result Specimen: Swab from Nasopharynx Updated: 06/02/21 1029     COVID19 Not Detected     Influenza A PCR Not Detected     Influenza B PCR Not Detected    Narrative:      Fact sheet for providers: https://www.fda.gov/media/224258/download    Fact sheet for patients: https://www.fda.gov/media/090065/download    Test performed by PCR.        FL C Arm During Surgery    Result Date: 6/2/2021  60 seconds of fluoroscopy time provided with 1 image stored during right hip I&D  This report was finalized on 6/2/2021 2:50 PM by Manny Ramirez.          Current Medications:  Scheduled Meds:acetaminophen, 1,000 mg, Oral, Q8H  aspirin, 81 mg, Oral, Q12H  buPROPion SR, 150 mg, Oral, Nightly  DAPTOmycin, 6 mg/kg, Intravenous, Q24H  docusate sodium, 100 mg, Oral, BID  fluticasone, 2 spray, Each Nare,  Daily  guaiFENesin, 1,200 mg, Oral, BID  lisinopril, 10 mg, Oral, Daily  meloxicam, 15 mg, Oral, Daily  predniSONE, 10 mg, Oral, Daily  rOPINIRole, 0.5 mg, Oral, Nightly      Continuous Infusions:lactated ringers, 9 mL/hr, Last Rate: 9 mL/hr (06/02/21 1108)  lactated ringers, 100 mL/hr, Last Rate: 100 mL/hr (06/02/21 4733)      PRN Meds:.HYDROmorphone **AND** naloxone  •  ketorolac  •  labetalol  •  ondansetron  •  oxyCODONE  •  oxyCODONE  •  sodium chloride  •  traMADol    Assessment: Status post  RIGHT HIP SUPERFICIAL IRRIGATION AND DEBRIDMENT    Patient Active Problem List   Diagnosis   • RUQ pain   • Cardiac mass   • PVD (peripheral vascular disease) with claudication (CMS/HCC)   • Tobacco dependence   • Pain in right hip   • HTN (hypertension)   • Rheumatoid arthritis (CMS/HCC)   • S/P right hip superficial irrigation and debridement arthrocentesis   • Acute blood loss anemia   • Postoperative pain   • Infection       PLAN:   Continues current post-op course  Anticoagulation: Aspirin started  Mobilize with PT as tolerated per protocol  Antibiotics for per infectious disease did discuss case with Dr. Juarez today recommend at least 2 to 4 weeks of IV antibiotics in order to ensure that the superficial infection clears as patient is high risk with her steroid use tobacco use and poor overall nutritional status and she has a large revision implant deep to her superficial infection.  We will follow up OR cultures from the superficial wound we did have some outpatient cultures that grew 3 different bacteria  Follow-up or aspiration as well  We will need home VAC changes did discuss with family the option that one of her daughters is a nurse is well trained in VAC changes as a living will area that if they had the proper supplies she could potentially do the home VAC changes for the patient which is reasonable but also she came to [place] for daily antibiotics could potentially VAC changes done at the infectious  disease office.    Weight Bearing: WBAT  Discharge Plan: OK to plan for discharge in  tomorrow to home  from orthopaedic perspective.    Riaz Man MD    Date: 6/3/2021    Time: 10:23 EDT

## 2021-06-04 VITALS
WEIGHT: 125 LBS | RESPIRATION RATE: 16 BRPM | HEIGHT: 65 IN | HEART RATE: 79 BPM | OXYGEN SATURATION: 98 % | DIASTOLIC BLOOD PRESSURE: 83 MMHG | BODY MASS INDEX: 20.83 KG/M2 | SYSTOLIC BLOOD PRESSURE: 154 MMHG | TEMPERATURE: 98.1 F

## 2021-06-04 PROBLEM — E87.1 HYPONATREMIA: Status: ACTIVE | Noted: 2021-06-04

## 2021-06-04 LAB
HCT VFR BLD AUTO: 28.6 % (ref 34–46.6)
HGB BLD-MCNC: 8.9 G/DL (ref 12–15.9)

## 2021-06-04 PROCEDURE — 25010000002 HYDROMORPHONE PER 4 MG: Performed by: ORTHOPAEDIC SURGERY

## 2021-06-04 PROCEDURE — C1894 INTRO/SHEATH, NON-LASER: HCPCS

## 2021-06-04 PROCEDURE — 02HV33Z INSERTION OF INFUSION DEVICE INTO SUPERIOR VENA CAVA, PERCUTANEOUS APPROACH: ICD-10-PCS | Performed by: INTERNAL MEDICINE

## 2021-06-04 PROCEDURE — 4A02X4A MEASUREMENT OF CARDIAC ELECTRICAL ACTIVITY, GUIDANCE, EXTERNAL APPROACH: ICD-10-PCS | Performed by: INTERNAL MEDICINE

## 2021-06-04 PROCEDURE — 97605 NEG PRS WND THER DME<=50SQCM: CPT

## 2021-06-04 PROCEDURE — 63710000001 PREDNISONE PER 5 MG: Performed by: NURSE PRACTITIONER

## 2021-06-04 PROCEDURE — 97116 GAIT TRAINING THERAPY: CPT

## 2021-06-04 PROCEDURE — 97110 THERAPEUTIC EXERCISES: CPT

## 2021-06-04 PROCEDURE — 85018 HEMOGLOBIN: CPT | Performed by: ORTHOPAEDIC SURGERY

## 2021-06-04 PROCEDURE — C1751 CATH, INF, PER/CENT/MIDLINE: HCPCS

## 2021-06-04 PROCEDURE — 85014 HEMATOCRIT: CPT | Performed by: ORTHOPAEDIC SURGERY

## 2021-06-04 PROCEDURE — 25010000002 DAPTOMYCIN PER 1 MG: Performed by: INTERNAL MEDICINE

## 2021-06-04 RX ORDER — ONDANSETRON 4 MG/1
4 TABLET, FILM COATED ORAL EVERY 8 HOURS PRN
Start: 2021-06-04 | End: 2021-10-15

## 2021-06-04 RX ORDER — MELOXICAM 15 MG/1
15 TABLET ORAL DAILY
Qty: 15 TABLET | Refills: 0
Start: 2021-06-04 | End: 2021-06-19

## 2021-06-04 RX ORDER — SODIUM CHLORIDE 0.9 % (FLUSH) 0.9 %
10 SYRINGE (ML) INJECTION AS NEEDED
Status: DISCONTINUED | OUTPATIENT
Start: 2021-06-04 | End: 2021-06-04 | Stop reason: HOSPADM

## 2021-06-04 RX ORDER — TRAMADOL HYDROCHLORIDE 50 MG/1
50 TABLET ORAL EVERY 6 HOURS PRN
Start: 2021-06-04 | End: 2021-07-06

## 2021-06-04 RX ORDER — ASPIRIN 81 MG/1
81 TABLET, DELAYED RELEASE ORAL EVERY 12 HOURS
Qty: 84 TABLET | Refills: 0
Start: 2021-06-04 | End: 2021-07-16

## 2021-06-04 RX ORDER — DOCUSATE SODIUM 100 MG/1
100 CAPSULE, LIQUID FILLED ORAL 2 TIMES DAILY
Start: 2021-06-04

## 2021-06-04 RX ORDER — OXYCODONE HYDROCHLORIDE 5 MG/1
5 TABLET ORAL EVERY 4 HOURS PRN
Refills: 0
Start: 2021-06-04 | End: 2021-07-06

## 2021-06-04 RX ORDER — ACETAMINOPHEN 500 MG
1000 TABLET ORAL EVERY 8 HOURS
Qty: 42 TABLET | Refills: 0
Start: 2021-06-04 | End: 2021-06-11

## 2021-06-04 RX ORDER — SODIUM CHLORIDE 9 MG/ML
9 INJECTION, SOLUTION INTRAVENOUS CONTINUOUS
Status: DISCONTINUED | OUTPATIENT
Start: 2021-06-04 | End: 2021-06-04 | Stop reason: HOSPADM

## 2021-06-04 RX ORDER — FLUCONAZOLE 200 MG/1
400 TABLET ORAL EVERY 24 HOURS
Status: DISCONTINUED | OUTPATIENT
Start: 2021-06-04 | End: 2021-06-04 | Stop reason: HOSPADM

## 2021-06-04 RX ORDER — FLUCONAZOLE 200 MG/1
400 TABLET ORAL EVERY 24 HOURS
Qty: 20 TABLET | Refills: 0
Start: 2021-06-04 | End: 2021-06-14

## 2021-06-04 RX ORDER — SODIUM CHLORIDE 0.9 % (FLUSH) 0.9 %
10 SYRINGE (ML) INJECTION EVERY 12 HOURS SCHEDULED
Status: DISCONTINUED | OUTPATIENT
Start: 2021-06-04 | End: 2021-06-04 | Stop reason: HOSPADM

## 2021-06-04 RX ADMIN — MELOXICAM 15 MG: 7.5 TABLET ORAL at 08:39

## 2021-06-04 RX ADMIN — FLUCONAZOLE 400 MG: 200 TABLET ORAL at 14:05

## 2021-06-04 RX ADMIN — TRAMADOL HYDROCHLORIDE 50 MG: 50 TABLET, FILM COATED ORAL at 04:14

## 2021-06-04 RX ADMIN — HYDROMORPHONE HYDROCHLORIDE 0.5 MG: 1 INJECTION, SOLUTION INTRAMUSCULAR; INTRAVENOUS; SUBCUTANEOUS at 08:43

## 2021-06-04 RX ADMIN — ASPIRIN 81 MG: 81 TABLET, COATED ORAL at 08:39

## 2021-06-04 RX ADMIN — ACETAMINOPHEN 1000 MG: 500 TABLET, FILM COATED ORAL at 14:05

## 2021-06-04 RX ADMIN — LISINOPRIL 10 MG: 10 TABLET ORAL at 08:39

## 2021-06-04 RX ADMIN — PREDNISONE 10 MG: 10 TABLET ORAL at 08:39

## 2021-06-04 RX ADMIN — OXYCODONE 10 MG: 5 TABLET ORAL at 01:01

## 2021-06-04 RX ADMIN — ACETAMINOPHEN 1000 MG: 500 TABLET, FILM COATED ORAL at 04:14

## 2021-06-04 RX ADMIN — OXYCODONE 10 MG: 5 TABLET ORAL at 14:05

## 2021-06-04 RX ADMIN — DOCUSATE SODIUM 100 MG: 100 CAPSULE, LIQUID FILLED ORAL at 08:39

## 2021-06-04 RX ADMIN — OXYCODONE 10 MG: 5 TABLET ORAL at 08:39

## 2021-06-04 RX ADMIN — GUAIFENESIN 1200 MG: 600 TABLET, EXTENDED RELEASE ORAL at 08:38

## 2021-06-04 RX ADMIN — DAPTOMYCIN 350 MG: 500 INJECTION, POWDER, LYOPHILIZED, FOR SOLUTION INTRAVENOUS at 08:39

## 2021-06-04 NOTE — NURSING NOTE
[facility] [place] [facility] Education Progress Note    Jessica Winston  1960 - female    Education Date:  06/04/21  Provider: Fabiola Hilario RN  Location:  [facility]      Summary of Education Session:  Met with Jessica Winston to review assembly and administration of IV antibiotic.  Demonstrated with proficiency.  Reviewed side effects of medication, care and complications of central line, use of probiotics, and 24 hour availability of RN for support.  Reviewed appointment date and time to include lab arrangements and providing supplies at each appointment.    Anticipated Discharge Date: 6/4/21    Follow Up Date: 6/8/21    Fabiola Hilario RN, 06/04/21 - 13:42 EDT

## 2021-06-04 NOTE — DISCHARGE SUMMARY
Patient Name: Jessica Winston  MRN: [id number]  : 1960  DOS: 2021    Attending: Riaz Man MD    Primary Care Provider: Iraida Demarco APRN    Date of Admission:.2021  9:16 AM    Date of Discharge:  2021    Discharge Diagnosis:   S/P right hip superficial irrigation and debridement arthrocentesis    PVD (peripheral vascular disease) with claudication (CMS/HCC)    Tobacco dependence    HTN (hypertension)    Rheumatoid arthritis (CMS/HCC)    Infection    Hyponatremia, mild  Status post PICC line placement    Consultations: Infectious disease consult, Woo Turner MD    Hospital Course  At admit:  Patient is a pleasant 60 y.o. female presented for scheduled surgery by Dr. Man.  She underwent right hip superficial irrigation and debridement arthrocentesis under general anesthesia.  She tolerated surgery well and was admitted for further medical management.     Per Dr. Man's note:(((60 y.o. female who was admitted to [facility] with wound break down from CLIFFORD incision done approximately 6 weeks ago. The patient had been recovering well she was seen in 2-week postoperative visit and wound appeared to be healing well unfortunately the next couple weeks she began to have some superficial necrosis of the skin and some fibrinous exudate.  She was seen in the office last Friday and a superficial swab was positive for several bacteria she had elevated ESR CRP at that point we discussed surgical intervention in order to ensure that there was no deep infection in the hip as well as debrided superficial infection.  Patient continued to smoke postoperatively have been a chronic lifetime smoker and had cachexia. Likely risk and benefits of the procedure including but not limited to infection, DVT, pulmonary embolism, leg length discrepancy, recurrent dislocation, possibility of injury to nerves and vessels, and periprosthetic fractures have been discussed with the patient in  detail. Despite the risks involved, the patient elected to proceed and informed consent was obtained. The patient was seen in the preoperative holding area and the operative extremity was marked.)))     She is known to us from previous admission on 4/15/2021 for right hip revision from prior repair.     She has history of rheumatoid arthritis for which she is on prednisone daily as well as Enbrel.  She has some joint pains related to rheumatoid arthritis.     When seen in PACU she is doing well.  Her pain is well controlled.  She denies nausea, shortness of breath or chest pain.  She denies recent fevers chills or night sweats.  No history of DVT or PE.  She is continued to use a walker since her previous surgery.  She has had falls.     After admit  Patient was provided pain medications as needed for pain control.    Adjustments were made to pain medications to optimize postop pain management when indicated. Risks and benefits of opiate medications discussed with patient. GERI report was reviewed.    Patient was seen by PT  and has progressed well over her stay.    She used an IS for atelectasis prophylaxis and aspirin along with mechanicals for DVT prophylaxis.    During her stay she was followed by infectious consultants, Dr. Woo Juarez manage her antibiotics.  IV antibiotic arrangement took place prior to discharge.   helped with discharge planning.  Prescription for fluconazole was provided as well by ID.    Case management note((   Final Note: Arrangements complete for home today. [facility]) will provide the IV antibiotic and instructions. PT has provided instructions to daughter on changing wound vac dressing and provided supplies.She and daughter are aware of f/u appt with Dr Turner on 6/8 at 1330, and outpt PT wound care on 6/8 at 1430.She will f/u weekly with [facility] office for PICC line dressing changes ,they verbalized understanding))     Cultures from  surgery were followed, noted below.  A PICC line was placed during her stay.    Home medications were resumed as appropriate, and labs were monitored and remained fairly stable.     With the progress she has made, Ms. Winston is ready for DC home today.      Discussed with patient regarding plan and she shows understanding and agreement.    Patient will have HHPT following discharge.        Procedures Performed  Procedure(s):  HIP SUPERFICIAL IRRIGATION AND DEBRIDEMENT, ARTHOCENTESIS RIGHT       Pertinent Test Results:    I reviewed the patient's new clinical results.   Results from last 7 days   Lab Units 06/04/21  0639 06/03/21  0758   WBC 10*3/mm3  --  5.79   HEMOGLOBIN g/dL 8.9* 9.6*   HEMATOCRIT % 28.6* 29.4*   PLATELETS 10*3/mm3  --  459*     Results from last 7 days   Lab Units 06/03/21  0758   SODIUM mmol/L 130*   POTASSIUM mmol/L 3.9   CHLORIDE mmol/L 96*   CO2 mmol/L 22.0   BUN mg/dL 8   CREATININE mg/dL 0.70   CALCIUM mg/dL 9.6   GLUCOSE mg/dL 131*   Microbiology Results (last 21 days)    Collected Updated Procedure Result Status    06/02/2021 1346 06/02/2021 1413 Fungus Culture - Tissue, Hip, Right [[id number]]   Tissue from Hip, Right    In process Component Value   No component results              06/02/2021 1346 06/05/2021 0926 Tissue / Bone Culture - Tissue, Hip, Right [[id number]]   Tissue from Hip, Right    Final result Component Value   Tissue Culture Moderate growth (3+) Normal Skin Linsey   Gram Stain Few (2+) WBCs seen    Few (2+) Gram positive cocci in pairs              06/02/2021 1346 06/03/2021 1146 AFB Culture - Tissue, Hip, Right [[id number]]   Tissue from Hip, Right    Preliminary result Component Value   AFB Stain No acid fast bacilli seen on concentrated smear P              06/02/2021 1346 06/07/2021 0740 Anaerobic Culture 10 Day Incubation - Tissue, Hip, Right [[id number]]   Tissue from Hip, Right    Preliminary result Component Value   Anaerobic Culture No anaerobes isolated at 5 days P               06/02/2021 1343 06/02/2021 1413 Fungus Culture - Body Fluid, Hip, Right [[id number]]   Body Fluid from Hip, Right    In process Component Value   No component results              06/02/2021 1343 06/07/2021 0609 Body Fluid Culture - Body Fluid, Hip, Right [[id number]]   Body Fluid from Hip, Right    Final result Component Value   Body Fluid Culture No growth at 5 days   Gram Stain Few (2+) WBCs seen    No organisms seen              06/02/2021 1343 06/03/2021 1146 AFB Culture - Body Fluid, Hip, Right [[id number]]   Body Fluid from Hip, Right    Preliminary result Component Value   AFB Stain No acid fast bacilli seen on concentrated smear P              06/02/2021 1343 06/07/2021 0740 Anaerobic Culture 10 Day Incubation - Body Fluid, Hip, Right [[id number]]   Body Fluid from Hip, Right    Preliminary result Component Value   Anaerobic Culture No anaerobes isolated at 5 days P              06/02/2021 0945 06/02/2021 1029 COVID PRE-OP / PRE-PROCEDURE SCREENING ORDER (NO ISOLATION) - Swab, Nasopharynx [[id number]]    Swab from Nasopharynx    Final result Component Value   No component results              06/02/2021 0945 06/02/2021 1029 COVID-19 and FLU A/B PCR - Swab, Nasopharynx [[id number]]    Swab from Nasopharynx    Final result Component Value   COVID19 Not Detected   Influenza A PCR Not Detected   Influenza B PCR Not Detected                  I reviewed the patient's new imaging including images and reports.  Physical therapy  Goal Outcome Evaluation:  Plan of Care Reviewed With: patient  Progress: improving  Outcome Summary: Pt ambulated 350 ft with RWx, SBA. PT performs teachback well with ther ex program. Pt expected to d/c this date.                    Physical therapy/wound care  Plan of Care Reviewed With: patient, daughter  Progress: no change  Outcome Summary: Pt with moist, pink/yellow base and intact periwound incision. Pt is a good candidate for NPWT to promote granulation and manage exudate. Pt  "will benefit from skilled PT wound care upon d/c to continue care.    Discharge Assessment:      Visit Vitals  /83 (BP Location: Left arm, Patient Position: Lying)   Pulse 79   Temp 98.1 °F (36.7 °C) (Oral)   Resp 16   Ht 165.1 cm (65\")   Wt 56.7 kg (125 lb)   LMP 2010 (Approximate)   SpO2 98%   BMI 20.80 kg/m²     Temp (24hrs), Av.9 °F (36.6 °C), Min:97.6 °F (36.4 °C), Max:98.1 °F (36.7 °C)      General Appearance:    Alert, cooperative, in no acute distress   Lungs:     Clear to auscultation,respirations regular, even and                   unlabored    Heart:    Regular rhythm and normal rate, normal S1 and S2   Abdomen:     Normal bowel sounds, no masses, no organomegaly, soft        non-tender, non-distended, no guarding, no rebound                 tenderness   Extremities:  Right hip incision with wound VAC dressing on.  No clubbing, cyanosis, or edema distal lower extremities.   Pulses:   Pulses palpable and equal bilaterally   Skin:   No bleeding, bruising or rash   Neurologic:   Cranial nerves 2 - 12 grossly intact, sensation intact, Flexion and dorsiflexion intact bilateral feet.         Discharge Disposition: Home         Discharge Medications      New Medications      Instructions Start Date   aspirin 81 MG EC tablet   81 mg, Oral, Every 12 Hours      DAPTOmycin 350 mg in sodium chloride 0.9 % 50 mL   6 mg/kg (350 mg), Intravenous, Every 24 Hours Scheduled   Start Date: 2021     fluconazole 200 MG tablet  Commonly known as: DIFLUCAN   400 mg, Oral, Every 24 Hours         Changes to Medications      Instructions Start Date   acetaminophen 500 MG tablet  Commonly known as: TYLENOL  What changed:   · when to take this  · reasons to take this  · additional instructions   1,000 mg, Oral, Every 8 Hours, Take every 8 hours  as needed after 1 week         Continue These Medications      Instructions Start Date   buPROPion  MG 12 hr tablet  Commonly known as: WELLBUTRIN SR   150 mg, " Oral, Daily      cyanocobalamin 1000 MCG/ML injection   Intramuscular, Every 30 Days, Once per month- usually first of month      docusate sodium 100 MG capsule  Commonly known as: Colace   100 mg, Oral, 2 Times Daily      lisinopril 10 MG tablet  Commonly known as: PRINIVIL,ZESTRIL   10 mg, Oral, Daily      meloxicam 15 MG tablet  Commonly known as: MOBIC   15 mg, Oral, Daily      ondansetron 4 MG tablet  Commonly known as: Zofran   4 mg, Oral, Every 8 Hours PRN      oxyCODONE 5 MG immediate release tablet  Commonly known as: Roxicodone   5 mg, Oral, Every 4 Hours PRN      predniSONE 10 MG tablet  Commonly known as: DELTASONE   10 mg, Oral, Daily      rOPINIRole 0.5 MG tablet  Commonly known as: REQUIP   0.5 mg, Oral, Nightly, Take 1 hour before bedtime.      traMADol 50 MG tablet  Commonly known as: ULTRAM   50 mg, Oral, Every 6 Hours PRN      vitamin D 1.25 MG (75053 UT) capsule capsule  Commonly known as: ERGOCALCIFEROL   50,000 Units, Oral, Weekly, Tuesday             Discharge Diet: Resume prehospital diet    Activity at Discharge: Weightbearing as tolerated right lower extremity    Follow-up Appointments  Woo Turner MD per his orders  Riaz Reynaga MD per his orders      Discussed with patient, discussed with case management, discussed with Dr. Turner.    Discharge took over 30 min      Dragon disclaimer:  Part of this encounter note is an electronic transcription/translation of spoken language to printed text. The electronic translation of spoken language may permit erroneous, or at times, nonsensical words or phrases to be inadvertently transcribed; Although I have reviewed the note for such errors, some may still exist.       Demi Bermudez MD  06/04/21  12:30 EDT

## 2021-06-04 NOTE — THERAPY TREATMENT NOTE
Patient Name: Jessica Winston  : 1960    MRN: [id number]                              Today's Date: 2021       Admit Date: 2021    Visit Dx:     ICD-10-CM ICD-9-CM   1. S/P right hip superficial irrigation and debridement arthrocentesis  Z96.641 V43.64   2. Infection  B99.9 136.9   3. Right hip pain  M25.551 719.45     Patient Active Problem List   Diagnosis   • RUQ pain   • Cardiac mass   • PVD (peripheral vascular disease) with claudication (CMS/HCC)   • Tobacco dependence   • Pain in right hip   • HTN (hypertension)   • Rheumatoid arthritis (CMS/HCC)   • S/P right hip superficial irrigation and debridement arthrocentesis   • Acute blood loss anemia   • Postoperative pain   • Infection   • Hyponatremia, mild     Past Medical History:   Diagnosis Date   • Avascular necrosis (CMS/HCC)    • Depression    • Fractures    • HTN (hypertension)    • Hyperglycemia    • Hypertension    • Osteoarthritis    • RA (rheumatoid arthritis) (CMS/HCC)     ALSO REPORTS OA   • Tooth loose     top left    • Vitamin D deficiency    • Wears glasses     readers     Past Surgical History:   Procedure Laterality Date   • ARM TENDON REPAIR Left    • BREAST SURGERY     • ENDOSCOPY N/A 3/30/2017    Procedure: ESOPHAGOGASTRODUODENOSCOPY WITH COLD FORCEP BIOPSY;  Surgeon: Anca Trevino MD;  Location: [facility] ENDOSCOPY;  Service:    • FEMUR SURGERY      right    • HERNIA REPAIR Bilateral     INGUINAL- unsure about mesh    • INCISION AND DRAINAGE HIP N/A 2021    Procedure: HIP SUPERFICIAL IRRIGATION AND DEBRIDEMENT, ARTHOCENTESIS RIGHT;  Surgeon: Riaz Man MD;  Location:  [facility];  Service: Orthopedics;  Laterality: N/A;   • KNEE SURGERY Left    • MANDIBLE SURGERY     • TOTAL HIP ARTHROPLASTY REVISION Right 4/15/2021    Procedure: COVERSION TO RIGHT TOTAL HIP ARTHROPLASTY;  Surgeon: Riaz Man MD;  Location:  [facility];  Service: Orthopedics;  Laterality: Right;   • TUBAL ABDOMINAL LIGATION     •  WISDOM TOOTH EXTRACTION       General Information     Row Name 06/04/21 1303          Physical Therapy Time and Intention    Document Type  therapy note (daily note)  -EJ     Mode of Treatment  physical therapy  -     Row Name 06/04/21 1303          General Information    Patient Profile Reviewed  yes  -EJ     Existing Precautions/Restrictions  fall;right;hip, anterior;other (see comments) wound vac  -EJ     Barriers to Rehab  physical barrier;medically complex;previous functional deficit  -EJ     Row Name 06/04/21 1303          Cognition    Orientation Status (Cognition)  oriented x 4  -EJ     Row Name 06/04/21 1303          Safety Issues, Functional Mobility    Safety Issues Affecting Function (Mobility)  safety precaution awareness;safety precautions follow-through/compliance;sequencing abilities  -EJ     Impairments Affecting Function (Mobility)  strength;endurance/activity tolerance;pain;range of motion (ROM)  -EJ       User Key  (r) = Recorded By, (t) = Taken By, (c) = Cosigned By    Initials Name Provider Type    EJ Silvia Caceres, PT Physical Therapist        Mobility     Row Name 06/04/21 1305          Bed Mobility    Supine-Sit [place] (Bed Mobility)  modified independence  -EJ     Sit-Supine [place] (Bed Mobility)  modified independence  -EJ     Assistive Device (Bed Mobility)  bed rails;head of bed elevated  -     Row Name 06/04/21 1305          Sit-Stand Transfer    Sit-Stand [place] (Transfers)  standby assist  -EJ     Assistive Device (Sit-Stand Transfers)  walker, front-wheeled  -EJ     Row Name 06/04/21 1305          Gait/Stairs (Locomotion)    [place] Level (Gait)  standby assist  -EJ     Assistive Device (Gait)  walker, front-wheeled  -EJ     Distance in Feet (Gait)  350  -EJ     Deviations/Abnormal Patterns (Gait)  bilateral deviations;antalgic;queta decreased;gait speed decreased;stride length decreased  -EJ     Bilateral Gait Deviations  forward flexed posture  -EJ      Left Sided Gait Deviations  heel strike decreased  -     Right Sided Gait Deviations  heel strike decreased;weight shift ability decreased  -EJ     Comment (Gait/Stairs)  Cues for increased heel strike, upright posture. Pt advancing to step through gait, but limited by pain.  -[facility] Name 06/04/21 1305          Mobility    Extremity Weight-bearing Status  right lower extremity  -     Right Lower Extremity (Weight-bearing Status)  weight-bearing as tolerated (WBAT)  -       User Key  (r) = Recorded By, (t) = Taken By, (c) = Cosigned By    Initials Name Provider Type     Silvia Caceres PT Physical Therapist        Obj/Interventions     [facility] Name 06/04/21 1307          Motor Skills    Therapeutic Exercise  hip;knee;ankle  -[facility] Name 06/04/21 1307          Hip (Therapeutic Exercise)    Hip AROM (Therapeutic Exercise)  right;aBduction 20 reps  -EJ     Row Name 06/04/21 1307          Knee (Therapeutic Exercise)    Knee Isometrics (Therapeutic Exercise)  bilateral;gluteal sets;quad sets 20 reps  -     Knee Strengthening (Therapeutic Exercise)  right;LAQ (long arc quad);SAQ (short arc quad) 20 reps  -EJ     [facility] Name 06/04/21 1307          Ankle (Therapeutic Exercise)    Ankle (Therapeutic Exercise)  AROM (active range of motion)  -     Ankle AROM (Therapeutic Exercise)  bilateral;dorsiflexion;plantarflexion 20 reps  -       User Key  (r) = Recorded By, (t) = Taken By, (c) = Cosigned By    Initials Name Provider Type     Silvia Caceres PT Physical Therapist        Goals/Plan    No documentation.       Clinical Impression     Row Name 06/04/21 1308          Pain    Additional Documentation  Pain Scale: Numbers Pre/Post-Treatment (Group)  -EJ     Row Name 06/04/21 1308          Pain Scale: Numbers Pre/Post-Treatment    Pretreatment Pain Rating  2/10  -EJ     Posttreatment Pain Rating  2/10  -EJ     Pain Location - Side  Right  -     Pain Location - Orientation  anterior  -     Pain  Location  hip  -EJ     Pre/Posttreatment Pain Comment  tolerated  -EJ     Pain Intervention(s)  Repositioned;Ambulation/increased activity  -EJ     Row Name 06/04/21 1308          Plan of Care Review    Plan of Care Reviewed With  patient  -EJ     Progress  improving  -EJ     Outcome Summary  Pt ambulated 350 ft with RWx, SBA. PT performs teachback well with ther ex program. Pt expected to d/c this date.  -EJ     Row Name 06/04/21 1308          Therapy Assessment/Plan (PT)    Rehab Potential (PT)  good, to achieve stated therapy goals  -EJ     Criteria for Skilled Interventions Met (PT)  yes;meets criteria;skilled treatment is necessary  -EJ     Row Name 06/04/21 1308          Vital Signs    Pre Patient Position  Supine  -EJ     Intra Patient Position  Standing  -EJ     Post Patient Position  Sitting  -EJ     Row Name 06/04/21 1308          Positioning and Restraints    Pre-Treatment Position  in bed  -EJ     Post Treatment Position  bed  -EJ     In Bed  sitting EOB;call light within reach;encouraged to call for assist  -EJ       User Key  (r) = Recorded By, (t) = Taken By, (c) = Cosigned By    Initials Name Provider Type    EJ Silvia Caceres, PT Physical Therapist        Outcome Measures     Row Name 06/04/21 1349          How much help from another person do you currently need...    Turning from your back to your side while in flat bed without using bedrails?  4  -EJ     Moving from lying on back to sitting on the side of a flat bed without bedrails?  3  -EJ     Moving to and from a bed to a chair (including a wheelchair)?  4  -EJ     Standing up from a chair using your arms (e.g., wheelchair, bedside chair)?  4  -EJ     Climbing 3-5 steps with a railing?  2  -EJ     To walk in hospital room?  4  -EJ     AM-PAC 6 Clicks Score (PT)  21  -EJ     Row Name 06/04/21 1349          Functional Assessment    Outcome Measure Options  AM-PAC 6 Clicks Basic Mobility (PT)  -EJ       User Key  (r) = Recorded By, (t) = Taken  By, (c) = Cosigned By    Initials Name Provider Type    Silvia Ledesma PT Physical Therapist        Physical Therapy Education                 Title: PT OT SLP Therapies (In Progress)     Topic: Physical Therapy (Done)     Point: Mobility training (Done)     Learning Progress Summary           Patient Acceptance, E,TB, VU,DU by MINDI at 6/4/2021 1350    Acceptance, E,TB, VU by  at 6/3/2021 1511    Comment: Edu on safe sequencing with transfers and gait. Reviewed HEP.    Acceptance, E,TB, VU by  at 6/3/2021 1054    Comment: Edu on safe sequencing with transfers and gait. Reviewed HEP.    Acceptance, E,D, VU by LOUISE at 6/2/2021 1640    Comment: Educated on safe sequencing with bed mobility, ambulatory transfesr, and gait. Reviewed HEP and hip precautions.                   Point: Home exercise program (Done)     Learning Progress Summary           Patient Acceptance, E,TB, VU,DU by MINDI at 6/4/2021 1350    Acceptance, E,TB, VU by  at 6/3/2021 1511    Comment: Edu on safe sequencing with transfers and gait. Reviewed HEP.    Acceptance, E,TB, VU by  at 6/3/2021 1054    Comment: Edu on safe sequencing with transfers and gait. Reviewed HEP.    Acceptance, E,D, VU by LOUISE at 6/2/2021 1640    Comment: Educated on safe sequencing with bed mobility, ambulatory transfesr, and gait. Reviewed HEP and hip precautions.                   Point: Body mechanics (Done)     Learning Progress Summary           Patient Acceptance, E,TB, VU,DU by MINDI at 6/4/2021 1350    Acceptance, E,TB, VU by  at 6/3/2021 1511    Comment: Edu on safe sequencing with transfers and gait. Reviewed HEP.    Acceptance, E,TB, VU by  at 6/3/2021 1054    Comment: Edu on safe sequencing with transfers and gait. Reviewed HEP.    Acceptance, E,D, VU by LOUISE at 6/2/2021 1640    Comment: Educated on safe sequencing with bed mobility, ambulatory transfesr, and gait. Reviewed HEP and hip precautions.                   Point: Precautions (Done)     Learning  Progress Summary           Patient Acceptance, E,TB, VU,DU by MINDI at 6/4/2021 1350    Acceptance, E,TB, VU by  at 6/3/2021 1511    Comment: Edu on safe sequencing with transfers and gait. Reviewed HEP.    Acceptance, E,TB, VU by  at 6/3/2021 1054    Comment: Edu on safe sequencing with transfers and gait. Reviewed HEP.    Acceptance, E,D, VU by LOUISE at 6/2/2021 1640    Comment: Educated on safe sequencing with bed mobility, ambulatory transfesr, and gait. Reviewed HEP and hip precautions.                               User Key     Initials Effective Dates Name Provider Type Discipline     11/20/18 -  Silvia Caceres PT Physical Therapist PT    LOUISE 09/10/19 -  Clarence Ken, PT Physical Therapist PT     09/22/20 -  Trent Cunha, PT Physical Therapist PT              PT Recommendation and Plan     Plan of Care Reviewed With: patient  Progress: improving  Outcome Summary: Pt ambulated 350 ft with RWx, SBA. PT performs teachback well with ther ex program. Pt expected to d/c this date.     Time Calculation:   PT Charges     Row Name 06/04/21 1350             Time Calculation    Start Time  1103  -EJ      PT Received On  06/04/21  -EJ      PT Goal Re-Cert Due Date  06/12/21  -EJ         Time Calculation- PT    Total Timed Code Minutes- PT  25 minute(s)  -EJ         Timed Charges    46798 - PT Therapeutic Exercise Minutes  15  -EJ      72325 - Gait Training Minutes   10  -EJ         Total Minutes    Timed Charges Total Minutes  25  -EJ       Total Minutes  25  -EJ        User Key  (r) = Recorded By, (t) = Taken By, (c) = Cosigned By    Initials Name Provider Type     Silvia Caceres PT Physical Therapist        Therapy Charges for Today     Code Description Service Date Service Provider Modifiers Qty    53587436807 HC PT THER PROC EA 15 MIN 6/4/2021 Silvia Caceres, PT GP 1    [id number] HC GAIT TRAINING EA 15 MIN 6/4/2021 Silvia Caceres, PT GP 1          PT G-Codes  Outcome Measure Options: AM-PAC 6  Clicks Basic Mobility (PT)  -[facility] 6 Clicks Score (PT): 21    Silvia Cristobal, PT  6/4/2021

## 2021-06-04 NOTE — PLAN OF CARE
Goal Outcome Evaluation:   Pt awake most of the night. VSS. RA. C/o back and hip pain. Prn pain medications given. Wound vac C/D/I and patent. Pt hopeful for d/c today. Consult for PICC placement ordered. Will continue to monitor.  Problem: Adult Inpatient Plan of Care  Goal: Plan of Care Review  Outcome: Ongoing, Progressing  Flowsheets  Taken 6/4/2021 0434 by Mary Gonzalez, RN  Progress: improving  Taken 6/3/2021 1709 by Jaimie Duarte, RN  Plan of Care Reviewed With: patient     Progress: improving

## 2021-06-04 NOTE — PROGRESS NOTES
INFECTIOUS DISEASE Progress Note    Jessica Winston  1960  [id number]      Admission Date: 6/2/2021      Requesting Provider: Riaz Man MD  Evaluating Physician: Woo Turner MD    Reason for Consultation: Right total hip arthroplasty infection    History of present illness:    6/3/21: Patient is a 60 y.o. female who is seen today for evaluation of right total hip arthroplasty infection in the setting of rheumatoid arthritis.  She developed an intertrochanteric right hip fracture last year at work that was fixed at .  She developed a significant malunion with erosion of the femoral head and screw impingement into the acetabulum.  She required right total hip arthroplasty on 4/15/21. Approximately one and half weeks after her surgery she noted some progressively worsening right hip wound dehiscence.  She noticed slight drainage on the dressings with mild erythema.  Yesterday she underwent a right hip superficial irrigation and debridement with arthrocentesis. Prior to the procedure she underwent aspiration of the hip with fluid sent for culture. Right hip wound Gram stain revealed few gram-positive cocci in pairs.  She has a penicillin allergy with anaphylaxis. She has tolerated perioperative cefazolin. She denies severe right hip pain. Denies fever, chills, sweats.    6/4/21: She has remained afebrile. Her operative cultures are growing moderate gram-positive cocci.  Her outpatient wound cultures at [facility] grew staph epidermidis and Candida parapsilosis.  She would like to go home.  She is able to ambulate.  She denies increased right hip pain.  She was discharged with a wound VAC and the current plan is for her daughter to change the wound VAC (she is a trained Home health wound VAC nurse) with weekly follow-ups at [facility] wound care.    Past Medical History:   Diagnosis Date   • Avascular necrosis (CMS/HCC)    • Depression    • Fractures    • HTN (hypertension)    •  Hyperglycemia    • Hypertension    • Osteoarthritis    • RA (rheumatoid arthritis) (CMS/[facility])     ALSO REPORTS OA   • Tooth loose     top left    • Vitamin D deficiency    • Wears glasses     readers       Past Surgical History:   Procedure Laterality Date   • ARM TENDON REPAIR Left    • BREAST SURGERY     • ENDOSCOPY N/A 3/30/2017    Procedure: ESOPHAGOGASTRODUODENOSCOPY WITH COLD FORCEP BIOPSY;  Surgeon: Anca Trevino MD;  Location: [facility] ENDOSCOPY;  Service:    • FEMUR SURGERY      right    • HERNIA REPAIR Bilateral     INGUINAL- unsure about mesh    • INCISION AND DRAINAGE HIP N/A 6/2/2021    Procedure: HIP SUPERFICIAL IRRIGATION AND DEBRIDEMENT, ARTHOCENTESIS RIGHT;  Surgeon: Riaz Man MD;  Location:  [facility];  Service: Orthopedics;  Laterality: N/A;   • KNEE SURGERY Left    • MANDIBLE SURGERY     • TOTAL HIP ARTHROPLASTY REVISION Right 4/15/2021    Procedure: COVERSION TO RIGHT TOTAL HIP ARTHROPLASTY;  Surgeon: Riaz Man MD;  Location:  [facility];  Service: Orthopedics;  Laterality: Right;   • TUBAL ABDOMINAL LIGATION     • WISDOM TOOTH EXTRACTION         Family History   Problem Relation Age of Onset   • Arthritis Mother    • Cancer Mother         leukemia   • Hypertension Mother    • Heart disease Father         heart attack   • Hypertension Father    • Migraines Daughter    • Cancer Maternal Grandmother         uterus       Social History     Socioeconomic History   • Marital status:      Spouse name: Not on file   • Number of children: Not on file   • Years of education: Not on file   • Highest education level: Not on file   Tobacco Use   • Smoking status: Current Every Day Smoker     Packs/day: 0.50     Years: 30.00     Pack years: 15.00     Types: Cigarettes   • Smokeless tobacco: Never Used   • Tobacco comment: HX OF SMOKING 1 PPD FOR THE PAST 30 YEARS    Vaping Use   • Vaping Use: Never used   Substance and Sexual Activity   • Alcohol use: No   • Drug use: No   •  Sexual activity: Defer       Allergies   Allergen Reactions   • Clindamycin/Lincomycin Anaphylaxis and Swelling     took whole dose and then throat started to swell -    • Penicillins Anaphylaxis and Swelling     Tolerated cefazolin 4/15/21   • Codeine Swelling         Medication:    Current Facility-Administered Medications:   •  acetaminophen (TYLENOL) tablet 1,000 mg, 1,000 mg, Oral, Q8H, Riaz Man MD, 1,000 mg at 06/04/21 0414  •  aspirin EC tablet 81 mg, 81 mg, Oral, Q12H, Riaz Man MD, 81 mg at 06/03/21 2030  •  buPROPion SR (WELLBUTRIN SR) 12 hr tablet 150 mg, 150 mg, Oral, Nightly, Lakeisha Clemente APRN, 150 mg at 06/03/21 2030  •  DAPTOmycin (CUBICIN) 350 mg in sodium chloride 0.9 % 50 mL IVPB, 6 mg/kg, Intravenous, Q24H, Woo Turner MD, Last Rate: 100 mL/hr at 06/03/21 0951, 350 mg at 06/03/21 0951  •  docusate sodium (COLACE) capsule 100 mg, 100 mg, Oral, BID, Lakeisha Clemente, APRN, 100 mg at 06/03/21 0834  •  fluticasone (FLONASE) 50 MCG/ACT nasal spray 2 spray, 2 spray, Each Nare, Daily, Lakeisha Clemente, DENISE, 2 spray at 06/03/21 0834  •  guaiFENesin (MUCINEX) 12 hr tablet 1,200 mg, 1,200 mg, Oral, BID, Lakeisha Clemente, APRN, 1,200 mg at 06/03/21 2030  •  HYDROmorphone (DILAUDID) injection 0.5 mg, 0.5 mg, Intravenous, Q2H PRN **AND** naloxone (NARCAN) injection 0.1 mg, 0.1 mg, Intravenous, Q5 Min PRN, Riaz Man MD  •  ketorolac (TORADOL) injection 15 mg, 15 mg, Intravenous, Q6H PRN, Riaz Man MD  •  labetalol (NORMODYNE,TRANDATE) injection 10 mg, 10 mg, Intravenous, Q4H PRN, Lakeisha Clemente APRN  •  lactated ringers infusion, 9 mL/hr, Intravenous, Continuous, Riaz Man MD, Last Rate: 9 mL/hr at 06/02/21 1108, 9 mL/hr at 06/02/21 1108  •  lactated ringers infusion, 100 mL/hr, Intravenous, Continuous, Riaz Man MD, Last Rate: 100 mL/hr at 06/02/21 1743, 100 mL/hr at 06/02/21 1743  •  lisinopril (PRINIVIL,ZESTRIL) tablet 10 mg, 10 mg, Oral, Daily,  Lakeisha Clemente, APRN, 10 mg at 21 0834  •  meloxicam (MOBIC) tablet 15 mg, 15 mg, Oral, Daily, Riaz Man MD, 15 mg at 21 0833  •  ondansetron (ZOFRAN) injection 4 mg, 4 mg, Intravenous, Q6H PRN, Lakeisha Clemente APRN  •  oxyCODONE (ROXICODONE) immediate release tablet 10 mg, 10 mg, Oral, Q4H PRN, Riaz Man MD, 10 mg at 21 0101  •  oxyCODONE (ROXICODONE) immediate release tablet 5 mg, 5 mg, Oral, Q4H PRN, Riaz Man MD  •  predniSONE (DELTASONE) tablet 10 mg, 10 mg, Oral, Daily, Lakeisha Clemente APRN, 10 mg at 21 0834  •  rOPINIRole (REQUIP) tablet 0.5 mg, 0.5 mg, Oral, Nightly, Lakeisha Clemente APRN, 0.5 mg at 219  •  sodium chloride 0.9 % bolus 500 mL, 500 mL, Intravenous, TID PRN, Lakeisha Clemente APRN  •  traMADol (ULTRAM) tablet 50 mg, 50 mg, Oral, Q8H PRN, Riaz Man MD, 50 mg at 21 0414    Antibiotics:  Anti-Infectives (From admission, onward)    Ordered     Dose/Rate Route Frequency Start Stop    21 0644  DAPTOmycin (CUBICIN) 350 mg in sodium chloride 0.9 % 50 mL IVPB     Ordering Provider: Woo Turner MD    6 mg/kg × 56.7 kg  100 mL/hr over 30 Minutes Intravenous Every 24 Hours Scheduled 21 0800 21 0859    21 1724  ceFAZolin in dextrose (ANCEF) IVPB solution 2 g     Ordering Provider: Riaz Man MD    2 g  over 30 Minutes Intravenous Every 8 Hours 21 2200 21 0615            Review of Systems:  See HPI    Physical Exam:   Vital Signs  Temp (24hrs), Av.9 °F (36.6 °C), Min:97.6 °F (36.4 °C), Max:98.1 °F (36.7 °C)    Temp  Min: 97.6 °F (36.4 °C)  Max: 98.1 °F (36.7 °C)  BP  Min: 134/93  Max: 158/88  Pulse  Min: 69  Max: 82  Resp  Min: 16  Max: 16  SpO2  Min: 98 %  Max: 98 %    GENERAL: Awake and alert, in no acute distress.   HEENT: Normocephalic, atraumatic.  PERRL. EOMI. No conjunctival injection. No icterus. Oropharynx clear without evidence of thrush or exudate.  NECK: Supple   LYMPH:  No cervical, axillary or inguinal lymphadenopathy.  HEART: RRR; No murmur, rubs, gallops.   LUNGS: Clear to auscultation bilaterally without wheezing, rales, rhonchi. Normal respiratory effort. Nonlabored.  ABDOMEN: Soft, nontender, nondistended.No rebound or guarding.   EXT: A wound VAC is in place over the anterior right hip wound.  :  Without Macario catheter.  MSK:  No joint swelling or erythema.   SKIN: Warm and dry without cutaneous eruptions on Inspection/palpation.    NEURO: Oriented to PPT.Motor 5/5 strength bilaterally  PSYCHIATRIC: Normal insight and judgement. Cooperative with PE    Laboratory Data    Results from last 7 days   Lab Units 06/04/21  0639 06/03/21  0758   WBC 10*3/mm3  --  5.79   HEMOGLOBIN g/dL 8.9* 9.6*   HEMATOCRIT % 28.6* 29.4*   PLATELETS 10*3/mm3  --  459*     Results from last 7 days   Lab Units 06/03/21  0758   SODIUM mmol/L 130*   POTASSIUM mmol/L 3.9   CHLORIDE mmol/L 96*   CO2 mmol/L 22.0   BUN mg/dL 8   CREATININE mg/dL 0.70   GLUCOSE mg/dL 131*   CALCIUM mg/dL 9.6                             Estimated Creatinine Clearance: 77.5 mL/min (by C-G formula based on SCr of 0.7 mg/dL).      Microbiology:  No results found for: ACANTHNAEG, AFBCX, BPERTUSSISCX, BLOODCX  No results found for: BCIDPCR, CXREFLEX, CSFCX, CULTURETIS  No results found for: CULTURES, HSVCX, URCX  No results found for: EYECULTURE, GCCX, HSVCULTURE, LABHSV  No results found for: LEGIONELLA, MRSACX, MUMPSCX, MYCOPLASCX  No results found for: NOCARDIACX, STOOLCX  No results found for: THROATCX, UNSTIMCULT, URINECX, CULTURE, VZVCULTUR  No results found for: VIRALCULTU, WOUNDCX    Right hip operative cultures from 6/2 are growing moderate gram-positive cocci    Radiology:  Imaging Results (Last 72 Hours)     Procedure Component Value Units Date/Time    FL C Arm During Surgery [[id number]] Collected: 06/02/21 1449     Updated: 06/02/21 1453    Narrative:      EXAMINATION: FL C ARM DURING SURGERY-      INDICATION: Right  hip I & D; B99.9-Unspecified infectious disease;  M25.551-Pain in right hip      COMPARISON: NONE     FINDINGS: 60 seconds of fluoroscopy time provided with 1 image stored  during right hip I&D       Impression:      60 seconds of fluoroscopy time provided with 1 image stored  during right hip I&D     This report was finalized on 6/2/2021 2:50 PM by Manny Ramirez.               Impression:   1.  Right total hip arthroplasty infection-status post debridement on 6/2/21.  This appeared to be superficial at the time of surgery, however she is at significant risk for involvement of the the hip joint aspirate culture is no growth so far.  Her wound cultures are growing gram-positive cocci from her surgery and her outpatient cultures grew staph epidermidis and Candida parapsilosis.  I will plan to discharge her on outpatient intravenous daptomycin along with oral fluconazole.  2.  Status post right total hip arthroplasty-4/15/21  3.  Rheumatoid arthritis-on prednisone and Enbrel  4.  Penicillin allergy-with anaphylaxis    PLAN/RECOMMENDATIONS:  1.  PICC line placement  2.  Increase daptomycin to 500 mg IV daily  3.  Fluconazole 400 mg p.o. daily-I placed a prescription on her chart  4.  Wound VAC care per Dr. Man  5.  Discharge to home today  6.  Follow-up with me on Tuesday 6/8 at 1:30-this appointment has been made    Outpatient orders:  1.  Outpatient intravenous antibiotic therapy: Daptomycin 500 mg IV daily-CHRISTINE GONZALEZ will provide  2.  PICC line care will be provided at her appointments with CHRISTINE GONZALEZ  3.  Stat CBC, CMP, ESR, CRP, and CPK at the time of her visit on 6/8  4.  Follow-up with me on Tuesday 6/8 at 1:30-this appointment has been made    I discussed her complex disposition with case management today.  I discussed her complex situation with Dr. Reynaga again today.  I discussed her situation with Dr. Bermudez today.  I coordinated her care.  I spent over 45 minutes on her care today.      Woo Turner,  MD  6/4/2021  08:14 EDT

## 2021-06-04 NOTE — PROGRESS NOTES
Orthopedic Progress Note      Patient: Jessica Winston  YOB: 1960     Date of Admission: 6/2/2021  9:16 AM Medical Record Number: [id number]     Attending Physician: Riaz Man MD    Status Post:  Procedure(s):  HIP SUPERFICIAL IRRIGATION AND DEBRIDEMENT, ARTHOCENTESIS RIGHT Post Operative Day Number: 2    Subjective : No new orthopaedic complaints     Pain Relief: some relief with present medication.     Systemic Complaints: No Complaints  Vitals:    06/03/21 1547 06/03/21 1937 06/04/21 0314 06/04/21 0737   BP: 134/93 158/88 157/96 154/83   BP Location: Left arm Left arm Right arm Left arm   Patient Position: Lying Lying Lying Lying   Pulse:  69 82 79   Resp: 16 16 16 16   Temp: 97.6 °F (36.4 °C) 97.9 °F (36.6 °C) 97.9 °F (36.6 °C) 98.1 °F (36.7 °C)   TempSrc: Oral Oral Oral Oral   SpO2: 98%      Weight:       Height:           Physical Exam: 60 y.o. female    General Appearance:       Alert, cooperative, in no acute distress                  Extremities:    Dressing Clean, Dry and Intact             No clinical sign of DVT        Able to do good movements of digits    Pulses:   Pulses palpable and equal bilaterally           Diagnostic Tests:     Results from last 7 days   Lab Units 06/04/21  0639 06/03/21  0758   WBC 10*3/mm3  --  5.79   HEMOGLOBIN g/dL 8.9* 9.6*   HEMATOCRIT % 28.6* 29.4*   PLATELETS 10*3/mm3  --  459*     Results from last 7 days   Lab Units 06/03/21  0758   SODIUM mmol/L 130*   POTASSIUM mmol/L 3.9   CHLORIDE mmol/L 96*   CO2 mmol/L 22.0   BUN mg/dL 8   CREATININE mg/dL 0.70   GLUCOSE mg/dL 131*   CALCIUM mg/dL 9.6         Lab Results   Component Value Date    URICACID 5.7 08/12/2019     No results found for: CRYSTAL  Microbiology Results (last 10 days)     Procedure Component Value - Date/Time    Tissue / Bone Culture - Tissue, Hip, Right [[id number]]  (Abnormal) Collected: 06/02/21 1346    Lab Status: Preliminary result Specimen: Tissue from Hip, Right Updated:  06/04/21 0718     Tissue Culture Moderate growth (3+) Gram Positive Cocci     Gram Stain Few (2+) WBCs seen      Few (2+) Gram positive cocci in pairs    AFB Culture - Tissue, Hip, Right [[id number]] Collected: 06/02/21 1346    Lab Status: Preliminary result Specimen: Tissue from Hip, Right Updated: 06/03/21 1146     AFB Stain No acid fast bacilli seen on concentrated smear    Body Fluid Culture - Body Fluid, Hip, Right [[id number]] Collected: 06/02/21 1343    Lab Status: Preliminary result Specimen: Body Fluid from Hip, Right Updated: 06/03/21 0611     Body Fluid Culture No growth     Gram Stain Few (2+) WBCs seen      No organisms seen    AFB Culture - Body Fluid, Hip, Right [[id number]] Collected: 06/02/21 1343    Lab Status: Preliminary result Specimen: Body Fluid from Hip, Right Updated: 06/03/21 1146     AFB Stain No acid fast bacilli seen on concentrated smear    COVID PRE-OP / PRE-PROCEDURE SCREENING ORDER (NO ISOLATION) - Swab, Nasopharynx [[id number]]  (Normal) Collected: 06/02/21 0945    Lab Status: Final result Specimen: Swab from Nasopharynx Updated: 06/02/21 1029    Narrative:      The following orders were created for panel order COVID PRE-OP / PRE-PROCEDURE SCREENING ORDER (NO ISOLATION) - Swab, Nasopharynx.  Procedure                               Abnormality         Status                     ---------                               -----------         ------                     COVID-19 and FLU A/B PCR...[[id number]]  Normal              Final result                 Please view results for these tests on the individual orders.    COVID-19 and FLU A/B PCR - Swab, Nasopharynx [[id number]]  (Normal) Collected: 06/02/21 0945    Lab Status: Final result Specimen: Swab from Nasopharynx Updated: 06/02/21 1029     COVID19 Not Detected     Influenza A PCR Not Detected     Influenza B PCR Not Detected    Narrative:      Fact sheet for providers: https://www.fda.gov/media/689314/download    Fact sheet for patients:  https://www.fda.gov/media/748709/download    Test performed by PCR.        FL C Arm During Surgery    Result Date: 6/2/2021  60 seconds of fluoroscopy time provided with 1 image stored during right hip I&D  This report was finalized on 6/2/2021 2:50 PM by Manny Ramirez.          Current Medications:  Scheduled Meds:acetaminophen, 1,000 mg, Oral, Q8H  aspirin, 81 mg, Oral, Q12H  buPROPion SR, 150 mg, Oral, Nightly  DAPTOmycin, 6 mg/kg, Intravenous, Q24H  docusate sodium, 100 mg, Oral, BID  fluticasone, 2 spray, Each Nare, Daily  guaiFENesin, 1,200 mg, Oral, BID  lisinopril, 10 mg, Oral, Daily  meloxicam, 15 mg, Oral, Daily  predniSONE, 10 mg, Oral, Daily  rOPINIRole, 0.5 mg, Oral, Nightly      Continuous Infusions:lactated ringers, 9 mL/hr, Last Rate: 9 mL/hr (06/02/21 1108)  lactated ringers, 100 mL/hr, Last Rate: 100 mL/hr (06/02/21 1743)      PRN Meds:.HYDROmorphone **AND** naloxone  •  ketorolac  •  labetalol  •  ondansetron  •  oxyCODONE  •  oxyCODONE  •  sodium chloride  •  traMADol    Assessment: Status post  RIGHT HIP SUPERFICIAL IRRIGATION AND DEBRIDMENT    Patient Active Problem List   Diagnosis   • RUQ pain   • Cardiac mass   • PVD (peripheral vascular disease) with claudication (CMS/HCC)   • Tobacco dependence   • Pain in right hip   • HTN (hypertension)   • Rheumatoid arthritis (CMS/HCC)   • S/P right hip superficial irrigation and debridement arthrocentesis   • Acute blood loss anemia   • Postoperative pain   • Infection       PLAN:   Continues current post-op course  Anticoagulation: Aspirin started  Mobilize with PT as tolerated per protocol  Discharge Plan Home today per Hospitalist and Infectious Disease  Prior to discharge:  [facility] assessment and plan for post-operative antibiotic therapy.  Weight Bearing: WBAT  Discharge Plan: OK to plan for discharge in  today to home  from orthopaedic perspective.    Bridgette Mclain PA-C    Date: 6/4/2021    Time: 07:45 EDT

## 2021-06-04 NOTE — CASE MANAGEMENT/SOCIAL WORK
Case Management Discharge Note      Final Note: Arrangements complete for home today. [facility]) will provide the IV antibiotic and instructions. PT has provided instructions to daughter on changing wound vac dressing and provided supplies.She and daughter are aware of f/u appt with Dr Turner on 6/8 at 1330, and outpt PT wound care on 6/8 at 1430.She will f/u weekly with [facility] office for PICC line dressing changes ,they verbalized understanding         Selected Continued Care - Admitted Since 6/2/2021     Destination    No services have been selected for the patient.              Durable Medical Equipment    No services have been selected for the patient.              Dialysis/Infusion Coordination complete    Service Provider Selected Services Address Phone Fax Patient Preferred    [facility]  Infusion and IV Therapy [address], [facility] [address] [phone] --          Home Medical Care    No services have been selected for the patient.              Therapy    No services have been selected for the patient.              Community Resources    No services have been selected for the patient.                Selected Continued Care - Prior Encounters Includes selections from prior encounters from 3/4/2021 to 6/4/2021    Discharged on 4/19/2021 Admission date: 4/15/2021 - Discharge disposition: Rehab Facility or Unit (DC - External)    Destination     Service Provider Selected Services Address Phone Fax Patient Preferred    [facility]  Inpatient Rehabilitation [address] [facility] [address] [phone] --                         Final Discharge Disposition Code: 01 - home or self-care

## 2021-06-04 NOTE — PLAN OF CARE
Goal Outcome Evaluation:  Plan of Care Reviewed With: patient  Progress: improving  Outcome Summary: Pt ambulated 350 ft with RWx, SBA. PT performs teachback well with ther ex program. Pt expected to d/c this date.

## 2021-06-04 NOTE — THERAPY WOUND CARE TREATMENT
Acute Care - Wound/Debridement Treatment Note   [facility]     Patient Name: Jessica Winston  : 1960  MRN: [id number]  Today's Date: 2021                Admit Date: 2021    Visit Dx:    ICD-10-CM ICD-9-CM   1. S/P right hip superficial irrigation and debridement arthrocentesis  Z96.641 V43.64   2. Infection  B99.9 136.9   3. Right hip pain  M25.551 719.45       Patient Active Problem List   Diagnosis   • RUQ pain   • Cardiac mass   • PVD (peripheral vascular disease) with claudication (CMS/HCC)   • Tobacco dependence   • Pain in right hip   • HTN (hypertension)   • Rheumatoid arthritis (CMS/HCC)   • S/P right hip superficial irrigation and debridement arthrocentesis   • Acute blood loss anemia   • Postoperative pain   • Infection   • Hyponatremia, mild        Past Medical History:   Diagnosis Date   • Avascular necrosis (CMS/HCC)    • Depression    • Fractures    • HTN (hypertension)    • Hyperglycemia    • Hypertension    • Osteoarthritis    • RA (rheumatoid arthritis) (CMS/HCC)     ALSO REPORTS OA   • Tooth loose     top left    • Vitamin D deficiency    • Wears glasses     readers        Past Surgical History:   Procedure Laterality Date   • ARM TENDON REPAIR Left    • BREAST SURGERY     • ENDOSCOPY N/A 3/30/2017    Procedure: ESOPHAGOGASTRODUODENOSCOPY WITH COLD FORCEP BIOPSY;  Surgeon: Anca Trevino MD;  Location: [facility] ENDOSCOPY;  Service:    • FEMUR SURGERY      right    • HERNIA REPAIR Bilateral     INGUINAL- unsure about mesh    • INCISION AND DRAINAGE HIP N/A 2021    Procedure: HIP SUPERFICIAL IRRIGATION AND DEBRIDEMENT, ARTHOCENTESIS RIGHT;  Surgeon: Riaz Man MD;  Location: [facility] OR;  Service: Orthopedics;  Laterality: N/A;   • KNEE SURGERY Left    • MANDIBLE SURGERY     • TOTAL HIP ARTHROPLASTY REVISION Right 4/15/2021    Procedure: COVERSION TO RIGHT TOTAL HIP ARTHROPLASTY;  Surgeon: Riaz Man MD;  Location: [facility] OR;  Service: Orthopedics;   Laterality: Right;   • TUBAL ABDOMINAL LIGATION     • WISDOM TOOTH EXTRACTION             Wound 06/02/21 Right anterior hip Incision (Active)   Wound Image   06/04/21 1430   Dressing Appearance dry;intact 06/04/21 1430   Closure Surface sutures 06/04/21 1430   Base moist;pink;red;yellow;subcutaneous;slough 06/04/21 1430   Periwound intact;dry;pink 06/04/21 1430   Periwound Temperature warm 06/04/21 1430   Periwound Skin Turgor soft 06/04/21 1430   Wound Length (cm) 6.3 cm 06/04/21 1430   Wound Width (cm) 2.5 cm 06/04/21 1430   Wound Depth (cm) 1 cm 06/04/21 1430   Drainage Characteristics/Odor serosanguineous 06/04/21 1430   Drainage Amount scant 06/04/21 1430   Care, Wound cleansed with;wound cleanser;negative pressure wound therapy 06/04/21 1430   Dressing Care dressing applied 06/04/21 1430   Periwound Care cleansed with pH balanced cleanser;barrier film applied 06/04/21 1430   Wound Output (mL) 10 06/04/21 1430         WOUND DEBRIDEMENT                        PT Assessment (last 12 hours)      PT Evaluation and Treatment     Row Name 06/04/21 1430          Physical Therapy Time and Intention    Subjective Information  complains of;pain  -     Document Type  wound care;therapy note (daily note)  -     Mode of Treatment  physical therapy;individual therapy  -     Row Name 06/04/21 1430          General Information    Patient Observations  alert;cooperative;agree to therapy  -     Row Name 06/04/21 1430          Cognition    Orientation Status (Cognition)  oriented x 4  -     Row Name 06/04/21 1430          Pain Scale: Numbers Pre/Post-Treatment    Pretreatment Pain Rating  4/10  -MC     Posttreatment Pain Rating  4/10  -MC     Pain Location - Side  Right  -     Pain Location - Orientation  anterior  -     Pain Location  hip  -     Pre/Posttreatment Pain Comment  tolerated  -     Row Name 06/04/21 1430          Pain Scale: Word Pre/Post-Treatment    Pain Intervention(s)  Rest  -     Row Name  06/04/21 1430          Wound 06/02/21 Right anterior hip Incision    Wound - Properties Group Placement Date: 06/02/21  -SS Side: Right  -SS Orientation: anterior  -SS Location: hip  -SS Primary Wound Type: Incision  -SS Additional Comments: XEROFORM, SPECIALTY PT WOUND VAC  -SS    Wound Image  Images linked: 1  -MC     Dressing Appearance  dry;intact  -MC     Closure  Surface sutures  -MC     Base  moist;pink;red;yellow;subcutaneous;slough  -MC     Periwound  intact;dry;pink  -MC     Periwound Temperature  warm  -MC     Periwound Skin Turgor  soft  -MC     Wound Length (cm)  6.3 cm  -MC     Wound Width (cm)  2.5 cm  -MC     Wound Depth (cm)  1 cm  -MC     Drainage Characteristics/Odor  serosanguineous  -MC     Drainage Amount  scant  -MC     Care, Wound  cleansed with;wound cleanser;negative pressure wound therapy  -MC     Dressing Care  dressing applied vac  -MC     Periwound Care  cleansed with pH balanced cleanser;barrier film applied NoSting, drape  -     Wound Output (mL)  10  -MC     Retired Wound - Properties Group Date first assessed: 06/02/21  - Side: Right  -SS Location: hip  -SS Primary Wound Type: Incision  -SS Additional Comments: XEROFORM, SPECIALTY PT WOUND VAC  -SS    Row Name 06/04/21 1430          NPWT (Negative Pressure Wound Therapy) 06/02/21 1200 R hip     NPWT (Negative Pressure Wound Therapy) - Properties Group Placement Date: 06/02/21  -MF Placement Time: 1200  -MF Location: R hip   -MF    Therapy Setting  continuous therapy  -MC     Dressing  foam, black  -MC     Contact Layer  other (see comments) xeroform over intact incision  -MC     Pressure Setting  125 mmHg  -MC     Sponges Inserted  1  -MC     Sponges Removed  1;other (see comments) xeroform  -MC     Finger sweep complete  Yes  -MC     Retired NPWT (Negative Pressure Wound Therapy) - Properties Group Placement Date: 06/02/21  -MF Placement Time: 1200  -MF Location: R hip   -MF    Row Name 06/04/21 1430          Coping    Observed  Emotional State  calm;cooperative;pleasant  -     Verbalized Emotional State  acceptance  -     Row Name 06/04/21 1430          Plan of Care Review    Plan of Care Reviewed With  patient;daughter  -     Progress  no change  -     Outcome Summary  Pt with moist, pink/yellow base and intact periwound incision. Pt is a good candidate for NPWT to promote granulation and manage exudate. Pt will benefit from skilled PT wound care upon d/c to continue care.  -     Row Name 06/04/21 1430          Positioning and Restraints    Pre-Treatment Position  in bed  -     Post Treatment Position  bed  -MC     In Bed  sitting EOB;call light within reach;encouraged to call for assist;with family/caregiver  -       User Key  (r) = Recorded By, (t) = Taken By, (c) = Cosigned By    Initials Name Provider Type    Jimy Rodriguez, PT Physical Therapist    Mitzi Wise, PT Physical Therapist    Stephanie Dove, RN Registered Nurse        Physical Therapy Education                 Title: PT OT SLP Therapies (In Progress)     Topic: Physical Therapy (Done)     Point: Mobility training (Done)     Learning Progress Summary           Patient Acceptance, E,TB, VU,DU by MINDI at 6/4/2021 1350    Acceptance, E,TB, VU by  at 6/3/2021 1511    Comment: Edu on safe sequencing with transfers and gait. Reviewed HEP.    Acceptance, E,TB, VU by  at 6/3/2021 1054    Comment: Edu on safe sequencing with transfers and gait. Reviewed HEP.    Acceptance, E,D, VU by LOUISE at 6/2/2021 1640    Comment: Educated on safe sequencing with bed mobility, ambulatory transfesr, and gait. Reviewed HEP and hip precautions.                   Point: Home exercise program (Done)     Learning Progress Summary           Patient Acceptance, E,TB, VU,DU by MINDI at 6/4/2021 1350    Acceptance, E,TB, VU by  at 6/3/2021 1511    Comment: Edu on safe sequencing with transfers and gait. Reviewed HEP.    Acceptance, E,TB, VU by  at 6/3/2021 1054     Comment: Edu on safe sequencing with transfers and gait. Reviewed HEP.    Acceptance, E,D, VU by  at 6/2/2021 1640    Comment: Educated on safe sequencing with bed mobility, ambulatory transfesr, and gait. Reviewed HEP and hip precautions.                   Point: Body mechanics (Done)     Learning Progress Summary           Patient Acceptance, E,TB, VU,DU by MINDI at 6/4/2021 1350    Acceptance, E,TB, VU by  at 6/3/2021 1511    Comment: Edu on safe sequencing with transfers and gait. Reviewed HEP.    Acceptance, E,TB, VU by  at 6/3/2021 1054    Comment: Edu on safe sequencing with transfers and gait. Reviewed HEP.    Acceptance, E,D, VU by  at 6/2/2021 1640    Comment: Educated on safe sequencing with bed mobility, ambulatory transfesr, and gait. Reviewed HEP and hip precautions.                   Point: Precautions (Done)     Learning Progress Summary           Patient Acceptance, E,TB, VU,DU by MINDI at 6/4/2021 1350    Acceptance, E,TB, VU by  at 6/3/2021 1511    Comment: Edu on safe sequencing with transfers and gait. Reviewed HEP.    Acceptance, E,TB, VU by  at 6/3/2021 1054    Comment: Edu on safe sequencing with transfers and gait. Reviewed HEP.    Acceptance, E,D, VU by  at 6/2/2021 1640    Comment: Educated on safe sequencing with bed mobility, ambulatory transfesr, and gait. Reviewed HEP and hip precautions.                               User Key     Initials Effective Dates Name Provider Type Discipline     11/20/18 -  Silvia Caceres, PT Physical Therapist PT     09/10/19 -  Clarence Ken, PT Physical Therapist PT     09/22/20 -  Trent Cunha, PT Physical Therapist PT                Recommendation and Plan  Anticipated Equipment Needs at Discharge (PT): other (see comments) (none)  Anticipated Discharge Disposition (PT): home with assist (OPPT as pt able to progress.)  Planned Therapy Interventions (PT): wound care  Therapy Frequency (PT): 2 times/day  Plan of Care Reviewed With:  patient, daughter   Progress: no change       Progress: no change  Outcome Summary: Pt with moist, pink/yellow base and intact periwound incision. Pt is a good candidate for NPWT to promote granulation and manage exudate. Pt will benefit from skilled PT wound care upon d/c to continue care.  Plan of Care Reviewed With: patient, daughter            Time Calculation  PT Charges     Row Name 06/04/21 1430 06/04/21 1350          Time Calculation    Start Time  1430  -  1103  -EJ     PT Received On  --  06/04/21  -EJ     PT Goal Re-Cert Due Date  06/12/21  -  06/12/21  -EJ        Time Calculation- PT    Total Timed Code Minutes- PT  --  25 minute(s)  -EJ        Timed Charges    88934 - PT Therapeutic Exercise Minutes  --  15  -EJ     75219 - Gait Training Minutes   --  10  -EJ        Untimed Charges    22811-Uqu Pressure wound to 50 sqcm  40  -MC  --        Total Minutes    Timed Charges Total Minutes  --  25  -EJ     Untimed Charges Total Minutes  40  -MC  --      Total Minutes  40  -MC  25  -EJ       User Key  (r) = Recorded By, (t) = Taken By, (c) = Cosigned By    Initials Name Provider Type     Silvia Caceres, PT Physical Therapist     Mitzi Ambriz, ASHLEY Physical Therapist            Therapy Charges for Today     Code Description Service Date Service Provider Modifiers Qty    21913208387 HC PT NEG PRESS WOUND TO 50SQCM DME3 6/4/2021 Mitzi Ambriz, PT GP 1            PT G-Codes  Outcome Measure Options: AM-PAC 6 Clicks Basic Mobility (PT)  AM-PAC 6 Clicks Score (PT): 21       Mitzi Ambriz PT  6/4/2021

## 2021-06-04 NOTE — CONSULTS
GABRIELLE ALVAREZ PICC PLACED BY JENNIFER LUCIANO RN, verified via 3cg.  38cm total length, 0cm exposed

## 2021-06-04 NOTE — PLAN OF CARE
Goal Outcome Evaluation:  Plan of Care Reviewed With: patient, daughter  Progress: no change  Outcome Summary: Pt with moist, pink/yellow base and intact periwound incision. Pt is a good candidate for NPWT to promote granulation and manage exudate. Pt will benefit from skilled PT wound care upon d/c to continue care.

## 2021-06-05 ENCOUNTER — READMISSION MANAGEMENT (OUTPATIENT)
Dept: CALL CENTER | Facility: HOSPITAL | Age: 61
End: 2021-06-05

## 2021-06-05 LAB
BACTERIA SPEC AEROBE CULT: NORMAL
GRAM STN SPEC: NORMAL
GRAM STN SPEC: NORMAL

## 2021-06-05 NOTE — OUTREACH NOTE
Prep Survey      Responses   [facility] facility patient discharged from?  [facility]   Is LACE score < 7 ?  No   Emergency Room discharge w/ pulse ox?  No   Eligibility  Readm Mgmt   Discharge diagnosis   Post-op right hip wound infectionright hip superficial irrigation and debridedment   Does the patient have one of the following disease processes/diagnoses(primary or secondary)?  General Surgery   Does the patient have Home health ordered?  No   Is there a DME ordered?  No   Comments regarding appointments  [facility]   Prep survey completed?  Yes          Brook Dao RN

## 2021-06-07 LAB
BACTERIA FLD CULT: NORMAL
GRAM STN SPEC: NORMAL
GRAM STN SPEC: NORMAL

## 2021-06-07 NOTE — PAYOR COMM NOTE
"Gris Fuller RN  Utilization Review  P: [phone]  F: [phone]    Claim# [id number]  DC date = 2021    Jessica Gallegos (60 y.o. Female)     Date of Birth Social Security Number Address Home Phone MRN    1960  [address]  [address]  [facility] [address] [phone] [id number]    [place]ondoism Marital Status          None        Admission Date Admission Type Admitting Provider Attending Provider Department, Room/Bed    21 Elective Riaz Man MD  [address], S383/1    Discharge Date Discharge Disposition Discharge Destination        2021 Home or Self Care              Attending Provider: (none)   Allergies: Clindamycin/lincomycin, Penicillins, Codeine    Isolation: None   Infection: None   Code Status: Prior    Ht: 165.1 cm (65\")   Wt: 56.7 kg (125 lb)    Admission Cmt: None   Principal Problem: S/P right hip superficial irrigation and debridement arthrocentesis [Z96.641]                 Active Insurance as of 2021     Primary Coverage     Payor Plan Insurance Group Employer/Plan Group    [id number]     Payor Plan Address Payor Plan Phone Number Payor Plan Fax Number Effective Dates    [address] [phone]  2020 - None Entered    [facility] [address]       Subscriber Name Subscriber Birth Date Member ID       JESSICA GALLEGOS 1960 [id number]                 Emergency Contacts      (Rel.) Home Phone Work Phone Mobile Phone    Vielka Rodriguez (Daughter) [phone] -- [phone]               Discharge Summary      Demi Bermudez MD at 21 1226          Patient Name: Jessica Gallegos  MRN: [id number]  : 1960  DOS: 2021    Attending: Riaz Man MD    Primary Care Provider: Iraida Demarco APRN    Date of Admission:.2021  9:16 AM    Date of Discharge:  2021    Discharge Diagnosis:   S/P right hip superficial irrigation and debridement " arthrocentesis    PVD (peripheral vascular disease) with claudication (CMS/HCC)    Tobacco dependence    HTN (hypertension)    Rheumatoid arthritis (CMS/HCC)    Infection    Hyponatremia, mild  Status post PICC line placement    Consultations: Infectious disease consult, Woo Turner MD    Hospital Course  At admit:  Patient is a pleasant 60 y.o. female presented for scheduled surgery by Dr. Man.  She underwent right hip superficial irrigation and debridement arthrocentesis under general anesthesia.  She tolerated surgery well and was admitted for further medical management.     Per Dr. Man's note:(((60 y.o. female who was admitted to [facility] with wound break down from CLIFFORD incision done approximately 6 weeks ago. The patient had been recovering well she was seen in 2-week postoperative visit and wound appeared to be healing well unfortunately the next couple weeks she began to have some superficial necrosis of the skin and some fibrinous exudate.  She was seen in the office last Friday and a superficial swab was positive for several bacteria she had elevated ESR CRP at that point we discussed surgical intervention in order to ensure that there was no deep infection in the hip as well as debrided superficial infection.  Patient continued to smoke postoperatively have been a chronic lifetime smoker and had cachexia. Likely risk and benefits of the procedure including but not limited to infection, DVT, pulmonary embolism, leg length discrepancy, recurrent dislocation, possibility of injury to nerves and vessels, and periprosthetic fractures have been discussed with the patient in detail. Despite the risks involved, the patient elected to proceed and informed consent was obtained. The patient was seen in the preoperative holding area and the operative extremity was marked.)))     She is known to us from previous admission on 4/15/2021 for right hip revision from prior repair.     She has  history of rheumatoid arthritis for which she is on prednisone daily as well as Enbrel.  She has some joint pains related to rheumatoid arthritis.     When seen in PACU she is doing well.  Her pain is well controlled.  She denies nausea, shortness of breath or chest pain.  She denies recent fevers chills or night sweats.  No history of DVT or PE.  She is continued to use a walker since her previous surgery.  She has had falls.     After admit  Patient was provided pain medications as needed for pain control.    Adjustments were made to pain medications to optimize postop pain management when indicated. Risks and benefits of opiate medications discussed with patient. GERI report was reviewed.    Patient was seen by PT  and has progressed well over her stay.    She used an IS for atelectasis prophylaxis and aspirin along with mechanicals for DVT prophylaxis.    During her stay she was followed by infectious consultants, Dr. Woo Juarez manage her antibiotics.  IV antibiotic arrangement took place prior to discharge.   helped with discharge planning.  Prescription for fluconazole was provided as well by ID.    Case management note((   Final Note: Arrangements complete for home today. [facility]) will provide the IV antibiotic and instructions. PT has provided instructions to daughter on changing wound vac dressing and provided supplies.She and daughter are aware of f/u appt with Dr Turner on 6/8 at 1330, and outpt PT wound care on 6/8 at 1430.She will f/u weekly with [facility] office for PICC line dressing changes ,they verbalized understanding))     Cultures from surgery were followed, noted below.  A PICC line was placed during her stay.    Home medications were resumed as appropriate, and labs were monitored and remained fairly stable.     With the progress she has made, Ms. Winston is ready for DC home today.      Discussed with patient regarding plan and she shows  understanding and agreement.    Patient will have HHPT following discharge.        Procedures Performed  Procedure(s):  HIP SUPERFICIAL IRRIGATION AND DEBRIDEMENT, ARTHOCENTESIS RIGHT       Pertinent Test Results:    I reviewed the patient's new clinical results.   Results from last 7 days   Lab Units 06/04/21  0639 06/03/21  0758   WBC 10*3/mm3  --  5.79   HEMOGLOBIN g/dL 8.9* 9.6*   HEMATOCRIT % 28.6* 29.4*   PLATELETS 10*3/mm3  --  459*     Results from last 7 days   Lab Units 06/03/21  0758   SODIUM mmol/L 130*   POTASSIUM mmol/L 3.9   CHLORIDE mmol/L 96*   CO2 mmol/L 22.0   BUN mg/dL 8   CREATININE mg/dL 0.70   CALCIUM mg/dL 9.6   GLUCOSE mg/dL 131*   Microbiology Results (last 21 days)    Collected Updated Procedure Result Status    06/02/2021 1346 06/02/2021 1413 Fungus Culture - Tissue, Hip, Right [[id number]]   Tissue from Hip, Right    In process Component Value   No component results              06/02/2021 1346 06/05/2021 0926 Tissue / Bone Culture - Tissue, Hip, Right [[id number]]   Tissue from Hip, Right    Final result Component Value   Tissue Culture Moderate growth (3+) Normal Skin Linsey   Gram Stain Few (2+) WBCs seen    Few (2+) Gram positive cocci in pairs              06/02/2021 1346 06/03/2021 1146 AFB Culture - Tissue, Hip, Right [[id number]]   Tissue from Hip, Right    Preliminary result Component Value   AFB Stain No acid fast bacilli seen on concentrated smear P              06/02/2021 1346 06/07/2021 0740 Anaerobic Culture 10 Day Incubation - Tissue, Hip, Right [[id number]]   Tissue from Hip, Right    Preliminary result Component Value   Anaerobic Culture No anaerobes isolated at 5 days P              06/02/2021 1343 06/02/2021 1413 Fungus Culture - Body Fluid, Hip, Right [[id number]]   Body Fluid from Hip, Right    In process Component Value   No component results              06/02/2021 1343 06/07/2021 0609 Body Fluid Culture - Body Fluid, Hip, Right [[id number]]   Body Fluid from Hip, Right  "   Final result Component Value   Body Fluid Culture No growth at 5 days   Gram Stain Few (2+) WBCs seen    No organisms seen              06/02/2021 1343 06/03/2021 1146 AFB Culture - Body Fluid, Hip, Right [[id number]]   Body Fluid from Hip, Right    Preliminary result Component Value   AFB Stain No acid fast bacilli seen on concentrated smear P              06/02/2021 1343 06/07/2021 0740 Anaerobic Culture 10 Day Incubation - Body Fluid, Hip, Right [[id number]]   Body Fluid from Hip, Right    Preliminary result Component Value   Anaerobic Culture No anaerobes isolated at 5 days P              06/02/2021 0945 06/02/2021 1029 COVID PRE-OP / PRE-PROCEDURE SCREENING ORDER (NO ISOLATION) - Swab, Nasopharynx [[id number]]    Swab from Nasopharynx    Final result Component Value   No component results              06/02/2021 0945 06/02/2021 1029 COVID-19 and FLU A/B PCR - Swab, Nasopharynx [[id number]]    Swab from Nasopharynx    Final result Component Value   COVID19 Not Detected   Influenza A PCR Not Detected   Influenza B PCR Not Detected                  I reviewed the patient's new imaging including images and reports.  Physical therapy  Goal Outcome Evaluation:  Plan of Care Reviewed With: patient  Progress: improving  Outcome Summary: Pt ambulated 350 ft with RWx, SBA. PT performs teachback well with ther ex program. Pt expected to d/c this date.                    Physical therapy/wound care  Plan of Care Reviewed With: patient, daughter  Progress: no change  Outcome Summary: Pt with moist, pink/yellow base and intact periwound incision. Pt is a good candidate for NPWT to promote granulation and manage exudate. Pt will benefit from skilled PT wound care upon d/c to continue care.    Discharge Assessment:      Visit Vitals  /83 (BP Location: Left arm, Patient Position: Lying)   Pulse 79   Temp 98.1 °F (36.7 °C) (Oral)   Resp 16   Ht 165.1 cm (65\")   Wt 56.7 kg (125 lb)   LMP 08/01/2010 (Approximate)   SpO2 98% "   BMI 20.80 kg/m²     Temp (24hrs), Av.9 °F (36.6 °C), Min:97.6 °F (36.4 °C), Max:98.1 °F (36.7 °C)      General Appearance:    Alert, cooperative, in no acute distress   Lungs:     Clear to auscultation,respirations regular, even and                   unlabored    Heart:    Regular rhythm and normal rate, normal S1 and S2   Abdomen:     Normal bowel sounds, no masses, no organomegaly, soft        non-tender, non-distended, no guarding, no rebound                 tenderness   Extremities:  Right hip incision with wound VAC dressing on.  No clubbing, cyanosis, or edema distal lower extremities.   Pulses:   Pulses palpable and equal bilaterally   Skin:   No bleeding, bruising or rash   Neurologic:   Cranial nerves 2 - 12 grossly intact, sensation intact, Flexion and dorsiflexion intact bilateral feet.         Discharge Disposition: Home         Discharge Medications      New Medications      Instructions Start Date   aspirin 81 MG EC tablet   81 mg, Oral, Every 12 Hours      DAPTOmycin 350 mg in sodium chloride 0.9 % 50 mL   6 mg/kg (350 mg), Intravenous, Every 24 Hours Scheduled   Start Date: 2021     fluconazole 200 MG tablet  Commonly known as: DIFLUCAN   400 mg, Oral, Every 24 Hours         Changes to Medications      Instructions Start Date   acetaminophen 500 MG tablet  Commonly known as: TYLENOL  What changed:   · when to take this  · reasons to take this  · additional instructions   1,000 mg, Oral, Every 8 Hours, Take every 8 hours  as needed after 1 week         Continue These Medications      Instructions Start Date   buPROPion  MG 12 hr tablet  Commonly known as: WELLBUTRIN SR   150 mg, Oral, Daily      cyanocobalamin 1000 MCG/ML injection   Intramuscular, Every 30 Days, Once per month- usually first of month      docusate sodium 100 MG capsule  Commonly known as: Colace   100 mg, Oral, 2 Times Daily      lisinopril 10 MG tablet  Commonly known as: PRINIVIL,ZESTRIL   10 mg, Oral, Daily       meloxicam 15 MG tablet  Commonly known as: MOBIC   15 mg, Oral, Daily      ondansetron 4 MG tablet  Commonly known as: Zofran   4 mg, Oral, Every 8 Hours PRN      oxyCODONE 5 MG immediate release tablet  Commonly known as: Roxicodone   5 mg, Oral, Every 4 Hours PRN      predniSONE 10 MG tablet  Commonly known as: DELTASONE   10 mg, Oral, Daily      rOPINIRole 0.5 MG tablet  Commonly known as: REQUIP   0.5 mg, Oral, Nightly, Take 1 hour before bedtime.      traMADol 50 MG tablet  Commonly known as: ULTRAM   50 mg, Oral, Every 6 Hours PRN      vitamin D 1.25 MG (75568 UT) capsule capsule  Commonly known as: ERGOCALCIFEROL   50,000 Units, Oral, Weekly, Tuesday             Discharge Diet: Resume prehospital diet    Activity at Discharge: Weightbearing as tolerated right lower extremity    Follow-up Appointments  Woo Turner MD per his orders  Riaz Reynaga MD per his orders      Discussed with patient, discussed with case management, discussed with Dr. Turner.    Discharge took over 30 min      Dragon disclaimer:  Part of this encounter note is an electronic transcription/translation of spoken language to printed text. The electronic translation of spoken language may permit erroneous, or at times, nonsensical words or phrases to be inadvertently transcribed; Although I have reviewed the note for such errors, some may still exist.       Demi Bermudez MD  06/04/21  12:30 EDT              Electronically signed by Demi Bermudez MD at 06/07/21 9989

## 2021-06-08 ENCOUNTER — LAB (OUTPATIENT)
Dept: LAB | Facility: HOSPITAL | Age: 61
End: 2021-06-08

## 2021-06-08 ENCOUNTER — TRANSCRIBE ORDERS (OUTPATIENT)
Dept: LAB | Facility: HOSPITAL | Age: 61
End: 2021-06-08

## 2021-06-08 ENCOUNTER — HOSPITAL ENCOUNTER (OUTPATIENT)
Dept: PHYSICAL THERAPY | Facility: HOSPITAL | Age: 61
Setting detail: THERAPIES SERIES
Discharge: HOME | End: 2021-06-08

## 2021-06-08 DIAGNOSIS — T84.51XD INFLAMMATORY REACTION DUE TO INTERNAL PROSTHESIS OF RIGHT HIP, SUBSEQUENT ENCOUNTER: Primary | ICD-10-CM

## 2021-06-08 DIAGNOSIS — T84.51XD INFLAMMATORY REACTION DUE TO INTERNAL PROSTHESIS OF RIGHT HIP, SUBSEQUENT ENCOUNTER: ICD-10-CM

## 2021-06-08 DIAGNOSIS — Z96.641 STATUS POST TOTAL HIP REPLACEMENT, RIGHT: Primary | ICD-10-CM

## 2021-06-08 DIAGNOSIS — S71.001D OPEN WOUND OF RIGHT HIP, SUBSEQUENT ENCOUNTER: ICD-10-CM

## 2021-06-08 LAB
ALBUMIN SERPL-MCNC: 3.7 G/DL (ref 3.5–5.2)
ALBUMIN/GLOB SERPL: 1.2 G/DL
ALP SERPL-CCNC: 95 U/L (ref 39–117)
ALT SERPL W P-5'-P-CCNC: 8 U/L (ref 1–33)
ANION GAP SERPL CALCULATED.3IONS-SCNC: 10 MMOL/L (ref 5–15)
AST SERPL-CCNC: 20 U/L (ref 1–32)
BASOPHILS # BLD AUTO: 0.09 10*3/MM3 (ref 0–0.2)
BASOPHILS NFR BLD AUTO: 1.3 % (ref 0–1.5)
BILIRUB SERPL-MCNC: 0.2 MG/DL (ref 0–1.2)
BUN SERPL-MCNC: 8 MG/DL (ref 6–20)
BUN/CREAT SERPL: 11.1 (ref 7–25)
CALCIUM SPEC-SCNC: 9.8 MG/DL (ref 8.6–10.5)
CHLORIDE SERPL-SCNC: 97 MMOL/L (ref 98–107)
CK SERPL-CCNC: 96 U/L (ref 20–180)
CO2 SERPL-SCNC: 26 MMOL/L (ref 22–29)
CREAT SERPL-MCNC: 0.72 MG/DL (ref 0.57–1)
CRP SERPL-MCNC: 14.15 MG/DL (ref 0–0.5)
DEPRECATED RDW RBC AUTO: 68.5 FL (ref 37–54)
EOSINOPHIL # BLD AUTO: 0.15 10*3/MM3 (ref 0–0.4)
EOSINOPHIL NFR BLD AUTO: 2.1 % (ref 0.3–6.2)
ERYTHROCYTE [DISTWIDTH] IN BLOOD BY AUTOMATED COUNT: 18.9 % (ref 12.3–15.4)
ERYTHROCYTE [SEDIMENTATION RATE] IN BLOOD: 58 MM/HR (ref 0–30)
GFR SERPL CREATININE-BSD FRML MDRD: 83 ML/MIN/1.73
GLOBULIN UR ELPH-MCNC: 3.2 GM/DL
GLUCOSE SERPL-MCNC: 85 MG/DL (ref 65–99)
HCT VFR BLD AUTO: 32.1 % (ref 34–46.6)
HGB BLD-MCNC: 10 G/DL (ref 12–15.9)
IMM GRANULOCYTES # BLD AUTO: 0.02 10*3/MM3 (ref 0–0.05)
IMM GRANULOCYTES NFR BLD AUTO: 0.3 % (ref 0–0.5)
LYMPHOCYTES # BLD AUTO: 1.34 10*3/MM3 (ref 0.7–3.1)
LYMPHOCYTES NFR BLD AUTO: 19.1 % (ref 19.6–45.3)
MCH RBC QN AUTO: 30.6 PG (ref 26.6–33)
MCHC RBC AUTO-ENTMCNC: 31.2 G/DL (ref 31.5–35.7)
MCV RBC AUTO: 98.2 FL (ref 79–97)
MONOCYTES # BLD AUTO: 0.88 10*3/MM3 (ref 0.1–0.9)
MONOCYTES NFR BLD AUTO: 12.6 % (ref 5–12)
NEUTROPHILS NFR BLD AUTO: 4.53 10*3/MM3 (ref 1.7–7)
NEUTROPHILS NFR BLD AUTO: 64.6 % (ref 42.7–76)
NRBC BLD AUTO-RTO: 0 /100 WBC (ref 0–0.2)
PLAT MORPH BLD: NORMAL
PLATELET # BLD AUTO: 429 10*3/MM3 (ref 140–450)
PMV BLD AUTO: 9.2 FL (ref 6–12)
POTASSIUM SERPL-SCNC: 4.7 MMOL/L (ref 3.5–5.2)
PROT SERPL-MCNC: 6.9 G/DL (ref 6–8.5)
RBC # BLD AUTO: 3.27 10*6/MM3 (ref 3.77–5.28)
RBC MORPH BLD: NORMAL
SODIUM SERPL-SCNC: 133 MMOL/L (ref 136–145)
WBC # BLD AUTO: 7.01 10*3/MM3 (ref 3.4–10.8)
WBC MORPH BLD: NORMAL

## 2021-06-08 PROCEDURE — 85025 COMPLETE CBC W/AUTO DIFF WBC: CPT

## 2021-06-08 PROCEDURE — 97605 NEG PRS WND THER DME<=50SQCM: CPT

## 2021-06-08 PROCEDURE — 86140 C-REACTIVE PROTEIN: CPT | Performed by: INTERNAL MEDICINE

## 2021-06-08 PROCEDURE — 82550 ASSAY OF CK (CPK): CPT

## 2021-06-08 PROCEDURE — 97161 PT EVAL LOW COMPLEX 20 MIN: CPT

## 2021-06-08 PROCEDURE — 36415 COLL VENOUS BLD VENIPUNCTURE: CPT | Performed by: INTERNAL MEDICINE

## 2021-06-08 PROCEDURE — 85007 BL SMEAR W/DIFF WBC COUNT: CPT

## 2021-06-08 PROCEDURE — 85652 RBC SED RATE AUTOMATED: CPT | Performed by: INTERNAL MEDICINE

## 2021-06-08 PROCEDURE — 80053 COMPREHEN METABOLIC PANEL: CPT | Performed by: INTERNAL MEDICINE

## 2021-06-08 NOTE — THERAPY EVALUATION
Outpatient Rehabilitation - Wound/Debridement Initial Eval   [place]     Patient Name: Jessica Winston  : 1960  MRN: [id number]  Today's Date: 2021                  Admit Date: 2021    Visit Dx:    ICD-10-CM ICD-9-CM   1. S/P right hip superficial irrigation and debridement arthrocentesis  Z96.641 V43.64   2. Open wound of right hip, subsequent encounter  S71.001D V58.89     890.0       Patient Active Problem List   Diagnosis   • RUQ pain   • Cardiac mass   • PVD (peripheral vascular disease) with claudication (CMS/HCC)   • Tobacco dependence   • Pain in right hip   • HTN (hypertension)   • Rheumatoid arthritis (CMS/HCC)   • S/P right hip superficial irrigation and debridement arthrocentesis   • Acute blood loss anemia   • Postoperative pain   • Infection   • Hyponatremia, mild        Past Medical History:   Diagnosis Date   • Avascular necrosis (CMS/HCC)    • Depression    • Fractures    • HTN (hypertension)    • Hyperglycemia    • Hypertension    • Osteoarthritis    • RA (rheumatoid arthritis) (CMS/HCC)     ALSO REPORTS OA   • Tooth loose     top left    • Vitamin D deficiency    • Wears glasses     readers        Past Surgical History:   Procedure Laterality Date   • ARM TENDON REPAIR Left    • BREAST SURGERY     • ENDOSCOPY N/A 3/30/2017    Procedure: ESOPHAGOGASTRODUODENOSCOPY WITH COLD FORCEP BIOPSY;  Surgeon: Anca Trevino MD;  Location: [facility] ENDOSCOPY;  Service:    • FEMUR SURGERY      right    • HERNIA REPAIR Bilateral     INGUINAL- unsure about mesh    • INCISION AND DRAINAGE HIP N/A 2021    Procedure: HIP SUPERFICIAL IRRIGATION AND DEBRIDEMENT, ARTHOCENTESIS RIGHT;  Surgeon: Riaz Man MD;  Location: [facility] OR;  Service: Orthopedics;  Laterality: N/A;   • KNEE SURGERY Left    • MANDIBLE SURGERY     • TOTAL HIP ARTHROPLASTY REVISION Right 4/15/2021    Procedure: COVERSION TO RIGHT TOTAL HIP ARTHROPLASTY;  Surgeon: Riaz Man MD;  Location: [facility]  OR;  Service: Orthopedics;  Laterality: Right;   • TUBAL ABDOMINAL LIGATION     • WISDOM TOOTH EXTRACTION         Patient History     Row Name 06/08/21 1500             History    Chief Complaint  Ulcer, wound or other skin conditions  -MF      Brief Description of Current Complaint  Pt had I and D of hip and now returns as OP for follow up wound vac changes.  -MF      Patient's Rating of General Health  Fair  -MF         Pain     Pain Location  Back  -MF      Pain at Present  8  -MF      Pain at Best  3  -MF      Pain at Worst  9  -MF      Guzmán-Peck FACES Pain Rating   6  -MF         Services    Are you currently receiving Home Health services  No  -MF      Do you plan to receive Home Health services in the near future  No  -MF         Daily Activities    Primary Language  English  -      Pt Participated in POC and Goals  Yes  -MF        User Key  (r) = Recorded By, (t) = Taken By, (c) = Cosigned By    Initials Name Provider Type    Jimy Rodriguez, PT Physical Therapist          EVALUATION    LDA Wound     Row Name 06/08/21 1500             Wound 04/15/21 Right anterior hip Incision    Wound - Properties Group Placement Date: 04/15/21  -KR Present on Hospital Admission: N  -KR Side: Right  -KR Orientation: anterior  -KR Location: hip  -KR Primary Wound Type: Incision  -KR    Dressing Appearance  intact;moist drainage  -MF      Base  moist;pink;red;slough;yellow  -MF      Periwound  intact;dry  -MF      Periwound Temperature  warm  -MF      Periwound Skin Turgor  soft  -MF      Edges  irregular  -MF      Wound Length (cm)  6 cm  -MF      Wound Width (cm)  2.8 cm  -MF      Wound Depth (cm)  1 cm  -MF      Drainage Characteristics/Odor  serosanguineous  -MF      Drainage Amount  small  -MF      Care, Wound  irrigated with;sterile normal saline;negative pressure wound therapy  -MF      Dressing Care  dressing changed  -MF      Periwound Care  barrier film applied  -      Retired Wound - Properties Group  Date first assessed: 04/15/21  -KR Present on Hospital Admission: N  -KR Side: Right  -KR Location: hip  -KR Primary Wound Type: Incision  -KR       NPWT (Negative Pressure Wound Therapy) 06/02/21 1200 R hip     NPWT (Negative Pressure Wound Therapy) - Properties Group Placement Date: 06/02/21  -MF Placement Time: 1200  -MF Location: R hip   -MF    Therapy Setting  continuous therapy  -MF      Dressing  foam, black  -MF      Contact Layer  Vaseline-embedded gauze  -MF      Pressure Setting  125 mmHg  -MF      Sponges Inserted  1;other (see comments) 1 black 2 adaptic   -MF      Sponges Removed  other (see comments) temp dressing from MD   -MF      Finger sweep complete  Yes  -MF      Retired NPWT (Negative Pressure Wound Therapy) - Properties Group Placement Date: 06/02/21  -MF Placement Time: 1200  -MF Location: R hip   -MF      User Key  (r) = Recorded By, (t) = Taken By, (c) = Cosigned By    Initials Name Provider Type    Jimy Rodriguez, PT Physical Therapist    [place] RN Registered Nurse            WOUND DEBRIDEMENT                   Therapy Education     Row Name 06/08/21 1500             Therapy Education    Education Details  reviewed home management of wound vac and alarms.  -MF      Given  Bandaging/dressing change  -MF      Program  Reinforced  -MF      How Provided  Verbal  -MF      Provided to  Patient  -MF      Level of Understanding  Verbalized  -MF        User Key  (r) = Recorded By, (t) = Taken By, (c) = Cosigned By    Initials Name Provider Type    Jimy Rodriguez, PT Physical Therapist          Recommendation and Plan  PT Assessment/Plan     Row Name 06/08/21 1500          PT Assessment    Functional Limitations  Performance in work activities;Performance in self-care ADL;Limitation in home management  -MF     Impairments  Gait;Pain;Integumentary integrity  -MF     Assessment Comments  Pt presents with open wound to R hip s/p surgical i and d PT placed wound vac to help  manage exudate, increase granulation, and improve healing potential.   -     Rehab Potential  Good  -     Patient/caregiver participated in establishment of treatment plan and goals  Yes  -     Patient would benefit from skilled therapy intervention  Yes  -        PT Plan    PT Frequency  1x/week  -     Predicted Duration of Therapy Intervention (PT)  6 weels  -     Planned CPT's?  PT EVAL LOW COMPLEXITY: 21675;PT NEG PRESS WOUND TO 50 SQCM 3: 91929;PT NONSELECT DEBRIDE 15 MIN: 72603;PT SELF CARE/MGMT/TRAIN 15 MIN: 91604;PT CASI DEBRIDE OPEN WOUND UP TO 20 CM: 10863  -     Physical Therapy Interventions (Optional Details)  wound care;patient/family education  -     PT Plan Comments  wound vac changes 1x/ week with pt's daughter to change 2x/week   -       User Key  (r) = Recorded By, (t) = Taken By, (c) = Cosigned By    Initials Name Provider Type    Jimy Rodriguez, PT Physical Therapist            Goals  PT OP Goals     Row Name 06/08/21 1500          PT Short Term Goals    STG Date to Achieve  07/23/21  -     STG 1  decrease wound size by 25% as evidence of wound healing  -     STG 2  increase granulation to > 95% to improve wound healing  -        Long Term Goals    LTG Date to Achieve  09/06/21  -     LTG 1  Decrease wound size by 80% as evidence of wound healing  -     LTG 2  Pt and family independent with home dressing changes to allow for transition to home management of wound.   -        Time Calculation    PT Goal Re-Cert Due Date  09/06/21  -       User Key  (r) = Recorded By, (t) = Taken By, (c) = Cosigned By    Initials Name Provider Type    Jimy Rodriguez, PT Physical Therapist          Time Calculation: Start Time: 1500  Untimed Charges  PT Eval/Re-eval Minutes: 40  Wound Care: 48433 Neg Press (DME) wound TO 50 sqcm  02740-Krb Pressure wound to 50 sqcm: 25  Total Minutes  Untimed Charges Total Minutes: 65   Total Minutes: 65  Therapy Charges for Today      Code Description Service Date Service Provider Modifiers Qty    96280171853 HC PT NEG PRESS WOUND TO 50SQCM DME2 6/8/2021 Jimy Hernandez, PT GP 1    29453114073 HC PT EVAL LOW COMPLEXITY 3 6/8/2021 Jimy Hernandez, PT GP 1                Jimy Hernandez, PT  6/8/2021

## 2021-06-09 ENCOUNTER — READMISSION MANAGEMENT (OUTPATIENT)
Dept: CALL CENTER | Facility: HOSPITAL | Age: 61
End: 2021-06-09

## 2021-06-09 NOTE — OUTREACH NOTE
General Surgery Week 1 Survey      Responses   [facility] [place], operated by [facility] patient discharged from?  [place]   Does the patient have one of the following disease processes/diagnoses(primary or secondary)?  General Surgery   Week 1 attempt successful?  No   Unsuccessful attempts  Attempt 1          Kaylyn St RN

## 2021-06-11 RX ORDER — PREDNISONE 10 MG/1
10 TABLET ORAL DAILY PRN
Qty: 30 TABLET | Refills: 3 | Status: SHIPPED | OUTPATIENT
Start: 2021-06-11 | End: 2021-11-08

## 2021-06-12 LAB
BACTERIA SPEC ANAEROBE CULT: NORMAL
BACTERIA SPEC ANAEROBE CULT: NORMAL

## 2021-06-13 ENCOUNTER — READMISSION MANAGEMENT (OUTPATIENT)
Dept: CALL CENTER | Facility: HOSPITAL | Age: 61
End: 2021-06-13

## 2021-06-13 NOTE — OUTREACH NOTE
General Surgery Week 1 Survey      Responses   [facility] patient discharged from?  [place]   Does the patient have one of the following disease processes/diagnoses(primary or secondary)?  General Surgery   Week 1 attempt successful?  Yes   Call start time  0914   Call end time  0918   Discharge diagnosis   Post-op right hip wound infectionright hip superficial irrigation and debridedment   Is patient permission given to speak with other caregiver?  Yes   List who call center can speak with  Gilson Zunigas reviewed with patient/caregiver?  Yes   Is the patient having any side effects they believe may be caused by any medication additions or changes?  No   Does the patient have all medications related to this admission filled (includes all antibiotics, pain medications, etc.)  Yes   Is the patient taking all medications as directed (includes completed medication regime)?  Yes   Medication comments  States her daughter is giving her IV medications.   Does the patient have a follow up appointment scheduled with their surgeon?  Yes   Has the patient kept scheduled appointments due by today?  Yes   Comments  Has seen the ID doctor   Has home health visited the patient within 72 hours of discharge?  N/A   Has all DME been delivered?  No   Psychosocial issues?  No   Did the patient receive a copy of their discharge instructions?  Yes   Nursing interventions  Reviewed instructions with patient   What is the patient's perception of their health status since discharge?  Improving   Nursing interventions  Nurse provided patient education   Is the patient /caregiver able to teach back basic post-op care?  Practice 'cough and deep breath' [still has wound vac on]   Is the patient/caregiver able to teach back signs and symptoms of incisional infection?  Increased redness, swelling or pain at the incisonal site, Increased drainage or bleeding, Incisional warmth   Is the patient/caregiver able to teach back steps to  recovery at home?  Set small, achievable goals for return to baseline health, Rest and rebuild strength, gradually increase activity, Practice good oral hygiene, Eat a well-balance diet   If the patient is a current smoker, are they able to teach back resources for cessation?  Not a smoker   Is the patient/caregiver able to teach back the hierarchy of who to call/visit for symptoms/problems? PCP, Specialist, Home health nurse, Urgent Care, ED, 911  Yes   Week 1 call completed?  Yes   Wrap up additional comments  STates  that her daughter is a nurse and she is taking good care of her.           Sole Gaxiola RN

## 2021-06-15 ENCOUNTER — HOSPITAL ENCOUNTER (OUTPATIENT)
Dept: PHYSICAL THERAPY | Facility: HOSPITAL | Age: 61
Setting detail: THERAPIES SERIES
Discharge: HOME | End: 2021-06-15

## 2021-06-15 ENCOUNTER — LAB (OUTPATIENT)
Dept: LAB | Facility: HOSPITAL | Age: 61
End: 2021-06-15

## 2021-06-15 ENCOUNTER — TRANSCRIBE ORDERS (OUTPATIENT)
Dept: LAB | Facility: HOSPITAL | Age: 61
End: 2021-06-15

## 2021-06-15 DIAGNOSIS — T84.51XD INFLAMMATORY REACTION DUE TO INTERNAL PROSTHESIS OF RIGHT HIP, SUBSEQUENT ENCOUNTER: Primary | ICD-10-CM

## 2021-06-15 DIAGNOSIS — Z96.641 STATUS POST TOTAL HIP REPLACEMENT, RIGHT: Primary | ICD-10-CM

## 2021-06-15 DIAGNOSIS — S71.001D OPEN WOUND OF RIGHT HIP, SUBSEQUENT ENCOUNTER: ICD-10-CM

## 2021-06-15 DIAGNOSIS — T84.51XD INFLAMMATORY REACTION DUE TO INTERNAL PROSTHESIS OF RIGHT HIP, SUBSEQUENT ENCOUNTER: ICD-10-CM

## 2021-06-15 LAB
ALBUMIN SERPL-MCNC: 3.9 G/DL (ref 3.5–5.2)
ALBUMIN/GLOB SERPL: 1.1 G/DL
ALP SERPL-CCNC: 103 U/L (ref 39–117)
ALT SERPL W P-5'-P-CCNC: 8 U/L (ref 1–33)
ANION GAP SERPL CALCULATED.3IONS-SCNC: 12 MMOL/L (ref 5–15)
AST SERPL-CCNC: 17 U/L (ref 1–32)
BASOPHILS # BLD AUTO: 0.13 10*3/MM3 (ref 0–0.2)
BASOPHILS NFR BLD AUTO: 1.2 % (ref 0–1.5)
BILIRUB SERPL-MCNC: <0.2 MG/DL (ref 0–1.2)
BUN SERPL-MCNC: 5 MG/DL (ref 6–20)
BUN/CREAT SERPL: 7.6 (ref 7–25)
CALCIUM SPEC-SCNC: 10.3 MG/DL (ref 8.6–10.5)
CHLORIDE SERPL-SCNC: 98 MMOL/L (ref 98–107)
CK SERPL-CCNC: 68 U/L (ref 20–180)
CO2 SERPL-SCNC: 27 MMOL/L (ref 22–29)
CREAT SERPL-MCNC: 0.66 MG/DL (ref 0.57–1)
CRP SERPL-MCNC: 5.78 MG/DL (ref 0–0.5)
DEPRECATED RDW RBC AUTO: 62.7 FL (ref 37–54)
EOSINOPHIL # BLD AUTO: 0.12 10*3/MM3 (ref 0–0.4)
EOSINOPHIL NFR BLD AUTO: 1.1 % (ref 0.3–6.2)
ERYTHROCYTE [DISTWIDTH] IN BLOOD BY AUTOMATED COUNT: 17.8 % (ref 12.3–15.4)
ERYTHROCYTE [SEDIMENTATION RATE] IN BLOOD: 60 MM/HR (ref 0–30)
GFR SERPL CREATININE-BSD FRML MDRD: 92 ML/MIN/1.73
GLOBULIN UR ELPH-MCNC: 3.6 GM/DL
GLUCOSE SERPL-MCNC: 106 MG/DL (ref 65–99)
HCT VFR BLD AUTO: 33.6 % (ref 34–46.6)
HGB BLD-MCNC: 10.6 G/DL (ref 12–15.9)
IMM GRANULOCYTES # BLD AUTO: 0.07 10*3/MM3 (ref 0–0.05)
IMM GRANULOCYTES NFR BLD AUTO: 0.7 % (ref 0–0.5)
LYMPHOCYTES # BLD AUTO: 0.82 10*3/MM3 (ref 0.7–3.1)
LYMPHOCYTES NFR BLD AUTO: 7.8 % (ref 19.6–45.3)
MCH RBC QN AUTO: 30.2 PG (ref 26.6–33)
MCHC RBC AUTO-ENTMCNC: 31.5 G/DL (ref 31.5–35.7)
MCV RBC AUTO: 95.7 FL (ref 79–97)
MONOCYTES # BLD AUTO: 0.31 10*3/MM3 (ref 0.1–0.9)
MONOCYTES NFR BLD AUTO: 2.9 % (ref 5–12)
NEUTROPHILS NFR BLD AUTO: 86.3 % (ref 42.7–76)
NEUTROPHILS NFR BLD AUTO: 9.11 10*3/MM3 (ref 1.7–7)
NRBC BLD AUTO-RTO: 0 /100 WBC (ref 0–0.2)
PLATELET # BLD AUTO: 520 10*3/MM3 (ref 140–450)
PMV BLD AUTO: 9.8 FL (ref 6–12)
POTASSIUM SERPL-SCNC: 4.5 MMOL/L (ref 3.5–5.2)
PROT SERPL-MCNC: 7.5 G/DL (ref 6–8.5)
RBC # BLD AUTO: 3.51 10*6/MM3 (ref 3.77–5.28)
SODIUM SERPL-SCNC: 137 MMOL/L (ref 136–145)
WBC # BLD AUTO: 10.56 10*3/MM3 (ref 3.4–10.8)

## 2021-06-15 PROCEDURE — 36415 COLL VENOUS BLD VENIPUNCTURE: CPT

## 2021-06-15 PROCEDURE — 82550 ASSAY OF CK (CPK): CPT

## 2021-06-15 PROCEDURE — 85025 COMPLETE CBC W/AUTO DIFF WBC: CPT

## 2021-06-15 PROCEDURE — 80053 COMPREHEN METABOLIC PANEL: CPT

## 2021-06-15 PROCEDURE — 97597 DBRDMT OPN WND 1ST 20 CM/<: CPT

## 2021-06-15 PROCEDURE — 97605 NEG PRS WND THER DME<=50SQCM: CPT

## 2021-06-15 PROCEDURE — 86140 C-REACTIVE PROTEIN: CPT

## 2021-06-15 PROCEDURE — 85652 RBC SED RATE AUTOMATED: CPT

## 2021-06-15 NOTE — THERAPY WOUND CARE TREATMENT
Outpatient Rehabilitation - Wound/Debridement Treatment Note   [place]     Patient Name: Jessica Winston  : 1960  MRN: [id number]  Today's Date: 6/15/2021                 Admit Date: 6/15/2021    Visit Dx:    ICD-10-CM ICD-9-CM   1. S/P right hip superficial irrigation and debridement arthrocentesis  Z96.641 V43.64   2. Open wound of right hip, subsequent encounter  S71.001D V58.89     890.0       Patient Active Problem List   Diagnosis   • RUQ pain   • Cardiac mass   • PVD (peripheral vascular disease) with claudication (CMS/HCC)   • Tobacco dependence   • Pain in right hip   • HTN (hypertension)   • Rheumatoid arthritis (CMS/HCC)   • S/P right hip superficial irrigation and debridement arthrocentesis   • Acute blood loss anemia   • Postoperative pain   • Infection   • Hyponatremia, mild        Past Medical History:   Diagnosis Date   • Avascular necrosis (CMS/HCC)    • Depression    • Fractures    • HTN (hypertension)    • Hyperglycemia    • Hypertension    • Osteoarthritis    • RA (rheumatoid arthritis) (CMS/HCC)     ALSO REPORTS OA   • Tooth loose     top left    • Vitamin D deficiency    • Wears glasses     readers        Past Surgical History:   Procedure Laterality Date   • ARM TENDON REPAIR Left    • BREAST SURGERY     • ENDOSCOPY N/A 3/30/2017    Procedure: ESOPHAGOGASTRODUODENOSCOPY WITH COLD FORCEP BIOPSY;  Surgeon: Anca Trevino MD;  Location: [facility] ENDOSCOPY;  Service:    • FEMUR SURGERY      right    • HERNIA REPAIR Bilateral     INGUINAL- unsure about mesh    • INCISION AND DRAINAGE HIP N/A 2021    Procedure: HIP SUPERFICIAL IRRIGATION AND DEBRIDEMENT, ARTHOCENTESIS RIGHT;  Surgeon: Riaz Man MD;  Location: [facility];  Service: Orthopedics;  Laterality: N/A;   • KNEE SURGERY Left    • MANDIBLE SURGERY     • TOTAL HIP ARTHROPLASTY REVISION Right 4/15/2021    Procedure: COVERSION TO RIGHT TOTAL HIP ARTHROPLASTY;  Surgeon: Riaz Man MD;  Location:   [facility];  Service: Orthopedics;  Laterality: Right;   • TUBAL ABDOMINAL LIGATION     • WISDOM TOOTH EXTRACTION           EVALUATION  PT Ortho     Row Name 06/15/21 4694       Subjective Comments    Subjective Comments  Pt with improved c/o pain from back.   -MF       Subjective Pain    Able to rate subjective pain?  yes  -MF    Pre-Treatment Pain Level  5  -MF    Post-Treatment Pain Level  5  -MF       Transfers    Chair-Bed [place] (Transfers)  contact guard  -MF    Sit-Stand [place] (Transfers)  supervision  -MF    Comment (Transfers)  Pt seen supine on stretcher   -       Gait/Stairs (Locomotion)    [place] Level (Gait)  supervision  -MF      User Key  (r) = Recorded By, (t) = Taken By, (c) = Cosigned By    Initials Name Provider Type    MF Jimy Hernandez, PT Physical Therapist          LDA Wound     Row Name 06/15/21 1345             Wound 04/15/21 Right anterior hip Incision    Wound - Properties Group Placement Date: 04/15/21  -KR Present on Hospital Admission: N  -KR Side: Right  -KR Orientation: anterior  -KR Location: hip  -KR Primary Wound Type: Incision  -KR    Wound Image  Images linked: 1  -MF      Dressing Appearance  intact;moist drainage  -MF      Base  moist;pink;red;slough;yellow;subcutaneous  -MF      Periwound  intact;dry  -MF      Periwound Temperature  warm  -MF      Periwound Skin Turgor  soft  -MF      Edges  irregular  -MF      Wound Length (cm)  6 cm  -MF      Wound Width (cm)  2.8 cm  -MF      Wound Depth (cm)  0.8 cm  -MF      Drainage Characteristics/Odor  serosanguineous  -MF      Drainage Amount  small  -MF      Care, Wound  irrigated with;sterile normal saline;debrided;negative pressure wound therapy  -MF      Dressing Care  dressing changed  -MF      Periwound Care  barrier ointment applied  -      Retired Wound - Properties Group Date first assessed: 04/15/21  -KR Present on Hospital Admission: N  -KR Side: Right  -KR Location: hip  -KR Primary Wound Type:  Incision  -KR       NPWT (Negative Pressure Wound Therapy) 06/02/21 1200 R hip     NPWT (Negative Pressure Wound Therapy) - Properties Group Placement Date: 06/02/21  -MF Placement Time: 1200  -MF Location: R hip   -MF    Therapy Setting  continuous therapy  -MF      Dressing  foam, black  -MF      Contact Layer  other (see comments) sorbact to cover sutures  -MF      Pressure Setting  125 mmHg  -MF      Sponges Inserted  1 1 black 2 sorbact   -MF      Sponges Removed  1;other (see comments) 1 black 1 xeroform   -MF      Finger sweep complete  Yes  -MF      Retired NPWT (Negative Pressure Wound Therapy) - Properties Group Placement Date: 06/02/21  -MF Placement Time: 1200  -MF Location: R hip   -MF      User Key  (r) = Recorded By, (t) = Taken By, (c) = Cosigned By    Initials Name Provider Type    Jimy Rodriguez, PT Physical Therapist    Cheyenne Richmond RN Registered Nurse            WOUND DEBRIDEMENT  Total area of Debridement: ~3cm2  Debridement Site 1  Location- Site 1: R hip wound  Selective Debridement- Site 1: Wound Surface <20cmsq  Instruments- Site 1: tweezers, scissors  Excised Tissue Description- Site 1: minimum, slough, necrotic, adipose  Bleeding- Site 1: none                 Recommendation and Plan  PT Assessment/Plan     Row Name 06/15/21 1345          PT Assessment    Functional Limitations  Performance in work activities;Performance in self-care ADL;Limitation in home management  -     Impairments  Gait;Pain;Integumentary integrity  -     Assessment Comments  wound vac changed today with stable measurements noted.  PT able to ligthly debride necrotic adipose to wound base to help increase granulation and improve healing potential.   -        PT Plan    PT Frequency  1x/week  -     Physical Therapy Interventions (Optional Details)  wound care;patient/family education  -     PT Plan Comments  wound vac changes 1 x/week   -       User Key  (r) = Recorded By, (t) = Taken By, (c)  = Cosigned By    Initials Name Provider Type    Jimy Rodriguez, PT Physical Therapist          Goals  PT OP Goals     Row Name 06/15/21 1345          Time Calculation    PT Goal Re-Cert Due Date  09/06/21  -       User Key  (r) = Recorded By, (t) = Taken By, (c) = Cosigned By    Initials Name Provider Type    Jimy Rodriguez, PT Physical Therapist          PT Goal Re-Cert Due Date: 09/06/21            Time Calculation: Start Time: 1345  Untimed Charges  Wound Care: 21798 Selective debridement  89800-Acpxnoxzp debridement: 15  13223-Qhv Pressure wound to 50 sqcm: 25  Total Minutes  Untimed Charges Total Minutes: 40   Total Minutes: 40  Therapy Charges for Today     Code Description Service Date Service Provider Modifiers Qty    18855713036 HC CASI DEBRIDE OPEN WOUND UP TO 20CM 6/15/2021 Jimy Hernandez, PT GP 1    49936287673 HC PT NEG PRESS WOUND TO 50SQCM DME2 6/15/2021 Jimy Hernandez, PT GP 1                  Jimy Hernandez, PT  6/15/2021

## 2021-06-18 ENCOUNTER — OFFICE VISIT (OUTPATIENT)
Dept: PRIMARY CARE CLINIC | Age: 61
End: 2021-06-18
Payer: MEDICAID

## 2021-06-18 VITALS
HEART RATE: 102 BPM | SYSTOLIC BLOOD PRESSURE: 100 MMHG | RESPIRATION RATE: 16 BRPM | OXYGEN SATURATION: 96 % | DIASTOLIC BLOOD PRESSURE: 58 MMHG | WEIGHT: 118.6 LBS | TEMPERATURE: 96.7 F | BODY MASS INDEX: 19.06 KG/M2 | HEIGHT: 66 IN

## 2021-06-18 DIAGNOSIS — L08.9 WOUND INFECTION: Primary | ICD-10-CM

## 2021-06-18 DIAGNOSIS — T14.8XXA WOUND INFECTION: Primary | ICD-10-CM

## 2021-06-18 DIAGNOSIS — Z12.11 COLON CANCER SCREENING: ICD-10-CM

## 2021-06-18 DIAGNOSIS — Z96.641 STATUS POST RIGHT HIP REPLACEMENT: ICD-10-CM

## 2021-06-18 DIAGNOSIS — M81.0 OSTEOPOROSIS WITHOUT CURRENT PATHOLOGICAL FRACTURE, UNSPECIFIED OSTEOPOROSIS TYPE: ICD-10-CM

## 2021-06-18 DIAGNOSIS — Z12.11 SCREENING FOR COLON CANCER: ICD-10-CM

## 2021-06-18 PROCEDURE — 99214 OFFICE O/P EST MOD 30 MIN: CPT | Performed by: NURSE PRACTITIONER

## 2021-06-18 PROCEDURE — 4004F PT TOBACCO SCREEN RCVD TLK: CPT | Performed by: NURSE PRACTITIONER

## 2021-06-18 PROCEDURE — 3017F COLORECTAL CA SCREEN DOC REV: CPT | Performed by: NURSE PRACTITIONER

## 2021-06-18 PROCEDURE — G8420 CALC BMI NORM PARAMETERS: HCPCS | Performed by: NURSE PRACTITIONER

## 2021-06-18 PROCEDURE — G8427 DOCREV CUR MEDS BY ELIG CLIN: HCPCS | Performed by: NURSE PRACTITIONER

## 2021-06-18 RX ORDER — ACETAMINOPHEN 500 MG
TABLET ORAL
COMMUNITY
Start: 2021-06-02 | End: 2021-09-15 | Stop reason: ALTCHOICE

## 2021-06-18 RX ORDER — TRAMADOL HYDROCHLORIDE 50 MG/1
50 TABLET ORAL
COMMUNITY
Start: 2021-06-02 | End: 2021-09-15 | Stop reason: ALTCHOICE

## 2021-06-18 RX ORDER — POTASSIUM CHLORIDE 750 MG/1
TABLET, FILM COATED, EXTENDED RELEASE ORAL
COMMUNITY
Start: 2021-04-23 | End: 2021-09-15 | Stop reason: ALTCHOICE

## 2021-06-18 RX ORDER — FLUCONAZOLE 200 MG/1
200 TABLET ORAL DAILY
COMMUNITY
Start: 2021-06-15 | End: 2021-09-15 | Stop reason: ALTCHOICE

## 2021-06-18 RX ORDER — DOXYCYCLINE HYCLATE 100 MG
TABLET ORAL
COMMUNITY
Start: 2021-05-28 | End: 2021-09-15 | Stop reason: ALTCHOICE

## 2021-06-18 RX ORDER — ALENDRONATE SODIUM 70 MG/1
70 TABLET ORAL
Qty: 4 TABLET | Refills: 5 | Status: SHIPPED | OUTPATIENT
Start: 2021-06-18 | End: 2021-12-15 | Stop reason: SDUPTHER

## 2021-06-18 RX ORDER — ONDANSETRON 4 MG/1
TABLET, FILM COATED ORAL
COMMUNITY
Start: 2021-06-02 | End: 2021-09-15 | Stop reason: ALTCHOICE

## 2021-06-18 RX ORDER — ASPIRIN 81 MG/1
81 TABLET ORAL EVERY 12 HOURS
COMMUNITY
Start: 2021-06-04 | End: 2021-07-16

## 2021-06-18 ASSESSMENT — ENCOUNTER SYMPTOMS
BACK PAIN: 1
COLOR CHANGE: 1
RESPIRATORY NEGATIVE: 1
GASTROINTESTINAL NEGATIVE: 1

## 2021-06-18 NOTE — PROGRESS NOTES
SUBJECTIVE:    Patient ID: Chidi Lemus is a 62 y.o. female. Chief Complaint   Patient presents with    Follow-up     6 weeks    Medication Management         HPI:  She had a hip replacement surgery on April 15. She developed an infection and had to go back in to surgery for debridement. She has a wound vac at this time and a picc line with Daptomycin. She is living with her daughter right now. She goes back to cardiology Jul 2 for evaluation of the lesion on her heart. She is walking with a walker. Has 2 weeks or so with antibiotic. She has had a wound vac for the past 16 days. She is changing the vac 2 times a week and the specialist does it once a week. She is taking her Lisinopril 10 mg but her blood pressure is low today. She does not have a b/p machine today. Patient's medications,allergies, past medical, surgical, social and family histories were reviewed and updated as appropriate. .  Current Outpatient Medications on File Prior to Visit   Medication Sig Dispense Refill    Cholecalciferol 50 MCG (2000 UT) TABS       aspirin EC 81 MG EC tablet Take 81 mg by mouth every 12 hours      MAPAP 500 MG tablet       doxycycline hyclate (VIBRA-TABS) 100 MG tablet       ondansetron (ZOFRAN) 4 MG tablet       fluconazole (DIFLUCAN) 200 MG tablet Take 200 mg by mouth daily       mupirocin (BACTROBAN) 2 % ointment       traMADol (ULTRAM) 50 MG tablet Take 50 mg by mouth.        predniSONE (DELTASONE) 10 MG tablet Take 1 tablet by mouth daily as needed (arthritic pain) 30 tablet 3    rOPINIRole (REQUIP) 0.5 MG tablet TAKE 1 TABLET BY MOUTH NIGHTLY 30 tablet 2    meloxicam (MOBIC) 15 MG tablet TAKE 1 TABLET BY MOUTH DAILY 30 tablet 3    docusate (COLACE, DULCOLAX) 100 MG CAPS Take 100 mg by mouth 2 times daily      buPROPion (WELLBUTRIN XL) 150 MG extended release tablet TAKE 1 TABLET BY MOUTH EVERY MORNING 30 tablet 3    cyanocobalamin 1000 MCG/ML injection INJECT 1 ML ONCE A WEEK FOR 4 WEEKS AND THEN ONCE A MONTH. 4 mL 5    leflunomide (ARAVA) 20 MG tablet Take 1 tablet by mouth daily 30 tablet 0    lisinopril (PRINIVIL;ZESTRIL) 10 MG tablet Take 1 tablet by mouth daily 30 tablet 3    omeprazole (PRILOSEC) 40 MG delayed release capsule Take 1 capsule by mouth daily 30 capsule 3    Syringe/Needle, Disp, (SYRINGE 3CC/25GX1\") 25G X 1\" 3 ML MISC 1 each by Does not apply route once a week 50 each 1    potassium chloride (KLOR-CON) 10 MEQ extended release tablet       ENBREL SURECLICK 50 MG/ML SOAJ INJECT 1ML UNDER THE SKIN ONCE WEEKLY (Patient not taking: Reported on 6/18/2021) 4 mL 1     No current facility-administered medications on file prior to visit. Review of Systems   Constitutional: Negative. HENT: Negative. Respiratory: Negative. Cardiovascular: Negative. Gastrointestinal: Negative. Genitourinary: Negative. Musculoskeletal: Positive for arthralgias, back pain and gait problem. Skin: Positive for color change and wound (wound on right hip). Psychiatric/Behavioral: Negative. OBJECTIVE:  BP (!) 100/58 (Site: Left Upper Arm, Position: Sitting, Cuff Size: Small Adult)   Pulse 102   Temp 96.7 °F (35.9 °C) (Temporal)   Resp 16   Ht 5' 6\" (1.676 m)   Wt 118 lb 9.6 oz (53.8 kg)   SpO2 96% Comment: room air  BMI 19.14 kg/m²    Physical Exam  Vitals and nursing note reviewed. Constitutional:       Appearance: She is well-developed. HENT:      Head: Normocephalic and atraumatic. Eyes:      Conjunctiva/sclera: Conjunctivae normal.      Pupils: Pupils are equal, round, and reactive to light. Neck:      Thyroid: No thyromegaly. Vascular: No JVD. Cardiovascular:      Rate and Rhythm: Normal rate and regular rhythm. Heart sounds: No murmur heard. No friction rub. No gallop. Pulmonary:      Effort: Pulmonary effort is normal. No respiratory distress. Breath sounds: Normal breath sounds. No wheezing or rales.    Abdominal: General: Bowel sounds are normal. There is no distension. Palpations: Abdomen is soft. Tenderness: There is no abdominal tenderness. There is no guarding. Musculoskeletal:         General: No tenderness. Cervical back: Normal range of motion and neck supple. Comments: Walking with limp and walker   Skin:     General: Skin is warm and dry. Findings: Wound (right hip open wound wiht intact wound vac) present. No rash. Neurological:      Mental Status: She is alert and oriented to person, place, and time. Psychiatric:         Judgment: Judgment normal.         No results found for requested labs within last 30 days. Hemoglobin A1C (%)   Date Value   03/04/2016 5.5     LDL Calculated (mg/dL)   Date Value   08/12/2019 143 (H)           Lab Results   Component Value Date    TSH 4.00 03/05/2021         ASSESSMENT/PLAN:     Kamari Mann was seen today for follow-up and medication management. Diagnoses and all orders for this visit:    Wound infection  She continues wound vac.  picc line antibiotic. Osteoporosis without current pathological fracture, unspecified osteoporosis type  -     alendronate (FOSAMAX) 70 MG tablet; Take 1 tablet by mouth every 7 days    Screening for colon cancer  -     POCT FECAL IMMUNOCHEMICAL TEST (FIT); Future    Colon cancer screening  -     Cologuard (For External Results Only); Future    Status post right hip replacement    Other orders  -     linaclotide (LINZESS) 145 MCG capsule;  Take 1 capsule by mouth every morning (before breakfast)      Medications Discontinued During This Encounter   Medication Reason    vitamin D (ERGOCALCIFEROL) 1.25 MG (72517 UT) CAPS capsule Alternate therapy    oxyCODONE (ROXICODONE) 5 MG immediate release tablet Therapy completed    alendronate (FOSAMAX) 70 MG tablet REORDER    linaclotide (LINZESS) 145 MCG capsule REORDER

## 2021-06-18 NOTE — PATIENT INSTRUCTIONS
· Keep a list of your medicines with you. List all of the prescription medicines, nonprescription medicines, supplements, natural remedies, and vitamins that you take. Tell your healthcare providers who treat you about all of the products you are taking. Your provider can provide you with a form to keep track of them. Just ask. · Follow the directions that come with your medicine, including information about food or alcohol. Make sure you know how and when to take your medicine. Do not take more or less than you are supposed to take. · Keep all medicines out of the reach of children. · Store medicines according to the directions on the label. · Monitor yourself. Learn to know how your body reacts to your new medicine and keep track of how it makes you feel before attempting (If your provider has allowed you to do so) to drive or go to work. · Seek emergency medical attention if you think you have used too much of this medicine. An overdose of any prescription medicine can be fatal. Overdose symptoms may include extreme drowsiness, muscle weakness, confusion, cold and clammy skin, pinpoint pupils, shallow breathing, slow heart rate, fainting, or coma. · Don't share prescription medicines with others, even when they seem to have the same symptoms. What may be good for you may be harmful to others. · If you are no longer taking a prescribed medication and you have pills left please take your pills out of their original containers. Mix crushed pills with an undesirable substance, such as cat litter or used coffee grounds. Put the mixture into a disposable container with a lid, such as an empty margarine tub, or into a sealable bag. Cover up or remove any of your personal information on the empty containers by covering it with black permanent marker or duct tape. Place the sealed container with the mixture, and the empty drug containers, in the trash.    · If you use a medication that is in the form of a patch, dispose of used patches by folding them in half so that the sticky sides meet, and then flushing them down a toilet. They should not be placed in the household trash where children or pets can find them. · If you have any questions, ask your provider or pharmacist for more information. · Be sure to keep all appointments for provider visits or tests. We are committed to providing you with the best care possible. In order to help us achieve these goals please remember to bring all medications, herbal products, and over the counter supplements with you to each visit. If your provider has ordered testing for you, please be sure to follow up with our office if you have not received results within 7 days after the testing took place. *If you receive a survey after visiting one of our offices, please take time to share your experience concerning your physician office visit. These surveys are confidential and no health information about you is shared. We are eager to improve for you and we are counting on your feedback to help make that happen. ips to Help You Stop Smoking       Cigarette smoking is a preventable cause of death in the [place]. If you have thought about quitting but haven't been able to, here are some reasons why you should and some ways to do it. Here's Why   Quitting smoking now can decrease your risk of getting smoking-related illnesses like:   Heart disease   Stroke   Several types of cancer, including:   Lung   Mouth   Esophagus   Larynx   Bladder   Pancreas   Kidney   Chronic lung diseases:   Bronchitis   Emphysema   Asthma   Cataracts   Macular degeneration   Thyroid conditions   Hearing loss   Erectile dysfunction   Dementia   Osteoporosis   Here's How   Once you've decided to quit smoking, set your target quit date a few weeks away.  In the time leading up to your quit day, try some of these ideas offered by the [address] [place] [address] [place] to help you successfully quit smoking. For the best results, work with your doctor. Together, you can test your lung function and compare the results to those of a nonsmoking person. The results can be given to you as your lung age. Finding out your lung age right after having the test done may help you to stop smoking. Your doctor can also discuss with you all of your options and refer you to smoking-cessation support groups. You may wish to use nicotine replacement (gum, patches, inhaler) or one of the prescription medications that have been shown to increase quit rates and prolong abstinence from smoking. But whatever you and your doctor decide on these matters, it will still be you who decides when an how to quit. Here are some techniques:   Switch Brands   Switch to a brand you find distasteful. Change to a brand that is low in tar and nicotine a couple of weeks before your target quit date. This will help change your smoking behavior. However, do not smoke more cigarettes, inhale them more often or more deeply, or place your fingertips over the holes in the filters. All of these actions will increase your nicotine intake, and the idea is to get your body used to functioning without nicotine. Cut Down the Number of Cigarettes You Smoke   Smoke only half of each cigarette. Each day, postpone the lighting of your first cigarette by one hour. Decide you'll only smoke during odd or even hours of the day. Decide beforehand how many cigarettes you'll smoke during the day. For each additional cigarette, give a dollar to your favorite irina. Change your eating habits to help you cut down. For example, drink milk, which many people consider incompatible with smoking. End meals or snacks with something that won't lead to a cigarette. Reach for a glass of juice instead of a cigarette for a \"pick-me-up. \"   Remember: Cutting down can help you quit, but it's not a substitute for quitting.  If you're down to about seven cigarettes a day, it's time to set your target quit date, and get ready to stick to it. Don't Smoke \"Automatically\"   Smoke only those cigarettes you really want. Catch yourself before you light up a cigarette out of pure habit. Don't empty your ashtrays. This will remind you of how many cigarettes you've smoked each day, and the sight and the smell of stale cigarettes butts will be very unpleasant. Make yourself aware of each cigarette by using the opposite hand or putting cigarettes in an unfamiliar location or a different pocket to break the automatic reach. If you light up many times during the day without even thinking about it, try to look in a mirror each time you put a match to your cigarette. You may decide you don't need it. Make Smoking Inconvenient   Stop buying cigarettes by the carton. Wait until one pack is empty before you buy another. Stop carrying cigarettes with you at home or at work. Make them difficult to get to. Make Smoking Unpleasant   Smoke only under circumstances that aren't especially pleasurable for you. If you like to smoke with others, smoke alone. Turn your chair to an empty corner and focus only on the cigarette you are smoking and all its many negative effects. Collect all your cigarette butts in one large glass container as a visual reminder of the filth made by smoking. Just Before Quitting   Practice going without cigarettes. Don't think of never smoking again. Think of quitting in terms of one day at a time . Tell yourself you won't smoke today, and then don't. Clean your clothes to rid them of the cigarette smell, which can linger a long time. On the Day You Quit   Throw away all your cigarettes and matches. Hide your lighters and ashtrays. Visit the dentist and have your teeth cleaned to get rid of tobacco stains. Notice how nice they look and resolve to keep them that way.    Make a list of things you'd like to buy for yourself or someone else. Estimate the cost in terms of packs of cigarettes, and put the money aside to buy these presents. Keep very busy on the big day. Go to the movies, exercise, take long walks, or go bike riding. Remind your family and friends that this is your quit date, and ask them to help you over the rough spots of the first couple of days and weeks. Buy yourself a treat or do something special to celebrate. Telephone and Internet Support   Telephone, web-, and computer-based programs can offer you the support that you need to quit and to stay smoke-free. You can find many programs online, like the [employer] Association's [place] from Smoking . Immediately After Quitting   Develop a clean, fresh, nonsmoking environment around yourselfat work and at home. Buy yourself flowersyou may be surprised how much you can enjoy their scent now. The first few days after you quit, spend as much free time as possible in places where smoking isn't allowed, such as [address] [place] [address], museums, theaters, department stores, and churches. Drink large quantities of water and fruit juice (but avoid sodas that contain caffeine). Try to avoid alcohol, coffee, and other beverages that you associate with cigarette smoking. Strike up conversation instead of a match for a cigarette. If you miss the sensation of having a cigarette in your hand, play with something elsea pencil, a paper clip, a marble. If you miss having something in your mouth, try toothpicks or a fake cigarette.

## 2021-06-22 ENCOUNTER — TRANSCRIBE ORDERS (OUTPATIENT)
Dept: LAB | Facility: HOSPITAL | Age: 61
End: 2021-06-22

## 2021-06-22 ENCOUNTER — HOSPITAL ENCOUNTER (OUTPATIENT)
Dept: PHYSICAL THERAPY | Facility: HOSPITAL | Age: 61
Setting detail: THERAPIES SERIES
Discharge: HOME | End: 2021-06-22

## 2021-06-22 ENCOUNTER — LAB (OUTPATIENT)
Dept: LAB | Facility: HOSPITAL | Age: 61
End: 2021-06-22

## 2021-06-22 DIAGNOSIS — T84.51XD INFLAMMATORY REACTION DUE TO INTERNAL PROSTHESIS OF RIGHT HIP, SUBSEQUENT ENCOUNTER: ICD-10-CM

## 2021-06-22 DIAGNOSIS — S71.001D OPEN WOUND OF RIGHT HIP, SUBSEQUENT ENCOUNTER: ICD-10-CM

## 2021-06-22 DIAGNOSIS — T84.51XD INFLAMMATORY REACTION DUE TO INTERNAL PROSTHESIS OF RIGHT HIP, SUBSEQUENT ENCOUNTER: Primary | ICD-10-CM

## 2021-06-22 DIAGNOSIS — Z96.641 STATUS POST TOTAL HIP REPLACEMENT, RIGHT: Primary | ICD-10-CM

## 2021-06-22 LAB
ALBUMIN SERPL-MCNC: 3.2 G/DL (ref 3.5–5.2)
ALBUMIN/GLOB SERPL: 0.8 G/DL
ALP SERPL-CCNC: 86 U/L (ref 39–117)
ALT SERPL W P-5'-P-CCNC: 11 U/L (ref 1–33)
ANION GAP SERPL CALCULATED.3IONS-SCNC: 14 MMOL/L (ref 5–15)
AST SERPL-CCNC: 10 U/L (ref 1–32)
BASOPHILS # BLD AUTO: 0.09 10*3/MM3 (ref 0–0.2)
BASOPHILS NFR BLD AUTO: 0.9 % (ref 0–1.5)
BILIRUB SERPL-MCNC: <0.2 MG/DL (ref 0–1.2)
BUN SERPL-MCNC: 7 MG/DL (ref 6–20)
BUN/CREAT SERPL: 11.1 (ref 7–25)
CALCIUM SPEC-SCNC: 9.3 MG/DL (ref 8.6–10.5)
CHLORIDE SERPL-SCNC: 92 MMOL/L (ref 98–107)
CK SERPL-CCNC: 33 U/L (ref 20–180)
CO2 SERPL-SCNC: 22 MMOL/L (ref 22–29)
CREAT SERPL-MCNC: 0.63 MG/DL (ref 0.57–1)
CRP SERPL-MCNC: 13.05 MG/DL (ref 0–0.5)
DEPRECATED RDW RBC AUTO: 60.7 FL (ref 37–54)
EOSINOPHIL # BLD AUTO: 0.36 10*3/MM3 (ref 0–0.4)
EOSINOPHIL NFR BLD AUTO: 3.8 % (ref 0.3–6.2)
ERYTHROCYTE [DISTWIDTH] IN BLOOD BY AUTOMATED COUNT: 17.5 % (ref 12.3–15.4)
ERYTHROCYTE [SEDIMENTATION RATE] IN BLOOD: 71 MM/HR (ref 0–30)
GFR SERPL CREATININE-BSD FRML MDRD: 97 ML/MIN/1.73
GLOBULIN UR ELPH-MCNC: 3.8 GM/DL
GLUCOSE SERPL-MCNC: 105 MG/DL (ref 65–99)
HCT VFR BLD AUTO: 34.4 % (ref 34–46.6)
HGB BLD-MCNC: 10.9 G/DL (ref 12–15.9)
IMM GRANULOCYTES # BLD AUTO: 0.05 10*3/MM3 (ref 0–0.05)
IMM GRANULOCYTES NFR BLD AUTO: 0.5 % (ref 0–0.5)
LYMPHOCYTES # BLD AUTO: 0.78 10*3/MM3 (ref 0.7–3.1)
LYMPHOCYTES NFR BLD AUTO: 8.2 % (ref 19.6–45.3)
MCH RBC QN AUTO: 29.6 PG (ref 26.6–33)
MCHC RBC AUTO-ENTMCNC: 31.7 G/DL (ref 31.5–35.7)
MCV RBC AUTO: 93.5 FL (ref 79–97)
MONOCYTES # BLD AUTO: 0.35 10*3/MM3 (ref 0.1–0.9)
MONOCYTES NFR BLD AUTO: 3.7 % (ref 5–12)
NEUTROPHILS NFR BLD AUTO: 7.91 10*3/MM3 (ref 1.7–7)
NEUTROPHILS NFR BLD AUTO: 82.9 % (ref 42.7–76)
NRBC BLD AUTO-RTO: 0 /100 WBC (ref 0–0.2)
PLATELET # BLD AUTO: 551 10*3/MM3 (ref 140–450)
PMV BLD AUTO: 9.6 FL (ref 6–12)
POTASSIUM SERPL-SCNC: 3.9 MMOL/L (ref 3.5–5.2)
PROT SERPL-MCNC: 7 G/DL (ref 6–8.5)
RBC # BLD AUTO: 3.68 10*6/MM3 (ref 3.77–5.28)
SODIUM SERPL-SCNC: 128 MMOL/L (ref 136–145)
WBC # BLD AUTO: 9.54 10*3/MM3 (ref 3.4–10.8)

## 2021-06-22 PROCEDURE — 85025 COMPLETE CBC W/AUTO DIFF WBC: CPT

## 2021-06-22 PROCEDURE — 82550 ASSAY OF CK (CPK): CPT

## 2021-06-22 PROCEDURE — 97605 NEG PRS WND THER DME<=50SQCM: CPT

## 2021-06-22 PROCEDURE — 86140 C-REACTIVE PROTEIN: CPT

## 2021-06-22 PROCEDURE — 85652 RBC SED RATE AUTOMATED: CPT

## 2021-06-22 PROCEDURE — 80053 COMPREHEN METABOLIC PANEL: CPT

## 2021-06-22 PROCEDURE — 97597 DBRDMT OPN WND 1ST 20 CM/<: CPT

## 2021-06-22 PROCEDURE — 36415 COLL VENOUS BLD VENIPUNCTURE: CPT

## 2021-06-23 ENCOUNTER — READMISSION MANAGEMENT (OUTPATIENT)
Dept: CALL CENTER | Facility: HOSPITAL | Age: 61
End: 2021-06-23

## 2021-06-23 NOTE — THERAPY WOUND CARE TREATMENT
Outpatient Rehabilitation - Wound/Debridement Treatment Note   [place]     Patient Name: Jessica Winston  : 1960  MRN: [id number]  Today's Date: 2021                 Admit Date: 2021    Visit Dx:    ICD-10-CM ICD-9-CM   1. S/P right hip superficial irrigation and debridement arthrocentesis  Z96.641 V43.64   2. Open wound of right hip, subsequent encounter  S71.001D V58.89     890.0       Patient Active Problem List   Diagnosis   • RUQ pain   • Cardiac mass   • PVD (peripheral vascular disease) with claudication (CMS/HCC)   • Tobacco dependence   • Pain in right hip   • HTN (hypertension)   • Rheumatoid arthritis (CMS/HCC)   • S/P right hip superficial irrigation and debridement arthrocentesis   • Acute blood loss anemia   • Postoperative pain   • Infection   • Hyponatremia, mild        Past Medical History:   Diagnosis Date   • Avascular necrosis (CMS/HCC)    • Depression    • Fractures    • HTN (hypertension)    • Hyperglycemia    • Hypertension    • Osteoarthritis    • RA (rheumatoid arthritis) (CMS/HCC)     ALSO REPORTS OA   • Tooth loose     top left    • Vitamin D deficiency    • Wears glasses     readers        Past Surgical History:   Procedure Laterality Date   • ARM TENDON REPAIR Left    • BREAST SURGERY     • ENDOSCOPY N/A 3/30/2017    Procedure: ESOPHAGOGASTRODUODENOSCOPY WITH COLD FORCEP BIOPSY;  Surgeon: Anca Trevino MD;  Location: [facility] ENDOSCOPY;  Service:    • FEMUR SURGERY      right    • HERNIA REPAIR Bilateral     INGUINAL- unsure about mesh    • INCISION AND DRAINAGE HIP N/A 2021    Procedure: HIP SUPERFICIAL IRRIGATION AND DEBRIDEMENT, ARTHOCENTESIS RIGHT;  Surgeon: Riaz Man MD;  Location: [facility];  Service: Orthopedics;  Laterality: N/A;   • KNEE SURGERY Left    • MANDIBLE SURGERY     • TOTAL HIP ARTHROPLASTY REVISION Right 4/15/2021    Procedure: COVERSION TO RIGHT TOTAL HIP ARTHROPLASTY;  Surgeon: Riaz Man MD;  Location:   [facility];  Service: Orthopedics;  Laterality: Right;   • TUBAL ABDOMINAL LIGATION     • WISDOM TOOTH EXTRACTION           EVALUATION  PT Ortho     Row Name 06/22/21 1345       Subjective Comments    Subjective Comments  Pt with mild c/o pain, improved from last visit.  -MF       Subjective Pain    Able to rate subjective pain?  yes  -MF    Pre-Treatment Pain Level  4  -MF    Post-Treatment Pain Level  4  -MF       Transfers    Chair-Bed Baker (Transfers)  contact guard  -MF    Sit-Stand Baker (Transfers)  supervision  -MF    Comment (Transfers)  supine on stretcher  -MF       Gait/Stairs (Locomotion)    Baker Level (Gait)  supervision  -MF    Assistive Device (Gait)  walker, front-wheeled  -MF      User Key  (r) = Recorded By, (t) = Taken By, (c) = Cosigned By    Initials Name Provider Type    MF Jimy Hernandez, PT Physical Therapist          LDA Wound     Row Name 06/22/21 1345             Wound 04/15/21 Right anterior hip Incision    Wound - Properties Group Placement Date: 04/15/21  -KR Present on Hospital Admission: N  -KR Side: Right  -KR Orientation: anterior  -KR Location: hip  -KR Primary Wound Type: Incision  -KR    Dressing Appearance  intact;moist drainage  -MF      Closure  Surface sutures sutures removed today per MD request.   -MF      Base  moist;pink;red;slough;yellow;subcutaneous  -MF      Periwound  intact;dry  -MF      Periwound Temperature  warm  -MF      Periwound Skin Turgor  soft  -MF      Edges  irregular  -MF      Wound Length (cm)  6 cm  -MF      Wound Width (cm)  2.8 cm  -MF      Wound Depth (cm)  0.5 cm  -MF      Drainage Characteristics/Odor  serosanguineous  -MF      Drainage Amount  small  -MF      Care, Wound  irrigated with;sterile normal saline;debrided;negative pressure wound therapy  -MF      Dressing Care  dressing changed  -MF      Periwound Care  barrier ointment applied  -MF      Wound Output (mL)  25  -MF      Retired Wound - Properties Group Date first  assessed: 04/15/21  -KR Present on Hospital Admission: N  -KR Side: Right  -KR Location: hip  -KR Primary Wound Type: Incision  -KR       NPWT (Negative Pressure Wound Therapy) 06/02/21 1200 R hip     NPWT (Negative Pressure Wound Therapy) - Properties Group Placement Date: 06/02/21  -MF Placement Time: 1200  -MF Location: R hip   -MF    Therapy Setting  continuous therapy  -MF      Dressing  foam, black  -MF      Pressure Setting  125 mmHg  -MF      Sponges Inserted  1  -MF      Sponges Removed  1;other (see comments) 1 black 1 xeroform  -MF      Finger sweep complete  Yes  -MF      Retired NPWT (Negative Pressure Wound Therapy) - Properties Group Placement Date: 06/02/21  -MF Placement Time: 1200  -MF Location: R hip   -MF      User Key  (r) = Recorded By, (t) = Taken By, (c) = Cosigned By    Initials Name Provider Type    MF Jimy Hernandez, PT Physical Therapist    Jimy Rodriguez, PT Physical Therapist    Cheyenne Richmond RN Registered Nurse            WOUND DEBRIDEMENT  Total area of Debridement: ~3cm2  Debridement Site 1  Location- Site 1: R hip wound  Selective Debridement- Site 1: Wound Surface <20cmsq  Instruments- Site 1: tweezers, scissors  Excised Tissue Description- Site 1: minimum, slough, necrotic, adipose  Bleeding- Site 1: none                 Recommendation and Plan  PT Assessment/Plan     Row Name 06/22/21 3045          PT Assessment    Functional Limitations  Performance in work activities;Performance in self-care ADL;Limitation in home management wound care  -MF     Impairments  Gait;Pain;Integumentary integrity  -MF     Assessment Comments  Pt with no significant change in length and width of wound noted, but depth decreasing quickly with good granulation formation noted today. PT able to debride a good amount of slough and necrotic adipose from wound base today to help expose more granulation to further improve healing potential.  Pt will cont to benefit from wound vac and  debridement 1 x/week with family to change / manage wound vac at home.   -        PT Plan    PT Frequency  1x/week  -     Physical Therapy Interventions (Optional Details)  wound care;patient/family education  -     PT Plan Comments  wound vac with debridement prn.   -       User Key  (r) = Recorded By, (t) = Taken By, (c) = Cosigned By    Initials Name Provider Type    Jimy Rodriguez, PT Physical Therapist          Goals  PT OP Goals     Row Name 06/22/21 1345          Time Calculation    PT Goal Re-Cert Due Date  09/06/21  -       User Key  (r) = Recorded By, (t) = Taken By, (c) = Cosigned By    Initials Name Provider Type    Jimy Rodriguez, PT Physical Therapist          PT Goal Re-Cert Due Date: 09/06/21            Time Calculation: Start Time: 1345  Untimed Charges  49631-Caiomwxzg debridement: 20  04066-Zgc Pressure wound to 50 sqcm: 25  Total Minutes  Untimed Charges Total Minutes: 45   Total Minutes: 45  Therapy Charges for Today     Code Description Service Date Service Provider Modifiers Qty    95629780869 HC PT NEG PRESS WOUND TO 50SQCM DME2 6/22/2021 Jimy Hernandez, PT GP 1    [id number] HC CASI DEBRIDE OPEN WOUND UP TO 20CM 6/22/2021 Jimy Hernandez, PT GP 1                  Jimy Hernandez, PT  6/23/2021

## 2021-06-23 NOTE — OUTREACH NOTE
General Surgery Week 2 Survey      Responses   [facility] patient discharged from?  [place]   Does the patient have one of the following disease processes/diagnoses(primary or secondary)?  General Surgery   Week 2 attempt successful?  Yes   Call start time  1059   Call end time  1103   Discharge diagnosis   Post-op right hip wound infectionright hip superficial irrigation and debridedment   Medication alerts for this patient  PICC line for IV antibx   Meds reviewed with patient/caregiver?  Yes   Is the patient taking all medications as directed (includes completed medication regime)?  Yes   Has the patient kept scheduled appointments due by today?  Yes   Comments  Has seen the ID doctor   Home health comments  Pt has seen ID and her daughter is a nurse and helps with her care. Wound vac is in place    Has all DME been delivered?  No   Psychosocial issues?  No   What is the patient's perception of their health status since discharge?  Improving   Nursing interventions  Nurse provided patient education   Is the patient /caregiver able to teach back basic post-op care?  Keep incision areas clean,dry and protected   Is the patient/caregiver able to teach back signs and symptoms of incisional infection?  Increased redness, swelling or pain at the incisonal site, Fever   Is the patient/caregiver able to teach back steps to recovery at home?  Rest and rebuild strength, gradually increase activity   Is the patient/caregiver able to teach back the hierarchy of who to call/visit for symptoms/problems? PCP, Specialist, Home health nurse, Urgent Care, ED, 911  Yes   Additional teach back comments  Wound vac still in place   Week 2 call completed?  Yes   Wrap up additional comments  STates  that her daughter is a nurse and she is taking good care of her.           Sue Zavala RN

## 2021-06-29 ENCOUNTER — TRANSCRIBE ORDERS (OUTPATIENT)
Dept: LAB | Facility: HOSPITAL | Age: 61
End: 2021-06-29

## 2021-06-29 ENCOUNTER — LAB (OUTPATIENT)
Dept: LAB | Facility: HOSPITAL | Age: 61
End: 2021-06-29

## 2021-06-29 DIAGNOSIS — T84.51XD INFLAMMATORY REACTION DUE TO INTERNAL PROSTHESIS OF RIGHT HIP, SUBSEQUENT ENCOUNTER: Primary | ICD-10-CM

## 2021-06-29 LAB
ALBUMIN SERPL-MCNC: 3.4 G/DL (ref 3.5–5.2)
ALBUMIN/GLOB SERPL: 1 G/DL
ALP SERPL-CCNC: 87 U/L (ref 39–117)
ALT SERPL W P-5'-P-CCNC: 8 U/L (ref 1–33)
ANION GAP SERPL CALCULATED.3IONS-SCNC: 11 MMOL/L (ref 5–15)
AST SERPL-CCNC: 13 U/L (ref 1–32)
BASOPHILS # BLD AUTO: 0.11 10*3/MM3 (ref 0–0.2)
BASOPHILS NFR BLD AUTO: 1.1 % (ref 0–1.5)
BILIRUB SERPL-MCNC: <0.2 MG/DL (ref 0–1.2)
BUN SERPL-MCNC: 9 MG/DL (ref 6–20)
BUN/CREAT SERPL: 11.8 (ref 7–25)
CALCIUM SPEC-SCNC: 9.2 MG/DL (ref 8.6–10.5)
CHLORIDE SERPL-SCNC: 96 MMOL/L (ref 98–107)
CK SERPL-CCNC: 55 U/L (ref 20–180)
CO2 SERPL-SCNC: 23 MMOL/L (ref 22–29)
CREAT SERPL-MCNC: 0.76 MG/DL (ref 0.57–1)
CRP SERPL-MCNC: 2.04 MG/DL (ref 0–0.5)
DEPRECATED RDW RBC AUTO: 62 FL (ref 37–54)
EOSINOPHIL # BLD AUTO: 0.11 10*3/MM3 (ref 0–0.4)
EOSINOPHIL NFR BLD AUTO: 1.1 % (ref 0.3–6.2)
ERYTHROCYTE [DISTWIDTH] IN BLOOD BY AUTOMATED COUNT: 17.8 % (ref 12.3–15.4)
ERYTHROCYTE [SEDIMENTATION RATE] IN BLOOD: 49 MM/HR (ref 0–30)
GFR SERPL CREATININE-BSD FRML MDRD: 78 ML/MIN/1.73
GLOBULIN UR ELPH-MCNC: 3.5 GM/DL
GLUCOSE SERPL-MCNC: 129 MG/DL (ref 65–99)
HCT VFR BLD AUTO: 34.5 % (ref 34–46.6)
HGB BLD-MCNC: 11.1 G/DL (ref 12–15.9)
IMM GRANULOCYTES # BLD AUTO: 0.08 10*3/MM3 (ref 0–0.05)
IMM GRANULOCYTES NFR BLD AUTO: 0.8 % (ref 0–0.5)
LYMPHOCYTES # BLD AUTO: 1.05 10*3/MM3 (ref 0.7–3.1)
LYMPHOCYTES NFR BLD AUTO: 10.7 % (ref 19.6–45.3)
MCH RBC QN AUTO: 30.1 PG (ref 26.6–33)
MCHC RBC AUTO-ENTMCNC: 32.2 G/DL (ref 31.5–35.7)
MCV RBC AUTO: 93.5 FL (ref 79–97)
MONOCYTES # BLD AUTO: 0.28 10*3/MM3 (ref 0.1–0.9)
MONOCYTES NFR BLD AUTO: 2.8 % (ref 5–12)
NEUTROPHILS NFR BLD AUTO: 8.21 10*3/MM3 (ref 1.7–7)
NEUTROPHILS NFR BLD AUTO: 83.5 % (ref 42.7–76)
NRBC BLD AUTO-RTO: 0 /100 WBC (ref 0–0.2)
PLATELET # BLD AUTO: 549 10*3/MM3 (ref 140–450)
PMV BLD AUTO: 9.4 FL (ref 6–12)
POTASSIUM SERPL-SCNC: 4.4 MMOL/L (ref 3.5–5.2)
PROT SERPL-MCNC: 6.9 G/DL (ref 6–8.5)
RBC # BLD AUTO: 3.69 10*6/MM3 (ref 3.77–5.28)
SODIUM SERPL-SCNC: 130 MMOL/L (ref 136–145)
WBC # BLD AUTO: 9.84 10*3/MM3 (ref 3.4–10.8)

## 2021-06-29 PROCEDURE — 85652 RBC SED RATE AUTOMATED: CPT

## 2021-06-29 PROCEDURE — 82550 ASSAY OF CK (CPK): CPT

## 2021-06-29 PROCEDURE — 86140 C-REACTIVE PROTEIN: CPT

## 2021-06-29 PROCEDURE — 80053 COMPREHEN METABOLIC PANEL: CPT

## 2021-06-29 PROCEDURE — 36415 COLL VENOUS BLD VENIPUNCTURE: CPT

## 2021-06-29 PROCEDURE — 85025 COMPLETE CBC W/AUTO DIFF WBC: CPT

## 2021-06-30 ENCOUNTER — READMISSION MANAGEMENT (OUTPATIENT)
Dept: CALL CENTER | Facility: HOSPITAL | Age: 61
End: 2021-06-30

## 2021-06-30 NOTE — OUTREACH NOTE
General Surgery Week 3 Survey      Responses   [facility] patient discharged from?  [place]   Does the patient have one of the following disease processes/diagnoses(primary or secondary)?  General Surgery   Week 3 attempt successful?  Yes   Call start time  0821   Call end time  0826   Discharge diagnosis   Post-op right hip wound infection,right hip superficial irrigation and debridedment   Is patient permission given to speak with other caregiver?  Yes   List who call center can speak with  Gilson Vora   Medication alerts for this patient  Continues to have PICC line. Patient reports that it was going to be dc'd yesterday, but with plan for skin graft surgery next week,  decided to leave in place for now.    Meds reviewed with patient/caregiver?  Yes   Does the patient have all medications related to this admission filled (includes all antibiotics, pain medications, etc.)  Yes   Is the patient taking all medications as directed (includes completed medication regime)?  Yes   Does the patient have a follow up appointment scheduled with their surgeon?  Yes   Has the patient kept scheduled appointments due by today?  Yes   Has home health visited the patient within 72 hours of discharge?  N/A   Home health comments  Daughter is a nurse and performs wound vac changes.    DME comments  continued wound vac.   Psychosocial issues?  No   Did the patient receive a copy of their discharge instructions?  Yes   Nursing interventions  Reviewed instructions with patient   What is the patient's perception of their health status since discharge?  Improving   Is the patient/caregiver able to teach back signs and symptoms of incisional infection?  Fever, Increased redness, swelling or pain at the incisonal site   Is the patient/caregiver able to teach back steps to recovery at home?  Set small, achievable goals for return to baseline health, Rest and rebuild strength, gradually increase activity   Is the  patient/caregiver able to teach back the hierarchy of who to call/visit for symptoms/problems? PCP, Specialist, Home health nurse, Urgent Care, ED, 911  Yes   Week 3 call completed?  Yes          Sole Arambula RN

## 2021-07-01 ENCOUNTER — APPOINTMENT (OUTPATIENT)
Dept: PHYSICAL THERAPY | Facility: HOSPITAL | Age: 61
End: 2021-07-01
Payer: COMMERCIAL

## 2021-07-02 ENCOUNTER — OFFICE VISIT (OUTPATIENT)
Dept: CARDIOLOGY | Facility: CLINIC | Age: 61
End: 2021-07-02

## 2021-07-02 VITALS
DIASTOLIC BLOOD PRESSURE: 68 MMHG | WEIGHT: 115.9 LBS | HEART RATE: 100 BPM | BODY MASS INDEX: 19.31 KG/M2 | HEIGHT: 65 IN | OXYGEN SATURATION: 94 % | SYSTOLIC BLOOD PRESSURE: 122 MMHG

## 2021-07-02 DIAGNOSIS — R06.09 DYSPNEA ON EXERTION: Primary | ICD-10-CM

## 2021-07-02 DIAGNOSIS — I51.89 CARDIAC MASS: ICD-10-CM

## 2021-07-02 DIAGNOSIS — F17.200 TOBACCO DEPENDENCE: ICD-10-CM

## 2021-07-02 PROCEDURE — 99213 OFFICE O/P EST LOW 20 MIN: CPT | Performed by: INTERNAL MEDICINE

## 2021-07-02 RX ORDER — MELOXICAM 15 MG/1
15 TABLET ORAL DAILY
COMMUNITY
End: 2021-07-06

## 2021-07-02 NOTE — PROGRESS NOTES
[facility] Cardiology  [address], [address]  [place], [place], [address]    [phone]  [email or web address]           OUTPATIENT CLINIC PROGRESS NOTE    Patient care team:  Patient Care Team:  Iraida Demarco APRN as PCP - General (Family Medicine)  Anca Trevino MD as Surgeon (General Surgery)    Subjective:   Chief complaint:   Chief Complaint   Patient presents with   • Dyspnea on exertion       HPI:    Jessica Winston is a 60 y.o. female.  Partial problem list, including cardiac problems:  1. Cardiac mass  a. Incidentally noted on TTE 1/15/2021 at [facility].  2. Dyspnea, anginal equivalent  a. Long smoking history, family history of father having MI in his 50s or 60s  3. Hypertension  4. Hyperlipidemia  5. Right hip fracture early 2020  a. Status post repair early 2020  b. Fall 3/2021, mechanical in nature, resulted in mild rhabdomyolysis and CHRISTINE  c. Corrective surgery complicated by infection, 4/2021.  Repeat surgery in 6/2021.  Plans for skin grafting in 7/2021  6. Rheumatoid arthritis  7. Anxiety/depression  8. Tobacco dependence    Today the patient presents for follow-up    1. Chronic dyspnea: Chronic stable dyspnea, still smoking.  No associated chest pain  2. Incidental cardiac mass: Denies signs/symptoms of heart failure.  Denies palpitations  3. Right lower extremity pains: Pain seem most related to her hip pain and not clearly claudication    Review of Systems:  Positive for dyspnea, hip pain    PFSH:  Patient Active Problem List   Diagnosis   • RUQ pain   • Cardiac mass   • PVD (peripheral vascular disease) with claudication (CMS/HCC)   • Tobacco dependence   • Pain in right hip   • HTN (hypertension)   • Rheumatoid arthritis (CMS/HCC)   • S/P right hip superficial irrigation and debridement arthrocentesis   • Acute blood loss anemia   • Postoperative pain   • Infection   • Hyponatremia, mild         Current Outpatient Medications:   •   aspirin 81 MG EC tablet, Take 1 tablet by mouth Every 12 (Twelve) Hours for 42 days., Disp: 84 tablet, Rfl: 0  •  buPROPion SR (WELLBUTRIN SR) 150 MG 12 hr tablet, Take 150 mg by mouth Daily., Disp: , Rfl:   •  cyanocobalamin 1000 MCG/ML injection, Inject  into the appropriate muscle as directed by prescriber Every 30 (Thirty) Days. Once per month- usually first of month, Disp: , Rfl:   •  DAPTOMYCIN IVPB 500 MG, Infuse 500 mg into a venous catheter Daily., Disp: , Rfl:   •  docusate sodium (Colace) 100 MG capsule, Take 1 capsule by mouth 2 (Two) Times a Day., Disp: , Rfl:   •  lisinopril (PRINIVIL,ZESTRIL) 10 MG tablet, Take 10 mg by mouth Daily., Disp: , Rfl:   •  meloxicam (MOBIC) 15 MG tablet, Take 15 mg by mouth Daily., Disp: , Rfl:   •  ondansetron (Zofran) 4 MG tablet, Take 1 tablet by mouth Every 8 (Eight) Hours As Needed for Nausea or Vomiting., Disp: , Rfl:   •  oxyCODONE (Roxicodone) 5 MG immediate release tablet, Take 1 tablet by mouth Every 4 (Four) Hours As Needed for Moderate Pain ., Disp: , Rfl: 0  •  predniSONE (DELTASONE) 10 MG tablet, Take 10 mg by mouth Daily., Disp: , Rfl:   •  rOPINIRole (REQUIP) 0.5 MG tablet, Take 0.5 mg by mouth Every Night. Take 1 hour before bedtime., Disp: , Rfl:   •  traMADol (ULTRAM) 50 MG tablet, Take 1 tablet by mouth Every 6 (Six) Hours As Needed for Moderate Pain ., Disp: , Rfl:   •  vitamin D (ERGOCALCIFEROL) 1.25 MG (50390 UT) capsule capsule, Take 50,000 Units by mouth 1 (One) Time Per Week. Tuesday, Disp: , Rfl:     Allergies   Allergen Reactions   • Clindamycin/Lincomycin Anaphylaxis and Swelling     took whole dose and then throat started to swell -    • Penicillins Anaphylaxis and Swelling     Tolerated cefazolin 4/15/21   • Codeine Swelling       Social History     Socioeconomic History   • Marital status:      Spouse name: Not on file   • Number of children: Not on file   • Years of education: Not on file   • Highest education level: Not on file  "  Tobacco Use   • Smoking status: Current Every Day Smoker     Packs/day: 0.50     Years: 30.00     Pack years: 15.00     Types: Cigarettes   • Smokeless tobacco: Never Used   • Tobacco comment: HX OF SMOKING 1 PPD FOR THE PAST 30 YEARS    Vaping Use   • Vaping Use: Never used   Substance and Sexual Activity   • Alcohol use: No   • Drug use: No   • Sexual activity: Defer     Family History   Problem Relation Age of Onset   • Arthritis Mother    • Cancer Mother         leukemia   • Hypertension Mother    • Heart disease Father         heart attack   • Hypertension Father    • Migraines Daughter    • Cancer Maternal Grandmother         uterus         Objective:   Physical Exam:  /68   Pulse 100   Ht 165.1 cm (65\")   Wt 52.6 kg (115 lb 14.4 oz)   LMP 08/01/2010 (Approximate)   SpO2 94%   BMI 19.29 kg/m²   CONSTITUTIONAL: Well-nourished. In no acute distress.   LUNGS: Normal effort.  No wheeze noted today.  Clear to auscultation without rales or rhonchi  CARDIOVASCULAR: Regular rate and rhythm with a normal S1 and S2. There is no murmur, gallop, rub, or click appreciated.  Normal radial pulse.There is no peripheral edema.     4/2021 exam: 2+ right PT pulse, nonpalpable right DP pulse  3/2021 exam: Nonpalpable right pedal pulses, nonpalpable left PT, 2+ left DP    Labs:  BUN   Date Value Ref Range Status   06/29/2021 9 6 - 20 mg/dL Final     Creatinine   Date Value Ref Range Status   06/29/2021 0.76 0.57 - 1.00 mg/dL Final     Potassium   Date Value Ref Range Status   06/29/2021 4.4 3.5 - 5.2 mmol/L Final     ALT (SGPT)   Date Value Ref Range Status   06/29/2021 8 1 - 33 U/L Final     AST (SGOT)   Date Value Ref Range Status   06/29/2021 13 1 - 32 U/L Final     WBC   Date Value Ref Range Status   06/29/2021 9.84 3.40 - 10.80 10*3/mm3 Final   03/05/2021 17.0 (H) 4.0 - 11.0 K/uL Final     Hemoglobin   Date Value Ref Range Status   06/29/2021 11.1 (L) 12.0 - 15.9 g/dL Final   03/05/2021 10.8 (L) 11.5 - 16.5 g/dL " Final     Hematocrit   Date Value Ref Range Status   06/29/2021 34.5 34.0 - 46.6 % Final   03/05/2021 31.2 (L) 37.0 - 47.0 % Final     Platelets   Date Value Ref Range Status   06/29/2021 549 (H) 140 - 450 10*3/mm3 Final   03/05/2021 456 (H) 150 - 400 K/uL Final       No results found for: CHOL  Lab Results   Component Value Date    TRIG 85 08/12/2019     Lab Results   Component Value Date    HDL 87 (H) 08/12/2019     Lab Results   Component Value Date     (H) 08/12/2019     No components found for: LDLDIRECTC    Diagnostic Data:    Procedures    TTE 1/15/2021  -Normal left ventricular systolic function with an estimated ejection fraction of 65%.  There is grade 1A diastolic dysfunction present.  -The intra-atrial septum has evidence of significant hypertrophy.  May represent simple lipomatous hypertrophy but cannot rule out a benign or malignant neoplasm.  A cardiac MRI and/or transesophageal echocardiogram could be considered to further evaluate.  There is moderate mitral annular calcification present.      Assessment and Plan:     Cardiac mass  Ectopic heartbeats on exam  -Possible lipomatous hypertrophy of the septum versus lipoma versus other.  Discussed with the patient again that if this is a benign tumor/tissue hypertrophy, will need to monitor size as it may affect other structures in the heart and/or electrical conduction  -We will pursue further work-up after she recovers from her hip operations.  Scheduled for skin grafting next week.  Still has a PICC line.  -We will order a cardiac MRI and 14-day heart monitor to rule out concomitant arrhythmia at her next follow-up  -If mass size is stable, will repeat echo in 1 year    Leg pain  -Has palpable pulses bilaterally.  Will defer on further work-up for now.  Clinical monitoring.  If with claudication-like symptoms post operatively, will consider noninvasive diagnostic work-up    Tobacco dependence  -Strongly advised the patient to stop  smoking      - Return in about 3 months (around 10/2/2021) for Next scheduled follow up.    Jimy Davis MD, MSc, FACC, [facility]  Interventional Cardiology  [facility]

## 2021-07-05 LAB — FUNGUS WND CULT: ABNORMAL

## 2021-07-06 ENCOUNTER — PRE-ADMISSION TESTING (OUTPATIENT)
Dept: PREADMISSION TESTING | Facility: HOSPITAL | Age: 61
End: 2021-07-06

## 2021-07-06 ENCOUNTER — HOSPITAL ENCOUNTER (OUTPATIENT)
Dept: PHYSICAL THERAPY | Facility: HOSPITAL | Age: 61
Setting detail: THERAPIES SERIES
Discharge: HOME | End: 2021-07-06

## 2021-07-06 ENCOUNTER — APPOINTMENT (OUTPATIENT)
Dept: PHYSICAL THERAPY | Facility: HOSPITAL | Age: 61
End: 2021-07-06
Payer: COMMERCIAL

## 2021-07-06 ENCOUNTER — LAB (OUTPATIENT)
Dept: LAB | Facility: HOSPITAL | Age: 61
End: 2021-07-06

## 2021-07-06 ENCOUNTER — HOSPITAL ENCOUNTER (OUTPATIENT)
Dept: GENERAL RADIOLOGY | Facility: HOSPITAL | Age: 61
Discharge: HOME | End: 2021-07-06
Admitting: ORTHOPAEDIC SURGERY

## 2021-07-06 ENCOUNTER — TRANSCRIBE ORDERS (OUTPATIENT)
Dept: LAB | Facility: HOSPITAL | Age: 61
End: 2021-07-06

## 2021-07-06 VITALS — WEIGHT: 118 LBS | HEIGHT: 65 IN | BODY MASS INDEX: 19.66 KG/M2

## 2021-07-06 DIAGNOSIS — Z96.641 STATUS POST TOTAL HIP REPLACEMENT, RIGHT: Primary | ICD-10-CM

## 2021-07-06 DIAGNOSIS — T84.51XD INFLAMMATORY REACTION DUE TO INTERNAL PROSTHESIS OF RIGHT HIP, SUBSEQUENT ENCOUNTER: Primary | ICD-10-CM

## 2021-07-06 DIAGNOSIS — T84.51XD INFLAMMATORY REACTION DUE TO INTERNAL PROSTHESIS OF RIGHT HIP, SUBSEQUENT ENCOUNTER: ICD-10-CM

## 2021-07-06 DIAGNOSIS — S71.001D OPEN WOUND OF RIGHT HIP, SUBSEQUENT ENCOUNTER: ICD-10-CM

## 2021-07-06 LAB
25(OH)D3 SERPL-MCNC: 48.6 NG/ML (ref 30–100)
ALBUMIN SERPL-MCNC: 4 G/DL (ref 3.5–5.2)
ALBUMIN/GLOB SERPL: 1.2 G/DL
ALP SERPL-CCNC: 88 U/L (ref 39–117)
ALT SERPL W P-5'-P-CCNC: 8 U/L (ref 1–33)
ANION GAP SERPL CALCULATED.3IONS-SCNC: 13 MMOL/L (ref 5–15)
APTT PPP: 30.8 SECONDS (ref 22–39)
AST SERPL-CCNC: 13 U/L (ref 1–32)
BASOPHILS # BLD AUTO: 0.13 10*3/MM3 (ref 0–0.2)
BASOPHILS NFR BLD AUTO: 1.4 % (ref 0–1.5)
BILIRUB SERPL-MCNC: 0.2 MG/DL (ref 0–1.2)
BUN SERPL-MCNC: 7 MG/DL (ref 6–20)
BUN/CREAT SERPL: 12.3 (ref 7–25)
CALCIUM SPEC-SCNC: 9.8 MG/DL (ref 8.6–10.5)
CHLORIDE SERPL-SCNC: 98 MMOL/L (ref 98–107)
CK SERPL-CCNC: 52 U/L (ref 20–180)
CO2 SERPL-SCNC: 26 MMOL/L (ref 22–29)
CREAT SERPL-MCNC: 0.57 MG/DL (ref 0.57–1)
CRP SERPL-MCNC: 1.81 MG/DL (ref 0–0.5)
DEPRECATED RDW RBC AUTO: 59.6 FL (ref 37–54)
EOSINOPHIL # BLD AUTO: 0.18 10*3/MM3 (ref 0–0.4)
EOSINOPHIL NFR BLD AUTO: 2 % (ref 0.3–6.2)
ERYTHROCYTE [DISTWIDTH] IN BLOOD BY AUTOMATED COUNT: 17.9 % (ref 12.3–15.4)
ERYTHROCYTE [SEDIMENTATION RATE] IN BLOOD: 39 MM/HR (ref 0–30)
GFR SERPL CREATININE-BSD FRML MDRD: 109 ML/MIN/1.73
GLOBULIN UR ELPH-MCNC: 3.4 GM/DL
GLUCOSE SERPL-MCNC: 110 MG/DL (ref 65–99)
HBA1C MFR BLD: 5.5 % (ref 4.8–5.6)
HCT VFR BLD AUTO: 36.3 % (ref 34–46.6)
HGB BLD-MCNC: 11.9 G/DL (ref 12–15.9)
IMM GRANULOCYTES # BLD AUTO: 0.03 10*3/MM3 (ref 0–0.05)
IMM GRANULOCYTES NFR BLD AUTO: 0.3 % (ref 0–0.5)
INR PPP: 0.88 (ref 0.85–1.16)
LYMPHOCYTES # BLD AUTO: 0.93 10*3/MM3 (ref 0.7–3.1)
LYMPHOCYTES NFR BLD AUTO: 10.3 % (ref 19.6–45.3)
MCH RBC QN AUTO: 30 PG (ref 26.6–33)
MCHC RBC AUTO-ENTMCNC: 32.8 G/DL (ref 31.5–35.7)
MCV RBC AUTO: 91.4 FL (ref 79–97)
MONOCYTES # BLD AUTO: 0.33 10*3/MM3 (ref 0.1–0.9)
MONOCYTES NFR BLD AUTO: 3.7 % (ref 5–12)
MRSA DNA SPEC QL NAA+PROBE: NEGATIVE
NEUTROPHILS NFR BLD AUTO: 7.43 10*3/MM3 (ref 1.7–7)
NEUTROPHILS NFR BLD AUTO: 82.3 % (ref 42.7–76)
NRBC BLD AUTO-RTO: 0 /100 WBC (ref 0–0.2)
PLATELET # BLD AUTO: 492 10*3/MM3 (ref 140–450)
PMV BLD AUTO: 9 FL (ref 6–12)
POTASSIUM SERPL-SCNC: 4 MMOL/L (ref 3.5–5.2)
PROT SERPL-MCNC: 7.4 G/DL (ref 6–8.5)
PROTHROMBIN TIME: 11.7 SECONDS (ref 11.4–14.4)
RBC # BLD AUTO: 3.97 10*6/MM3 (ref 3.77–5.28)
SARS-COV-2 RNA PNL SPEC NAA+PROBE: NOT DETECTED
SODIUM SERPL-SCNC: 137 MMOL/L (ref 136–145)
WBC # BLD AUTO: 9.03 10*3/MM3 (ref 3.4–10.8)

## 2021-07-06 PROCEDURE — 85610 PROTHROMBIN TIME: CPT

## 2021-07-06 PROCEDURE — 80053 COMPREHEN METABOLIC PANEL: CPT

## 2021-07-06 PROCEDURE — 82306 VITAMIN D 25 HYDROXY: CPT

## 2021-07-06 PROCEDURE — 87641 MR-STAPH DNA AMP PROBE: CPT

## 2021-07-06 PROCEDURE — C9803 HOPD COVID-19 SPEC COLLECT: HCPCS

## 2021-07-06 PROCEDURE — 36415 COLL VENOUS BLD VENIPUNCTURE: CPT

## 2021-07-06 PROCEDURE — 97605 NEG PRS WND THER DME<=50SQCM: CPT

## 2021-07-06 PROCEDURE — 85652 RBC SED RATE AUTOMATED: CPT

## 2021-07-06 PROCEDURE — G0480 DRUG TEST DEF 1-7 CLASSES: HCPCS

## 2021-07-06 PROCEDURE — 85730 THROMBOPLASTIN TIME PARTIAL: CPT

## 2021-07-06 PROCEDURE — 85025 COMPLETE CBC W/AUTO DIFF WBC: CPT

## 2021-07-06 PROCEDURE — 83036 HEMOGLOBIN GLYCOSYLATED A1C: CPT

## 2021-07-06 PROCEDURE — U0004 COV-19 TEST NON-CDC HGH THRU: HCPCS

## 2021-07-06 PROCEDURE — 82985 ASSAY OF GLYCATED PROTEIN: CPT

## 2021-07-06 PROCEDURE — 97597 DBRDMT OPN WND 1ST 20 CM/<: CPT

## 2021-07-06 PROCEDURE — 71046 X-RAY EXAM CHEST 2 VIEWS: CPT

## 2021-07-06 PROCEDURE — 86140 C-REACTIVE PROTEIN: CPT

## 2021-07-06 PROCEDURE — 82550 ASSAY OF CK (CPK): CPT

## 2021-07-06 NOTE — THERAPY PROGRESS REPORT/RE-CERT
Outpatient Rehabilitation - Wound/Debridement Progress Note   [place]     Patient Name: Jessica Winston  : 1960  MRN: [id number]  Today's Date: 2021                 Admit Date: (Not on file)    Visit Dx:    ICD-10-CM ICD-9-CM   1. S/P right hip superficial irrigation and debridement arthrocentesis  Z96.641 V43.64   2. Open wound of right hip, subsequent encounter  S71.001D V58.89     890.0       Patient Active Problem List   Diagnosis   • RUQ pain   • Cardiac mass   • PVD (peripheral vascular disease) with claudication (CMS/HCC)   • Tobacco dependence   • Pain in right hip   • HTN (hypertension)   • Rheumatoid arthritis (CMS/HCC)   • S/P right hip superficial irrigation and debridement arthrocentesis   • Acute blood loss anemia   • Postoperative pain   • Infection   • Hyponatremia, mild        Past Medical History:   Diagnosis Date   • Avascular necrosis (CMS/HCC)    • Depression    • Fractures    • HTN (hypertension)    • Hyperglycemia    • Hypertension    • Osteoarthritis    • RA (rheumatoid arthritis) (CMS/HCC)     ALSO REPORTS OA   • Tooth loose     top left    • Vitamin D deficiency    • Wears glasses     readers        Past Surgical History:   Procedure Laterality Date   • ARM TENDON REPAIR Left    • BREAST SURGERY     • ENDOSCOPY N/A 3/30/2017    Procedure: ESOPHAGOGASTRODUODENOSCOPY WITH COLD FORCEP BIOPSY;  Surgeon: Anca Trevino MD;  Location: [facility] ENDOSCOPY;  Service:    • FEMUR SURGERY      right    • HERNIA REPAIR Bilateral     INGUINAL- unsure about mesh    • INCISION AND DRAINAGE HIP N/A 2021    Procedure: HIP SUPERFICIAL IRRIGATION AND DEBRIDEMENT, ARTHOCENTESIS RIGHT;  Surgeon: Riaz Man MD;  Location: [facility];  Service: Orthopedics;  Laterality: N/A;   • KNEE SURGERY Left    • MANDIBLE SURGERY     • TOTAL HIP ARTHROPLASTY REVISION Right 4/15/2021    Procedure: COVERSION TO RIGHT TOTAL HIP ARTHROPLASTY;  Surgeon: Riaz Man MD;  Location:   [facility];  Service: Orthopedics;  Laterality: Right;   • TUBAL ABDOMINAL LIGATION     • WISDOM TOOTH EXTRACTION           EVALUATION  PT Ortho     Row Name 07/06/21 1515       Subjective Comments    Subjective Comments  Pt with mild c/o pain   -MF       Subjective Pain    Able to rate subjective pain?  yes  -MF    Pre-Treatment Pain Level  3  -MF    Post-Treatment Pain Level  3  -MF       Transfers    Chair-Bed Mower (Transfers)  contact guard  -MF    Sit-Stand Mower (Transfers)  supervision  -MF    Comment (Transfers)  supine on stretcher  -MF       Gait/Stairs (Locomotion)    Mower Level (Gait)  supervision  -MF    Assistive Device (Gait)  walker, front-wheeled  -MF      User Key  (r) = Recorded By, (t) = Taken By, (c) = Cosigned By    Initials Name Provider Type    MF Jimy Hernandez, PT Physical Therapist          LDA Wound     Row Name 07/06/21 1515             Wound 04/15/21 Right anterior hip Incision    Wound - Properties Group Placement Date: 04/15/21  -KR Present on Hospital Admission: N  -KR Side: Right  -KR Orientation: anterior  -KR Location: hip  -KR Primary Wound Type: Incision  -KR    Dressing Appearance  intact;moist drainage  -MF      Base  moist;pink;red;slough;yellow;subcutaneous  -MF      Periwound  intact;dry  -MF      Periwound Temperature  warm  -MF      Periwound Skin Turgor  soft  -MF      Edges  irregular  -MF      Wound Length (cm)  6.5 cm  -MF      Wound Width (cm)  3.2 cm  -MF      Wound Depth (cm)  0.1 cm  -MF      Drainage Characteristics/Odor  serosanguineous  -MF      Drainage Amount  scant  -MF      Care, Wound  irrigated with;sterile normal saline;debrided;negative pressure wound therapy  -MF      Dressing Care  dressing changed  -MF      Periwound Care  barrier ointment applied  -      Wound Output (mL)  25  -MF      Retired Wound - Properties Group Date first assessed: 04/15/21  -KR Present on Hospital Admission: N  -KR Side: Right  -KR Location: hip  -KR  Primary Wound Type: Incision  -KR       NPWT (Negative Pressure Wound Therapy) 06/02/21 1200 R hip     NPWT (Negative Pressure Wound Therapy) - Properties Group Placement Date: 06/02/21  -MF Placement Time: 1200  -MF Location: R hip   -MF    Therapy Setting  continuous therapy  -MF      Dressing  foam, black  -MF      Pressure Setting  125 mmHg  -MF      Sponges Inserted  1  -MF      Sponges Removed  1  -MF      Finger sweep complete  Yes  -MF      Retired NPWT (Negative Pressure Wound Therapy) - Properties Group Placement Date: 06/02/21  -MF Placement Time: 1200  -MF Location: R hip   -MF      User Key  (r) = Recorded By, (t) = Taken By, (c) = Cosigned By    Initials Name Provider Type    MF Jimy Hernandez, PT Physical Therapist    Jimy Rodriguez, PT Physical Therapist    Cheyenne Richmond RN Registered Nurse            WOUND DEBRIDEMENT  Total area of Debridement: ~3cm2  Debridement Site 1  Location- Site 1: R hip wound  Selective Debridement- Site 1: Wound Surface <20cmsq  Instruments- Site 1: tweezers, scissors  Excised Tissue Description- Site 1: minimum, slough  Bleeding- Site 1: none                 Recommendation and Plan  PT Assessment/Plan     Row Name 07/06/21 5890          PT Assessment    Functional Limitations  Performance in work activities;Performance in self-care ADL;Limitation in home management  -MF     Impairments  Gait;Pain;Integumentary integrity  -     Assessment Comments  Pt with slightly larger area measurements noted today, but with excellent decrease in wound depth noted. overall, wound progressing well with increasing granulation and only scant slough remaining.  Pt now scheduled for a skin graft in OR on thursday and PT will await recommendations / orders from MD about continuation of wound vac.  no goals met to this point, but pt making excellent progress with therapy.   -MF        PT Plan    PT Frequency  1x/week  -MF     Physical Therapy Interventions (Optional  Details)  wound care;patient/family education  -     PT Plan Comments  cont with wound vac and surgical skin graft thursday  -       User Key  (r) = Recorded By, (t) = Taken By, (c) = Cosigned By    Initials Name Provider Type    Jimy Rodriguez, PT Physical Therapist          Goals  PT OP Goals     Row Name 07/06/21 1515          PT Short Term Goals    STG Date to Achieve  07/23/21  -     STG 1  decrease wound size by 25% as evidence of wound healing  -     STG 1 Progress  Progressing  -     STG 2  increase granulation to > 95% to improve wound healing  -     STG 2 Progress  Partially Met  -        Long Term Goals    LTG Date to Achieve  09/06/21  -     LTG 1  Decrease wound size by 80% as evidence of wound healing  -     LTG 1 Progress  Not Met  -     LTG 2  Pt and family independent with home dressing changes to allow for transition to home management of wound.   -     LTG 2 Progress  Progressing  -        Time Calculation    PT Goal Re-Cert Due Date  09/06/21  -       User Key  (r) = Recorded By, (t) = Taken By, (c) = Cosigned By    Initials Name Provider Type    Jimy Rodriguez, PT Physical Therapist          PT Goal Re-Cert Due Date: 09/06/21  PT Short Term Goals  STG Date to Achieve: 07/23/21  STG 1: decrease wound size by 25% as evidence of wound healing  STG 1 Progress: Progressing  STG 2: increase granulation to > 95% to improve wound healing  STG 2 Progress: Partially Met  Long Term Goals  LTG Date to Achieve: 09/06/21  LTG 1: Decrease wound size by 80% as evidence of wound healing  LTG 1 Progress: Not Met  LTG 2: Pt and family independent with home dressing changes to allow for transition to home management of wound.   LTG 2 Progress: Progressing      Time Calculation: Start Time: 1515  Untimed Charges  35562-Qqevswrha debridement: 10  15313-Cuj Pressure wound to 50 sqcm: 20  Total Minutes  Untimed Charges Total Minutes: 30   Total Minutes: 30  Therapy Charges for  Today     Code Description Service Date Service Provider Modifiers Qty    41960724647 HC CASI DEBRIDE OPEN WOUND UP TO 20CM 7/6/2021 Jimy Hernandez, PT GP 1    20661247573 HC PT NEG PRESS WOUND TO 50SQCM DME2 7/6/2021 Jimy Hernandez, PT GP 1                  Jimy Hernandez, PT  7/6/2021

## 2021-07-07 ENCOUNTER — APPOINTMENT (OUTPATIENT)
Dept: PREADMISSION TESTING | Facility: HOSPITAL | Age: 61
End: 2021-07-07

## 2021-07-07 ENCOUNTER — ANESTHESIA EVENT (OUTPATIENT)
Dept: PERIOP | Facility: HOSPITAL | Age: 61
End: 2021-07-07

## 2021-07-07 LAB — FRUCTOSAMINE SERPL-SCNC: 216 UMOL/L (ref 0–285)

## 2021-07-07 RX ORDER — FAMOTIDINE 10 MG/ML
20 INJECTION, SOLUTION INTRAVENOUS ONCE
Status: CANCELLED | OUTPATIENT
Start: 2021-07-07 | End: 2021-07-07

## 2021-07-07 RX ORDER — SODIUM CHLORIDE 0.9 % (FLUSH) 0.9 %
10 SYRINGE (ML) INJECTION AS NEEDED
Status: CANCELLED | OUTPATIENT
Start: 2021-07-07

## 2021-07-07 RX ORDER — SODIUM CHLORIDE 0.9 % (FLUSH) 0.9 %
10 SYRINGE (ML) INJECTION EVERY 12 HOURS SCHEDULED
Status: CANCELLED | OUTPATIENT
Start: 2021-07-07

## 2021-07-08 ENCOUNTER — ANESTHESIA (OUTPATIENT)
Dept: PERIOP | Facility: HOSPITAL | Age: 61
End: 2021-07-08

## 2021-07-08 ENCOUNTER — HOSPITAL ENCOUNTER (OUTPATIENT)
Facility: HOSPITAL | Age: 61
Setting detail: SURGERY ADMIT
Discharge: HOME | End: 2021-07-08
Attending: ORTHOPAEDIC SURGERY | Admitting: ORTHOPAEDIC SURGERY

## 2021-07-08 ENCOUNTER — APPOINTMENT (OUTPATIENT)
Dept: PHYSICAL THERAPY | Facility: HOSPITAL | Age: 61
End: 2021-07-08
Payer: COMMERCIAL

## 2021-07-08 VITALS
HEART RATE: 61 BPM | HEIGHT: 65 IN | RESPIRATION RATE: 16 BRPM | BODY MASS INDEX: 19.66 KG/M2 | WEIGHT: 118 LBS | SYSTOLIC BLOOD PRESSURE: 174 MMHG | TEMPERATURE: 98 F | OXYGEN SATURATION: 100 % | DIASTOLIC BLOOD PRESSURE: 95 MMHG

## 2021-07-08 PROCEDURE — 25010000002 PROPOFOL 10 MG/ML EMULSION: Performed by: NURSE ANESTHETIST, CERTIFIED REGISTERED

## 2021-07-08 PROCEDURE — 25010000002 NEOSTIGMINE 10 MG/10ML SOLUTION: Performed by: NURSE ANESTHETIST, CERTIFIED REGISTERED

## 2021-07-08 PROCEDURE — 25010000002 DEXAMETHASONE PER 1 MG: Performed by: NURSE ANESTHETIST, CERTIFIED REGISTERED

## 2021-07-08 PROCEDURE — 25010000002 FENTANYL CITRATE (PF) 50 MCG/ML SOLUTION: Performed by: NURSE ANESTHETIST, CERTIFIED REGISTERED

## 2021-07-08 PROCEDURE — 25010000003 LIDOCAINE 1 % SOLUTION: Performed by: NURSE ANESTHETIST, CERTIFIED REGISTERED

## 2021-07-08 PROCEDURE — 25010000002 ONDANSETRON PER 1 MG: Performed by: NURSE ANESTHETIST, CERTIFIED REGISTERED

## 2021-07-08 PROCEDURE — 25010000003 CEFAZOLIN IN DEXTROSE 2-4 GM/100ML-% SOLUTION: Performed by: ORTHOPAEDIC SURGERY

## 2021-07-08 DEVICE — IMPLANTABLE DEVICE: Type: IMPLANTABLE DEVICE | Site: HIP | Status: FUNCTIONAL

## 2021-07-08 DEVICE — GRFT MATRISTEM MICROMATRIX PARTIC 1000MG: Type: IMPLANTABLE DEVICE | Site: HIP | Status: FUNCTIONAL

## 2021-07-08 RX ORDER — PROPOFOL 10 MG/ML
VIAL (ML) INTRAVENOUS AS NEEDED
Status: DISCONTINUED | OUTPATIENT
Start: 2021-07-08 | End: 2021-07-08 | Stop reason: SURG

## 2021-07-08 RX ORDER — FENTANYL CITRATE 50 UG/ML
INJECTION, SOLUTION INTRAMUSCULAR; INTRAVENOUS AS NEEDED
Status: DISCONTINUED | OUTPATIENT
Start: 2021-07-08 | End: 2021-07-08 | Stop reason: SURG

## 2021-07-08 RX ORDER — LIDOCAINE HYDROCHLORIDE 10 MG/ML
INJECTION, SOLUTION INFILTRATION; PERINEURAL AS NEEDED
Status: DISCONTINUED | OUTPATIENT
Start: 2021-07-08 | End: 2021-07-08 | Stop reason: SURG

## 2021-07-08 RX ORDER — ROCURONIUM BROMIDE 10 MG/ML
INJECTION, SOLUTION INTRAVENOUS AS NEEDED
Status: DISCONTINUED | OUTPATIENT
Start: 2021-07-08 | End: 2021-07-08 | Stop reason: SURG

## 2021-07-08 RX ORDER — EPHEDRINE SULFATE 50 MG/ML
5 INJECTION, SOLUTION INTRAVENOUS ONCE AS NEEDED
Status: DISCONTINUED | OUTPATIENT
Start: 2021-07-08 | End: 2021-07-08 | Stop reason: HOSPADM

## 2021-07-08 RX ORDER — ONDANSETRON 2 MG/ML
INJECTION INTRAMUSCULAR; INTRAVENOUS AS NEEDED
Status: DISCONTINUED | OUTPATIENT
Start: 2021-07-08 | End: 2021-07-08 | Stop reason: SURG

## 2021-07-08 RX ORDER — MELOXICAM 15 MG/1
15 TABLET ORAL ONCE
Status: COMPLETED | OUTPATIENT
Start: 2021-07-08 | End: 2021-07-08

## 2021-07-08 RX ORDER — ACETAMINOPHEN 500 MG
1000 TABLET ORAL ONCE
Status: COMPLETED | OUTPATIENT
Start: 2021-07-08 | End: 2021-07-08

## 2021-07-08 RX ORDER — MAGNESIUM HYDROXIDE 1200 MG/15ML
LIQUID ORAL AS NEEDED
Status: DISCONTINUED | OUTPATIENT
Start: 2021-07-08 | End: 2021-07-08 | Stop reason: HOSPADM

## 2021-07-08 RX ORDER — SODIUM CHLORIDE, SODIUM LACTATE, POTASSIUM CHLORIDE, CALCIUM CHLORIDE 600; 310; 30; 20 MG/100ML; MG/100ML; MG/100ML; MG/100ML
9 INJECTION, SOLUTION INTRAVENOUS CONTINUOUS
Status: DISCONTINUED | OUTPATIENT
Start: 2021-07-08 | End: 2021-07-08 | Stop reason: HOSPADM

## 2021-07-08 RX ORDER — NEOSTIGMINE METHYLSULFATE 1 MG/ML
INJECTION, SOLUTION INTRAVENOUS AS NEEDED
Status: DISCONTINUED | OUTPATIENT
Start: 2021-07-08 | End: 2021-07-08 | Stop reason: SURG

## 2021-07-08 RX ORDER — CEFAZOLIN SODIUM 2 G/100ML
2 INJECTION, SOLUTION INTRAVENOUS ONCE
Status: COMPLETED | OUTPATIENT
Start: 2021-07-08 | End: 2021-07-08

## 2021-07-08 RX ORDER — PREGABALIN 75 MG/1
75 CAPSULE ORAL ONCE
Status: COMPLETED | OUTPATIENT
Start: 2021-07-08 | End: 2021-07-08

## 2021-07-08 RX ORDER — MIDAZOLAM HYDROCHLORIDE 1 MG/ML
1 INJECTION INTRAMUSCULAR; INTRAVENOUS
Status: DISCONTINUED | OUTPATIENT
Start: 2021-07-08 | End: 2021-07-08 | Stop reason: HOSPADM

## 2021-07-08 RX ORDER — DEXAMETHASONE SODIUM PHOSPHATE 4 MG/ML
INJECTION, SOLUTION INTRA-ARTICULAR; INTRALESIONAL; INTRAMUSCULAR; INTRAVENOUS; SOFT TISSUE AS NEEDED
Status: DISCONTINUED | OUTPATIENT
Start: 2021-07-08 | End: 2021-07-08 | Stop reason: SURG

## 2021-07-08 RX ORDER — PROMETHAZINE HYDROCHLORIDE 25 MG/1
25 TABLET ORAL ONCE AS NEEDED
Status: DISCONTINUED | OUTPATIENT
Start: 2021-07-08 | End: 2021-07-08 | Stop reason: HOSPADM

## 2021-07-08 RX ORDER — GLYCOPYRROLATE 0.2 MG/ML
INJECTION INTRAMUSCULAR; INTRAVENOUS AS NEEDED
Status: DISCONTINUED | OUTPATIENT
Start: 2021-07-08 | End: 2021-07-08 | Stop reason: SURG

## 2021-07-08 RX ORDER — LIDOCAINE HYDROCHLORIDE 10 MG/ML
0.5 INJECTION, SOLUTION EPIDURAL; INFILTRATION; INTRACAUDAL; PERINEURAL ONCE AS NEEDED
Status: COMPLETED | OUTPATIENT
Start: 2021-07-08 | End: 2021-07-08

## 2021-07-08 RX ORDER — FENTANYL CITRATE 50 UG/ML
50 INJECTION, SOLUTION INTRAMUSCULAR; INTRAVENOUS
Status: DISCONTINUED | OUTPATIENT
Start: 2021-07-08 | End: 2021-07-08 | Stop reason: HOSPADM

## 2021-07-08 RX ORDER — TRANEXAMIC ACID 10 MG/ML
1000 INJECTION, SOLUTION INTRAVENOUS ONCE
Status: DISCONTINUED | OUTPATIENT
Start: 2021-07-08 | End: 2021-07-08 | Stop reason: HOSPADM

## 2021-07-08 RX ORDER — ESMOLOL HYDROCHLORIDE 10 MG/ML
INJECTION INTRAVENOUS AS NEEDED
Status: DISCONTINUED | OUTPATIENT
Start: 2021-07-08 | End: 2021-07-08 | Stop reason: SURG

## 2021-07-08 RX ORDER — FAMOTIDINE 20 MG/1
20 TABLET, FILM COATED ORAL ONCE
Status: COMPLETED | OUTPATIENT
Start: 2021-07-08 | End: 2021-07-08

## 2021-07-08 RX ORDER — PROMETHAZINE HYDROCHLORIDE 25 MG/1
25 SUPPOSITORY RECTAL ONCE AS NEEDED
Status: DISCONTINUED | OUTPATIENT
Start: 2021-07-08 | End: 2021-07-08 | Stop reason: HOSPADM

## 2021-07-08 RX ADMIN — ESMOLOL HYDROCHLORIDE 50 MG: 10 INJECTION, SOLUTION INTRAVENOUS at 07:37

## 2021-07-08 RX ADMIN — ONDANSETRON 4 MG: 2 INJECTION INTRAMUSCULAR; INTRAVENOUS at 08:14

## 2021-07-08 RX ADMIN — MELOXICAM 15 MG: 15 TABLET ORAL at 07:14

## 2021-07-08 RX ADMIN — PROPOFOL 200 MG: 10 INJECTION, EMULSION INTRAVENOUS at 07:37

## 2021-07-08 RX ADMIN — GLYCOPYRROLATE 0.4 MG: 0.4 INJECTION INTRAMUSCULAR; INTRAVENOUS at 08:14

## 2021-07-08 RX ADMIN — MUPIROCIN: 20 OINTMENT TOPICAL at 07:13

## 2021-07-08 RX ADMIN — ACETAMINOPHEN 1000 MG: 500 TABLET, FILM COATED ORAL at 07:14

## 2021-07-08 RX ADMIN — LIDOCAINE HYDROCHLORIDE 0.5 ML: 10 INJECTION, SOLUTION EPIDURAL; INFILTRATION; INTRACAUDAL; PERINEURAL at 07:13

## 2021-07-08 RX ADMIN — FAMOTIDINE 20 MG: 20 TABLET, FILM COATED ORAL at 07:18

## 2021-07-08 RX ADMIN — FENTANYL CITRATE 50 MCG: 50 INJECTION INTRAMUSCULAR; INTRAVENOUS at 08:36

## 2021-07-08 RX ADMIN — FENTANYL CITRATE 50 MCG: 50 INJECTION INTRAMUSCULAR; INTRAVENOUS at 08:56

## 2021-07-08 RX ADMIN — ROCURONIUM BROMIDE 30 MG: 10 INJECTION, SOLUTION INTRAVENOUS at 07:37

## 2021-07-08 RX ADMIN — LIDOCAINE HYDROCHLORIDE 50 MG: 10 INJECTION, SOLUTION INFILTRATION; PERINEURAL at 07:37

## 2021-07-08 RX ADMIN — CEFAZOLIN SODIUM 2 G: 10 INJECTION, POWDER, FOR SOLUTION INTRAVENOUS at 07:31

## 2021-07-08 RX ADMIN — SODIUM CHLORIDE, POTASSIUM CHLORIDE, SODIUM LACTATE AND CALCIUM CHLORIDE 9 ML/HR: 600; 310; 30; 20 INJECTION, SOLUTION INTRAVENOUS at 07:18

## 2021-07-08 RX ADMIN — DEXAMETHASONE SODIUM PHOSPHATE 8 MG: 4 INJECTION, SOLUTION INTRA-ARTICULAR; INTRALESIONAL; INTRAMUSCULAR; INTRAVENOUS; SOFT TISSUE at 07:37

## 2021-07-08 RX ADMIN — PREGABALIN 75 MG: 75 CAPSULE ORAL at 07:14

## 2021-07-08 RX ADMIN — FENTANYL CITRATE 100 MCG: 50 INJECTION, SOLUTION INTRAMUSCULAR; INTRAVENOUS at 07:37

## 2021-07-08 RX ADMIN — NEOSTIGMINE 4 MG: 1 INJECTION INTRAVENOUS at 08:14

## 2021-07-08 NOTE — ANESTHESIA PREPROCEDURE EVALUATION
Anesthesia Evaluation                  Airway   Mallampati: I  TM distance: >3 FB  Neck ROM: full  No difficulty expected  Dental      Pulmonary    (+) a smoker,   Cardiovascular     ECG reviewed    (+) hypertension, PVD,       Neuro/Psych  GI/Hepatic/Renal/Endo      Musculoskeletal     Abdominal    Substance History      OB/GYN          Other                        Anesthesia Plan    ASA 3     general     intravenous induction     Anesthetic plan, all risks, benefits, and alternatives have been provided, discussed and informed consent has been obtained with: patient.    Plan discussed with CRNA.

## 2021-07-08 NOTE — ANESTHESIA PROCEDURE NOTES
Airway  Urgency: elective    Date/Time: 7/8/2021 7:44 AM  Airway not difficult    General Information and Staff    Patient location during procedure: OR  CRNA: Kostas Victoria CRNA    Indications and Patient Condition  Indications for airway management: airway protection    Preoxygenated: yes  MILS not maintained throughout  Mask difficulty assessment: 1 - vent by mask    Final Airway Details  Final airway type: endotracheal airway      Successful airway: ETT  Cuffed: yes   Successful intubation technique: direct laryngoscopy  Endotracheal tube insertion site: oral  Blade: Mirna  Blade size: 3  ETT size (mm): 7.0  Cormack-Lehane Classification: grade I - full view of glottis  Placement verified by: chest auscultation and capnometry   Cuff volume (mL): 8  Measured from: lips  ETT/EBT  to lips (cm): 20  Number of attempts at approach: 1  Assessment: lips, teeth, and gum same as pre-op and atraumatic intubation    Additional Comments  Negative epigastric sounds, Breath sound equal bilaterally with symmetric chest rise and fall

## 2021-07-08 NOTE — INTERVAL H&P NOTE
Pre-Op H&P (See Recent Office Note Attached for Full H&P)    History and physical note from office reviewed and updated with the following, otherwise there are no changes in H&P:      Review of Systems:  General ROS:  no fever, chills, rashes.  No change since last office visit.  No recent sick exposure  Cardiovascular ROS: no chest pain or dyspnea on exertion  Respiratory ROS: no cough, shortness of breath, or wheezing    Vital Signs:    Vitals:    07/08/21 0706   BP: (!) 152/101   Pulse: 86   Resp: 16   Temp: 98 °F (36.7 °C)   SpO2: 97%           Physical Exam:    CV:  S1S2 regular rate and rhythm, no murmur               Resp:  Clear to auscultation; respirations regular, even and unlabored    Results Review:     Lab Results   Component Value Date    WBC 9.03 07/06/2021    HGB 11.9 (L) 07/06/2021    HCT 36.3 07/06/2021    MCV 91.4 07/06/2021     (H) 07/06/2021    NEUTROABS 7.43 (H) 07/06/2021    GLUCOSE 110 (H) 07/06/2021    BUN 7 07/06/2021    CREATININE 0.57 07/06/2021    EGFRIFNONA 109 07/06/2021     07/06/2021    K 4.0 07/06/2021    CL 98 07/06/2021    CO2 26.0 07/06/2021    CALCIUM 9.8 07/06/2021    ALBUMIN 4.00 07/06/2021    AST 13 07/06/2021    ALT 8 07/06/2021    BILITOT 0.2 07/06/2021    PTT 30.8 07/06/2021    I reviewed the patient's new clinical results.      Assessment/Plan:   DISRUPTION OF EXTERNAL WOUND OF RIGHT HIP SURGERY  /  APPLICATION ACELLUR DERMAL MATRIX RIGHT HIP      OLEKSANDR Santana  7/8/2021   07:06 EDT

## 2021-07-08 NOTE — OP NOTE
SKIN ALLOGRAFT  Procedure Report    Patient Name:  Jessica Winston  YOB: 1960    Date of Surgery:  7/8/2021     Indications:    60 y.o. female who was admitted to [facility] with chronic wound of the anterior aspect of her right hip she had undergone a conversion to right total hip arthroplasty several months ago and because of the patient's rheumatoid chronic steroid use and history of smoking she had a postoperative wound necrosis she did undergo debridement of that and course of antibiotics per infectious disease.  The wound much improved she had no deep infection based on aspiration and after about 2 to 3 weeks of VAC changes with antibiotics the wound bed appeared clean and ready for grafting.  Because of the patient's systemic illnesses and poor skin quality I thought she was better candidate for allograft versus autograft for skin graft. Likely risk and benefits of the procedure including but not limited to infection, DVT, pulmonary embolism,possibility of injury to nerves and vessels,  have been discussed with the patient in detail. Despite the risks involved, the patient elected to proceed and informed consent was obtained. The patient was seen in the preoperative holding area and the operative extremity was marked.    Pre-op Diagnosis:   Complicated open wound of right hip [[id number]]       Post-Op Diagnosis Codes:     * Complicated open wound of right hip [S71.001A]    Procedure/CPT® Codes:      Procedure(s):  APPLICATION ACELLUR DERMAL MATRIX RIGHT HIP    Staff:  Surgeon(s):  Riaz Man MD    Anesthesia: Spinal    Estimated Blood Loss: none    Implants:    Implant Name Type Inv. Item Serial No.  Lot No. LRB No. Used Action   GRFT MATRISTEM MICROMATRIX PARTIC 1000MG - [id number] Implant GRFT MATRISTEM MICROMATRIX PARTIC 1000MG  [id number] [id number] Right 1 Implanted   GRFT WND CYTAL MATRX [id number] 7X10CM BX/1 - [id number] Implant GRFT WND CYTAL MATRX [id number] [id number] BX/1   CHANTALE [id number] Right 1 Implanted       Specimen:          None      Findings: See dictation    Complications: None    Description of Procedure:   The patient was transferred to [facility] operating room and preoperative antibiotics given IV prior to skin incision as well as 1 g of Tranexamic Acid. Patient received general anesthesia and was transferred to the regular or table. All bony prominences were padded adequately. The operative hip was then prepped and draped in the usual sterile fashion. Multiple timeouts were done identifying the correct patient, surgical site, and planned procedure.    We began by a debridement excisional sharp using a curettes Nikki and scalpel of the superficial wound down to the layer of the fascia the deep fascia was intact.  The wound bed itself measured 3 x 5 cm.   Once we had good viable bleeding tissue circumferentially we then applied the paste into the deep aspect of the wound and then sutured on top of the graft with the appropriate lamina propria side facing down once this was sutured into place we then placed a layer of Adaptic over the top and placed large amount of Neosporin over the top to help hydrate the area we then placed a wound VAC over this in order to seal at all in place.    . It was found to have a good seal. The patient was then transferred to the recovery room in stable condition. The patient tolerated the procedure well and there were no medications. The patient had adequate distal pulses and good cap refill.    The patient can be discharged home we'll keep that back in place for 7 days and see her back in the office for a VAC change in the office.  Postoperatively we'll likely keep the Adaptic in place at least for 2 to 3weeks postoperatively to allow the graft to Incorporated then we can remove the Adaptic and then do some VAC changes with just the skin graft in place.  And as long as the graft is incorporating well we'll likely do VAC changes  for 4 to 6 weeks postoperatively and then change over to wet-to-dry dressings.    I discussed the satisfactory performance of the procedure with the patient's family and discussed the postoperative management.        Assistant Participation:  Surgeon(s):  Riaz Man MD    Assistant: Bridgette Mclain PA-C assisted with proper preoperative positioning, preoperative templating, determingin availability of proper implants, prepping and draping of patient, manipulation placement of instruments, protection of ligaments and vital soft tissue structures, assistance in maintaining hemostasis and assistance with closure of the wound. Their skills and knowledge of the steps of operation and the desired outcome of each surgical step was crucial, allowing for efficient choreography of surgical procedure, and closure of the wound which lead to reduced surgical time, less blood loss, and less risk of complications for the patient.       Riaz Man MD     Date: 7/8/2021  Time: 08:35 EDT

## 2021-07-08 NOTE — ANESTHESIA POSTPROCEDURE EVALUATION
Patient: Jessica Winston    Procedure Summary     Date: 07/08/21 Room / Location:  [facility] [address] [place] [address] [facility]    Anesthesia Start: 0731 Anesthesia Stop: 0823    Procedure: APPLICATION ACELLUR DERMAL MATRIX RIGHT HIP (Right Knee) Diagnosis:     Surgeons: Riaz Man MD Provider: Rickey Rothman MD    Anesthesia Type: general ASA Status: 3          Anesthesia Type: general    Vitals  No vitals data found for the desired time range.          Post Anesthesia Care and Evaluation    Patient location during evaluation: PACU  Patient participation: complete - patient participated  Level of consciousness: awake and alert  Pain management: adequate  Airway patency: patent  Anesthetic complications: No anesthetic complications  PONV Status: none  Cardiovascular status: hemodynamically stable and acceptable  Respiratory status: nonlabored ventilation, acceptable and nasal cannula  Hydration status: acceptable

## 2021-07-09 LAB
COTININE SERPL-MCNC: 44.9 NG/ML
NICOTINE SERPL-MCNC: <1 NG/ML

## 2021-07-10 ENCOUNTER — READMISSION MANAGEMENT (OUTPATIENT)
Dept: CALL CENTER | Facility: HOSPITAL | Age: 61
End: 2021-07-10

## 2021-07-10 NOTE — OUTREACH NOTE
General Surgery Week 4 Survey      Responses   [facility] patient discharged from?  [place]   Does the patient have one of the following disease processes/diagnoses(primary or secondary)?  General Surgery   Week 4 attempt successful?  Yes   Call start time  1610   Call end time  1612   Discharge diagnosis   Post-op right hip wound infection,right hip superficial irrigation and debridedment   Is the patient taking all medications as directed (includes completed medication regime)?  Yes   Has the patient kept scheduled appointments due by today?  Yes   Is the patient still receiving Home Health Services?  N/A   What is the patient's perception of their health status since discharge?  Improving   Week 4 call completed?  Yes   Would the patient like one additional call?  No   Graduated  Yes   Is the patient interested in additional calls from an ambulatory ?  NOTE:  applies to high risk patients requiring additional follow-up.  No   Did the patient feel the follow up calls were helpful during their recovery period?  Yes   Was the number of calls appropriate?  Yes   Wrap up additional comments  Doing well.  Has had skin graft and will have wound vac DC'd this coming week.  Denies problems or questions at this time.          Shira Streeter, RN

## 2021-07-13 ENCOUNTER — LAB (OUTPATIENT)
Dept: LAB | Facility: HOSPITAL | Age: 61
End: 2021-07-13

## 2021-07-13 ENCOUNTER — APPOINTMENT (OUTPATIENT)
Dept: PHYSICAL THERAPY | Facility: HOSPITAL | Age: 61
End: 2021-07-13
Payer: COMMERCIAL

## 2021-07-13 ENCOUNTER — TRANSCRIBE ORDERS (OUTPATIENT)
Dept: LAB | Facility: HOSPITAL | Age: 61
End: 2021-07-13

## 2021-07-13 DIAGNOSIS — T84.51XD INFLAMMATORY REACTION DUE TO INTERNAL PROSTHESIS OF RIGHT HIP, SUBSEQUENT ENCOUNTER: Primary | ICD-10-CM

## 2021-07-13 LAB
ALBUMIN SERPL-MCNC: 3.9 G/DL (ref 3.5–5.2)
ALBUMIN/GLOB SERPL: 1.1 G/DL
ALP SERPL-CCNC: 82 U/L (ref 39–117)
ALT SERPL W P-5'-P-CCNC: 8 U/L (ref 1–33)
ANION GAP SERPL CALCULATED.3IONS-SCNC: 12 MMOL/L (ref 5–15)
AST SERPL-CCNC: 12 U/L (ref 1–32)
BASOPHILS # BLD AUTO: 0.08 10*3/MM3 (ref 0–0.2)
BASOPHILS NFR BLD AUTO: 1 % (ref 0–1.5)
BILIRUB SERPL-MCNC: 0.2 MG/DL (ref 0–1.2)
BUN SERPL-MCNC: 10 MG/DL (ref 6–20)
BUN/CREAT SERPL: 12 (ref 7–25)
CALCIUM SPEC-SCNC: 10.3 MG/DL (ref 8.6–10.5)
CHLORIDE SERPL-SCNC: 94 MMOL/L (ref 98–107)
CK SERPL-CCNC: 49 U/L (ref 20–180)
CO2 SERPL-SCNC: 25 MMOL/L (ref 22–29)
CREAT SERPL-MCNC: 0.83 MG/DL (ref 0.57–1)
CRP SERPL-MCNC: 5.54 MG/DL (ref 0–0.5)
DEPRECATED RDW RBC AUTO: 62.1 FL (ref 37–54)
EOSINOPHIL # BLD AUTO: 0.53 10*3/MM3 (ref 0–0.4)
EOSINOPHIL NFR BLD AUTO: 6.6 % (ref 0.3–6.2)
ERYTHROCYTE [DISTWIDTH] IN BLOOD BY AUTOMATED COUNT: 18 % (ref 12.3–15.4)
ERYTHROCYTE [SEDIMENTATION RATE] IN BLOOD: 30 MM/HR (ref 0–30)
GFR SERPL CREATININE-BSD FRML MDRD: 70 ML/MIN/1.73
GLOBULIN UR ELPH-MCNC: 3.7 GM/DL
GLUCOSE SERPL-MCNC: 154 MG/DL (ref 65–99)
HCT VFR BLD AUTO: 37.2 % (ref 34–46.6)
HGB BLD-MCNC: 11.8 G/DL (ref 12–15.9)
IMM GRANULOCYTES # BLD AUTO: 0.04 10*3/MM3 (ref 0–0.05)
IMM GRANULOCYTES NFR BLD AUTO: 0.5 % (ref 0–0.5)
LYMPHOCYTES # BLD AUTO: 1.4 10*3/MM3 (ref 0.7–3.1)
LYMPHOCYTES NFR BLD AUTO: 17.4 % (ref 19.6–45.3)
MCH RBC QN AUTO: 29.9 PG (ref 26.6–33)
MCHC RBC AUTO-ENTMCNC: 31.7 G/DL (ref 31.5–35.7)
MCV RBC AUTO: 94.4 FL (ref 79–97)
MONOCYTES # BLD AUTO: 0.62 10*3/MM3 (ref 0.1–0.9)
MONOCYTES NFR BLD AUTO: 7.7 % (ref 5–12)
NEUTROPHILS NFR BLD AUTO: 5.37 10*3/MM3 (ref 1.7–7)
NEUTROPHILS NFR BLD AUTO: 66.8 % (ref 42.7–76)
NRBC BLD AUTO-RTO: 0 /100 WBC (ref 0–0.2)
PLATELET # BLD AUTO: 419 10*3/MM3 (ref 140–450)
PMV BLD AUTO: 9 FL (ref 6–12)
POTASSIUM SERPL-SCNC: 4.6 MMOL/L (ref 3.5–5.2)
PROT SERPL-MCNC: 7.6 G/DL (ref 6–8.5)
RBC # BLD AUTO: 3.94 10*6/MM3 (ref 3.77–5.28)
SODIUM SERPL-SCNC: 131 MMOL/L (ref 136–145)
WBC # BLD AUTO: 8.04 10*3/MM3 (ref 3.4–10.8)

## 2021-07-13 PROCEDURE — 82550 ASSAY OF CK (CPK): CPT

## 2021-07-13 PROCEDURE — 86140 C-REACTIVE PROTEIN: CPT

## 2021-07-13 PROCEDURE — 36415 COLL VENOUS BLD VENIPUNCTURE: CPT

## 2021-07-13 PROCEDURE — 80053 COMPREHEN METABOLIC PANEL: CPT

## 2021-07-13 PROCEDURE — 85652 RBC SED RATE AUTOMATED: CPT

## 2021-07-13 PROCEDURE — 85025 COMPLETE CBC W/AUTO DIFF WBC: CPT

## 2021-07-14 LAB
FUNGUS WND CULT: NORMAL
MYCOBACTERIUM SPEC CULT: NORMAL
MYCOBACTERIUM SPEC CULT: NORMAL
NIGHT BLUE STAIN TISS: NORMAL
NIGHT BLUE STAIN TISS: NORMAL

## 2021-07-15 ENCOUNTER — APPOINTMENT (OUTPATIENT)
Dept: PHYSICAL THERAPY | Facility: HOSPITAL | Age: 61
End: 2021-07-15
Payer: COMMERCIAL

## 2021-07-20 ENCOUNTER — APPOINTMENT (OUTPATIENT)
Dept: PHYSICAL THERAPY | Facility: HOSPITAL | Age: 61
End: 2021-07-20
Payer: COMMERCIAL

## 2021-07-22 ENCOUNTER — APPOINTMENT (OUTPATIENT)
Dept: PHYSICAL THERAPY | Facility: HOSPITAL | Age: 61
End: 2021-07-22
Payer: COMMERCIAL

## 2021-07-27 ENCOUNTER — APPOINTMENT (OUTPATIENT)
Dept: PHYSICAL THERAPY | Facility: HOSPITAL | Age: 61
End: 2021-07-27
Payer: COMMERCIAL

## 2021-07-27 ENCOUNTER — LAB (OUTPATIENT)
Dept: LAB | Facility: HOSPITAL | Age: 61
End: 2021-07-27

## 2021-07-27 ENCOUNTER — TRANSCRIBE ORDERS (OUTPATIENT)
Dept: LAB | Facility: HOSPITAL | Age: 61
End: 2021-07-27

## 2021-07-27 DIAGNOSIS — L03.115 CELLULITIS OF RIGHT FOOT: ICD-10-CM

## 2021-07-27 DIAGNOSIS — B37.89 GASTROINTESTINAL CANDIDIASIS: ICD-10-CM

## 2021-07-27 DIAGNOSIS — B95.7 CHRONIC GRANULOMATOUS INFECTION DUE MOSTLY TO STAPHYLOCOCCUS AUREUS: ICD-10-CM

## 2021-07-27 DIAGNOSIS — T84.51XD INFLAMMATORY REACTION DUE TO INTERNAL PROSTHESIS OF RIGHT HIP, SUBSEQUENT ENCOUNTER: ICD-10-CM

## 2021-07-27 DIAGNOSIS — T84.51XD INFLAMMATORY REACTION DUE TO INTERNAL PROSTHESIS OF RIGHT HIP, SUBSEQUENT ENCOUNTER: Primary | ICD-10-CM

## 2021-07-27 LAB
ALBUMIN SERPL-MCNC: 3.7 G/DL (ref 3.5–5.2)
ALBUMIN/GLOB SERPL: 1.3 G/DL
ALP SERPL-CCNC: 82 U/L (ref 39–117)
ALT SERPL W P-5'-P-CCNC: 8 U/L (ref 1–33)
ANION GAP SERPL CALCULATED.3IONS-SCNC: 11 MMOL/L (ref 5–15)
AST SERPL-CCNC: 19 U/L (ref 1–32)
BASOPHILS # BLD AUTO: 0.07 10*3/MM3 (ref 0–0.2)
BASOPHILS NFR BLD AUTO: 1 % (ref 0–1.5)
BILIRUB SERPL-MCNC: <0.2 MG/DL (ref 0–1.2)
BUN SERPL-MCNC: 8 MG/DL (ref 6–20)
BUN/CREAT SERPL: 9.5 (ref 7–25)
CALCIUM SPEC-SCNC: 9.5 MG/DL (ref 8.6–10.5)
CHLORIDE SERPL-SCNC: 93 MMOL/L (ref 98–107)
CO2 SERPL-SCNC: 23 MMOL/L (ref 22–29)
CREAT SERPL-MCNC: 0.84 MG/DL (ref 0.57–1)
CRP SERPL-MCNC: 0.76 MG/DL (ref 0–0.5)
DEPRECATED RDW RBC AUTO: 57.3 FL (ref 37–54)
EOSINOPHIL # BLD AUTO: 0.22 10*3/MM3 (ref 0–0.4)
EOSINOPHIL NFR BLD AUTO: 3.2 % (ref 0.3–6.2)
ERYTHROCYTE [DISTWIDTH] IN BLOOD BY AUTOMATED COUNT: 17.2 % (ref 12.3–15.4)
ERYTHROCYTE [SEDIMENTATION RATE] IN BLOOD: 38 MM/HR (ref 0–30)
GFR SERPL CREATININE-BSD FRML MDRD: 69 ML/MIN/1.73
GLOBULIN UR ELPH-MCNC: 2.9 GM/DL
GLUCOSE SERPL-MCNC: 126 MG/DL (ref 65–99)
HCT VFR BLD AUTO: 35.7 % (ref 34–46.6)
HGB BLD-MCNC: 11.8 G/DL (ref 12–15.9)
IMM GRANULOCYTES # BLD AUTO: 0.03 10*3/MM3 (ref 0–0.05)
IMM GRANULOCYTES NFR BLD AUTO: 0.4 % (ref 0–0.5)
LYMPHOCYTES # BLD AUTO: 1.65 10*3/MM3 (ref 0.7–3.1)
LYMPHOCYTES NFR BLD AUTO: 23.9 % (ref 19.6–45.3)
MCH RBC QN AUTO: 30.5 PG (ref 26.6–33)
MCHC RBC AUTO-ENTMCNC: 33.1 G/DL (ref 31.5–35.7)
MCV RBC AUTO: 92.2 FL (ref 79–97)
MONOCYTES # BLD AUTO: 0.53 10*3/MM3 (ref 0.1–0.9)
MONOCYTES NFR BLD AUTO: 7.7 % (ref 5–12)
NEUTROPHILS NFR BLD AUTO: 4.4 10*3/MM3 (ref 1.7–7)
NEUTROPHILS NFR BLD AUTO: 63.8 % (ref 42.7–76)
NRBC BLD AUTO-RTO: 0 /100 WBC (ref 0–0.2)
PLAT MORPH BLD: NORMAL
PLATELET # BLD AUTO: 441 10*3/MM3 (ref 140–450)
PMV BLD AUTO: 9.8 FL (ref 6–12)
POTASSIUM SERPL-SCNC: 4.1 MMOL/L (ref 3.5–5.2)
PROT SERPL-MCNC: 6.6 G/DL (ref 6–8.5)
RBC # BLD AUTO: 3.87 10*6/MM3 (ref 3.77–5.28)
RBC MORPH BLD: NORMAL
SODIUM SERPL-SCNC: 127 MMOL/L (ref 136–145)
WBC # BLD AUTO: 6.9 10*3/MM3 (ref 3.4–10.8)
WBC MORPH BLD: NORMAL

## 2021-07-27 PROCEDURE — 85652 RBC SED RATE AUTOMATED: CPT

## 2021-07-27 PROCEDURE — 36415 COLL VENOUS BLD VENIPUNCTURE: CPT

## 2021-07-27 PROCEDURE — 85007 BL SMEAR W/DIFF WBC COUNT: CPT

## 2021-07-27 PROCEDURE — 86140 C-REACTIVE PROTEIN: CPT

## 2021-07-27 PROCEDURE — 85025 COMPLETE CBC W/AUTO DIFF WBC: CPT

## 2021-07-27 PROCEDURE — 80053 COMPREHEN METABOLIC PANEL: CPT

## 2021-07-29 ENCOUNTER — APPOINTMENT (OUTPATIENT)
Dept: PHYSICAL THERAPY | Facility: HOSPITAL | Age: 61
End: 2021-07-29
Payer: COMMERCIAL

## 2021-08-02 ENCOUNTER — HOSPITAL ENCOUNTER (OUTPATIENT)
Dept: PHYSICAL THERAPY | Facility: HOSPITAL | Age: 61
Setting detail: THERAPIES SERIES
Discharge: HOME | End: 2021-08-02
Payer: COMMERCIAL

## 2021-08-02 DIAGNOSIS — F32.9 REACTIVE DEPRESSION: ICD-10-CM

## 2021-08-02 DIAGNOSIS — Z72.0 TOBACCO ABUSE: ICD-10-CM

## 2021-08-02 PROCEDURE — 97110 THERAPEUTIC EXERCISES: CPT

## 2021-08-02 PROCEDURE — 97161 PT EVAL LOW COMPLEX 20 MIN: CPT

## 2021-08-02 RX ORDER — BUPROPION HYDROCHLORIDE 150 MG/1
150 TABLET ORAL EVERY MORNING
Qty: 30 TABLET | Refills: 3 | Status: SHIPPED | OUTPATIENT
Start: 2021-08-02 | End: 2021-09-15 | Stop reason: SDUPTHER

## 2021-08-02 NOTE — PROGRESS NOTES
POC discussion  Activity Tolerance  Activity Tolerance: Patient Tolerated treatment well         Plan   Plan  Times per week: 2 x week  Plan weeks: 8 weeks  Current Treatment Recommendations: Strengthening, Neuromuscular Re-education, Home Exercise Program, Balance Training, Manual Therapy - Soft Tissue Mobilization, Gait Training, ADL/Self-care Training, Safety Education & Training               Goals  Short term goals  Time Frame for Short term goals: 4 weeks  Short term goal 1: Patient to be independent with HEP. Short term goal 2: Patient to achieve a TUG test time of 15\" - improving gait efficiency. Short term goal 3: Patient to achieve a 1/2 grade strength increase in right hip MMG's in noted deficits. Short term goal 4: Patient to achieve a single leg stance time on right LE of 3 seconds without support. Long term goals  Time Frame for Long term goals : 8 weeks  Long term goal 1: Patient to achieve a TUG test time of 12\" - improving gait efficiency. Long term goal 2: Patient to safely advance to ambulation with a cane on even and uneven surfaces with good stability and confidence for community distances and terrains. Long term goal 3: Patient to achieve 4+/5 right hip strength in MMG's. Long term goal 4: Patient to achieve ability to perform ADL's and I-ADL's with reported no more than minimal difficulty. Long term goal 5: Patient to achieve right hip pain level at < 1-2/10 on average with performance of routine daily activities. Therapy Time   Individual Concurrent Group Co-treatment   Time In           Time Out           Minutes                   Leigha Richmond PT       Certification of Medical Necessity: It will be understood that this treatment plan is certified medically necessary by the documenting therapist and referring physician mentioned in this report.   Unless the physician indicated otherwise through written correspondence with our office, all further referrals will act as certification of medical necessity on this treatment plan. Thank you for this referral.  If you have questions regarding this plan of care, please call [phone].           Revisions to this plan (optional):                     Please sign and return this plan to:   FAX: [phone]      Signature:                                 Date:

## 2021-08-03 RX ORDER — LISINOPRIL 10 MG/1
10 TABLET ORAL DAILY
Qty: 30 TABLET | Refills: 3 | Status: SHIPPED | OUTPATIENT
Start: 2021-08-03 | End: 2021-11-09

## 2021-08-03 ASSESSMENT — PAIN DESCRIPTION - ONSET: ONSET: ON-GOING

## 2021-08-03 ASSESSMENT — PAIN DESCRIPTION - FREQUENCY: FREQUENCY: CONTINUOUS

## 2021-08-03 ASSESSMENT — PAIN DESCRIPTION - PAIN TYPE: TYPE: CHRONIC PAIN;SURGICAL PAIN

## 2021-08-03 ASSESSMENT — PAIN DESCRIPTION - ORIENTATION: ORIENTATION: RIGHT

## 2021-08-03 ASSESSMENT — PAIN DESCRIPTION - DESCRIPTORS: DESCRIPTORS: ACHING;SORE

## 2021-08-03 ASSESSMENT — PAIN SCALES - GENERAL: PAINLEVEL_OUTOF10: 5

## 2021-08-03 ASSESSMENT — PAIN DESCRIPTION - LOCATION: LOCATION: HIP

## 2021-08-10 ENCOUNTER — DOCUMENTATION (OUTPATIENT)
Dept: PHYSICAL THERAPY | Facility: HOSPITAL | Age: 61
End: 2021-08-10

## 2021-08-10 ENCOUNTER — HOSPITAL ENCOUNTER (OUTPATIENT)
Dept: PHYSICAL THERAPY | Facility: HOSPITAL | Age: 61
Setting detail: THERAPIES SERIES
Discharge: HOME | End: 2021-08-10
Payer: COMMERCIAL

## 2021-08-10 PROCEDURE — 97110 THERAPEUTIC EXERCISES: CPT

## 2021-08-10 NOTE — THERAPY DISCHARGE NOTE
Outpatient Rehabilitation - Wound/Debridement D/C Summary        Patient Name: Jessica Winston  : 1960  MRN: [id number]  Today's Date: 8/10/2021                  Admit Date: (Not on file)    Visit Dx:  No diagnosis found.    Patient Active Problem List   Diagnosis   • RUQ pain   • Cardiac mass   • PVD (peripheral vascular disease) with claudication (CMS/HCC)   • Tobacco dependence   • Pain in right hip   • HTN (hypertension)   • Rheumatoid arthritis (CMS/HCC)   • S/P right hip superficial irrigation and debridement arthrocentesis   • Acute blood loss anemia   • Postoperative pain   • Infection   • Hyponatremia, mild        Past Medical History:   Diagnosis Date   • Avascular necrosis (CMS/HCC)    • Depression    • Fractures    • Hyperglycemia    • Hypertension    • Osteoarthritis    • RA (rheumatoid arthritis) (CMS/HCC)     ALSO REPORTS OA   • Tooth loose     top left and left bottom    • Vitamin D deficiency    • Wears glasses     readers        Past Surgical History:   Procedure Laterality Date   • ARM TENDON REPAIR Left    • BREAST SURGERY      lumpectomy   • ENDOSCOPY N/A 3/30/2017    Procedure: ESOPHAGOGASTRODUODENOSCOPY WITH COLD FORCEP BIOPSY;  Surgeon: Anca Trevino MD;  Location: [facility] ENDOSCOPY;  Service:    • FEMUR SURGERY      right    • HERNIA REPAIR Bilateral     INGUINAL- unsure about mesh    • INCISION AND DRAINAGE HIP N/A 2021    Procedure: HIP SUPERFICIAL IRRIGATION AND DEBRIDEMENT, ARTHOCENTESIS RIGHT;  Surgeon: Riaz Man MD;  Location: [facility] OR;  Service: Orthopedics;  Laterality: N/A;   • KNEE SURGERY Left    • MANDIBLE SURGERY     • SKIN GRAFT Right 2021    Procedure: APPLICATION ACELLUR DERMAL MATRIX RIGHT HIP;  Surgeon: Riaz Man MD;  Location: [facility] OR;  Service: Orthopedics;  Laterality: Right;   • TOTAL HIP ARTHROPLASTY REVISION Right 4/15/2021    Procedure: COVERSION TO RIGHT TOTAL HIP ARTHROPLASTY;  Surgeon: Riaz Man MD;   Location: [facility];  Service: Orthopedics;  Laterality: Right;   • TUBAL ABDOMINAL LIGATION     • WISDOM TOOTH EXTRACTION           EVALUATION                WOUND DEBRIDEMENT                            Recommendation and Plan      Goals  PT OP Goals     Row Name 08/10/21 0940          PT Short Term Goals    STG Date to Achieve  07/23/21  -     STG 1  decrease wound size by 25% as evidence of wound healing  -     STG 1 Progress  Not Met  -     STG 2  increase granulation to > 95% to improve wound healing  -     STG 2 Progress  Partially Met  -        Long Term Goals    LTG Date to Achieve  09/06/21  -     LTG 1  Decrease wound size by 80% as evidence of wound healing  -     LTG 1 Progress  Not Met  -     LTG 2  Pt and family independent with home dressing changes to allow for transition to home management of wound.   -     LTG 2 Progress  Not Met  -       User Key  (r) = Recorded By, (t) = Taken By, (c) = Cosigned By    Initials Name Provider Type    Mitzi Wise, PT Physical Therapist          Time Calculation:              OP Discharge Summary     Row Name 08/10/21 0941             OP PT Discharge Summary    Date of Discharge  07/08/21  -      Reason for Discharge  Change in medical status  -      Outcomes Achieved  Patient able to partially acheive established goals  -      Discharge Destination  Unknown  -      Discharge Instructions/Additional Comments  Pt underwent allograft procedure. Would now need new MD order for continued wound care if needed  -        User Key  (r) = Recorded By, (t) = Taken By, (c) = Cosigned By    Initials Name Provider Type    Mitzi Wise, PT Physical Therapist          Mitzi Ambriz, ASHLEY  8/10/2021

## 2021-08-10 NOTE — FLOWSHEET NOTE
Physical Therapy Daily Treatment Note   Date:  8/10/2021    TIme In:   1116                   Time Out:  1152    Patient Name:  Ean Ballesteros    :  1960  MRN: [id number]    Restrictions/Precautions:    Pertinent Medical History:  Medical/Treatment Diagnosis Information:  ·   s/p right hip sx - application acellular dermal matrix  ·    Insurance/Certification information:    Worker's compensation  Physician Information:    Mindy Adam MD  Plan of care signed (Y/N):    Visit# / total visits:     2/    G-Code (if applicable):      Date / Visit # G-Code Applied:         Progress Note: []  Yes  [x]  No  Next due by: Visit #10      Pain level:   6/10  Subjective: Pt states she was on her feet more over the weekend and has been using her cane more instead of the walker and therefore is having some increase in soreness. Objective:   Observation:    Test measurements:     Palpation:    Exercises:  Exercise Resistance/Repetitions Other comments   Nustep L5 x 10' 10   Standing marching 30\" x 5 10   Standing heel-toe raises 3x10 10   R LE forward weight shift w/ support 3x20 10   Standing hip abd AROM - right 3x15 10   Mini squats with support 3x15 10   Partial bridging 3x15 10   SLR flexion 3x10 right 10                         Other Therapeutic Activities:      Manual Treatments:      Modalities:        Timed Code Treatment Minutes:  35      Total Treatment Minutes:  36    Treatment/Activity Tolerance:  [x] Patient tolerated treatment well [] Patient limited by fatigue  [] Patient limited by pain  [] Patient limited by other medical complications  [x] Other: Pt progressed through activity well; pain levels were about the same at end of session.      Pain after treatment:      -7/10    Prognosis: [x] Good [] Fair  [] Poor    Patient Requires Follow-up: [x] Yes  [] No    Plan:   [x] Continue per plan of care [] Alter current plan (see comments)  [] Plan of care initiated [] Hold pending MD visit [] Discharge    Plan for Next Session:        Electronically signed by:  Kirill Mojica PT

## 2021-08-12 ENCOUNTER — HOSPITAL ENCOUNTER (OUTPATIENT)
Dept: PHYSICAL THERAPY | Facility: HOSPITAL | Age: 61
Setting detail: THERAPIES SERIES
Discharge: HOME | End: 2021-08-12
Payer: COMMERCIAL

## 2021-08-12 PROCEDURE — 97110 THERAPEUTIC EXERCISES: CPT

## 2021-08-12 NOTE — FLOWSHEET NOTE
Physical Therapy Daily Treatment Note   Date:  2021    TIme In:   1100                   Time Out:  1134    Patient Name:  Lionel Saldana    :  1960  MRN: [id number]    Restrictions/Precautions:    Pertinent Medical History:  Medical/Treatment Diagnosis Information:  ·   s/p right hip sx - application acellular dermal matrix     Insurance/Certification information:    Worker's compensation  Physician Information:    Marina Miller MD  Plan of care signed (Y/N):    Visit# / total visits:     3/    G-Code (if applicable):      Date / Visit # G-Code Applied:         Progress Note: []  Yes  [x]  No  Next due by: Visit #10      Pain level:   4/10     Subjective: Pt states she is having less pain as compared to last visit. Wound vac continues to do well.       Objective:   Observation:    Test measurements:     Palpation:    Exercises:  Exercise Resistance/Repetitions Other comments   Nustep L5 x 10' 12   Standing marching 30\" x 5 12   Standing heel-toe raises 3x10 12   R LE forward weight shift w/ support 3x20 12   Standing hip abd AROM - right 3x15 12   Mini squats with support 3x15 12   Partial bridging 3x15 12   SLR flexion 3x10 right 12   Bent knee fall out 3x10 right 12                    Other Therapeutic Activities:      Manual Treatments:      Modalities:        Timed Code Treatment Minutes:  33      Total Treatment Minutes:  34    Treatment/Activity Tolerance:  [x] Patient tolerated treatment well [] Patient limited by fatigue  [] Patient limited by pain  [] Patient limited by other medical complications  [] Other:     Pain after treatment:      5/10    Prognosis: [x] Good [] Fair  [] Poor    Patient Requires Follow-up: [x] Yes  [] No    Plan:   [x] Continue per plan of care [] Alter current plan (see comments)  [] Plan of care initiated [] Hold pending MD visit [] Discharge    Plan for Next Session:        Electronically signed by:  Moy Villavicencio PT

## 2021-08-17 ENCOUNTER — HOSPITAL ENCOUNTER (OUTPATIENT)
Dept: PHYSICAL THERAPY | Facility: HOSPITAL | Age: 61
Setting detail: THERAPIES SERIES
Discharge: HOME | End: 2021-08-17
Payer: COMMERCIAL

## 2021-08-17 PROCEDURE — 97110 THERAPEUTIC EXERCISES: CPT

## 2021-08-17 NOTE — FLOWSHEET NOTE
Physical Therapy Daily Treatment Note   Date:  2021    TIme In:    3734                  Time Out:  0388    Patient Name:  Isis Parker    :  1960  MRN: [id number]    Restrictions/Precautions:    Pertinent Medical History:  Medical/Treatment Diagnosis Information:  ·   s/p right hip sx - application acellular dermal matrix     Insurance/Certification information:    Worker's compensation  Physician Information:    Shine Garner MD  Plan of care signed (Y/N):    Visit# / total visits:     4/    G-Code (if applicable):      Date / Visit # G-Code Applied:         Progress Note: []  Yes  [x]  No  Next due by: Visit #10      Pain level:  4 /10     Subjective: Pt reports she isn't hurting too bad today. She expressed that she returns to the MD this Friday and is hoping to get the wound vac off. Objective:    Observation:    Test measurements:     Palpation:    Exercises:  Exercise Resistance/Repetitions Other comments   Nustep L5 x 10' 17   Standing marching 30\" x 5 17   Standing heel-toe raises 3x10 17   R LE forward weight shift w/ support 3x20 17   Standing hip abd AROM - right 3x15 17   Mini squats with support 3x15 17   Partial bridging 3x15 17   SLR flexion 3x10 right 17   Bent knee fall out 3x10 right 17                    Other Therapeutic Activities:      Manual Treatments:      Modalities:        Timed Code Treatment Minutes: 31      Total Treatment Minutes:  32    Treatment/Activity Tolerance:  [x] Patient tolerated treatment well [] Patient limited by fatigue  [] Patient limited by pain  [] Patient limited by other medical complications  [x] Other:  Pt completed tx with mod c/o pain but performed all activity with few complaints.     Pain after treatment:      5/10    Prognosis: [x] Good [] Fair  [] Poor    Patient Requires Follow-up: [x] Yes  [] No    Plan:   [x] Continue per plan of care [] Alter current plan (see comments)  [] Plan of care initiated [] Hold pending MD visit []

## 2021-08-19 ENCOUNTER — HOSPITAL ENCOUNTER (OUTPATIENT)
Dept: PHYSICAL THERAPY | Facility: HOSPITAL | Age: 61
Setting detail: THERAPIES SERIES
Discharge: HOME | End: 2021-08-19
Payer: COMMERCIAL

## 2021-08-19 PROCEDURE — 97110 THERAPEUTIC EXERCISES: CPT

## 2021-08-19 NOTE — FLOWSHEET NOTE
Physical Therapy Daily Treatment Note   Date:  2021    TIme In:    1101                  Time Out:  1134    Patient Name:  Girish Hall    :  1960  MRN: [id number]    Restrictions/Precautions:    Pertinent Medical History:  Medical/Treatment Diagnosis Information:  ·   s/p right hip sx - application acellular dermal matrix     Insurance/Certification information:    Worker's compensation  Physician Information:    Basilio Madden MD  Plan of care signed (Y/N):    Visit# / total visits:     5 /    G-Code (if applicable):      Date / Visit # G-Code Applied:         Progress Note: []  Yes  [x]  No  Next due by: Visit #10      Pain level:  3 /10     Subjective: Pt reports: less pain overall, still weak; no new c/o     Objective:    Observation: Gait: decreased edgardo; decreased hip and knee flexion, using a cane; wound vac present   Test measurements:     Palpation:    Exercises:  Exercise Resistance/Repetitions Other comments   Nustep L5 x 10' 19   Standing marching 30\" x 5 19   Standing heel-toe raises 3x10 19   R LE forward weight shift w/ support 3x20 19   Standing hip abd AROM - right 3x15 19   Mini squats with support 3x15 19   Partial bridging 3x15 19   SLR flexion 3x10 right 19   Bent knee fall out 3x10 right 19                    Other Therapeutic Activities:      Manual Treatments:      Modalities:        Timed Code Treatment Minutes: 31      Total Treatment Minutes:  33    Treatment/Activity Tolerance:  [x] Patient tolerated treatment well [] Patient limited by fatigue  [] Patient limited by pain  [] Patient limited by other medical complications  [x] Other:  Pt completed tx with mod c/o pain but performed all activity with few complaints, mostly fatigue    Pain after treatment:      4/10    Prognosis: [x] Good [] Fair  [] Poor    Patient Requires Follow-up: [x] Yes  [] No    Plan:   [x] Continue per plan of care [] Alter current plan (see comments)  [] Plan of care initiated [] Hold pending MD visit [] Discharge    Plan for Next Session:        Electronically signed by:  Cynthia Motta PT

## 2021-08-24 ENCOUNTER — HOSPITAL ENCOUNTER (OUTPATIENT)
Dept: PHYSICAL THERAPY | Facility: HOSPITAL | Age: 61
Setting detail: THERAPIES SERIES
Discharge: HOME | End: 2021-08-24
Payer: COMMERCIAL

## 2021-08-24 PROCEDURE — 97110 THERAPEUTIC EXERCISES: CPT

## 2021-08-24 NOTE — FLOWSHEET NOTE
Physical Therapy Daily Treatment Note   Date:  2021    TIme In:     1105                 Time Out:  [address]    Patient Name:  Inderjit Wallace    :  1960  MRN: [id number]    Restrictions/Precautions:    Pertinent Medical History:  Medical/Treatment Diagnosis Information:  ·   s/p right hip sx - application acellular dermal matrix     Insurance/Certification information:    Worker's compensation  Physician Information:    Miranda Liang MD  Plan of care signed (Y/N):    Visit# / total visits:     6 /    G-Code (if applicable):      Date / Visit # G-Code Applied:         Progress Note: []  Yes  [x]  No  Next due by: Visit #10      Pain level:   3/10     Subjective: Pt reports she got her wound vac off on Friday and she has so much more freedom. She expressed that her balance was thrown off initially. Objective:    Observation: Gait: decreased edgardo; decreased hip and knee flexion, using a cane; wound vac present   Test measurements:     Palpation:    Exercises:  Exercise Resistance/Repetitions Other comments   Nustep L5 x 10' 24   Standing marching 30\" x 5 24   Standing heel-toe raises 3x10 24   R LE forward weight shift w/ support 3x20 24   Standing hip abd AROM - right 3x15 24   Mini squats with support 3x15 24   Partial bridging 3x15 24   SLR flexion 3x10 right 24   Bent knee fall out 3x10 right 24   Step ups 2x10 24   Sidestepping gentle 3 short laps 24          Other Therapeutic Activities:      Manual Treatments:      Modalities:        Timed Code Treatment Minutes: 40      Total Treatment Minutes:  42    Treatment/Activity Tolerance:  [x] Patient tolerated treatment well [] Patient limited by fatigue  [] Patient limited by pain  [] Patient limited by other medical complications  [x] Other:  Pt completed tx with slight increase in pain and performed new exercises well with mild pain.     Pain after treatment:     4 /10    Prognosis: [x] Good [] Fair  [] Poor    Patient Requires Follow-up: [x] Yes  [] No    Plan:   [x] Continue per plan of care [] Alter current plan (see comments)  [] Plan of care initiated [] Hold pending MD visit [] Discharge    Plan for Next Session:        Electronically signed by:  Erasto Major PTA

## 2021-08-26 ENCOUNTER — APPOINTMENT (OUTPATIENT)
Dept: PHYSICAL THERAPY | Facility: HOSPITAL | Age: 61
End: 2021-08-26
Payer: COMMERCIAL

## 2021-08-31 ENCOUNTER — HOSPITAL ENCOUNTER (OUTPATIENT)
Dept: PHYSICAL THERAPY | Facility: HOSPITAL | Age: 61
Setting detail: THERAPIES SERIES
Discharge: HOME | End: 2021-08-31
Payer: COMMERCIAL

## 2021-08-31 PROCEDURE — 97110 THERAPEUTIC EXERCISES: CPT

## 2021-08-31 NOTE — FLOWSHEET NOTE
Physical Therapy Daily Treatment Note / Re-Assessment   Date:  2021    TIme In:   8148                   Time Out:  7616    Patient Name:  Felipe Buchanan    :  1960  MRN: [id number]    Restrictions/Precautions:    Pertinent Medical History:  Medical/Treatment Diagnosis Information:  ·   s/p right hip sx - application acellular dermal matrix     Insurance/Certification information:    Worker's compensation  Physician Information:    Duglas Dooley MD  Plan of care signed (Y/N):    Visit# / total visits:     7 /    G-Code (if applicable):      Date / Visit # G-Code Applied:         Progress Note: [x]  Yes  []  No  Next due by: 21      Pain level:   5/10     Subjective: Pt reports seeing improvement since starting PT. He notes having moderate RLE / hip pain. Objective:    Observation: Gait: decreased edgardo; decreased hip and knee flexion, using a cane   Test measurements:  TU\". LEFS: 26/80, SLS: R: 3\", L: 30\". MMT: RLE: hip flexion: 4+/5, hip IR: 4-/5, hip ER: 4/5, abd: 3+/5   Palpation:    Exercises:  Exercise Resistance/Repetitions Other comments   Nustep L5 x 10' 31   Standing marching 30\" x 5 31   Standing heel-toe raises 3x10 31   R LE forward weight shift w/ support 3x20 31   Standing hip abd AROM - right 3x15 31   Mini squats with support 3x15 31   Partial bridging 3x15 31   SLR flexion 3x10 right 31   Bent knee fall out 3x10 right 31   Step ups 2x10 31   Sidestepping gentle 3 short laps 31          Other Therapeutic Activities:      Manual Treatments:      Modalities:        Timed Code Treatment Minutes: 45      Total Treatment Minutes:  46    Treatment/Activity Tolerance:  [x] Patient tolerated treatment well [] Patient limited by fatigue  [] Patient limited by pain  [] Patient limited by other medical complications  [x] Other:  Pt has responded well to skilled PT intervention with improved balance, strength, and function. She has met all STG and 1/5 LTG.   She will benefit from continued skilled PT to further address RLE strength deficits as well as to improve gait mechanics and restore optimal functional level. Pain after treatment:     5/10    Prognosis: [x] Good [] Fair  [] Poor    Patient Requires Follow-up: [x] Yes  [] No    Plan:   [x] Continue per plan of care [] Alter current plan (see comments)  [] Plan of care initiated [] Hold pending MD visit [] Discharge    Plan for Next Session:      Goals  Short term goals  Time Frame for Short term goals: 4 weeks  Short term goal 1: Patient to be independent with HEP. -MET  Short term goal 2: Patient to achieve a TUG test time of 15\" - improving gait efficiency. -MET  Short term goal 3: Patient to achieve a 1/2 grade strength increase in right hip MMG's in noted deficits. -MET  Short term goal 4: Patient to achieve a single leg stance time on right LE of 3 seconds without support. -MET  Long term goals  Time Frame for Long term goals : 8 weeks  Long term goal 1: Patient to achieve a TUG test time of 12\" - improving gait efficiency. -MET  Long term goal 2: Patient to safely advance to ambulation with a cane on even and uneven surfaces with good stability and confidence for community distances and terrains. Long term goal 3: Patient to achieve 4+/5 right hip strength in MMG's. Long term goal 4: Patient to achieve ability to perform ADL's and I-ADL's with reported no more than minimal difficulty.   Long term goal 5: Patient to achieve right hip pain level at < 1-2/10 on average with performance of routine daily activities.         Electronically signed by:  Matilde Garcia PT

## 2021-09-02 ENCOUNTER — APPOINTMENT (OUTPATIENT)
Dept: PHYSICAL THERAPY | Facility: HOSPITAL | Age: 61
End: 2021-09-02
Payer: COMMERCIAL

## 2021-09-03 ENCOUNTER — HOSPITAL ENCOUNTER (OUTPATIENT)
Dept: PHYSICAL THERAPY | Facility: HOSPITAL | Age: 61
Setting detail: THERAPIES SERIES
Discharge: HOME | End: 2021-09-03
Payer: COMMERCIAL

## 2021-09-03 PROCEDURE — 97110 THERAPEUTIC EXERCISES: CPT

## 2021-09-03 NOTE — FLOWSHEET NOTE
continued skilled PT to further address RLE strength deficits as well as to improve gait mechanics and restore optimal functional level. Pain after treatment:     5/10    Prognosis: [x] Good [] Fair  [] Poor    Patient Requires Follow-up: [x] Yes  [] No    Plan:   [x] Continue per plan of care [] Alter current plan (see comments)  [] Plan of care initiated [] Hold pending MD visit [] Discharge    Plan for Next Session:      Goals  Short term goals  Time Frame for Short term goals: 4 weeks  Short term goal 1: Patient to be independent with HEP. -MET  Short term goal 2: Patient to achieve a TUG test time of 15\" - improving gait efficiency. -MET  Short term goal 3: Patient to achieve a 1/2 grade strength increase in right hip MMG's in noted deficits. -MET  Short term goal 4: Patient to achieve a single leg stance time on right LE of 3 seconds without support. -MET  Long term goals  Time Frame for Long term goals : 8 weeks  Long term goal 1: Patient to achieve a TUG test time of 12\" - improving gait efficiency. -MET  Long term goal 2: Patient to safely advance to ambulation with a cane on even and uneven surfaces with good stability and confidence for community distances and terrains. Long term goal 3: Patient to achieve 4+/5 right hip strength in MMG's. Long term goal 4: Patient to achieve ability to perform ADL's and I-ADL's with reported no more than minimal difficulty.   Long term goal 5: Patient to achieve right hip pain level at < 1-2/10 on average with performance of routine daily activities.         Electronically signed by:  Mitzy Baker PT

## 2021-09-07 ENCOUNTER — HOSPITAL ENCOUNTER (OUTPATIENT)
Dept: PHYSICAL THERAPY | Facility: HOSPITAL | Age: 61
Setting detail: THERAPIES SERIES
Discharge: HOME | End: 2021-09-07
Payer: COMMERCIAL

## 2021-09-07 PROCEDURE — 97110 THERAPEUTIC EXERCISES: CPT

## 2021-09-07 NOTE — FLOWSHEET NOTE
Physical Therapy Daily Treatment Note  Date:  2021    TIme In:   1138                  Time Out:  8796    Patient Name:  Casimiro Blackmon    :  1960  MRN: [id number]    Restrictions/Precautions:    Pertinent Medical History:  Medical/Treatment Diagnosis Information:  ·   s/p right hip sx - application acellular dermal matrix     Insurance/Certification information:    Worker's compensation  Physician Information:    Mini Aguirre MD  Plan of care signed (Y/N):    Visit# / total visits:     -Code (if applicable):      Date / Visit # G-Code Applied:         Progress Note: [x]  Yes  []  No  Next due by: 21      Pain level:   3-4/10     Subjective:   Patient reports having a little more pain today. Objective:    Observation: Gait: decreased edgardo; decreased hip and knee flexion, using a cane   Test measurements:  TU\". LEFS: 26/80, SLS: R: 3\", L: 30\".    MMT: RLE: hip flexion: 4+/5, hip IR: 4-/5, hip ER: 4/5, abd: 3+/5   Palpation:    Exercises:  Exercise Resistance/Repetitions Other comments   Nustep L5 x 10' 7   Standing marching 30\" x 5 7   Standing heel-toe raises 3x10 7   R LE forward weight shift w/ support 3x20 7   Standing hip abd AROM - right 3x15 7   Mini squats with support 3x15 7   Partial bridging 3x15 7   SLR flexion 3x10 right 7   Bent knee fall out 3x10 right 7   Step ups 2x10 7   Sidestepping gentle 3 short laps 7          Other Therapeutic Activities:      Manual Treatments:      Modalities:        Timed Code Treatment Minutes:  41      Total Treatment Minutes:  45    Treatment/Activity Tolerance:  [x] Patient tolerated treatment well [] Patient limited by fatigue  [] Patient limited by pain  [] Patient limited by other medical complications  [] Other:    Pain after treatment:    2/10    Prognosis: [x] Good [] Fair  [] Poor    Patient Requires Follow-up: [x] Yes  [] No    Plan:   [x] Continue per plan of care [] Alter current plan (see comments)  [] Plan of care initiated [] Hold pending MD visit [] Discharge    Plan for Next Session:      Goals  Short term goals  Time Frame for Short term goals: 4 weeks  Short term goal 1: Patient to be independent with HEP. -MET  Short term goal 2: Patient to achieve a TUG test time of 15\" - improving gait efficiency. -MET  Short term goal 3: Patient to achieve a 1/2 grade strength increase in right hip MMG's in noted deficits. -MET  Short term goal 4: Patient to achieve a single leg stance time on right LE of 3 seconds without support. -MET  Long term goals  Time Frame for Long term goals : 8 weeks  Long term goal 1: Patient to achieve a TUG test time of 12\" - improving gait efficiency. -MET  Long term goal 2: Patient to safely advance to ambulation with a cane on even and uneven surfaces with good stability and confidence for community distances and terrains. Long term goal 3: Patient to achieve 4+/5 right hip strength in MMG's. Long term goal 4: Patient to achieve ability to perform ADL's and I-ADL's with reported no more than minimal difficulty.   Long term goal 5: Patient to achieve right hip pain level at < 1-2/10 on average with performance of routine daily activities.         Electronically signed by:  Bell Ramirez PTA

## 2021-09-09 ENCOUNTER — HOSPITAL ENCOUNTER (OUTPATIENT)
Dept: PHYSICAL THERAPY | Facility: HOSPITAL | Age: 61
Setting detail: THERAPIES SERIES
Discharge: HOME | End: 2021-09-09
Payer: COMMERCIAL

## 2021-09-09 PROCEDURE — 97110 THERAPEUTIC EXERCISES: CPT

## 2021-09-09 NOTE — FLOWSHEET NOTE
Physical Therapy Daily Treatment Note  Date:  2021    TIme In:   0538                  Time Out:  1217    Patient Name:  aNsh Pereyra    :  1960  MRN: [id number]    Restrictions/Precautions:    Pertinent Medical History:  Medical/Treatment Diagnosis Information:  ·   s/p right hip sx - application acellular dermal matrix     Insurance/Certification information:    Worker's compensation  Physician Information:    Yana Estrada MD  Plan of care signed (Y/N):    Visit# / total visits:     10 /    G-Code (if applicable):      Date / Visit # G-Code Applied:         Progress Note: []  Yes  [x]  No  Next due by: 21      Pain level:   2-3/10     Subjective:   Patient reports she is getting stronger and she is able to stand on one leg for 3 sec now. Objective:    Observation: Gait: decreased edgardo; decreased hip and knee flexion, using a cane   Test measurements:  TU\". LEFS: 26/80, SLS: R: 3\", L: 30\". MMT: RLE: hip flexion: 4+/5, hip IR: 4-/5, hip ER: 4/5, abd: 3+/5   Palpation:    Exercises:  Exercise Resistance/Repetitions Other comments   Nustep L5 x 10' 9   Standing marching 30\" x 5 9   Standing heel-toe raises 3x10 9   R LE forward weight shift w/ support 3x20 9   Standing hip abd AROM - right 3x15 9   Mini squats with support 3x15 9   Partial bridging 3x15 9   SLR flexion 3x10 right 9   Bent knee fall out 3x10 right 9   Step ups 2x10 9   Sidestepping gentle 3 short laps 9          Other Therapeutic Activities:      Manual Treatments:      Modalities:        Timed Code Treatment Minutes:  40      Total Treatment Minutes:  42    Treatment/Activity Tolerance:  [x] Patient tolerated treatment well [] Patient limited by fatigue  [] Patient limited by pain  [] Patient limited by other medical complications  [x] Other: Pt completed tx with mod c/o pain but subjective reports of improved strength.     Pain after treatment:    4/10    Prognosis: [x] Good [] Fair  [] Poor    Patient Requires Follow-up: [x] Yes  [] No    Plan:   [x] Continue per plan of care [] Alter current plan (see comments)  [] Plan of care initiated [] Hold pending MD visit [] Discharge    Plan for Next Session:      Goals  Short term goals  Time Frame for Short term goals: 4 weeks  Short term goal 1: Patient to be independent with HEP. -MET  Short term goal 2: Patient to achieve a TUG test time of 15\" - improving gait efficiency. -MET  Short term goal 3: Patient to achieve a 1/2 grade strength increase in right hip MMG's in noted deficits. -MET  Short term goal 4: Patient to achieve a single leg stance time on right LE of 3 seconds without support. -MET  Long term goals  Time Frame for Long term goals : 8 weeks  Long term goal 1: Patient to achieve a TUG test time of 12\" - improving gait efficiency. -MET  Long term goal 2: Patient to safely advance to ambulation with a cane on even and uneven surfaces with good stability and confidence for community distances and terrains. Long term goal 3: Patient to achieve 4+/5 right hip strength in MMG's. Long term goal 4: Patient to achieve ability to perform ADL's and I-ADL's with reported no more than minimal difficulty.   Long term goal 5: Patient to achieve right hip pain level at < 1-2/10 on average with performance of routine daily activities.         Electronically signed by:  Adan Major PTA

## 2021-09-14 ENCOUNTER — HOSPITAL ENCOUNTER (OUTPATIENT)
Dept: PHYSICAL THERAPY | Facility: HOSPITAL | Age: 61
Setting detail: THERAPIES SERIES
Discharge: HOME | End: 2021-09-14
Payer: COMMERCIAL

## 2021-09-14 PROCEDURE — 97110 THERAPEUTIC EXERCISES: CPT

## 2021-09-14 NOTE — FLOWSHEET NOTE
Physical Therapy Daily Treatment Note  Date:  2021    TIme In:   1134                  Time Out:  1214    Patient Name:  Aysha Bird    :  1960  MRN: [id number]    Restrictions/Precautions:    Pertinent Medical History:  Medical/Treatment Diagnosis Information:  ·   s/p right hip sx - application acellular dermal matrix     Insurance/Certification information:    Worker's compensation  Physician Information:    Pat Reynolds MD  Plan of care signed (Y/N):    Visit# / total visits:     11 /    G-Code (if applicable):      Date / Visit # G-Code Applied:         Progress Note: []  Yes  [x]  No  Next due by: 21      Pain level:   2/10     Subjective:   Patient reports no new complaints today. Objective:    Observation: Gait: decreased edgardo; decreased hip and knee flexion, using a cane   Test measurements:  TU\". LEFS: 26/80, SLS: R: 3\", L: 30\". MMT: RLE: hip flexion: 4+/5, hip IR: 4-/5, hip ER: 4/5, abd: 3+/5   Palpation:    Exercises:  Exercise Resistance/Repetitions Other comments   Nustep L5 x 10' 14   Standing marching 30\" x 5 14   Standing heel-toe raises 3x10 14   R LE forward weight shift w/ support 3x20 14   Standing hip abd AROM - right 3x15 14   Mini squats with support 3x15 14   Partial bridging 3x15 14   SLR flexion 3x10 right 14   Bent knee fall out 3x10 right 14   Step ups 2x10 14   Sidestepping gentle 3 short laps 14   Heel and toe walking 2 laps 14                    Other Therapeutic Activities:      Manual Treatments:      Modalities:        Timed Code Treatment Minutes:  39      Total Treatment Minutes:  40    Treatment/Activity Tolerance:  [x] Patient tolerated treatment well [] Patient limited by fatigue  [] Patient limited by pain  [] Patient limited by other medical complications  [x] Other: Pt completed tx with mod difficulty with heel and toe walking today.     Pain after treatment:    1/10    Prognosis: [x] Good [] Fair  [] Poor    Patient Requires

## 2021-09-15 ENCOUNTER — OFFICE VISIT (OUTPATIENT)
Dept: PRIMARY CARE CLINIC | Age: 61
End: 2021-09-15
Payer: MEDICAID

## 2021-09-15 VITALS
HEIGHT: 66 IN | HEART RATE: 93 BPM | TEMPERATURE: 97.3 F | WEIGHT: 116.2 LBS | DIASTOLIC BLOOD PRESSURE: 74 MMHG | RESPIRATION RATE: 16 BRPM | BODY MASS INDEX: 18.68 KG/M2 | SYSTOLIC BLOOD PRESSURE: 116 MMHG | OXYGEN SATURATION: 98 %

## 2021-09-15 DIAGNOSIS — Z96.641 STATUS POST RIGHT HIP REPLACEMENT: ICD-10-CM

## 2021-09-15 DIAGNOSIS — F32.9 REACTIVE DEPRESSION: ICD-10-CM

## 2021-09-15 DIAGNOSIS — L08.9 WOUND INFECTION: Primary | ICD-10-CM

## 2021-09-15 DIAGNOSIS — R12 HEARTBURN: ICD-10-CM

## 2021-09-15 DIAGNOSIS — T14.8XXA WOUND INFECTION: Primary | ICD-10-CM

## 2021-09-15 DIAGNOSIS — Z72.0 TOBACCO ABUSE: ICD-10-CM

## 2021-09-15 DIAGNOSIS — M81.0 OSTEOPOROSIS WITHOUT CURRENT PATHOLOGICAL FRACTURE, UNSPECIFIED OSTEOPOROSIS TYPE: ICD-10-CM

## 2021-09-15 DIAGNOSIS — G25.81 RLS (RESTLESS LEGS SYNDROME): ICD-10-CM

## 2021-09-15 PROCEDURE — G8427 DOCREV CUR MEDS BY ELIG CLIN: HCPCS | Performed by: NURSE PRACTITIONER

## 2021-09-15 PROCEDURE — 4004F PT TOBACCO SCREEN RCVD TLK: CPT | Performed by: NURSE PRACTITIONER

## 2021-09-15 PROCEDURE — 3017F COLORECTAL CA SCREEN DOC REV: CPT | Performed by: NURSE PRACTITIONER

## 2021-09-15 PROCEDURE — G8420 CALC BMI NORM PARAMETERS: HCPCS | Performed by: NURSE PRACTITIONER

## 2021-09-15 PROCEDURE — 99214 OFFICE O/P EST MOD 30 MIN: CPT | Performed by: NURSE PRACTITIONER

## 2021-09-15 RX ORDER — MELOXICAM 15 MG/1
15 TABLET ORAL DAILY
Qty: 30 TABLET | Refills: 3 | Status: SHIPPED | OUTPATIENT
Start: 2021-09-15 | End: 2021-12-15 | Stop reason: SDUPTHER

## 2021-09-15 RX ORDER — BUPROPION HYDROCHLORIDE 150 MG/1
150 TABLET ORAL EVERY MORNING
Qty: 30 TABLET | Refills: 3 | Status: SHIPPED | OUTPATIENT
Start: 2021-09-15 | End: 2021-12-15 | Stop reason: SDUPTHER

## 2021-09-15 RX ORDER — FAMOTIDINE 40 MG/1
40 TABLET, FILM COATED ORAL EVERY EVENING
Qty: 30 TABLET | Refills: 3 | Status: SHIPPED | OUTPATIENT
Start: 2021-09-15 | End: 2021-12-06

## 2021-09-15 RX ORDER — ROPINIROLE 0.5 MG/1
0.5 TABLET, FILM COATED ORAL NIGHTLY
Qty: 30 TABLET | Refills: 2 | Status: SHIPPED | OUTPATIENT
Start: 2021-09-15 | End: 2021-09-20

## 2021-09-15 ASSESSMENT — ENCOUNTER SYMPTOMS
EYES NEGATIVE: 1
ALLERGIC/IMMUNOLOGIC NEGATIVE: 1
GASTROINTESTINAL NEGATIVE: 1
RESPIRATORY NEGATIVE: 1

## 2021-09-15 NOTE — PATIENT INSTRUCTIONS
· Keep a list of your medicines with you. List all of the prescription medicines, nonprescription medicines, supplements, natural remedies, and vitamins that you take. Tell your healthcare providers who treat you about all of the products you are taking. Your provider can provide you with a form to keep track of them. Just ask. · Follow the directions that come with your medicine, including information about food or alcohol. Make sure you know how and when to take your medicine. Do not take more or less than you are supposed to take. · Keep all medicines out of the reach of children. · Store medicines according to the directions on the label. · Monitor yourself. Learn to know how your body reacts to your new medicine and keep track of how it makes you feel before attempting (If your provider has allowed you to do so) to drive or go to work. · Seek emergency medical attention if you think you have used too much of this medicine. An overdose of any prescription medicine can be fatal. Overdose symptoms may include extreme drowsiness, muscle weakness, confusion, cold and clammy skin, pinpoint pupils, shallow breathing, slow heart rate, fainting, or coma. · Don't share prescription medicines with others, even when they seem to have the same symptoms. What may be good for you may be harmful to others. · If you are no longer taking a prescribed medication and you have pills left please take your pills out of their original containers. Mix crushed pills with an undesirable substance, such as cat litter or used coffee grounds. Put the mixture into a disposable container with a lid, such as an empty margarine tub, or into a sealable bag. Cover up or remove any of your personal information on the empty containers by covering it with black permanent marker or duct tape. Place the sealed container with the mixture, and the empty drug containers, in the trash.    · If you use a medication that is in the form of a patch, dispose of used patches by folding them in half so that the sticky sides meet, and then flushing them down a toilet. They should not be placed in the household trash where children or pets can find them. · If you have any questions, ask your provider or pharmacist for more information. · Be sure to keep all appointments for provider visits or tests. We are committed to providing you with the best care possible. In order to help us achieve these goals please remember to bring all medications, herbal products, and over the counter supplements with you to each visit. If your provider has ordered testing for you, please be sure to follow up with our office if you have not received results within 7 days after the testing took place. *If you receive a survey after visiting one of our offices, please take time to share your experience concerning your physician office visit. These surveys are confidential and no health information about you is shared. We are eager to improve for you and we are counting on your feedback to help make that happen. ips to Help You Stop Smoking       Cigarette smoking is a preventable cause of death in the [place]. If you have thought about quitting but haven't been able to, here are some reasons why you should and some ways to do it. Here's Why   Quitting smoking now can decrease your risk of getting smoking-related illnesses like:   Heart disease   Stroke   Several types of cancer, including:   Lung   Mouth   Esophagus   Larynx   Bladder   Pancreas   Kidney   Chronic lung diseases:   Bronchitis   Emphysema   Asthma   Cataracts   Macular degeneration   Thyroid conditions   Hearing loss   Erectile dysfunction   Dementia   Osteoporosis   Here's How   Once you've decided to quit smoking, set your target quit date a few weeks away.  In the time leading up to your quit day, try some of these ideas offered by the [address] [place] [address] you're down to about seven cigarettes a day, it's time to set your target quit date, and get ready to stick to it. Don't Smoke \"Automatically\"   Smoke only those cigarettes you really want. Catch yourself before you light up a cigarette out of pure habit. Don't empty your ashtrays. This will remind you of how many cigarettes you've smoked each day, and the sight and the smell of stale cigarettes butts will be very unpleasant. Make yourself aware of each cigarette by using the opposite hand or putting cigarettes in an unfamiliar location or a different pocket to break the automatic reach. If you light up many times during the day without even thinking about it, try to look in a mirror each time you put a match to your cigarette. You may decide you don't need it. Make Smoking Inconvenient   Stop buying cigarettes by the carton. Wait until one pack is empty before you buy another. Stop carrying cigarettes with you at home or at work. Make them difficult to get to. Make Smoking Unpleasant   Smoke only under circumstances that aren't especially pleasurable for you. If you like to smoke with others, smoke alone. Turn your chair to an empty corner and focus only on the cigarette you are smoking and all its many negative effects. Collect all your cigarette butts in one large glass container as a visual reminder of the filth made by smoking. Just Before Quitting   Practice going without cigarettes. Don't think of never smoking again. Think of quitting in terms of one day at a time . Tell yourself you won't smoke today, and then don't. Clean your clothes to rid them of the cigarette smell, which can linger a long time. On the Day You Quit   Throw away all your cigarettes and matches. Hide your lighters and ashtrays. Visit the dentist and have your teeth cleaned to get rid of tobacco stains. Notice how nice they look and resolve to keep them that way.    Make a list of things you'd like to buy for yourself or someone else. Estimate the cost in terms of packs of cigarettes, and put the money aside to buy these presents. Keep very busy on the big day. Go to the movies, exercise, take long walks, or go bike riding. Remind your family and friends that this is your quit date, and ask them to help you over the rough spots of the first couple of days and weeks. Buy yourself a treat or do something special to celebrate. Telephone and Internet Support   Telephone, web-, and computer-based programs can offer you the support that you need to quit and to stay smoke-free. You can find many programs online, like the American Lung Association's [place] from Smoking . Immediately After Quitting   Develop a clean, fresh, nonsmoking environment around yourselfat work and at home. Buy yourself flowersyou may be surprised how much you can enjoy their scent now. The first few days after you quit, spend as much free time as possible in places where smoking isn't allowed, such as [address] [place] [address], museums, theaters, department stores, and churches. Drink large quantities of water and fruit juice (but avoid sodas that contain caffeine). Try to avoid alcohol, coffee, and other beverages that you associate with cigarette smoking. Strike up conversation instead of a match for a cigarette. If you miss the sensation of having a cigarette in your hand, play with something elsea pencil, a paper clip, a marble. If you miss having something in your mouth, try toothpicks or a fake cigarette. Patient Education        Plantar Fasciitis: Exercises  Introduction  Here are some examples of exercises for you to try. The exercises may be suggested for a condition or for rehabilitation. Start each exercise slowly. Ease off the exercises if you start to have pain. You will be told when to start these exercises and which ones will work best for you.   How to do the exercises  Towel stretch   Sit with your legs extended and knees straight. Place a towel around your foot just under the toes. Hold each end of the towel in each hand, with your hands above your knees. Pull back with the towel so that your foot stretches toward you. Hold the position for at least 15 to 30 seconds. Repeat 2 to 4 times a session, up to 5 sessions a day. Calf stretch   This exercise stretches the muscles at the back of the lower leg (the calf) and the Achilles tendon. Do this exercise 3 or 4 times a day, 5 days a week. Stand facing a wall with your hands on the wall at about eye level. Put the leg you want to stretch about a step behind your other leg. Keeping your back heel on the floor, bend your front knee until you feel a stretch in the back leg. Hold the stretch for 15 to 30 seconds. Repeat 2 to 4 times. Plantar fascia and calf stretch   Stretching the plantar fascia and calf muscles can increase flexibility and decrease heel pain. You can do this exercise several times each day and before and after activity. Stand on a step as shown above. Be sure to hold on to the banister. Slowly let your heels down over the edge of the step as you relax your calf muscles. You should feel a gentle stretch across the bottom of your foot and up the back of your leg to your knee. Hold the stretch about 15 to 30 seconds, and then tighten your calf muscle a little to bring your heel back up to the level of the step. Repeat 2 to 4 times. Towel curls   Make this exercise more challenging by placing a weighted object, such as a soup can, on the other end of the towel. While sitting, place your foot on a towel on the floor and scrunch the towel toward you with your toes. Then, also using your toes, push the towel away from you. [employer] pickups   Put marbles on the floor next to a cup. Using your toes, try to lift the marbles up from the floor and put them in the cup. Follow-up care is a key part of your treatment and safety.  Be sure to make and go to all appointments, and call your doctor if you are having problems. It's also a good idea to know your test results and keep a list of the medicines you take. Where can you learn more? Go to https://In[employer]peliSymetis.Tele[employer]u Services. org and sign in to your [employer] account. Enter K848 in the [facility] box to learn more about \"Plantar Fasciitis: Exercises. \"     If you do not have an account, please click on the \"Sign Up Now\" link. Current as of: November 16, 2020               Content Version: 12.9  © 2006-2021 [employer]. Care instructions adapted under license by [facility] ([facility]). If you have questions about a medical condition or this instruction, always ask your healthcare professional. [address] any warranty or liability for your use of this information. Patient Education        Plantar Fasciitis: Care Instructions  Overview     Plantar fasciitis is pain and inflammation of the plantar fascia, the tissue at the bottom of your foot that connects the heel bone to the toes. The plantar fascia also supports the arch. If you strain the plantar fascia, it can develop small tears and cause heel pain when you stand or walk. Plantar fasciitis can be caused by running or other sports. It also may occur in people who are overweight or who have high arches or flat feet. You may get plantar fasciitis if you walk or stand for long periods, or have a tight Achilles tendon or calf muscles. You can improve your foot pain with rest and other care at home. It might take a few weeks to a few months for your foot to heal completely. Follow-up care is a key part of your treatment and safety. Be sure to make and go to all appointments, and call your doctor if you are having problems. It's also a good idea to know your test results and keep a list of the medicines you take. How can you care for yourself at home? Rest your feet often.  Reduce your activity to a level that lets you avoid pain. If possible, do not run or walk on hard surfaces. Take pain medicines exactly as directed. If the doctor gave you a prescription medicine for pain, take it as prescribed. If you are not taking a prescription pain medicine, take an over-the-counter anti-inflammatory medicine for pain and swelling, such as ibuprofen (Advil, Motrin) or naproxen (Aleve). Read and follow all instructions on the label. Use ice massage to help with pain and swelling. You can use an ice cube or an ice cup several times a day. To make an ice cup, fill a paper cup with water and freeze it. Cut off the top of the cup until a half-inch of ice shows. Hold onto the remaining paper to use the cup. Rub the ice in small circles over the area for 5 to 7 minutes. Contrast baths, which alternate hot and cold water, can also help reduce swelling. But because heat alone may make pain and swelling worse, end a contrast bath with a soak in cold water. Wear a night splint if your doctor suggests it. A night splint holds your foot with the toes pointed up and the foot and ankle at a 90-degree angle. This position gives the bottom of your foot a constant, gentle stretch. Do simple exercises such as calf stretches and towel stretches 2 to 3 times each day, especially when you first get up in the morning. These can help the plantar fascia become more flexible. They also make the muscles that support your arch stronger. Hold these stretches for 15 to 30 seconds per stretch. Repeat 2 to 4 times. Stand about 1 foot from a wall. Place the palms of both hands against the wall at chest level. Lean forward against the wall, keeping one leg with the knee straight and heel on the ground while bending the knee of the other leg. Sit down on the floor or a mat with your feet stretched in front of you. Roll up a towel lengthwise, and loop it over the ball of your foot.  Holding the towel at both ends, gently pull the towel toward you to stretch your foot.  Wear shoes with good arch support. Athletic shoes or shoes with a well-cushioned sole are good choices. Replace athletic shoes regularly. Try heel cups or shoe inserts (orthotics) to help cushion your heel. You can buy these at many shoe stores. Put on your shoes as soon as you get out of bed. Going barefoot or wearing slippers may make your pain worse. Reach and stay at a good weight for your height. This puts less strain on your feet. When should you call for help? Call your doctor now or seek immediate medical care if:    You have heel pain with fever, redness, or warmth in your heel.     You cannot put weight on the sore foot. Watch closely for changes in your health, and be sure to contact your doctor if:    You have numbness or tingling in your heel.     Your heel pain lasts more than 2 weeks. Where can you learn more? Go to https://[employer].PacerPro. org and sign in to your [employer] account. Enter A458 in the [employer] box to learn more about \"Plantar Fasciitis: Care Instructions. \"     If you do not have an account, please click on the \"Sign Up Now\" link. Current as of: November 16, 2020               Content Version: 12.9  © 2006-2021 Healthwise, Incorporated. Care instructions adapted under license by [facility] ([facility]). If you have questions about a medical condition or this instruction, always ask your healthcare professional. [place] [address] any warranty or liability for your use of this information.

## 2021-09-15 NOTE — PROGRESS NOTES
Chief Complaint   Patient presents with    Follow-up    Gastroesophageal Reflux    Hypertension       Have you seen any other physician or provider since your last visit yes - wound doctor    Have you had any other diagnostic tests since your last visit? no    Have you changed or stopped any medications since your last visit? yes - stopped Protonix     I have recommended that this patient have a immunization for pneumonia and sigmoidoscopy but she declines at this time. I have discussed the risks and benefits of this examination with her. The patient verbalizes understanding. SUBJECTIVE:    Patient Sofia Mina is a 62 y.o. female. Chief Complaint   Patient presents with    Follow-up    Gastroesophageal Reflux    Hypertension     HPI:  She has had a hip replacement that was infected. She has had iv antibiotics with a picc line. She is still doing exercise. Patient has been complaining of heart burn for the past several weeks. Sx worse and occurs every day. It does not matter what foods she eats Patient tried Protonix with good response, but has not had this a a while. Patient has occasional sx at night. Patient has had hypertension for several years. She has been compliant with taking medications, without side effects from it. She has been following a low-sodium, is not active and rarely exercises. Weight is stable, compared to last visit. Her blood pressure is stable 116/74 at this time. Patient is scheduled to see the wound doctor tomorrow and should be her last visit for her right hip surgery. She is doing physical therapy twice a week at [facility]. She has been on her osteoporosis medication. She has had some heartburn recently. She has an appt with her cardiologist next month about her heart condition. Patient's medications, allergies, past medical, surgical, social and family histories were reviewed and updated as appropriate.           Review of Systems   Constitutional: Negative. HENT: Negative. Eyes: Negative. Respiratory: Negative. Cardiovascular: Negative. Gastrointestinal: Negative. Endocrine: Negative. Genitourinary: Negative. Musculoskeletal: Positive for arthralgias and gait problem. Skin: Positive for wound. Allergic/Immunologic: Negative. Hematological: Negative. Psychiatric/Behavioral: Negative. OBJECTIVE:  /74 (Site: Right Upper Arm, Position: Sitting, Cuff Size: Child)   Pulse 93   Temp 97.3 °F (36.3 °C) (Temporal)   Resp 16   Ht 5' 6\" (1.676 m)   Wt 116 lb 3.2 oz (52.7 kg)   SpO2 98% Comment: room air  BMI 18.76 kg/m²    Physical Exam  Vitals and nursing note reviewed. Constitutional:       Appearance: She is well-developed. HENT:      Head: Normocephalic and atraumatic. Eyes:      Conjunctiva/sclera: Conjunctivae normal.      Pupils: Pupils are equal, round, and reactive to light. Neck:      Thyroid: No thyromegaly. Vascular: No JVD. Cardiovascular:      Rate and Rhythm: Normal rate and regular rhythm. Heart sounds: No murmur heard. No friction rub. No gallop. Pulmonary:      Effort: Pulmonary effort is normal. No respiratory distress. Breath sounds: Normal breath sounds. No wheezing or rales. Abdominal:      General: Bowel sounds are normal. There is no distension. Palpations: Abdomen is soft. Tenderness: There is no abdominal tenderness. There is no guarding. Musculoskeletal:      Cervical back: Normal range of motion and neck supple. Right hip: Tenderness present. Left foot: Tenderness present. Comments: Surgical incision is healing well. Skin:     General: Skin is warm and dry. Findings: No rash. Neurological:      Mental Status: She is alert and oriented to person, place, and time. Psychiatric:         Judgment: Judgment normal.         No results found for requested labs within last 30 days.        Hemoglobin A1C (%)   Date Value 03/04/2016 5.5     LDL Calculated (mg/dL)   Date Value   08/12/2019 143 (H)         Lab Results   Component Value Date    WBC 17.0 03/05/2021    NEUTROABS 14.5 03/05/2021    HGB 10.8 03/05/2021    HCT 31.2 03/05/2021    MCV 92.6 03/05/2021     03/05/2021       Lab Results   Component Value Date    TSH 4.00 03/05/2021         ASSESSMENT/PLAN:     1. Reactive depression    - buPROPion (WELLBUTRIN XL) 150 MG extended release tablet; Take 1 tablet by mouth every morning  Dispense: 30 tablet; Refill: 3    2. Tobacco abuse    - buPROPion (WELLBUTRIN XL) 150 MG extended release tablet; Take 1 tablet by mouth every morning  Dispense: 30 tablet; Refill: 3    3. RLS (restless legs syndrome)    - rOPINIRole (REQUIP) 0.5 MG tablet; Take 1 tablet by mouth nightly  Dispense: 30 tablet; Refill: 2    4. Wound infection  Improving, doing well. 5. Status post right hip replacement    - meloxicam (MOBIC) 15 MG tablet; Take 1 tablet by mouth daily  Dispense: 30 tablet; Refill: 3    6. Heartburn    - famotidine (PEPCID) 40 MG tablet; Take 1 tablet by mouth every evening  Dispense: 30 tablet; Refill: 3    7. Osteoporosis without current pathological fracture, unspecified osteoporosis type  Doing well Fosamax. Cautioned about staying up right and notify of problems with her stomach.         Written by Luisito GURROLA, acting as a scribe for Charmaine Bones on 9/15/2021 at 9:18 PM.

## 2021-09-16 ENCOUNTER — APPOINTMENT (OUTPATIENT)
Dept: PHYSICAL THERAPY | Facility: HOSPITAL | Age: 61
End: 2021-09-16
Payer: COMMERCIAL

## 2021-09-16 ENCOUNTER — LAB (OUTPATIENT)
Dept: LAB | Facility: HOSPITAL | Age: 61
End: 2021-09-16

## 2021-09-16 ENCOUNTER — TRANSCRIBE ORDERS (OUTPATIENT)
Dept: LAB | Facility: HOSPITAL | Age: 61
End: 2021-09-16

## 2021-09-16 DIAGNOSIS — T84.51XD INFLAMMATORY REACTION DUE TO INTERNAL PROSTHESIS OF RIGHT HIP, SUBSEQUENT ENCOUNTER: ICD-10-CM

## 2021-09-16 DIAGNOSIS — T84.51XD INFLAMMATORY REACTION DUE TO INTERNAL PROSTHESIS OF RIGHT HIP, SUBSEQUENT ENCOUNTER: Primary | ICD-10-CM

## 2021-09-16 LAB
BASOPHILS # BLD AUTO: 0.07 10*3/MM3 (ref 0–0.2)
BASOPHILS NFR BLD AUTO: 0.9 % (ref 0–1.5)
CRP SERPL-MCNC: 0.62 MG/DL (ref 0–0.5)
DEPRECATED RDW RBC AUTO: 58.6 FL (ref 37–54)
EOSINOPHIL # BLD AUTO: 0.05 10*3/MM3 (ref 0–0.4)
EOSINOPHIL NFR BLD AUTO: 0.7 % (ref 0.3–6.2)
ERYTHROCYTE [DISTWIDTH] IN BLOOD BY AUTOMATED COUNT: 16.5 % (ref 12.3–15.4)
ERYTHROCYTE [SEDIMENTATION RATE] IN BLOOD: 6 MM/HR (ref 0–30)
HCT VFR BLD AUTO: 40 % (ref 34–46.6)
HGB BLD-MCNC: 13.5 G/DL (ref 12–15.9)
IMM GRANULOCYTES # BLD AUTO: 0.03 10*3/MM3 (ref 0–0.05)
IMM GRANULOCYTES NFR BLD AUTO: 0.4 % (ref 0–0.5)
LYMPHOCYTES # BLD AUTO: 1.12 10*3/MM3 (ref 0.7–3.1)
LYMPHOCYTES NFR BLD AUTO: 15.1 % (ref 19.6–45.3)
MCH RBC QN AUTO: 32.5 PG (ref 26.6–33)
MCHC RBC AUTO-ENTMCNC: 33.8 G/DL (ref 31.5–35.7)
MCV RBC AUTO: 96.2 FL (ref 79–97)
MONOCYTES # BLD AUTO: 0.31 10*3/MM3 (ref 0.1–0.9)
MONOCYTES NFR BLD AUTO: 4.2 % (ref 5–12)
NEUTROPHILS NFR BLD AUTO: 5.86 10*3/MM3 (ref 1.7–7)
NEUTROPHILS NFR BLD AUTO: 78.7 % (ref 42.7–76)
NRBC BLD AUTO-RTO: 0 /100 WBC (ref 0–0.2)
PLATELET # BLD AUTO: 368 10*3/MM3 (ref 140–450)
PMV BLD AUTO: 9.5 FL (ref 6–12)
RBC # BLD AUTO: 4.16 10*6/MM3 (ref 3.77–5.28)
WBC # BLD AUTO: 7.44 10*3/MM3 (ref 3.4–10.8)

## 2021-09-16 PROCEDURE — 85652 RBC SED RATE AUTOMATED: CPT | Performed by: INTERNAL MEDICINE

## 2021-09-16 PROCEDURE — 86140 C-REACTIVE PROTEIN: CPT

## 2021-09-16 PROCEDURE — 85025 COMPLETE CBC W/AUTO DIFF WBC: CPT

## 2021-09-16 PROCEDURE — 36415 COLL VENOUS BLD VENIPUNCTURE: CPT

## 2021-09-17 ENCOUNTER — HOSPITAL ENCOUNTER (OUTPATIENT)
Dept: PHYSICAL THERAPY | Facility: HOSPITAL | Age: 61
Setting detail: THERAPIES SERIES
Discharge: HOME | End: 2021-09-17
Payer: COMMERCIAL

## 2021-09-17 PROCEDURE — 97110 THERAPEUTIC EXERCISES: CPT

## 2021-09-17 NOTE — FLOWSHEET NOTE
Physical Therapy Daily Treatment Note  Date:  2021    TIme In:   1129                  Time Out:  7302    Patient Name:  Isis Parker    :  1960  MRN: [id number]    Restrictions/Precautions:    Pertinent Medical History:  Medical/Treatment Diagnosis Information:  ·   s/p right hip sx - application acellular dermal matrix     Insurance/Certification information:    Worker's compensation  Physician Information:    Shine Garner MD  Plan of care signed (Y/N):    Visit# / total visits:     12 /    G-Code (if applicable):      Date / Visit # G-Code Applied:         Progress Note: []  Yes  [x]  No  Next due by: 21      Pain level:   2/10     Subjective:   Patient reports no new complaints today, mild pain; was released by infectious disease MD this week     Objective:    Observation: Gait: decreased edgardo; decreased hip and knee flexion, using a cane   Test measurements:  TU\". LEFS: 26/80, SLS: R: 3\", L: 30\". MMT: RLE: hip flexion: 4+/5, hip IR: 4-/5, hip ER: 4/5, abd: 3+/5   Palpation:    Exercises:  Exercise Resistance/Repetitions Other comments   Nustep L5 x 10' 17   Standing marching 30\" x 5 17   Standing heel-toe raises 3x15 17   R LE forward weight shift w/ support 3x20 17   Standing hip abd AROM - right 3x15 17   Mini squats with support 3x15 17   Partial bridging 3x15 17   SLR flexion 3x10 right 17   Bent knee fall out 3x10 right 17   Step ups 2x10 17   Sidestepping gentle 3 short laps 17   Heel and toe walking 2 laps 17                    Other Therapeutic Activities:      Manual Treatments:      Modalities:        Timed Code Treatment Minutes:  36'      Total Treatment Minutes:  43'    Treatment/Activity Tolerance:  [x] Patient tolerated treatment well [] Patient limited by fatigue  [] Patient limited by pain  [] Patient limited by other medical complications  [x] Other: Pt completed tx with mod difficulty with heel and toe walking today.     Pain after treatment:    2 /10    Prognosis: [x] Good [] Fair  [] Poor    Patient Requires Follow-up: [x] Yes  [] No    Plan:   [x] Continue per plan of care [] Alter current plan (see comments)  [] Plan of care initiated [] Hold pending MD visit [] Discharge    Plan for Next Session:      Goals  Short term goals  Time Frame for Short term goals: 4 weeks  Short term goal 1: Patient to be independent with HEP. -MET  Short term goal 2: Patient to achieve a TUG test time of 15\" - improving gait efficiency. -MET  Short term goal 3: Patient to achieve a 1/2 grade strength increase in right hip MMG's in noted deficits. -MET  Short term goal 4: Patient to achieve a single leg stance time on right LE of 3 seconds without support. -MET  Long term goals  Time Frame for Long term goals : 8 weeks  Long term goal 1: Patient to achieve a TUG test time of 12\" - improving gait efficiency. -MET  Long term goal 2: Patient to safely advance to ambulation with a cane on even and uneven surfaces with good stability and confidence for community distances and terrains. Long term goal 3: Patient to achieve 4+/5 right hip strength in MMG's. Long term goal 4: Patient to achieve ability to perform ADL's and I-ADL's with reported no more than minimal difficulty.   Long term goal 5: Patient to achieve right hip pain level at < 1-2/10 on average with performance of routine daily activities.         Electronically signed by:  Ana Lorenzo PT

## 2021-09-18 DIAGNOSIS — G25.81 RLS (RESTLESS LEGS SYNDROME): ICD-10-CM

## 2021-09-20 RX ORDER — ROPINIROLE 0.5 MG/1
0.5 TABLET, FILM COATED ORAL NIGHTLY
Qty: 30 TABLET | Refills: 2 | Status: SHIPPED | OUTPATIENT
Start: 2021-09-20 | End: 2022-02-02

## 2021-09-20 RX ORDER — ETANERCEPT 50 MG/ML
SOLUTION SUBCUTANEOUS
Qty: 4 ML | Refills: 1 | Status: SHIPPED | OUTPATIENT
Start: 2021-09-20 | End: 2021-11-09

## 2021-09-21 ENCOUNTER — HOSPITAL ENCOUNTER (OUTPATIENT)
Dept: PHYSICAL THERAPY | Facility: HOSPITAL | Age: 61
Setting detail: THERAPIES SERIES
Discharge: HOME | End: 2021-09-21
Payer: COMMERCIAL

## 2021-09-21 PROCEDURE — 97110 THERAPEUTIC EXERCISES: CPT

## 2021-09-21 NOTE — FLOWSHEET NOTE
Physical Therapy Daily Treatment Note  Date:  2021    TIme In:    1131                 Time Out:  1219    Patient Name:  Felipe Feldman    :  1960  MRN: [id number]    Restrictions/Precautions:    Pertinent Medical History:  Medical/Treatment Diagnosis Information:  ·   s/p right hip sx - application acellular dermal matrix     Insurance/Certification information:    Worker's compensation  Physician Information:    Tita Small MD  Plan of care signed (Y/N):    Visit# / total visits:     13 /    G-Code (if applicable):      Date / Visit # G-Code Applied:         Progress Note: []  Yes  [x]  No  Next due by: 21      Pain level:  0/10     Subjective:   Patient reports she was released by her infectious disease doc. She expressed she feels good today. Objective:    Observation: Gait: decreased edgardo; decreased hip and knee flexion, using a cane   Test measurements:  TU\". LEFS: 26/80, SLS: R: 3\", L: 30\". MMT: RLE: hip flexion: 4+/5, hip IR: 4-/5, hip ER: 4/5, abd: 3+/5    Palpation:    Exercises:  Exercise Resistance/Repetitions Other comments   Nustep L5 x 10' 21   Standing marching 30\" x 5 21   Standing heel-toe raises 3x15 21   R lunge to step 2x10 21   Standing hip abd AROM - right 3x15 21   Mini squats with support 3x15 21   Partial bridging 3x15 21   SLR flexion 3x10 right 21   Bent knee fall out 3x10 right 21   Step ups fwd and sideways 2x10 21   Sidestepping gentle 3 short laps 21   Heel and toe walking 2 laps 21                    Other Therapeutic Activities:      Manual Treatments:      Modalities:        Timed Code Treatment Minutes:  45       Total Treatment Minutes:   48    Treatment/Activity Tolerance:  [x] Patient tolerated treatment well [] Patient limited by fatigue  [] Patient limited by pain  [] Patient limited by other medical complications  [x] Other: Pt completed tx with no pain and tolerated new activity fair.     Pain after treatment:     0/10 \"She expressed stepping up sideways hurt worse on the right than the left. \"    Prognosis: [x] Good [] Fair  [] Poor    Patient Requires Follow-up: [x] Yes  [] No    Plan:   [x] Continue per plan of care [] Alter current plan (see comments)  [] Plan of care initiated [] Hold pending MD visit [] Discharge    Plan for Next Session:      Goals  Short term goals  Time Frame for Short term goals: 4 weeks  Short term goal 1: Patient to be independent with HEP. -MET  Short term goal 2: Patient to achieve a TUG test time of 15\" - improving gait efficiency. -MET  Short term goal 3: Patient to achieve a 1/2 grade strength increase in right hip MMG's in noted deficits. -MET  Short term goal 4: Patient to achieve a single leg stance time on right LE of 3 seconds without support. -MET  Long term goals  Time Frame for Long term goals : 8 weeks  Long term goal 1: Patient to achieve a TUG test time of 12\" - improving gait efficiency. -MET  Long term goal 2: Patient to safely advance to ambulation with a cane on even and uneven surfaces with good stability and confidence for community distances and terrains. Long term goal 3: Patient to achieve 4+/5 right hip strength in MMG's. Long term goal 4: Patient to achieve ability to perform ADL's and I-ADL's with reported no more than minimal difficulty.   Long term goal 5: Patient to achieve right hip pain level at < 1-2/10 on average with performance of routine daily activities.         Electronically signed by:  Adan Major PTA

## 2021-09-23 ENCOUNTER — HOSPITAL ENCOUNTER (OUTPATIENT)
Dept: PHYSICAL THERAPY | Facility: HOSPITAL | Age: 61
Setting detail: THERAPIES SERIES
Discharge: HOME | End: 2021-09-23
Payer: COMMERCIAL

## 2021-09-23 PROCEDURE — 97110 THERAPEUTIC EXERCISES: CPT

## 2021-09-23 NOTE — FLOWSHEET NOTE
Physical Therapy Daily Treatment Note  Date:  2021    TIme In:    1126                 Time Out:  1209    Patient Name:  Florencia Lozano    :  1960  MRN: [id number]    Restrictions/Precautions:    Pertinent Medical History:  Medical/Treatment Diagnosis Information:  ·   s/p right hip sx - application acellular dermal matrix     Insurance/Certification information:    Worker's compensation  Physician Information:    Kelly Martino MD  Plan of care signed (Y/N):    Visit# / total visits:     15 /    G-Code (if applicable):      Date / Visit # G-Code Applied:         Progress Note: []  Yes  [x]  No  Next due by: 21      Pain level:  0/10     Subjective:   Patient reports she is doing okay today. She expressed that she has a hard time with heel walking and its due to weakness and knee pain not hip pain. Objective:    Observation: Gait: decreased edgardo; decreased hip and knee flexion, using a cane   Test measurements:  TU\". LEFS: 26/80, SLS: R: 3\", L: 30\".    MMT: RLE: hip flexion: 4+/5, hip IR: 4-/5, hip ER: 4/5, abd: 3+/5    Palpation:    Exercises:  Exercise Resistance/Repetitions Other comments   Nustep L5 x 10' 23   Standing marching 30\" x 5 23   Standing heel-toe raises 3x15 23   R lunge to step 2x10 23   Standing hip abd AROM - right 3x15 23   Mini squats with support 3x15 23   Partial bridging 3x15 23   SLR flexion 3x10 right 23   Bent knee fall out 3x10 right 23   Step ups fwd and sideways 2x10 23   Sidestepping gentle 3 short laps 23   Toe walking 2 laps 23                    Other Therapeutic Activities:      Manual Treatments:      Modalities:        Timed Code Treatment Minutes:  40       Total Treatment Minutes:   43    Treatment/Activity Tolerance:  [x] Patient tolerated treatment well [] Patient limited by fatigue  [] Patient limited by pain  [] Patient limited by other medical complications  [x] Other: Pt completed tx with no pain and heel walking was removed from tx list due to knee pain. Pain after treatment:     0/10    Prognosis: [x] Good [] Fair  [] Poor    Patient Requires Follow-up: [x] Yes  [] No    Plan:   [x] Continue per plan of care [] Alter current plan (see comments)  [] Plan of care initiated [] Hold pending MD visit [] Discharge    Plan for Next Session:      Goals  Short term goals  Time Frame for Short term goals: 4 weeks  Short term goal 1: Patient to be independent with HEP. -MET  Short term goal 2: Patient to achieve a TUG test time of 15\" - improving gait efficiency. -MET  Short term goal 3: Patient to achieve a 1/2 grade strength increase in right hip MMG's in noted deficits. -MET  Short term goal 4: Patient to achieve a single leg stance time on right LE of 3 seconds without support. -MET  Long term goals  Time Frame for Long term goals : 8 weeks  Long term goal 1: Patient to achieve a TUG test time of 12\" - improving gait efficiency. -MET  Long term goal 2: Patient to safely advance to ambulation with a cane on even and uneven surfaces with good stability and confidence for community distances and terrains. Long term goal 3: Patient to achieve 4+/5 right hip strength in MMG's. Long term goal 4: Patient to achieve ability to perform ADL's and I-ADL's with reported no more than minimal difficulty.   Long term goal 5: Patient to achieve right hip pain level at < 1-2/10 on average with performance of routine daily activities.         Electronically signed by:  Marie Major PTA

## 2021-09-28 ENCOUNTER — APPOINTMENT (OUTPATIENT)
Dept: PHYSICAL THERAPY | Facility: HOSPITAL | Age: 61
End: 2021-09-28
Payer: COMMERCIAL

## 2021-09-30 ENCOUNTER — APPOINTMENT (OUTPATIENT)
Dept: PHYSICAL THERAPY | Facility: HOSPITAL | Age: 61
End: 2021-09-30
Payer: COMMERCIAL

## 2021-10-05 ENCOUNTER — HOSPITAL ENCOUNTER (OUTPATIENT)
Dept: PHYSICAL THERAPY | Facility: HOSPITAL | Age: 61
Setting detail: THERAPIES SERIES
Discharge: HOME | End: 2021-10-05
Payer: COMMERCIAL

## 2021-10-05 PROCEDURE — 97110 THERAPEUTIC EXERCISES: CPT

## 2021-10-05 NOTE — FLOWSHEET NOTE
Physical Therapy Daily Treatment / Reassessment Note  Date:  10/5/2021    TIme In:  9977                   Time Out:  1233    Patient Name:  Jus Thorne    :  1960  MRN: [id number]    Restrictions/Precautions:    Pertinent Medical History:  Medical/Treatment Diagnosis Information:  ·   s/p right hip sx - application acellular dermal matrix     Insurance/Certification information:    Worker's compensation  Physician Information:    Estela Donaldson MD  Plan of care signed (Y/N):    Visit# / total visits:     15 /    G-Code (if applicable):      Date / Visit # G-Code Applied:         Progress Note: []  Yes  [x]  No  Next due by: 21      Pain level:  0/10     Subjective:   Patient reports she has been having to help with her mom and has to go down stairs. She expressed she would like some more training  On stairs so she feels more secure. Objective:    Observation: Gait: decreased edgardo; decreased hip and knee flexion, using a cane   Test measurements:  TU\". LEFS: 30/80, SLS: R: 2\", L: 30\". MMT: RLE: hip flexion: 4+/5, hip IR: 5/5, hip ER: 5/5, abd: 4+/5    Palpation:    Exercises:  Exercise Resistance/Repetitions Other comments   Nustep L5 x 10' 5   Standing marching 30\" x 5 5   Standing heel-toe raises 3x15 5   R lunge to step 2x10 5   Standing hip abd AROM - right 3x15 5   Mini squats with support 3x15 5   Partial bridging 3x15 5   SLR flexion 3x10 right 5   Bent knee fall out 3x10 right 5   Stairs in hallway 1 hand rail X 6 rounds 5   Sidestepping gentle 3 short laps 5   Toe walking 2 laps 5                    Other Therapeutic Activities:  Re-assess performed by Aleshia Leos PT.     Manual Treatments:      Modalities:        Timed Code Treatment Minutes:  45       Total Treatment Minutes:   49    Treatment/Activity Tolerance:  [x] Patient tolerated treatment well [] Patient limited by fatigue  [] Patient limited by pain  [] Patient limited by other medical complications  [x] Other: Pt completed tx with good increase in hip strength and improvement in functional measures. Pain after treatment:     0/10    Prognosis: [x] Good [] Fair  [] Poor    Patient Requires Follow-up: [x] Yes  [] No    Plan:   [x] Continue per plan of care [] Alter current plan (see comments)  [] Plan of care initiated [] Hold pending MD visit [] Discharge    Plan for Next Session:      Goals  Short term goals  Time Frame for Short term goals: 4 weeks  Short term goal 1: Patient to be independent with HEP. -MET  Short term goal 2: Patient to achieve a TUG test time of 15\" - improving gait efficiency. -MET  Short term goal 3: Patient to achieve a 1/2 grade strength increase in right hip MMG's in noted deficits. -MET  Short term goal 4: Patient to achieve a single leg stance time on right LE of 3 seconds without support. -MET  Long term goals  Time Frame for Long term goals : 8 weeks  Long term goal 1: Patient to achieve a TUG test time of 12\" - improving gait efficiency. -MET  Long term goal 2: Patient to safely advance to ambulation with a cane on even and uneven surfaces with good stability and confidence for community distances and terrains. - not met but improving  Long term goal 3: Patient to achieve 4+/5 right hip strength in MMG's. - met  Long term goal 4: Patient to achieve ability to perform ADL's and I-ADL's with reported no more than minimal difficulty. - not met but improving  Long term goal 5: Patient to achieve right hip pain level at < 1-2/10 on average with performance of routine daily activities. - not met but improving    Patient continues to show gradual, steady progress; she has now met all STG's and 2/5 LTG's; she is anticipated to progress with continued PT intervention.  She still has significant deficits with strength, balance, and overall functional capacity.         Electronically signed by:  Vin Dela Cruz PTA    Electronically signed by Florina Baltazar PT on 10/6/2021 at 10:24 AM

## 2021-10-07 ENCOUNTER — HOSPITAL ENCOUNTER (OUTPATIENT)
Dept: PHYSICAL THERAPY | Facility: HOSPITAL | Age: 61
Setting detail: THERAPIES SERIES
Discharge: HOME | End: 2021-10-07
Payer: MEDICAID

## 2021-10-07 PROCEDURE — 97110 THERAPEUTIC EXERCISES: CPT

## 2021-10-07 PROCEDURE — 97140 MANUAL THERAPY 1/> REGIONS: CPT

## 2021-10-07 PROCEDURE — 97161 PT EVAL LOW COMPLEX 20 MIN: CPT

## 2021-10-07 ASSESSMENT — PAIN SCALES - GENERAL: PAINLEVEL_OUTOF10: 3

## 2021-10-07 ASSESSMENT — PAIN DESCRIPTION - FREQUENCY: FREQUENCY: INTERMITTENT

## 2021-10-07 ASSESSMENT — PAIN DESCRIPTION - LOCATION: LOCATION: FOOT

## 2021-10-07 ASSESSMENT — PAIN DESCRIPTION - DESCRIPTORS: DESCRIPTORS: ACHING;SORE;TENDER

## 2021-10-07 ASSESSMENT — PAIN DESCRIPTION - ORIENTATION: ORIENTATION: LEFT

## 2021-10-07 NOTE — PROGRESS NOTES
Physical Therapy  Initial Assessment  Date: 10/7/2021  Patient Name: Rachel Moore  MRN: [id number]  : 1960     Treatment Diagnosis: Left foot pain, ROM deficits; abnormality of gait; s/s consistent with plantar fasciitis    Restrictions  Restrictions/Precautions  Restrictions/Precautions: General Precautions  Required Braces or Orthoses?: No    Subjective   General  Chart Reviewed: Yes  Patient assessed for rehabilitation services?: Yes  Response To Previous Treatment: Not applicable  Family / Caregiver Present: No  Referring Practitioner: Chastity Myles DPM  Diagnosis: Left foot pain, plantar fasciitis  Follows Commands: Within Functional Limits  PT Visit Information  PT Insurance Information: [employer]  Subjective  Subjective: Patient reports: acute on chronic left foot pain, plantar fasciitis; history of this condition that has been well managed until the past 3 months; she has c/o localized arch and forefoot pain worse with [address] activities; no recent foot injury; patient has been dealing with right hip surgery x 2, she had misplaced her custom orthotics therefore has not been using them, and feels this has caused increased stress to the left foot, she has been walking with a walker and has progressed to a cane; no recent foot x-rays  Pain Screening  Patient Currently in Pain: Yes  Pain Assessment  Pain Assessment: 0-10  Pain Level: 3  Vital Signs  Patient Currently in Pain: Yes    Vision/Hearing  Vision  Vision: Within Functional Limits  Hearing  Hearing: Within functional limits    Orientation  Orientation  Overall Orientation Status: Within Normal Limits    Social/Functional History       Objective     Observation/Palpation  Posture: Fair  Palpation: (+) tenderness left plantar fasciitis  Observation: mild antalgic gait; high arch; no edema or discoloration    AROM LLE (degrees)  L Ankle Dorsiflexion 0-20: 0-4  L Ankle Plantar Flexion 0-45: 0-45  L Ankle Forefoot Inversion 0-40: WNL  L hip, knee, and ankle strength in MMG's. Short term goal 4: Achieve a LEFS = 35/80. Long term goals  Time Frame for Long term goals : 6 weeks  Long term goal 1: Achieve left ankle DF AROM: 0-10  Long term goal 2: Achieve a non-antalgic on left LE gait patern on level surfaces with cane. Long term goal 3: Achieve a LEFS = 38/80. Long term goal 4: Achieve ability to perform ADL's and I-ADL's with reported no more than mild difficulty. Long term goal 5: Achieve 5/5 left hip, knee, and ankle strength in MMG's. Therapy Time   Individual Concurrent Group Co-treatment   Time In           Time Out           Minutes                   Amelie Yang PT       Certification of Medical Necessity: It will be understood that this treatment plan is certified medically necessary by the documenting therapist and referring physician mentioned in this report. Unless the physician indicated otherwise through written correspondence with our office, all further referrals will act as certification of medical necessity on this treatment plan. Thank you for this referral.  If you have questions regarding this plan of care, please call [phone].           Revisions to this plan (optional):                     Please sign and return this plan to:   FAX: [phone]      Signature:                                 Date:

## 2021-10-08 ASSESSMENT — PAIN SCALES - GENERAL: PAINLEVEL_OUTOF10: 3

## 2021-10-12 ENCOUNTER — APPOINTMENT (OUTPATIENT)
Dept: PHYSICAL THERAPY | Facility: HOSPITAL | Age: 61
End: 2021-10-12
Payer: COMMERCIAL

## 2021-10-12 PROCEDURE — 99283 EMERGENCY DEPT VISIT LOW MDM: CPT

## 2021-10-13 ENCOUNTER — APPOINTMENT (OUTPATIENT)
Dept: GENERAL RADIOLOGY | Facility: HOSPITAL | Age: 61
End: 2021-10-13

## 2021-10-13 ENCOUNTER — HOSPITAL ENCOUNTER (EMERGENCY)
Facility: HOSPITAL | Age: 61
Discharge: HOME | End: 2021-10-13
Attending: EMERGENCY MEDICINE | Admitting: EMERGENCY MEDICINE

## 2021-10-13 ENCOUNTER — APPOINTMENT (OUTPATIENT)
Dept: CT IMAGING | Facility: HOSPITAL | Age: 61
End: 2021-10-13

## 2021-10-13 VITALS
TEMPERATURE: 97.6 F | SYSTOLIC BLOOD PRESSURE: 145 MMHG | HEART RATE: 82 BPM | OXYGEN SATURATION: 99 % | RESPIRATION RATE: 18 BRPM | BODY MASS INDEX: 18.99 KG/M2 | DIASTOLIC BLOOD PRESSURE: 94 MMHG | WEIGHT: 114 LBS | HEIGHT: 65 IN

## 2021-10-13 DIAGNOSIS — S82.141A CLOSED FRACTURE OF RIGHT TIBIAL PLATEAU, INITIAL ENCOUNTER: Primary | ICD-10-CM

## 2021-10-13 PROCEDURE — 73700 CT LOWER EXTREMITY W/O DYE: CPT

## 2021-10-13 PROCEDURE — 96372 THER/PROPH/DIAG INJ SC/IM: CPT

## 2021-10-13 PROCEDURE — 73562 X-RAY EXAM OF KNEE 3: CPT

## 2021-10-13 PROCEDURE — 25010000002 ORPHENADRINE CITRATE PER 60 MG: Performed by: EMERGENCY MEDICINE

## 2021-10-13 RX ORDER — OXYCODONE HYDROCHLORIDE AND ACETAMINOPHEN 5; 325 MG/1; MG/1
1 TABLET ORAL EVERY 6 HOURS PRN
Qty: 14 TABLET | Refills: 0 | Status: SHIPPED | OUTPATIENT
Start: 2021-10-13

## 2021-10-13 RX ORDER — ORPHENADRINE CITRATE 100 MG/1
100 TABLET, EXTENDED RELEASE ORAL 2 TIMES DAILY PRN
Qty: 20 TABLET | Refills: 0 | Status: SHIPPED | OUTPATIENT
Start: 2021-10-13

## 2021-10-13 RX ORDER — OXYCODONE HYDROCHLORIDE AND ACETAMINOPHEN 5; 325 MG/1; MG/1
1 TABLET ORAL ONCE
Status: COMPLETED | OUTPATIENT
Start: 2021-10-13 | End: 2021-10-13

## 2021-10-13 RX ORDER — ORPHENADRINE CITRATE 30 MG/ML
60 INJECTION INTRAMUSCULAR; INTRAVENOUS ONCE
Status: COMPLETED | OUTPATIENT
Start: 2021-10-13 | End: 2021-10-13

## 2021-10-13 RX ADMIN — OXYCODONE HYDROCHLORIDE AND ACETAMINOPHEN 1 TABLET: 5; 325 TABLET ORAL at 04:53

## 2021-10-13 RX ADMIN — ORPHENADRINE CITRATE 60 MG: 60 INJECTION INTRAMUSCULAR; INTRAVENOUS at 05:07

## 2021-10-13 RX ADMIN — OXYCODONE HYDROCHLORIDE AND ACETAMINOPHEN 1 TABLET: 5; 325 TABLET ORAL at 00:36

## 2021-10-13 NOTE — ED PROVIDER NOTES
Subjective   60-year-old female presenting with right knee injury.  She states that just prior to arrival she got up quickly to answer a telephone call, forcefully twisted her right knee.  Immediately had pain and swelling in the knee.  Did not fall.  No other injuries or complaints.  Pain is moderate to severe, worse with movement and bearing weight.  No alleviating factors.  Denies any other complaints.  She notes that a few months ago she had right hip replacement.          Review of Systems   Constitutional: Negative.    HENT: Negative.    Eyes: Negative.    Respiratory: Negative.    Cardiovascular: Negative.    Gastrointestinal: Negative.    Genitourinary: Negative.    Musculoskeletal: Positive for arthralgias.   Skin: Negative.    Neurological: Negative.    Psychiatric/Behavioral: Negative.        Past Medical History:   Diagnosis Date   • Avascular necrosis (CMS/[facility])    • Depression    • Fractures    • Hyperglycemia    • Hypertension    • Osteoarthritis    • RA (rheumatoid arthritis) (CMS/[facility])     ALSO REPORTS OA   • Tooth loose     top left and left bottom    • Vitamin D deficiency    • Wears glasses     readers       Allergies   Allergen Reactions   • Clindamycin/Lincomycin Anaphylaxis and Swelling     took whole dose and then throat started to swell -    • Penicillins Anaphylaxis and Swelling     Tolerated cefazolin 4/15/21   • Codeine Swelling       Past Surgical History:   Procedure Laterality Date   • ARM TENDON REPAIR Left    • BREAST SURGERY      lumpectomy   • ENDOSCOPY N/A 3/30/2017    Procedure: ESOPHAGOGASTRODUODENOSCOPY WITH COLD FORCEP BIOPSY;  Surgeon: Anca Trevino MD;  Location: [facility] ENDOSCOPY;  Service:    • FEMUR SURGERY      right    • HERNIA REPAIR Bilateral     INGUINAL- unsure about mesh    • INCISION AND DRAINAGE HIP N/A 6/2/2021    Procedure: HIP SUPERFICIAL IRRIGATION AND DEBRIDEMENT, ARTHOCENTESIS RIGHT;  Surgeon: Riaz Man MD;  Location: [facility] OR;  Service:  Orthopedics;  Laterality: N/A;   • KNEE SURGERY Left    • MANDIBLE SURGERY     • SKIN GRAFT Right 2021    Procedure: APPLICATION ACELLUR DERMAL MATRIX RIGHT HIP;  Surgeon: Riaz Man MD;  Location:  [facility] OR;  Service: Orthopedics;  Laterality: Right;   • TOTAL HIP ARTHROPLASTY REVISION Right 4/15/2021    Procedure: COVERSION TO RIGHT TOTAL HIP ARTHROPLASTY;  Surgeon: Riaz Man MD;  Location:  [facility] OR;  Service: Orthopedics;  Laterality: Right;   • TUBAL ABDOMINAL LIGATION     • WISDOM TOOTH EXTRACTION         Family History   Problem Relation Age of Onset   • Arthritis Mother    • Cancer Mother         leukemia   • Hypertension Mother    • Heart disease Father         heart attack   • Hypertension Father    • Migraines Daughter    • Cancer Maternal Grandmother         uterus       Social History     Socioeconomic History   • Marital status:    Tobacco Use   • Smoking status: Former Smoker     Packs/day: 0.50     Years: 30.00     Pack years: 15.00     Types: Cigarettes     Quit date: 4/15/2021     Years since quittin.4   • Smokeless tobacco: Never Used   • Tobacco comment: HX OF SMOKING 1 PPD FOR THE PAST 30 YEARS    Vaping Use   • Vaping Use: Never used   Substance and Sexual Activity   • Alcohol use: No   • Drug use: No   • Sexual activity: Defer           Objective   Physical Exam  Constitutional:       General: She is not in acute distress.     Appearance: Normal appearance. She is not ill-appearing, toxic-appearing or diaphoretic.   HENT:      Head: Normocephalic and atraumatic.      Right Ear: External ear normal.      Left Ear: External ear normal.      Nose: Nose normal.      Mouth/Throat:      Mouth: Mucous membranes are moist.      Pharynx: Oropharynx is clear.   Eyes:      Extraocular Movements: Extraocular movements intact.      Conjunctiva/sclera: Conjunctivae normal.      Pupils: Pupils are equal, round, and reactive to light.   Cardiovascular:      Rate and Rhythm: Normal  rate and regular rhythm.      Pulses: Normal pulses.      Heart sounds: Normal heart sounds.   Pulmonary:      Effort: Pulmonary effort is normal. No respiratory distress.      Breath sounds: Normal breath sounds.   Abdominal:      General: Bowel sounds are normal. There is no distension.      Tenderness: There is no abdominal tenderness.   Musculoskeletal:         General: No deformity.      Cervical back: Normal range of motion.      Comments: Right knee with mild swelling, tenderness diffusely about the knee worse anteriorly and laterally, all other extremities and joints are stable without tenderness   Skin:     General: Skin is warm and dry.      Capillary Refill: Capillary refill takes less than 2 seconds.      Findings: No rash.   Neurological:      General: No focal deficit present.      Mental Status: She is alert and oriented to person, place, and time.      Comments: Normal strength and sensation   Psychiatric:         Mood and Affect: Mood normal.         Behavior: Behavior normal.         Procedures           ED Course                                           MDM  Number of Diagnoses or Management Options  Closed fracture of right tibial plateau, initial encounter  Diagnosis management comments: 60-year-old female with knee injury.  Well-developed, well-nourished lady in no distress with exam as above.  She is neurovascular intact.  Will treat symptomatically, check x-ray.  Disposition pending though anticipate discharge home.    DDx: Strain, sprain, fracture    02:28 EDT x-ray interpreted by radiology as possible tibial plateau fracture.  CT scan has been recommended, this is been ordered.  Patient is resting comfortably, agreeable with plan of care.    04:40 EDT CT scan confirms a nondisplaced tibial plateau fracture.  I have placed her in a knee immobilizer, she will be nonweightbearing.  She will follow up with her orthopedist, call for first available appointment.  She is happy with this  plan.        Final diagnoses:   Closed fracture of right tibial plateau, initial encounter          Jimy Childress MD  10/13/21 4740

## 2021-10-14 ENCOUNTER — APPOINTMENT (OUTPATIENT)
Dept: PHYSICAL THERAPY | Facility: HOSPITAL | Age: 61
End: 2021-10-14
Payer: COMMERCIAL

## 2021-10-15 ENCOUNTER — OFFICE VISIT (OUTPATIENT)
Dept: CARDIOLOGY | Facility: CLINIC | Age: 61
End: 2021-10-15

## 2021-10-15 ENCOUNTER — HOSPITAL ENCOUNTER (OUTPATIENT)
Facility: HOSPITAL | Age: 61
Discharge: HOME | End: 2021-10-15
Payer: MEDICAID

## 2021-10-15 VITALS
BODY MASS INDEX: 18.99 KG/M2 | WEIGHT: 114 LBS | DIASTOLIC BLOOD PRESSURE: 54 MMHG | HEIGHT: 65 IN | SYSTOLIC BLOOD PRESSURE: 98 MMHG | OXYGEN SATURATION: 98 % | HEART RATE: 96 BPM

## 2021-10-15 DIAGNOSIS — R00.2 PALPITATIONS: ICD-10-CM

## 2021-10-15 DIAGNOSIS — I51.89 CARDIAC MASS: Primary | ICD-10-CM

## 2021-10-15 PROCEDURE — 93225 XTRNL ECG REC<48 HRS REC: CPT

## 2021-10-15 PROCEDURE — 99214 OFFICE O/P EST MOD 30 MIN: CPT | Performed by: INTERNAL MEDICINE

## 2021-10-15 RX ORDER — ALENDRONATE SODIUM 70 MG/1
70 TABLET ORAL
COMMUNITY
Start: 2021-10-07

## 2021-10-15 RX ORDER — MELOXICAM 15 MG/1
15 TABLET ORAL DAILY
COMMUNITY
Start: 2021-10-13

## 2021-10-15 RX ORDER — MEDROXYPROGESTERONE ACETATE 150 MG/ML
50 INJECTION, SUSPENSION INTRAMUSCULAR WEEKLY
COMMUNITY
Start: 2021-09-20

## 2021-10-15 RX ORDER — GABAPENTIN 300 MG/1
300 CAPSULE ORAL EVERY 12 HOURS SCHEDULED
COMMUNITY

## 2021-10-15 NOTE — PROGRESS NOTES
[facility] Cardiology  [address], [address]  [place], [place], [address]    [phone]  [email or web address]           OUTPATIENT CLINIC PROGRESS NOTE    Patient care team:  Patient Care Team:  Iraida Demarco APRN as PCP - General (Family Medicine)  Anca Trevino MD as Surgeon (General Surgery)    Subjective:   Chief complaint:   Chief Complaint   Patient presents with   • Chest Pain       HPI:    Jessica Winston is a 60 y.o. female.  Partial problem list, including cardiac problems:  1. Cardiac mass  a. Incidentally noted on TTE 1/15/2021 at [facility].  2. Dyspnea, anginal equivalent  a. Long smoking history, family history of father having MI in his 50s or 60s  3. Hypertension  4. Hyperlipidemia  5. Right hip fracture early 2020  a. Status post repair early 2020  b. Fall 3/2021, mechanical in nature, resulted in mild rhabdomyolysis and CHRISTINE  c. Corrective surgery complicated by infection, 4/2021.  Repeat surgery in 6/2021.  Skin grafting in 7/2021  6. Mechanical fall, right tibial fracture, 10/2021  7. Rheumatoid arthritis  8. Anxiety/depression  9. Tobacco dependence    Today the patient presents for follow-up    Since her last visit, the patient has had some palpitations/chest discomfort, more commonly at night.  Mild, does not last long.  Denies chest pain otherwise.  Denies lower extremity swelling    Chronic dyspnea: Chronic stable dyspnea, still smoking.  No associated chest pain    Incidental cardiac mass: Denies signs/symptoms of heart failure.  Denies palpitations    Review of Systems:  Positive for dyspnea, hip pain    PFSH:  Patient Active Problem List   Diagnosis   • RUQ pain   • Cardiac mass   • PVD (peripheral vascular disease) with claudication (HCC)   • Tobacco dependence   • Pain in right hip   • HTN (hypertension)   • Rheumatoid arthritis (HCC)   • S/P right hip superficial irrigation and debridement arthrocentesis   • Acute blood loss  anemia   • Postoperative pain   • Infection   • Hyponatremia, mild   • Palpitations         Current Outpatient Medications:   •  alendronate (FOSAMAX) 70 MG tablet, Take 70 mg by mouth Every 7 (Seven) Days., Disp: , Rfl:   •  buPROPion SR (WELLBUTRIN SR) 150 MG 12 hr tablet, Take 150 mg by mouth Daily., Disp: , Rfl:   •  cyanocobalamin 1000 MCG/ML injection, Inject 1,000 mcg into the appropriate muscle as directed by prescriber Every 30 (Thirty) Days. Once per month- usually first of month, Disp: , Rfl:   •  docusate sodium (Colace) 100 MG capsule, Take 1 capsule by mouth 2 (Two) Times a Day. (Patient taking differently: Take 100 mg by mouth As Needed.), Disp: , Rfl:   •  Etanercept (Enbrel SureClick) 50 MG/ML solution auto-injector, Inject 50 mg as directed 1 (One) Time Per Week., Disp: , Rfl:   •  gabapentin (NEURONTIN) 300 MG capsule, Take 300 capsules by mouth Every 12 (Twelve) Hours., Disp: , Rfl:   •  lisinopril (PRINIVIL,ZESTRIL) 10 MG tablet, Take 10 mg by mouth Daily., Disp: , Rfl:   •  meloxicam (MOBIC) 15 MG tablet, Take 15 mg by mouth Daily., Disp: , Rfl:   •  orphenadrine (NORFLEX) 100 MG 12 hr tablet, Take 1 tablet by mouth 2 (Two) Times a Day As Needed for Muscle Spasms., Disp: 20 tablet, Rfl: 0  •  oxyCODONE-acetaminophen (PERCOCET) 5-325 MG per tablet, Take 1 tablet by mouth Every 6 (Six) Hours As Needed for Severe Pain ., Disp: 14 tablet, Rfl: 0  •  predniSONE (DELTASONE) 10 MG tablet, Take 10 mg by mouth Daily., Disp: , Rfl:   •  rOPINIRole (REQUIP) 0.5 MG tablet, Take 0.5 mg by mouth Every Night. Take 1 hour before bedtime., Disp: , Rfl:   •  vitamin D (ERGOCALCIFEROL) 1.25 MG (38049 UT) capsule capsule, Take 50,000 Units by mouth 1 (One) Time Per Week. Tuesday, Disp: , Rfl:     Allergies   Allergen Reactions   • Clindamycin/Lincomycin Anaphylaxis and Swelling     took whole dose and then throat started to swell -    • Penicillins Anaphylaxis and Swelling     Tolerated cefazolin 4/15/21   •  "Codeine Swelling       Social History     Socioeconomic History   • Marital status:    Tobacco Use   • Smoking status: Former Smoker     Packs/day: 0.50     Years: 30.00     Pack years: 15.00     Types: Cigarettes     Quit date: 4/15/2021     Years since quittin.5   • Smokeless tobacco: Never Used   • Tobacco comment: HX OF SMOKING 1 PPD FOR THE PAST 30 YEARS    Vaping Use   • Vaping Use: Never used   Substance and Sexual Activity   • Alcohol use: No   • Drug use: No   • Sexual activity: Defer     Family History   Problem Relation Age of Onset   • Arthritis Mother    • Cancer Mother         leukemia   • Hypertension Mother    • Heart disease Father         heart attack   • Hypertension Father    • Migraines Daughter    • Cancer Maternal Grandmother         uterus         Objective:   Physical Exam:  BP 98/54 (BP Location: Left arm, Patient Position: Sitting)   Pulse 96   Ht 165.1 cm (65\")   Wt 51.7 kg (114 lb)   LMP 2010 (Approximate)   SpO2 98%   BMI 18.97 kg/m²   CONSTITUTIONAL: Well-nourished. In no acute distress.   LUNGS: Normal effort.  No wheeze noted today.  Clear to auscultation without rales or rhonchi  CARDIOVASCULAR: Regular rate and rhythm with a normal S1 and S2. There is no murmur, gallop, rub, or click appreciated.  Normal radial pulse.There is no peripheral edema.     3/2021 exam: Nonpalpable right pedal pulses, nonpalpable left PT, 2+ left DP  2021 exam: 2+ right PT pulse, nonpalpable right DP pulse  10/2021 exam: 2+ right PT pulse    Labs:  BUN   Date Value Ref Range Status   2021 8 6 - 20 mg/dL Final     Creatinine   Date Value Ref Range Status   2021 0.84 0.57 - 1.00 mg/dL Final     Potassium   Date Value Ref Range Status   2021 4.1 3.5 - 5.2 mmol/L Final     ALT (SGPT)   Date Value Ref Range Status   2021 8 1 - 33 U/L Final     AST (SGOT)   Date Value Ref Range Status   2021 19 1 - 32 U/L Final     WBC   Date Value Ref Range Status "   09/16/2021 7.44 3.40 - 10.80 10*3/mm3 Final   03/05/2021 17.0 (H) 4.0 - 11.0 K/uL Final     Hemoglobin   Date Value Ref Range Status   09/16/2021 13.5 12.0 - 15.9 g/dL Final   03/05/2021 10.8 (L) 11.5 - 16.5 g/dL Final     Hematocrit   Date Value Ref Range Status   09/16/2021 40.0 34.0 - 46.6 % Final   03/05/2021 31.2 (L) 37.0 - 47.0 % Final     Platelets   Date Value Ref Range Status   09/16/2021 368 140 - 450 10*3/mm3 Final   03/05/2021 456 (H) 150 - 400 K/uL Final       No results found for: CHOL  Lab Results   Component Value Date    TRIG 85 08/12/2019     Lab Results   Component Value Date    HDL 87 (H) 08/12/2019     Lab Results   Component Value Date     (H) 08/12/2019     No components found for: LDLDIRECTC    Diagnostic Data:    Procedures    TTE 1/15/2021  -Normal left ventricular systolic function with an estimated ejection fraction of 65%.  There is grade 1A diastolic dysfunction present.  -The intra-atrial septum has evidence of significant hypertrophy.  May represent simple lipomatous hypertrophy but cannot rule out a benign or malignant neoplasm.  A cardiac MRI and/or transesophageal echocardiogram could be considered to further evaluate.  There is moderate mitral annular calcification present.      Assessment and Plan:     Cardiac mass  Ectopic heartbeats on exam  -Possible lipomatous hypertrophy of the septum versus lipoma versus other.  Discussed with the patient again that if this is a benign tumor/tissue hypertrophy, will need to monitor size as it may affect other structures in the heart and/or electrical conduction  -Recommend cardiac MRI if okay to have this type of test in the setting of her prior right hip operation.  If not, recommend CT of the chest.  -48-hour Holter monitor to rule out arrhythmia in the setting of her cardiac mass and ongoing palpitations.  If the 48-hour monitor does not capture one of her more prominent events, will have her wear a 30-day    Tobacco  dependence  -Strongly advised the patient to stop smoking      - Return in about 6 months (around 4/15/2022) for Next scheduled follow up, or sooner if needed.    Jimy Davis MD, MSc, FACC, [place]  Interventional Cardiology  [facility]

## 2021-10-19 ENCOUNTER — APPOINTMENT (OUTPATIENT)
Dept: PHYSICAL THERAPY | Facility: HOSPITAL | Age: 61
End: 2021-10-19
Payer: COMMERCIAL

## 2021-10-21 ENCOUNTER — APPOINTMENT (OUTPATIENT)
Dept: PHYSICAL THERAPY | Facility: HOSPITAL | Age: 61
End: 2021-10-21
Payer: COMMERCIAL

## 2021-10-27 ENCOUNTER — TELEPHONE (OUTPATIENT)
Dept: CARDIOLOGY | Facility: CLINIC | Age: 61
End: 2021-10-27

## 2021-10-27 NOTE — TELEPHONE ENCOUNTER
----- Message from DENISE Almendarez sent at 10/27/2021  9:54 AM EDT -----  Can you let the patient know that she did not have any arrhythmias on her monitor. Can you ask her if she had one of her more severe events while wearing the monitor?  If she did not can you arrange for a 30 day monitor?

## 2021-11-08 RX ORDER — PREDNISONE 10 MG/1
10 TABLET ORAL DAILY PRN
Qty: 30 TABLET | Refills: 3 | Status: SHIPPED | OUTPATIENT
Start: 2021-11-08 | End: 2022-03-01

## 2021-11-09 RX ORDER — ETANERCEPT 50 MG/ML
SOLUTION SUBCUTANEOUS
Qty: 4 ML | Refills: 1 | Status: SHIPPED | OUTPATIENT
Start: 2021-11-09 | End: 2022-01-24

## 2021-11-09 RX ORDER — LISINOPRIL 10 MG/1
10 TABLET ORAL DAILY
Qty: 30 TABLET | Refills: 3 | Status: SHIPPED | OUTPATIENT
Start: 2021-11-09 | End: 2021-12-15 | Stop reason: SDUPTHER

## 2021-11-29 DIAGNOSIS — E53.8 B12 DEFICIENCY: ICD-10-CM

## 2021-11-29 RX ORDER — SYRINGE W-NEEDLE,DISPOSAB,3 ML 25GX5/8"
1 SYRINGE, EMPTY DISPOSABLE MISCELLANEOUS WEEKLY
Qty: 4 EACH | Refills: 1 | Status: SHIPPED | OUTPATIENT
Start: 2021-11-29 | End: 2022-03-03

## 2021-12-06 DIAGNOSIS — R12 HEARTBURN: ICD-10-CM

## 2021-12-06 RX ORDER — FAMOTIDINE 40 MG/1
40 TABLET, FILM COATED ORAL EVERY EVENING
Qty: 30 TABLET | Refills: 3 | Status: SHIPPED | OUTPATIENT
Start: 2021-12-06 | End: 2021-12-15 | Stop reason: SDUPTHER

## 2021-12-15 ENCOUNTER — OFFICE VISIT (OUTPATIENT)
Dept: PRIMARY CARE CLINIC | Age: 61
End: 2021-12-15
Payer: MEDICAID

## 2021-12-15 VITALS
SYSTOLIC BLOOD PRESSURE: 130 MMHG | HEIGHT: 66 IN | HEART RATE: 87 BPM | OXYGEN SATURATION: 98 % | RESPIRATION RATE: 16 BRPM | BODY MASS INDEX: 20.38 KG/M2 | TEMPERATURE: 97.5 F | WEIGHT: 126.8 LBS | DIASTOLIC BLOOD PRESSURE: 72 MMHG

## 2021-12-15 DIAGNOSIS — M79.2 NEUROPATHIC PAIN: Primary | ICD-10-CM

## 2021-12-15 DIAGNOSIS — Z72.0 TOBACCO ABUSE: ICD-10-CM

## 2021-12-15 DIAGNOSIS — F32.9 REACTIVE DEPRESSION: ICD-10-CM

## 2021-12-15 DIAGNOSIS — Z96.641 STATUS POST RIGHT HIP REPLACEMENT: ICD-10-CM

## 2021-12-15 DIAGNOSIS — R12 HEARTBURN: ICD-10-CM

## 2021-12-15 DIAGNOSIS — S82.141A CLOSED FRACTURE OF RIGHT TIBIAL PLATEAU, INITIAL ENCOUNTER: ICD-10-CM

## 2021-12-15 DIAGNOSIS — M81.0 OSTEOPOROSIS WITHOUT CURRENT PATHOLOGICAL FRACTURE, UNSPECIFIED OSTEOPOROSIS TYPE: ICD-10-CM

## 2021-12-15 PROCEDURE — G8427 DOCREV CUR MEDS BY ELIG CLIN: HCPCS | Performed by: NURSE PRACTITIONER

## 2021-12-15 PROCEDURE — G8484 FLU IMMUNIZE NO ADMIN: HCPCS | Performed by: NURSE PRACTITIONER

## 2021-12-15 PROCEDURE — 4004F PT TOBACCO SCREEN RCVD TLK: CPT | Performed by: NURSE PRACTITIONER

## 2021-12-15 PROCEDURE — 99213 OFFICE O/P EST LOW 20 MIN: CPT | Performed by: NURSE PRACTITIONER

## 2021-12-15 PROCEDURE — 3017F COLORECTAL CA SCREEN DOC REV: CPT | Performed by: NURSE PRACTITIONER

## 2021-12-15 PROCEDURE — G8420 CALC BMI NORM PARAMETERS: HCPCS | Performed by: NURSE PRACTITIONER

## 2021-12-15 RX ORDER — BUPROPION HYDROCHLORIDE 150 MG/1
150 TABLET ORAL EVERY MORNING
Qty: 30 TABLET | Refills: 3 | Status: SHIPPED | OUTPATIENT
Start: 2021-12-15 | End: 2022-07-19

## 2021-12-15 RX ORDER — GABAPENTIN 300 MG/1
300 CAPSULE ORAL
COMMUNITY
Start: 2021-12-06 | End: 2021-12-15 | Stop reason: SDUPTHER

## 2021-12-15 RX ORDER — FAMOTIDINE 40 MG/1
40 TABLET, FILM COATED ORAL EVERY EVENING
Qty: 30 TABLET | Refills: 3 | Status: SHIPPED | OUTPATIENT
Start: 2021-12-15 | End: 2022-07-19

## 2021-12-15 RX ORDER — GABAPENTIN 300 MG/1
300 CAPSULE ORAL 3 TIMES DAILY
Qty: 90 CAPSULE | Refills: 2 | Status: SHIPPED | OUTPATIENT
Start: 2021-12-15 | End: 2022-03-02

## 2021-12-15 RX ORDER — MELOXICAM 15 MG/1
15 TABLET ORAL DAILY
Qty: 30 TABLET | Refills: 3 | Status: SHIPPED | OUTPATIENT
Start: 2021-12-15 | End: 2022-06-24

## 2021-12-15 RX ORDER — PSEUDOEPHEDRINE HCL 30 MG
100 TABLET ORAL 2 TIMES DAILY
Qty: 60 CAPSULE | Refills: 3 | Status: SHIPPED | OUTPATIENT
Start: 2021-12-15 | End: 2022-03-01

## 2021-12-15 RX ORDER — ALENDRONATE SODIUM 70 MG/1
70 TABLET ORAL
Qty: 4 TABLET | Refills: 5 | Status: SHIPPED | OUTPATIENT
Start: 2021-12-15 | End: 2022-08-22

## 2021-12-15 RX ORDER — LISINOPRIL 10 MG/1
10 TABLET ORAL DAILY
Qty: 30 TABLET | Refills: 3 | Status: SHIPPED | OUTPATIENT
Start: 2021-12-15

## 2021-12-15 ASSESSMENT — ENCOUNTER SYMPTOMS
EYES NEGATIVE: 1
ALLERGIC/IMMUNOLOGIC NEGATIVE: 1
RESPIRATORY NEGATIVE: 1
GASTROINTESTINAL NEGATIVE: 1

## 2021-12-15 NOTE — PROGRESS NOTES
Chief Complaint   Patient presents with    3 Month Follow-Up    Gastroesophageal Reflux    Hypertension    Arthritis       Have you seen any other physician or provider since your last visit yes - cardiology,orthopedic,[facility],physical therapy    Have you had any other diagnostic tests since your last visit? yes - x rays    Have you changed or stopped any medications since your last visit? no     I have recommended that this patient have a immunization for pneumonia and sigmoidoscopy but she declines at this time. I have discussed the risks and benefits of this examination with her. The patient verbalizes understanding. SUBJECTIVE:    Patient Marisabel Haynes is a 62 y.o. female. Chief Complaint   Patient presents with    3 Month Follow-Up    Gastroesophageal Reflux    Hypertension    Arthritis     HPI:  Patient has had hypertension for several years. She has been compliant with taking medications, without side effects from it. She has been following a low-sodium, is not active and never exercises. Weight is increasing steadily, compared to last visit. Her blood pressure is stable 130/72 at this time. Patient was diagnosed with GERD. She has been on medication with good response. Patient has been compliant with diet. Patient was diagnosed with Osteoporosis. She is active. She does not exercises. She has + family history of Osteoporosis. She has prior history of fracture. She just fractured her right tib/fib in October. She is taking Ca/Vit D. She has been compliant with taking without side effects. She is taking Fosamax for her osteoporosis. She has chest mri about abnormal mass on her heart on Tuesday. She will follow with Dr. Placido Rhodes. Patient's medications, allergies, past medical, surgical, social and family histories were reviewed and updated as appropriate. Review of Systems   Constitutional: Negative. HENT: Negative. Eyes: Negative. Respiratory: Negative. Cardiovascular: Negative. Gastrointestinal: Negative. Endocrine: Negative. Genitourinary: Negative. Musculoskeletal: Positive for gait problem. Skin: Negative. Allergic/Immunologic: Negative. Hematological: Negative. Psychiatric/Behavioral: Negative. OBJECTIVE:  /72 (Site: Left Upper Arm, Position: Sitting, Cuff Size: Medium Adult)   Pulse 87   Temp 97.5 °F (36.4 °C) (Temporal)   Resp 16   Ht 5' 6\" (1.676 m)   Wt 126 lb 12.8 oz (57.5 kg)   SpO2 98% Comment: room air  BMI 20.47 kg/m²    Physical Exam  Vitals and nursing note reviewed. Constitutional:       Appearance: She is well-developed. HENT:      Head: Normocephalic and atraumatic. Eyes:      Conjunctiva/sclera: Conjunctivae normal.      Pupils: Pupils are equal, round, and reactive to light. Neck:      Thyroid: No thyromegaly. Vascular: No JVD. Cardiovascular:      Rate and Rhythm: Normal rate and regular rhythm. Heart sounds: No murmur heard. No friction rub. No gallop. Pulmonary:      Effort: Pulmonary effort is normal. No respiratory distress. Breath sounds: Normal breath sounds. No wheezing or rales. Abdominal:      General: Bowel sounds are normal. There is no distension. Palpations: Abdomen is soft. Tenderness: There is no abdominal tenderness. There is no guarding. Musculoskeletal:      Cervical back: Normal range of motion and neck supple. Right lower leg: Tenderness present. Skin:     General: Skin is warm and dry. Findings: No rash. Neurological:      Mental Status: She is alert and oriented to person, place, and time. Psychiatric:         Judgment: Judgment normal.         No results found for requested labs within last 30 days.        Hemoglobin A1C (%)   Date Value   03/04/2016 5.5     LDL Calculated (mg/dL)   Date Value   08/12/2019 143 (H)         Lab Results   Component Value Date    WBC 17.0 03/05/2021    NEUTROABS 14.5 03/05/2021    HGB 10.8 03/05/2021    HCT 31.2 03/05/2021    MCV 92.6 03/05/2021     03/05/2021       Lab Results   Component Value Date    TSH 4.00 03/05/2021         ASSESSMENT/PLAN:     1. Osteoporosis without current pathological fracture, unspecified osteoporosis type    - alendronate (FOSAMAX) 70 MG tablet; Take 1 tablet by mouth every 7 days  Dispense: 4 tablet; Refill: 5    2. Heartburn    - famotidine (PEPCID) 40 MG tablet; Take 1 tablet by mouth every evening  Dispense: 30 tablet; Refill: 3    3. Reactive depression    - buPROPion (WELLBUTRIN XL) 150 MG extended release tablet; Take 1 tablet by mouth every morning  Dispense: 30 tablet; Refill: 3    4. Tobacco abuse    - buPROPion (WELLBUTRIN XL) 150 MG extended release tablet; Take 1 tablet by mouth every morning  Dispense: 30 tablet; Refill: 3    5. Status post right hip replacement    - meloxicam (MOBIC) 15 MG tablet; Take 1 tablet by mouth daily  Dispense: 30 tablet; Refill: 3    6. Neuropathic pain    - gabapentin (NEURONTIN) 300 MG capsule; Take 1 capsule by mouth 3 times daily for 31 days. Dispense: 90 capsule; Refill: 2    7.  Closed fracture of right tibial plateau, initial encounter  Continue to wear immobilization boot follows orthopedist.       Written by Nusrat GURROLA, acting as a scribe for Sal Mcmillan on 12/15/2021 at 8:24 PM.

## 2021-12-15 NOTE — PATIENT INSTRUCTIONS
· Keep a list of your medicines with you. List all of the prescription medicines, nonprescription medicines, supplements, natural remedies, and vitamins that you take. Tell your healthcare providers who treat you about all of the products you are taking. Your provider can provide you with a form to keep track of them. Just ask. · Follow the directions that come with your medicine, including information about food or alcohol. Make sure you know how and when to take your medicine. Do not take more or less than you are supposed to take. · Keep all medicines out of the reach of children. · Store medicines according to the directions on the label. · Monitor yourself. Learn to know how your body reacts to your new medicine and keep track of how it makes you feel before attempting (If your provider has allowed you to do so) to drive or go to work. · Seek emergency medical attention if you think you have used too much of this medicine. An overdose of any prescription medicine can be fatal. Overdose symptoms may include extreme drowsiness, muscle weakness, confusion, cold and clammy skin, pinpoint pupils, shallow breathing, slow heart rate, fainting, or coma. · Don't share prescription medicines with others, even when they seem to have the same symptoms. What may be good for you may be harmful to others. · If you are no longer taking a prescribed medication and you have pills left please take your pills out of their original containers. Mix crushed pills with an undesirable substance, such as cat litter or used coffee grounds. Put the mixture into a disposable container with a lid, such as an empty margarine tub, or into a sealable bag. Cover up or remove any of your personal information on the empty containers by covering it with black permanent marker or duct tape. Place the sealed container with the mixture, and the empty drug containers, in the trash.    · If you use a medication that is in the form of a patch, dispose of used patches by folding them in half so that the sticky sides meet, and then flushing them down a toilet. They should not be placed in the household trash where children or pets can find them. · If you have any questions, ask your provider or pharmacist for more information. · Be sure to keep all appointments for provider visits or tests. We are committed to providing you with the best care possible. In order to help us achieve these goals please remember to bring all medications, herbal products, and over the counter supplements with you to each visit. If your provider has ordered testing for you, please be sure to follow up with our office if you have not received results within 7 days after the testing took place. *If you receive a survey after visiting one of our offices, please take time to share your experience concerning your physician office visit. These surveys are confidential and no health information about you is shared. We are eager to improve for you and we are counting on your feedback to help make that happen. ips to Help You Stop Smoking       Cigarette smoking is a preventable cause of death in the [place]. If you have thought about quitting but haven't been able to, here are some reasons why you should and some ways to do it. Here's Why   Quitting smoking now can decrease your risk of getting smoking-related illnesses like:   Heart disease   Stroke   Several types of cancer, including:   Lung   Mouth   Esophagus   Larynx   Bladder   Pancreas   Kidney   Chronic lung diseases:   Bronchitis   Emphysema   Asthma   Cataracts   Macular degeneration   Thyroid conditions   Hearing loss   Erectile dysfunction   Dementia   Osteoporosis   Here's How   Once you've decided to quit smoking, set your target quit date a few weeks away.  In the time leading up to your quit day, try some of these ideas offered by the [address] [place] [address] [place] to help you successfully quit smoking. For the best results, work with your doctor. Together, you can test your lung function and compare the results to those of a nonsmoking person. The results can be given to you as your lung age. Finding out your lung age right after having the test done may help you to stop smoking. Your doctor can also discuss with you all of your options and refer you to smoking-cessation support groups. You may wish to use nicotine replacement (gum, patches, inhaler) or one of the prescription medications that have been shown to increase quit rates and prolong abstinence from smoking. But whatever you and your doctor decide on these matters, it will still be you who decides when an how to quit. Here are some techniques:   Switch Brands   Switch to a brand you find distasteful. Change to a brand that is low in tar and nicotine a couple of weeks before your target quit date. This will help change your smoking behavior. However, do not smoke more cigarettes, inhale them more often or more deeply, or place your fingertips over the holes in the filters. All of these actions will increase your nicotine intake, and the idea is to get your body used to functioning without nicotine. Cut Down the Number of Cigarettes You Smoke   Smoke only half of each cigarette. Each day, postpone the lighting of your first cigarette by one hour. Decide you'll only smoke during odd or even hours of the day. Decide beforehand how many cigarettes you'll smoke during the day. For each additional cigarette, give a dollar to your favorite irina. Change your eating habits to help you cut down. For example, drink milk, which many people consider incompatible with smoking. End meals or snacks with something that won't lead to a cigarette. Reach for a glass of juice instead of a cigarette for a \"pick-me-up. \"   Remember: Cutting down can help you quit, but it's not a substitute for quitting.  If you're down to about seven cigarettes a day, it's time to set your target quit date, and get ready to stick to it. Don't Smoke \"Automatically\"   Smoke only those cigarettes you really want. Catch yourself before you light up a cigarette out of pure habit. Don't empty your ashtrays. This will remind you of how many cigarettes you've smoked each day, and the sight and the smell of stale cigarettes butts will be very unpleasant. Make yourself aware of each cigarette by using the opposite hand or putting cigarettes in an unfamiliar location or a different pocket to break the automatic reach. If you light up many times during the day without even thinking about it, try to look in a mirror each time you put a match to your cigarette. You may decide you don't need it. Make Smoking Inconvenient   Stop buying cigarettes by the carton. Wait until one pack is empty before you buy another. Stop carrying cigarettes with you at home or at work. Make them difficult to get to. Make Smoking Unpleasant   Smoke only under circumstances that aren't especially pleasurable for you. If you like to smoke with others, smoke alone. Turn your chair to an empty corner and focus only on the cigarette you are smoking and all its many negative effects. Collect all your cigarette butts in one large glass container as a visual reminder of the filth made by smoking. Just Before Quitting   Practice going without cigarettes. Don't think of never smoking again. Think of quitting in terms of one day at a time . Tell yourself you won't smoke today, and then don't. Clean your clothes to rid them of the cigarette smell, which can linger a long time. On the Day You Quit   Throw away all your cigarettes and matches. Hide your lighters and ashtrays. Visit the dentist and have your teeth cleaned to get rid of tobacco stains. Notice how nice they look and resolve to keep them that way.    Make a list of things you'd like to buy for yourself or someone else. Estimate the cost in terms of packs of cigarettes, and put the money aside to buy these presents. Keep very busy on the big day. Go to the movies, exercise, take long walks, or go bike riding. Remind your family and friends that this is your quit date, and ask them to help you over the rough spots of the first couple of days and weeks. Buy yourself a treat or do something special to celebrate. Telephone and Internet Support   Telephone, web-, and computer-based programs can offer you the support that you need to quit and to stay smoke-free. You can find many programs online, like the [employer] Association's [place] from Smoking . Immediately After Quitting   Develop a clean, fresh, nonsmoking environment around yourselfat work and at home. Buy yourself flowersyou may be surprised how much you can enjoy their scent now. The first few days after you quit, spend as much free time as possible in places where smoking isn't allowed, such as [address] [place] [address], museums, theaters, department stores, and churches. Drink large quantities of water and fruit juice (but avoid sodas that contain caffeine). Try to avoid alcohol, coffee, and other beverages that you associate with cigarette smoking. Strike up conversation instead of a match for a cigarette. If you miss the sensation of having a cigarette in your hand, play with something elsea pencil, a paper clip, a marble. If you miss having something in your mouth, try toothpicks or a fake cigarette.

## 2021-12-21 ENCOUNTER — HOSPITAL ENCOUNTER (OUTPATIENT)
Dept: MRI IMAGING | Facility: HOSPITAL | Age: 61
Discharge: HOME | End: 2021-12-21
Admitting: INTERNAL MEDICINE

## 2021-12-21 ENCOUNTER — OUTSIDE FACILITY SERVICE (OUTPATIENT)
Dept: CARDIOLOGY | Facility: CLINIC | Age: 61
End: 2021-12-21

## 2021-12-21 DIAGNOSIS — I51.89 CARDIAC MASS: ICD-10-CM

## 2021-12-21 PROCEDURE — 0 GADOBENATE DIMEGLUMINE 529 MG/ML SOLUTION: Performed by: INTERNAL MEDICINE

## 2021-12-21 PROCEDURE — 75561 CARDIAC MRI FOR MORPH W/DYE: CPT

## 2021-12-21 PROCEDURE — 75561 CARDIAC MRI FOR MORPH W/DYE: CPT | Performed by: INTERNAL MEDICINE

## 2021-12-21 PROCEDURE — A9577 INJ MULTIHANCE: HCPCS | Performed by: INTERNAL MEDICINE

## 2021-12-21 RX ADMIN — GADOBENATE DIMEGLUMINE 17 ML: 529 INJECTION, SOLUTION INTRAVENOUS at 12:05

## 2022-01-24 RX ORDER — ETANERCEPT 50 MG/ML
SOLUTION SUBCUTANEOUS
Qty: 4 ML | Refills: 1 | Status: SHIPPED | OUTPATIENT
Start: 2022-01-24 | End: 2022-06-24

## 2022-02-02 DIAGNOSIS — G25.81 RLS (RESTLESS LEGS SYNDROME): ICD-10-CM

## 2022-02-02 RX ORDER — ROPINIROLE 0.5 MG/1
0.5 TABLET, FILM COATED ORAL NIGHTLY
Qty: 30 TABLET | Refills: 2 | Status: SHIPPED | OUTPATIENT
Start: 2022-02-02 | End: 2022-04-28

## 2022-03-01 RX ORDER — PREDNISONE 10 MG/1
10 TABLET ORAL DAILY PRN
Qty: 30 TABLET | Refills: 3 | Status: SHIPPED | OUTPATIENT
Start: 2022-03-01 | End: 2022-06-24

## 2022-03-01 RX ORDER — DOCUSATE SODIUM 100 MG/1
CAPSULE, LIQUID FILLED ORAL
Qty: 60 CAPSULE | Refills: 3 | Status: SHIPPED | OUTPATIENT
Start: 2022-03-01 | End: 2022-06-24

## 2022-03-02 DIAGNOSIS — E53.8 B12 DEFICIENCY: ICD-10-CM

## 2022-03-02 DIAGNOSIS — M79.2 NEUROPATHIC PAIN: ICD-10-CM

## 2022-03-02 RX ORDER — GABAPENTIN 300 MG/1
CAPSULE ORAL
Qty: 90 CAPSULE | Refills: 2 | Status: SHIPPED | OUTPATIENT
Start: 2022-03-02 | End: 2022-06-04

## 2022-03-02 RX ORDER — CYANOCOBALAMIN 1000 UG/ML
INJECTION INTRAMUSCULAR; SUBCUTANEOUS
Qty: 1 ML | Refills: 5 | Status: SHIPPED | OUTPATIENT
Start: 2022-03-02 | End: 2022-07-19

## 2022-03-03 RX ORDER — SYRINGE W-NEEDLE,DISPOSAB,3 ML 25GX5/8"
SYRINGE, EMPTY DISPOSABLE MISCELLANEOUS
Qty: 4 EACH | Refills: 1 | Status: SHIPPED | OUTPATIENT
Start: 2022-03-03

## 2022-03-28 ENCOUNTER — HOSPITAL ENCOUNTER (OUTPATIENT)
Dept: PHYSICAL THERAPY | Facility: HOSPITAL | Age: 62
Setting detail: THERAPIES SERIES
Discharge: HOME | End: 2022-03-28
Payer: COMMERCIAL

## 2022-03-28 PROCEDURE — 97110 THERAPEUTIC EXERCISES: CPT

## 2022-03-28 PROCEDURE — 97161 PT EVAL LOW COMPLEX 20 MIN: CPT

## 2022-03-28 ASSESSMENT — PAIN SCALES - GENERAL: PAINLEVEL_OUTOF10: 3

## 2022-03-29 ASSESSMENT — PAIN SCALES - GENERAL: PAINLEVEL_OUTOF10: 3

## 2022-03-29 ASSESSMENT — PAIN DESCRIPTION - ORIENTATION: ORIENTATION: RIGHT

## 2022-03-29 ASSESSMENT — PAIN DESCRIPTION - LOCATION: LOCATION: KNEE

## 2022-03-29 NOTE — PROGRESS NOTES
Physical Therapy  Initial Assessment  Date: 3/28/2022  Patient Name: Rowan Spatz  MRN: [id number]  : 1960     Treatment Diagnosis: R knee lateral arthritis / R lateral tibial plateau fracture - non operative treatment    Subjective   General  Chart Reviewed: Yes  Patient assessed for rehabilitation services?: Yes  Family / Caregiver Present: No  Referring Practitioner: Jerel Rankin MD  Referral Date : 22  Diagnosis: R knee lateral arthritis / right lateral tibial plateau fx; non-op treatment  PT Visit Information  PT Insurance Information:   Subjective  Subjective: Pt reports she was standing in her bathroom and her knee gave out as she turned around; she landed sitting on the toilet, she did not hit the ground. Pt went to the ER in [place], CAT scan revealed R tibial plateau fraction. Pt was referred to [facility] for management. Pt did not require surgery but was placed in a knee brace with limited motion that was progressively advanced over a 12 week period. Pt presents to PT today with WBAT and using a RW for ambulation.   Pain Screening  Patient Currently in Pain: Yes  Pain Assessment  Pain Assessment: 0-10  Pain Level: 3 (8/10 at worst with prolonged activity during the day)  Pain Location: Knee  Pain Orientation: Right  Vital Signs  Patient Currently in Pain: Yes    Objective     Observation/Palpation  Posture: Good  Palpation: grade I tenderness R knee lateral joint line  Observation: Pt ambulates with RW into PT clinic    AROM RLE (degrees)  RLE AROM: Exceptions  R Knee Flexion 0-145: 141 deg  R Knee Extension 0: lacks 6 deg to full ext    Strength RLE  Strength RLE: Exception  R Hip Flexion: 4/5  R Knee Flexion: 4/5  R Knee Extension: 4-/5  R Ankle Dorsiflexion: 4+/5     Additional Measures  Special Tests: LEFS: 34/80        Exercises  Exercise 1: QS 3x10  Exercise 2: Supine active heel slides 3x10  Exercise 3: SLR, short ROM 3x10  Exercise 4: SAQ 3x10  Exercise 5: Hooklying hip abd with t-band 3x10 OTB   Pt was educated in and issued a written HEP of the above exercises. Plan to add next visit:  Nustep: L3-4, 5 min  Hamstring curls: OTB 3x10 R  Calf raises 3x10  TKE: R 3x10 GTB  May use MH or ice as needed      Assessment   Conditions Requiring Skilled Therapeutic Intervention  Body structures, Functions, Activity limitations: Decreased functional mobility ; Increased pain;Decreased balance;Decreased ROM; Decreased strength;Decreased high-level IADLs;Decreased endurance  Assessment: Pt will benefit from skilled PT to address deficits due to R knee tibial plateau fracture in order to restore optimal functional level. Treatment Diagnosis: R knee lateral arthritis / R lateral tibial plateau fracture - non operative treatment  Prognosis: Good  Decision Making: Low Complexity  REQUIRES PT FOLLOW UP: Yes  Activity Tolerance  Activity Tolerance: Patient Tolerated treatment well         Plan   Plan  Times per week: 2-3x/week  Plan weeks: 6-8 weeks  Current Treatment Recommendations: Strengthening,Neuromuscular Re-education,Home Exercise Program,Safety Education & [employer] Education & Training,Equipment Evaluation, Education, & procurement,Manual Therapy - Joint Manipulation,Modalities,Gait Training,Balance Training,Endurance Training    Goals  Short term goals  Time Frame for Short term goals: 4 weeks  Short term goal 1: Pt to be I with HEP  Short term goal 2: Pt to demonstrate full R knee ext AROM  Short term goal 3: Pt to demonstrate 4+/5 R knee flexion / extension AROM  Short term goal 4: Pt to perform daily activities with pain of 5/10 or less. Long term goals  Time Frame for Long term goals : 8 weeks  Long term goal 1: LEFS score to improve to 55/80 indicating improved function. Long term goal 2: Pt to demonstrate 5/5 RLE strength grossly.   Long term goal 3: Pt to ambulate short community distances with use of a single point cane with good safety awareness. Long term goal 4: Pt to perform daily activities with pain of less than 3/10. Joan Haider, PT     Certification of Medical Necessity: It will be understood that this treatment plan is certified medically necessary by the documenting therapist and referring physician mentioned in this report. Unless the physician indicated otherwise through written correspondence with our office, all further referrals will act as certification of medical necessity on this treatment plan. Thank you for this referral.  If you have questions regarding this plan of care, please call [phone].           Revisions to this plan (optional):                     Please sign and return this plan to:   FAX: [phone]      Signature:                                 Date:

## 2022-03-30 ENCOUNTER — HOSPITAL ENCOUNTER (OUTPATIENT)
Dept: PHYSICAL THERAPY | Facility: HOSPITAL | Age: 62
Setting detail: THERAPIES SERIES
Discharge: HOME | End: 2022-03-30
Payer: COMMERCIAL

## 2022-03-30 PROCEDURE — 97110 THERAPEUTIC EXERCISES: CPT

## 2022-03-30 NOTE — FLOWSHEET NOTE
Physical Therapy Daily Treatment Note   Date:  3/30/2022    TIme In:    1128                  Time Out:    2709    Patient Name:  Karthik Goins    :  1960  MRN: [id number]    Restrictions/Precautions:    Pertinent Medical History:  Medical/Treatment Diagnosis Information:  ·   R knee lateral arthritis / R lateral tibial plateau fracture - non operative treatment  ·    Insurance/Certification information:      Physician Information:    Chuck Garcia PA-C / Duane Bean, MD  Plan of care signed (Y/N):    Visit# / total visits:    2 /    G-Code (if applicable):      Date / Visit # G-Code Applied:         Progress Note: []  Yes  []  No  Next due by: Visit #10      Pain level: 2  /10    Subjective:  Patient reports mild pain today, no new c/o. Objective:   Observation: gait; steady with RW   Test measurements:     Palpation:    Exercises:  Exercise Resistance/Repetitions Other comments   Nustep L3 x 6' 30   Standing heel raises 3 x 10 30   TKE GTB - 3 x 10 30   HS curls OTB - 3 x 10 30   Quad sets 30x 30   Supine active heel slides 30x 30   SLR - limit ROM 3 x 10 30   SAQ 3 x 10 30   Hook-lying hip ABD with band OTB - 3 x 10 30                                   Other Therapeutic Activities:      Manual Treatments:        Modalities:        Timed Code Treatment Minutes:  26'      Total Treatment Minutes:  29'    Treatment/Activity Tolerance:  [x] Patient tolerated treatment well [] Patient limited by fatigue  [] Patient limited by pain  [] Patient limited by other medical complications  [x] Other:  Completed exercises well, no increase in pain or c/o.     Pain after treatment:    2  /10    Prognosis: [x] Good [] Fair  [] Poor    Patient Requires Follow-up: [x] Yes  [] No    Plan:   [x] Continue per plan of care [] Alter current plan (see comments)  [] Plan of care initiated [] Hold pending MD visit [] Discharge    Plan for Next Session:        Electronically signed by:  Xochitl Fritz PT

## 2022-04-04 ENCOUNTER — HOSPITAL ENCOUNTER (OUTPATIENT)
Dept: PHYSICAL THERAPY | Facility: HOSPITAL | Age: 62
Setting detail: THERAPIES SERIES
Discharge: HOME | End: 2022-04-04
Payer: COMMERCIAL

## 2022-04-04 PROCEDURE — 97110 THERAPEUTIC EXERCISES: CPT

## 2022-04-04 NOTE — FLOWSHEET NOTE
Physical Therapy Daily Treatment Note   Date:  2022    TIme In:    0545                  Time Out:    1006    Patient Name:  Aura Kunz    :  1960  MRN: [id number]    Restrictions/Precautions:    Pertinent Medical History:  Medical/Treatment Diagnosis Information:  ·   R knee lateral arthritis / R lateral tibial plateau fracture - non operative treatment     Insurance/Certification information:    [facility]  Physician Information:    Darrell Adan PA-C / Bella Treviño MD  Plan of care signed (Y/N):    Visit# / total visits:    3 /    G-Code (if applicable):      Date / Visit # G-Code Applied:         Progress Note: []  Yes  []  No  Next due by: Visit #10      Pain level: 2/10    Subjective:  Patient reports doing well after last PT visit, no new complaints. Objective:   Observation: gait; steady with RW   Test measurements:     Palpation:    Exercises:  Exercise Resistance/Repetitions Other comments   Nustep L3 x 6' 4   Standing heel raises 3 x 10 4   TKE GTB - 3 x 10 4   HS curls OTB - 3 x 10 4   Quad sets 30x 4   Supine active heel slides 30x 4   SLR - limit ROM 3 x 10 4   SAQ 3 x 10 4   Hook-lying hip ABD with band OTB - 3 x 10 4                                   Other Therapeutic Activities:      Manual Treatments:        Modalities:        Timed Code Treatment Minutes:  28'      Total Treatment Minutes:  28'    Treatment/Activity Tolerance:  [x] Patient tolerated treatment well [] Patient limited by fatigue  [] Patient limited by pain  [] Patient limited by other medical complications  [x] Other:  Pt progressed through activity well today.       Pain after treatment:    2/10    Prognosis: [x] Good [] Fair  [] Poor    Patient Requires Follow-up: [x] Yes  [] No    Plan:   [x] Continue per plan of care [] Alter current plan (see comments)  [] Plan of care initiated [] Hold pending MD visit [] Discharge    Plan for Next Session:        Electronically signed by:  Rola Guevara PT

## 2022-04-08 ENCOUNTER — HOSPITAL ENCOUNTER (OUTPATIENT)
Dept: PHYSICAL THERAPY | Facility: HOSPITAL | Age: 62
Setting detail: THERAPIES SERIES
Discharge: HOME | End: 2022-04-08
Payer: COMMERCIAL

## 2022-04-08 PROCEDURE — 97110 THERAPEUTIC EXERCISES: CPT

## 2022-04-08 NOTE — FLOWSHEET NOTE
Physical Therapy Daily Treatment Note   Date:  2022    TIme In:    1128                  Time Out:    1200    Patient Name:  Lionel Saldana    :  1960  MRN: [id number]    Restrictions/Precautions:    Pertinent Medical History:  Medical/Treatment Diagnosis Information:  ·   R knee lateral arthritis / R lateral tibial plateau fracture - non operative treatment     Insurance/Certification information:      Physician Information:    Aleshia Degroot PA-C / Seth Ruvalcaba MD  Plan of care signed (Y/N):    Visit# / total visits:    3 /    G-Code (if applicable):      Date / Visit # G-Code Applied:         Progress Note: []  Yes  []  No  Next due by: Visit #10      Pain level: 1/10    Subjective:  Patient reports no new complaints; tolerated last treatment well. Objective:   Observation: gait; steady with RW   Test measurements:     Palpation:    Exercises:  Exercise Resistance/Repetitions Other comments   Nustep L3 x 6' 8   Standing heel raises 3 x 10 8   TKE GTB - 3 x 10 8   HS curls OTB - 3 x 10 8   Quad sets 30x 8   Supine active heel slides 30x 8   SLR - limit ROM 3 x 10 8   SAQ 3 x 10 8   Hook-lying hip ABD with band OTB - 3 x 10 8                                   Other Therapeutic Activities:      Manual Treatments:        Modalities:        Timed Code Treatment Minutes:  28'      Total Treatment Minutes:  28'    Treatment/Activity Tolerance:  [x] Patient tolerated treatment well [] Patient limited by fatigue  [] Patient limited by pain  [] Patient limited by other medical complications  [x] Other:  Pt progressed through activity well today, plan to increase therex as tolerated.      Pain after treatment:    1/10    Prognosis: [x] Good [] Fair  [] Poor    Patient Requires Follow-up: [x] Yes  [] No    Plan:   [x] Continue per plan of care [] Alter current plan (see comments)  [] Plan of care initiated [] Hold pending MD visit [] Discharge    Plan for Next Session:        Electronically signed by: Simeon Ahn, PT

## 2022-04-11 ENCOUNTER — HOSPITAL ENCOUNTER (OUTPATIENT)
Dept: PHYSICAL THERAPY | Facility: HOSPITAL | Age: 62
Setting detail: THERAPIES SERIES
Discharge: HOME | End: 2022-04-11
Payer: COMMERCIAL

## 2022-04-11 PROCEDURE — 97110 THERAPEUTIC EXERCISES: CPT

## 2022-04-11 NOTE — FLOWSHEET NOTE
Physical Therapy Daily Treatment Note   Date:  2022    TIme In:   1128                  Time Out:     1157    Patient Name:  Andrzej Moran    :  1960  MRN: [id number]    Restrictions/Precautions:    Pertinent Medical History:  Medical/Treatment Diagnosis Information:  ·   R knee lateral arthritis / R lateral tibial plateau fracture - non operative treatment     Insurance/Certification information:    [facility]  Physician Information:    Paulina Foster PA-C / Noah Gordon MD  Plan of care signed (Y/N):    Visit# / total visits:    4/    G-Code (if applicable):      Date / Visit # G-Code Applied:         Progress Note: []  Yes  [x]  No  Next due by: Visit #10      Pain level: 1-2/10    Subjective:  Patient reports she is feeling okay today. Objective:   Observation: gait; steady with RW   Test measurements:     Palpation:    Exercises:  Exercise Resistance/Repetitions Other comments   Nustep L3 x 6' 11   Standing heel raises 3 x 10 11   TKE GTB - 3 x 10 11   HS curls OTB - 3 x 10 11   Quad sets 30x 11   Supine active heel slides 30x 11   SLR - limit ROM 3 x 10 11   SAQ 3 x 10 11   Hook-lying hip ABD with band OTB - 3 x 10 11                                   Other Therapeutic Activities:      Manual Treatments:        Modalities:        Timed Code Treatment Minutes: 25      Total Treatment Minutes:  29    Treatment/Activity Tolerance:  [x] Patient tolerated treatment well [] Patient limited by fatigue  [] Patient limited by pain  [] Patient limited by other medical complications  [x] Other:  Pt completed tx with no pain.     Pain after treatment:    0/10    Prognosis: [x] Good [] Fair  [] Poor    Patient Requires Follow-up: [x] Yes  [] No    Plan:   [x] Continue per plan of care [] Alter current plan (see comments)  [] Plan of care initiated [] Hold pending MD visit [] Discharge    Plan for Next Session:        Electronically signed by:  Victoriano Major PTA

## 2022-04-13 ENCOUNTER — HOSPITAL ENCOUNTER (OUTPATIENT)
Dept: PHYSICAL THERAPY | Facility: HOSPITAL | Age: 62
Setting detail: THERAPIES SERIES
Discharge: HOME | End: 2022-04-13
Payer: COMMERCIAL

## 2022-04-13 PROCEDURE — 97110 THERAPEUTIC EXERCISES: CPT

## 2022-04-13 NOTE — FLOWSHEET NOTE
Physical Therapy Daily Treatment Note   Date:  2022    TIme In:    2176                 Time Out:     1214    Patient Name:  Mikayla Hale    :  1960  MRN: [id number]    Restrictions/Precautions:    Pertinent Medical History:  Medical/Treatment Diagnosis Information:  ·   R knee lateral arthritis / R lateral tibial plateau fracture - non operative treatment     Insurance/Certification information:    [facility]  Physician Information:    Magnolia Moore PA-C / Oren Morse MD  Plan of care signed (Y/N):    Visit# / total visits:    5/    G-Code (if applicable):      Date / Visit # G-Code Applied:         Progress Note: []  Yes  [x]  No  Next due by: Visit #10      Pain level: 1-2/10    Subjective:  Patient reports she is hurting some in her knee and ankle. She expressed that she is wanting to start walking with a cane because its so much easier and she has been putting all of her weight through her leg for a while now. Objective:   Observation: gait; steady with RW   Test measurements:      Palpation:    Exercises:  Exercise Resistance/Repetitions Other comments   Nustep L5 x 6' 13   Standing heel raises 3 x 10 13   TKE GTB - 3 x 10 13   HS curls OTB - 3 x 10 13   Quad sets 30x 13   SLR - limit ROM 3 x 10 13   LAQ 3 x 10 13   Hook-lying hip ABD with band OTB - 3 x 10 13   St marching 4x30\" 13   St hip abd 2x10 13                         Other Therapeutic Activities:      Manual Treatments:        Modalities:        Timed Code Treatment Minutes: 38      Total Treatment Minutes:  39    Treatment/Activity Tolerance:  [x] Patient tolerated treatment well [] Patient limited by fatigue  [] Patient limited by pain  [] Patient limited by other medical complications  [x] Other:  Pt completed tx with mild pain and performed new activities well.     Pain after treatment:    1/10    Prognosis: [x] Good [] Fair  [] Poor    Patient Requires Follow-up: [x] Yes  [] No    Plan:   [x] Continue per plan of care [] Sean Ceja current plan (see comments)  [] Plan of care initiated [] Hold pending MD visit [] Discharge    Plan for Next Session:        Electronically signed by:  Ramos Major PTA

## 2022-04-18 ENCOUNTER — APPOINTMENT (OUTPATIENT)
Dept: PHYSICAL THERAPY | Facility: HOSPITAL | Age: 62
End: 2022-04-18
Payer: COMMERCIAL

## 2022-04-20 ENCOUNTER — OFFICE VISIT (OUTPATIENT)
Dept: PRIMARY CARE CLINIC | Age: 62
End: 2022-04-20
Payer: MEDICAID

## 2022-04-20 ENCOUNTER — HOSPITAL ENCOUNTER (OUTPATIENT)
Dept: PHYSICAL THERAPY | Facility: HOSPITAL | Age: 62
Setting detail: THERAPIES SERIES
Discharge: HOME | End: 2022-04-20
Payer: COMMERCIAL

## 2022-04-20 VITALS
TEMPERATURE: 98 F | HEIGHT: 66 IN | OXYGEN SATURATION: 97 % | HEART RATE: 100 BPM | RESPIRATION RATE: 18 BRPM | SYSTOLIC BLOOD PRESSURE: 128 MMHG | BODY MASS INDEX: 20.57 KG/M2 | WEIGHT: 128 LBS | DIASTOLIC BLOOD PRESSURE: 60 MMHG

## 2022-04-20 DIAGNOSIS — Z13.220 SCREENING CHOLESTEROL LEVEL: ICD-10-CM

## 2022-04-20 DIAGNOSIS — M79.2 NEUROPATHIC PAIN: ICD-10-CM

## 2022-04-20 DIAGNOSIS — F32.9 REACTIVE DEPRESSION: ICD-10-CM

## 2022-04-20 DIAGNOSIS — E53.9 VITAMIN B DEFICIENCY: ICD-10-CM

## 2022-04-20 DIAGNOSIS — M05.9 RHEUMATOID ARTHRITIS WITH POSITIVE RHEUMATOID FACTOR, INVOLVING UNSPECIFIED SITE (HCC): Primary | ICD-10-CM

## 2022-04-20 DIAGNOSIS — E55.9 VITAMIN D DEFICIENCY: ICD-10-CM

## 2022-04-20 DIAGNOSIS — M81.0 OSTEOPOROSIS WITHOUT CURRENT PATHOLOGICAL FRACTURE, UNSPECIFIED OSTEOPOROSIS TYPE: ICD-10-CM

## 2022-04-20 DIAGNOSIS — Z13.29 THYROID DISORDER SCREEN: ICD-10-CM

## 2022-04-20 DIAGNOSIS — G25.81 RLS (RESTLESS LEGS SYNDROME): ICD-10-CM

## 2022-04-20 PROCEDURE — 97110 THERAPEUTIC EXERCISES: CPT

## 2022-04-20 PROCEDURE — G8420 CALC BMI NORM PARAMETERS: HCPCS | Performed by: NURSE PRACTITIONER

## 2022-04-20 PROCEDURE — 4004F PT TOBACCO SCREEN RCVD TLK: CPT | Performed by: NURSE PRACTITIONER

## 2022-04-20 PROCEDURE — 99214 OFFICE O/P EST MOD 30 MIN: CPT | Performed by: NURSE PRACTITIONER

## 2022-04-20 PROCEDURE — 3017F COLORECTAL CA SCREEN DOC REV: CPT | Performed by: NURSE PRACTITIONER

## 2022-04-20 PROCEDURE — G8427 DOCREV CUR MEDS BY ELIG CLIN: HCPCS | Performed by: NURSE PRACTITIONER

## 2022-04-20 SDOH — ECONOMIC STABILITY: FOOD INSECURITY: WITHIN THE PAST 12 MONTHS, THE FOOD YOU BOUGHT JUST DIDN'T LAST AND YOU DIDN'T HAVE MONEY TO GET MORE.: NEVER TRUE

## 2022-04-20 SDOH — ECONOMIC STABILITY: FOOD INSECURITY: WITHIN THE PAST 12 MONTHS, YOU WORRIED THAT YOUR FOOD WOULD RUN OUT BEFORE YOU GOT MONEY TO BUY MORE.: NEVER TRUE

## 2022-04-20 ASSESSMENT — ENCOUNTER SYMPTOMS
RESPIRATORY NEGATIVE: 1
BACK PAIN: 1
GASTROINTESTINAL NEGATIVE: 1

## 2022-04-20 ASSESSMENT — SOCIAL DETERMINANTS OF HEALTH (SDOH): HOW HARD IS IT FOR YOU TO PAY FOR THE VERY BASICS LIKE FOOD, HOUSING, MEDICAL CARE, AND HEATING?: NOT VERY HARD

## 2022-04-20 NOTE — FLOWSHEET NOTE
Physical Therapy Daily Treatment Note   Date:  2022    TIme In:    1101                 Time Out:     1130    Patient Name:  Florencia Lozano    :  1960  MRN: [id number]    Restrictions/Precautions:    Pertinent Medical History:  Medical/Treatment Diagnosis Information:  ·   R knee lateral arthritis / R lateral tibial plateau fracture - non operative treatment     Insurance/Certification information:    [facility]  Physician Information:    Randy Torres PA-C / Madhav Singleton MD  Plan of care signed (Y/N):    Visit# / total visits:    6/    G-Code (if applicable):      Date / Visit # G-Code Applied:         Progress Note: []  Yes  [x]  No  Next due by: Visit #10      Pain level: 1/10    Subjective:  Patient reports she isn't doing too bad today and what pain she does have is in her knee. Objective:   Observation: gait; steady with RW   Test measurements:      Palpation:    Exercises:  Exercise Resistance/Repetitions Other comments   Nustep L5 x 6' 20   Standing heel raises 3 x 10 20   TKE GTB - 3 x 10 20   HS curls OTB - 3 x 10 20   Quad sets 30x 20   SLR - limit ROM 3 x 10 20   LAQ 3 x 10 20   Hook-lying hip ABD with band OTB - 3 x 10 20   St marching 4x30\" 20   St hip abd 2x10 20             Advance there-ex next visit            Other Therapeutic Activities:      Manual Treatments:        Modalities:        Timed Code Treatment Minutes: 28      Total Treatment Minutes:  29    Treatment/Activity Tolerance:  [x] Patient tolerated treatment well [] Patient limited by fatigue  [] Patient limited by pain  [] Patient limited by other medical complications  [x] Other:  Pt completed tx with no pain and may benefit from advanced there-ex next visit.     Pain after treatment:    0/10    Prognosis: [x] Good [] Fair  [] Poor    Patient Requires Follow-up: [x] Yes  [] No    Plan:   [x] Continue per plan of care [] Alter current plan (see comments)  [] Plan of care initiated [] Hold pending MD visit [] Discharge    Plan for Next Session:        Electronically signed by:  Cherry Major PTA

## 2022-04-20 NOTE — PROGRESS NOTES
SUBJECTIVE:    Patient ID: Omar Tran is a 62 y.o. female. Chief Complaint   Patient presents with    Pain     Neuropathic         HPI:  She has moved in with her 80year old mother. She is doing better being with her. She has an appt. With Dr. Denilson Nicole. 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 She is suppose to have a repeat mri in 6 month. She needs to see the rheumatologist.  She is on Enbrel but it isn't helping as well any more. She hasn't been to rheumatology in a long time. She is taking restless leg medication. She is doing physical therapy. Patient's medications,allergies, past medical, surgical, social and family histories were reviewed and updated as appropriate.   .  Current Outpatient Medications on File Prior to Visit   Medication Sig Dispense Refill    Cholecalciferol 50 MCG (2000 UT) TABS       Syringe/Needle, Disp, (SYRINGE 3CC/25GX1\") 25G X 1\" 3 ML MISC USE TO INJECT ONCE A WEEK 4 each 1    gabapentin (NEURONTIN) 300 MG capsule TAKE ONE CAPSULE 3 TIMES DAILY 90 capsule 2    cyanocobalamin 1000 MCG/ML injection INJECT 1 ML ONCE A WEEK FOR 4 WEEKS AND THEN ONCE A MONTH. 1 mL 5    docusate sodium (COLACE) 100 MG capsule TAKE ONE CAPSULE 2 TIMES A DAY 60 capsule 3    predniSONE (DELTASONE) 10 MG tablet TAKE 1 TABLET BY MOUTH DAILY AS NEEDED (ARTHRITIC PAIN) 30 tablet 3    ENBREL SURECLICK 50 MG/ML SOAJ INJECT 1ML UNDER THE SKIN ONCE WEEKLY 4 mL 1    PROAIR  (90 Base) MCG/ACT inhaler INHALE 1 PUFF BY MOUTH EVERY 6 HOURS AS NEEDED FOR WHEEZING OR SHORTNESS OF BREATH 8.5 g 0    alendronate (FOSAMAX) 70 MG tablet Take 1 tablet by mouth every 7 days 4 tablet 5    lisinopril (PRINIVIL;ZESTRIL) 10 MG tablet Take 1 tablet by mouth daily 30 tablet 3    famotidine (PEPCID) 40 MG tablet Take 1 tablet by mouth every evening 30 tablet 3    buPROPion (WELLBUTRIN XL) 150 MG extended release tablet Take 1 tablet by mouth every morning 30 tablet 3    meloxicam (MOBIC) 15 MG tablet Take 1 tablet by mouth daily 30 tablet 3     No current facility-administered medications on file prior to visit. Review of Systems   Constitutional: Negative. HENT: Negative. Respiratory: Negative. Cardiovascular: Negative. Gastrointestinal: Negative. Genitourinary: Negative. Musculoskeletal: Positive for arthralgias, back pain, gait problem and myalgias. Skin: Negative. Psychiatric/Behavioral: Negative. OBJECTIVE:  /60 (Site: Right Upper Arm, Position: Sitting, Cuff Size: Medium Adult)   Pulse 100   Temp 98 °F (36.7 °C) (Temporal)   Resp 18   Ht 5' 6\" (1.676 m)   Wt 128 lb (58.1 kg)   SpO2 97%   BMI 20.66 kg/m²    Physical Exam  Vitals and nursing note reviewed. Constitutional:       Appearance: She is well-developed. HENT:      Head: Normocephalic and atraumatic. Eyes:      Conjunctiva/sclera: Conjunctivae normal.      Pupils: Pupils are equal, round, and reactive to light. Neck:      Thyroid: No thyromegaly. Vascular: No JVD. Cardiovascular:      Rate and Rhythm: Normal rate and regular rhythm. Heart sounds: No murmur heard. No friction rub. No gallop. Pulmonary:      Effort: Pulmonary effort is normal. No respiratory distress. Breath sounds: Normal breath sounds. No wheezing or rales. Abdominal:      General: Bowel sounds are normal. There is no distension. Palpations: Abdomen is soft. Tenderness: There is no abdominal tenderness. There is no guarding. Musculoskeletal:         General: No tenderness. Cervical back: Normal range of motion and neck supple. Skin:     General: Skin is warm and dry. Findings: No rash. Neurological:      Mental Status: She is alert and oriented to person, place, and time. Motor: Weakness present. Gait: Gait abnormal (walking with a walker).    Psychiatric:         Judgment: Judgment normal.         No results found for requested labs within last 30 days. Hemoglobin A1C (%)   Date Value   03/04/2016 5.5     LDL Calculated (mg/dL)   Date Value   08/12/2019 143 (H)           Lab Results   Component Value Date    TSH 4.00 03/05/2021         ASSESSMENT/PLAN:     Pk Finn was seen today for pain. Diagnoses and all orders for this visit:    Rheumatoid arthritis with positive rheumatoid factor, involving unspecified site [facility])  -     External Referral To Rheumatology  -     Comprehensive Metabolic Panel; Future  -     CBC; Future    Osteoporosis without current pathological fracture, unspecified osteoporosis type  Fosamax. Neuropathic pain  Neurontin 300 mg. RLS (restless legs syndrome)  Requip  Reactive depression  -     sertraline (ZOLOFT) 50 MG tablet; Take 1 tablet by mouth daily    Vitamin D deficiency  -     Vitamin D 25 Hydroxy; Future    Vitamin B deficiency  -     Vitamin B12 & Folate; Future    Thyroid disorder screen  -     TSH; Future    Screening cholesterol level  -     Lipid Panel; Future      There are no discontinued medications.

## 2022-04-20 NOTE — PROGRESS NOTES
Chief Complaint   Patient presents with    Pain     Neuropathic       Have you seen any other physician or provider since your last visit yes - ortho     Have you had any other diagnostic tests since your last visit? no    Have you changed or stopped any medications since your last visit? no

## 2022-04-22 ENCOUNTER — HOSPITAL ENCOUNTER (OUTPATIENT)
Dept: PHYSICAL THERAPY | Facility: HOSPITAL | Age: 62
Setting detail: THERAPIES SERIES
Discharge: HOME | End: 2022-04-22
Payer: COMMERCIAL

## 2022-04-22 PROBLEM — M06.9 RHEUMATOID ARTHRITIS (HCC): Status: ACTIVE | Noted: 2021-04-15

## 2022-04-22 PROCEDURE — 97110 THERAPEUTIC EXERCISES: CPT

## 2022-04-22 NOTE — FLOWSHEET NOTE
Physical Therapy Daily Treatment Note   Date:  2022    TIme In:    1132                 Time Out:     7563    Patient Name:  Casimiro Blackmon    :  1960  MRN: [id number]    Restrictions/Precautions:    Pertinent Medical History:  Medical/Treatment Diagnosis Information:  ·   R knee lateral arthritis / R lateral tibial plateau fracture - non operative treatment     Insurance/Certification information:    [facility]  Physician Information:    Josef Hobson PA-C / Louis Hoffman MD  Plan of care signed (Y/N):    Visit# / total visits:    7/    G-Code (if applicable):      Date / Visit # G-Code Applied:         Progress Note: []  Yes  [x]  No  Next due by: Visit #10      Pain level: 0/10    Subjective:  Patient reports doing well today and is not having pain currently. Objective:   Observation: gait; steady with RW   Test measurements:      Palpation:    Exercises:  Exercise Resistance/Repetitions Other comments   Nustep L5 x 6' 22   Standing heel raises 3 x 10 22   TKE GTB - 3 x 10 22   HS curls OTB - 3 x 10 22   Quad sets 30x 22   SLR - limit ROM 3 x 10 22   LAQ 3 x 10 22   Hook-lying hip ABD with band OTB - 3 x 10 22   St marching 4x30\" 22   St hip abd 2x10 22             Advance there-ex next visit            Other Therapeutic Activities:      Manual Treatments:        Modalities:        Timed Code Treatment Minutes: 33      Total Treatment Minutes:  33    Treatment/Activity Tolerance:  [x] Patient tolerated treatment well [] Patient limited by fatigue  [] Patient limited by pain  [] Patient limited by other medical complications  [x] Other:  Pt progressed through activity well today. Plan to re-assess next week.      Pain after treatment:    0/10    Prognosis: [x] Good [] Fair  [] Poor    Patient Requires Follow-up: [x] Yes  [] No    Plan:   [x] Continue per plan of care [] Alter current plan (see comments)  [] Plan of care initiated [] Hold pending MD visit [] Discharge    Plan for Next Session: Electronically signed by:  Francoise Angelucci, PT

## 2022-04-25 ENCOUNTER — APPOINTMENT (OUTPATIENT)
Dept: PHYSICAL THERAPY | Facility: HOSPITAL | Age: 62
End: 2022-04-25
Payer: COMMERCIAL

## 2022-04-26 ENCOUNTER — HOSPITAL ENCOUNTER (OUTPATIENT)
Dept: PHYSICAL THERAPY | Facility: HOSPITAL | Age: 62
Setting detail: THERAPIES SERIES
Discharge: HOME | End: 2022-04-26
Payer: COMMERCIAL

## 2022-04-26 PROCEDURE — 97110 THERAPEUTIC EXERCISES: CPT

## 2022-04-26 NOTE — FLOWSHEET NOTE
Physical Therapy Daily Treatment Note / Re-Assessment    Date:  2022    TIme In:    1134                Time Out:     1210    Patient Name:  Nash Pereyra    :  1960  MRN: [id number]    Restrictions/Precautions:    Pertinent Medical History:  Medical/Treatment Diagnosis Information:  ·   R knee lateral arthritis / R lateral tibial plateau fracture - non operative treatment     Insurance/Certification information:    [facility]  Physician Information:    Marilynn Santizo PA-C / Klaudia Mason MD  Plan of care signed (Y/N):    Visit# / total visits:    8/    G-Code (if applicable):      Date / Visit # G-Code Applied:         Progress Note: [x]  Yes  []  No  Next due by: 22      Pain level: 1/10    Subjective:  Patient reports she is getting better and is hoping to start walking with just a cane at home so she can be more mobile. Objective:   Observation: gait; steady with RW   Test measurements:   LEFS: 43/80, R knee AROM: 0 deg, R knee MMT: flex: 5/5, ext: 4+/5.  Palpation:    Exercises:  Exercise Resistance/Repetitions Other comments   Nustep L5 x 6' 26   Standing heel raises 3 x 10 26   TKE GTB - 3 x 10 26   HS curls OTB - 3 x 10 26   Quad sets 30x 26   SLR - limit ROM 3 x 10 26   LAQ 3 x 10 26   Hook-lying hip ABD with band OTB - 3 x 10 26   St marching 4x30\" 26   St hip abd 2x10 26             Advance there-ex next visit            Other Therapeutic Activities:  Pt ambulated with large base quad cane with PT to assess safety. Treatment performed by Hugo Major PTA; re-assessment performed by Behzad Camacho, ROLO.       Manual Treatments:        Modalities:        Timed Code Treatment Minutes: 34      Total Treatment Minutes:  36    Treatment/Activity Tolerance:  [x] Patient tolerated treatment well [] Patient limited by fatigue  [] Patient limited by pain  [] Patient limited by other medical complications  [x] Other:  Pt completed tx with no pain and demonstrated improving AROM as well as improved strength in right knee. PT educated pt in proper use of quad cane to which pt demonstrated good understanding and use. Pt will continue to benefit from skilled PT to further address ROM, strength, and gait to restore optimal functional level. Pt has met all STG at this time. Pain after treatment:    0/10    Goals  Short term goals  Time Frame for Short term goals: 4 weeks  Short term goal 1: Pt to be I with HEP -MET  Short term goal 2: Pt to demonstrate full R knee ext AROM -MET  Short term goal 3: Pt to demonstrate 4+/5 R knee flexion / extension AROM -MET  Short term goal 4: Pt to perform daily activities with pain of 5/10 or less. -MET  Long term goals  Time Frame for Long term goals : 8 weeks  Long term goal 1: LEFS score to improve to 55/80 indicating improved function. Long term goal 2: Pt to demonstrate 5/5 RLE strength grossly. Long term goal 3: Pt to ambulate short community distances with use of a single point cane with good safety awareness.   Long term goal 4: Pt to perform daily activities with pain of less than 3/10.       Prognosis: [x] Good [] Fair  [] Poor    Patient Requires Follow-up: [x] Yes  [] No    Plan:   [x] Continue per plan of care [] Alter current plan (see comments)  [] Plan of care initiated [] Hold pending MD visit [] Discharge    Plan for Next Session:        Electronically signed by:  Oleg Mckeon PTA      Electronically signed by Simeon Ahn PT on 7/3/29 at 6:18 PM EDT

## 2022-04-27 ENCOUNTER — HOSPITAL ENCOUNTER (OUTPATIENT)
Dept: PHYSICAL THERAPY | Facility: HOSPITAL | Age: 62
Setting detail: THERAPIES SERIES
Discharge: HOME | End: 2022-04-27
Payer: COMMERCIAL

## 2022-04-27 PROCEDURE — 97110 THERAPEUTIC EXERCISES: CPT

## 2022-04-27 NOTE — FLOWSHEET NOTE
Physical Therapy Daily Treatment Note   Date:  2022    TIme In:   1137                 Time Out: 1209        Patient Name:  Chapito Grover    :  1960  MRN: [id number]    Restrictions/Precautions:    Pertinent Medical History:  Medical/Treatment Diagnosis Information:  ·   R knee lateral arthritis / R lateral tibial plateau fracture - non operative treatment     Insurance/Certification information:    [facility]  Physician Information:    Daily Catalan PA-C / Zeeshan Matthews MD  Plan of care signed (Y/N):    Visit# / total visits:    9/    G-Code (if applicable):      Date / Visit # G-Code Applied:         Progress Note: []  Yes  [x]  No  Next due by: 22      Pain level: 0/10    Subjective:  Patient reports she hasn't been walking on the cane at home but done okay walking in today with it. She expresed that she is getting better its just a slow process. Objective:   Observation: gait; steady with RW   Test measurements:   LEFS: 43/80, R knee AROM: 0 deg, R knee MMT: flex: 5/5, ext: 4+/5.  Palpation:    Exercises:  Exercise Resistance/Repetitions Other comments   Nustep L5 x 6' 27   Standing heel raises 3 x 10 27   TKE GTB - 3 x 10 27   HS curls GTB - 3 x 10 27   Quad sets 30x 27   SLR - limit ROM 3 x 10 27   LAQ 3 x 10 3# 27   Hook-lying hip ABD with band GTB - 3 x 10 27   St marching 4x30\" 27   St hip abd 2x10 27   Lunge to step 2x10 27                    Other Therapeutic Activities:      Manual Treatments:        Modalities:        Timed Code Treatment Minutes: 30      Total Treatment Minutes:  32    Treatment/Activity Tolerance:  [x] Patient tolerated treatment well [] Patient limited by fatigue  [] Patient limited by pain  [] Patient limited by other medical complications  [x] Other:  Pt completed tx with no pain and tolerated increased resistance well.     Pain after treatment:    0/10    Goals  Short term goals  Time Frame for Short term goals: 4 weeks  Short term goal 1: Pt to be I with HEP -MET  Short term goal 2: Pt to demonstrate full R knee ext AROM -MET  Short term goal 3: Pt to demonstrate 4+/5 R knee flexion / extension AROM  -MET  Short term goal 4: Pt to perform daily activities with pain of 5/10 or less. -MET  Long term goals  Time Frame for Long term goals : 8 weeks  Long term goal 1: LEFS score to improve to 55/80 indicating improved function. Long term goal 2: Pt to demonstrate 5/5 RLE strength grossly. Long term goal 3: Pt to ambulate short community distances with use of a single point cane with good safety awareness.   Long term goal 4: Pt to perform daily activities with pain of less than 3/10.       Prognosis: [x] Good [] Fair  [] Poor    Patient Requires Follow-up: [x] Yes  [] No    Plan:   [x] Continue per plan of care [] Alter current plan (see comments)  [] Plan of care initiated [] Hold pending MD visit [] Discharge    Plan for Next Session:        Electronically signed by:  Erasto Major PTA

## 2022-04-28 DIAGNOSIS — G25.81 RLS (RESTLESS LEGS SYNDROME): ICD-10-CM

## 2022-04-28 RX ORDER — ROPINIROLE 0.5 MG/1
0.5 TABLET, FILM COATED ORAL NIGHTLY
Qty: 30 TABLET | Refills: 2 | Status: SHIPPED | OUTPATIENT
Start: 2022-04-28 | End: 2022-07-19

## 2022-04-28 RX ORDER — CHOLECALCIFEROL (VITAMIN D3) 50 MCG
TABLET ORAL
Qty: 30 TABLET | Refills: 5 | Status: SHIPPED | OUTPATIENT
Start: 2022-04-28 | End: 2022-10-17

## 2022-04-29 ENCOUNTER — APPOINTMENT (OUTPATIENT)
Dept: PHYSICAL THERAPY | Facility: HOSPITAL | Age: 62
End: 2022-04-29
Payer: COMMERCIAL

## 2022-05-03 ENCOUNTER — HOSPITAL ENCOUNTER (OUTPATIENT)
Dept: PHYSICAL THERAPY | Facility: HOSPITAL | Age: 62
Setting detail: THERAPIES SERIES
Discharge: HOME | End: 2022-05-03
Payer: COMMERCIAL

## 2022-05-03 PROCEDURE — 97110 THERAPEUTIC EXERCISES: CPT

## 2022-05-03 NOTE — FLOWSHEET NOTE
Physical Therapy Daily Treatment Note   Date:  5/3/2022    TIme In:   1132                Time Out: 8954        Patient Name:  Nash Pereyra    :  1960  MRN: [id number]    Restrictions/Precautions:    Pertinent Medical History:  Medical/Treatment Diagnosis Information:  ·   R knee lateral arthritis / R lateral tibial plateau fracture - non operative treatment     Insurance/Certification information:    [facility]  Physician Information:    Marilynn Santizo PA-C / Klaudia Mason MD  Plan of care signed (Y/N):    Visit# / total visits:    10/    G-Code (if applicable):      Date / Visit # G-Code Applied:         Progress Note: []  Yes  [x]  No  Next due by: 22      Pain level: 0/10    Subjective:  Patient reports she is seeing improvement; she presents using her cane for ambulation. Objective:   Observation: gait; steady with RW   Test measurements:   LEFS: 43/80, R knee AROM: 0 deg, R knee MMT: flex: 5/5, ext: 4+/5.    Palpation:    Exercises:  Exercise Resistance/Repetitions Other comments   Nustep L5 x 6' 3   Standing heel raises 3 x 10 3   TKE GTB - 3 x 10 3   HS curls GTB - 3 x 10 3   Quad sets 30x 3   SLR - limit ROM 3 x 10 3   LAQ 3 x 10 3# 3   Hook-lying hip ABD with band GTB - 3 x 10 3   St marching 4x30\" 3   St hip abd 2x10 3   Lunge to step 2x10 3                    Other Therapeutic Activities:      Manual Treatments:        Modalities:        Timed Code Treatment Minutes: 34      Total Treatment Minutes: 34     Treatment/Activity Tolerance:  [x] Patient tolerated treatment well [] Patient limited by fatigue  [] Patient limited by pain  [] Patient limited by other medical complications  [] Other:      Pain after treatment:    0/10    Goals  Short term goals  Time Frame for Short term goals: 4 weeks  Short term goal 1: Pt to be I with HEP -MET  Short term goal 2: Pt to demonstrate full R knee ext AROM -MET  Short term goal 3: Pt to demonstrate 4+/5 R knee flexion / extension AROM  -MET  Short term goal 4: Pt to perform daily activities with pain of 5/10 or less. -MET  Long term goals  Time Frame for Long term goals : 8 weeks  Long term goal 1: LEFS score to improve to 55/80 indicating improved function. Long term goal 2: Pt to demonstrate 5/5 RLE strength grossly. Long term goal 3: Pt to ambulate short community distances with use of a single point cane with good safety awareness.   Long term goal 4: Pt to perform daily activities with pain of less than 3/10.       Prognosis: [x] Good [] Fair  [] Poor    Patient Requires Follow-up: [x] Yes  [] No    Plan:   [x] Continue per plan of care [] Alter current plan (see comments)  [] Plan of care initiated [] Hold pending MD visit [] Discharge    Plan for Next Session:        Electronically signed by:  Marimar Herrera, PT

## 2022-05-05 ENCOUNTER — HOSPITAL ENCOUNTER (OUTPATIENT)
Dept: PHYSICAL THERAPY | Facility: HOSPITAL | Age: 62
Setting detail: THERAPIES SERIES
Discharge: HOME | End: 2022-05-05
Payer: COMMERCIAL

## 2022-05-05 PROCEDURE — 97110 THERAPEUTIC EXERCISES: CPT

## 2022-05-05 NOTE — FLOWSHEET NOTE
Physical Therapy Daily Treatment Note   Date:  2022    TIme In:    1132              Time Out:    6083    Patient Name:  Kartik William    :  1960  MRN: [id number]    Restrictions/Precautions:    Pertinent Medical History:  Medical/Treatment Diagnosis Information:  ·   R knee lateral arthritis / R lateral tibial plateau fracture - non operative treatment     Insurance/Certification information:    [facility] [address]  Physician Information:    Caitlin Jackson MD  Plan of care signed (Y/N):    Visit# / total visits:    11/    G-Code (if applicable):      Date / Visit # G-Code Applied:         Progress Note: []  Yes  [x]  No  Next due by: 22      Pain level: 0/10    Subjective:  Patient reports she is doing good today and is walking good with her cane. Objective:   Observation: gait; steady with RW   Test measurements:   LEFS: 43/80, R knee AROM: 0 deg, R knee MMT: flex: 5/5, ext: 4+/5.  Palpation:    Exercises:  Exercise Resistance/Repetitions Other comments   Nustep L5 x 6' 5   Standing heel raises 3 x 10 5   TKE GTB - 3 x 10 5   HS curls GTB - 3 x 10 5   Quad sets 30x 5   SLR - limit ROM 3 x 10 5   LAQ 3 x 10 3# 5   Hook-lying hip ABD with band GTB - 3 x 10 5   St marching 4x30\" 5   St hip abd 2x10 5   Lunge to step 2x10 5                    Other Therapeutic Activities:      Manual Treatments:        Modalities:        Timed Code Treatment Minutes: 25      Total Treatment Minutes: 30    Treatment/Activity Tolerance:  [x] Patient tolerated treatment well [] Patient limited by fatigue  [] Patient limited by pain  [] Patient limited by other medical complications  [x] Other:  Pt completed tx with no pain and good tolerance to all activity.     Pain after treatment:    0/10    Goals  Short term goals  Time Frame for Short term goals: 4 weeks  Short term goal 1: Pt to be I with HEP -MET  Short term goal 2: Pt to demonstrate full R knee ext AROM -MET  Short term goal 3: Pt to demonstrate 4+/5 R knee flexion / extension AROM  -MET  Short term goal 4: Pt to perform daily activities with pain of 5/10 or less. -MET  Long term goals  Time Frame for Long term goals : 8 weeks  Long term goal 1: LEFS score to improve to 55/80 indicating improved function. Long term goal 2: Pt to demonstrate 5/5 RLE strength grossly. Long term goal 3: Pt to ambulate short community distances with use of a single point cane with good safety awareness.   Long term goal 4: Pt to perform daily activities with pain of less than 3/10.       Prognosis: [x] Good [] Fair  [] Poor    Patient Requires Follow-up: [x] Yes  [] No    Plan:   [x] Continue per plan of care [] Alter current plan (see comments)  [] Plan of care initiated [] Hold pending MD visit [] Discharge    Plan for Next Session:        Electronically signed by:  Macey Major PTA

## 2022-05-10 ENCOUNTER — APPOINTMENT (OUTPATIENT)
Dept: PHYSICAL THERAPY | Facility: HOSPITAL | Age: 62
End: 2022-05-10
Payer: COMMERCIAL

## 2022-05-12 ENCOUNTER — HOSPITAL ENCOUNTER (OUTPATIENT)
Dept: PHYSICAL THERAPY | Facility: HOSPITAL | Age: 62
Setting detail: THERAPIES SERIES
Discharge: HOME | End: 2022-05-12
Payer: COMMERCIAL

## 2022-05-12 PROCEDURE — 97110 THERAPEUTIC EXERCISES: CPT

## 2022-05-12 NOTE — FLOWSHEET NOTE
Physical Therapy Daily Treatment Note   Date:  2022    TIme In:     1130            Time Out:    1200    Patient Name:  Lvei Garibay    :  1960  MRN: [id number]    Restrictions/Precautions:    Pertinent Medical History:  Medical/Treatment Diagnosis Information:  ·   R knee lateral arthritis / R lateral tibial plateau fracture - non operative treatment     Insurance/Certification information:    [facility]  Physician Information:    Ross Vasquez, Ziyad Kessler MD  Plan of care signed (Y/N):    Visit# / total visits:    12/    G-Code (if applicable):      Date / Visit # G-Code Applied:         Progress Note: []  Yes  [x]  No  Next due by: 22      Pain level: 2/10    Subjective:  Patient reports she is walking good with quad cane. Objective:   Observation: gait; steady with RW   Test measurements:   LEFS: 43/80, R knee AROM: 0 deg, R knee MMT: flex: 5/5, ext: 4+/5.  Palpation:    Exercises:  Exercise Resistance/Repetitions Other comments   Nustep L5 x 6' 12   Standing heel raises 3 x 10 12   TKE GTB - 3 x 10 12   HS curls GTB - 3 x 10 12   Quad sets 30x 12   SLR - limit ROM 3 x 10 12   LAQ 3 x 10 3# 12   Hook-lying hip ABD with band GTB - 3 x 10 12   St marching 4x30\" 12   St hip abd 2x10 12   Lunge to step 2x10 12                    Other Therapeutic Activities:      Manual Treatments:        Modalities:        Timed Code Treatment Minutes: 29      Total Treatment Minutes: 30    Treatment/Activity Tolerance:  [x] Patient tolerated treatment well [] Patient limited by fatigue  [] Patient limited by pain  [] Patient limited by other medical complications  [x] Other:  Pt completed tx with no pain and no rest breaks throughout.     Pain after treatment:    0/10    Goals  Short term goals  Time Frame for Short term goals: 4 weeks  Short term goal 1: Pt to be I with HEP -MET  Short term goal 2: Pt to demonstrate full R knee ext AROM -MET  Short term goal 3: Pt to demonstrate 4+/5 R knee flexion / extension AROM  -MET  Short term goal 4: Pt to perform daily activities with pain of 5/10 or less. -MET  Long term goals  Time Frame for Long term goals : 8 weeks  Long term goal 1: LEFS score to improve to 55/80 indicating improved function. Long term goal 2: Pt to demonstrate 5/5 RLE strength grossly. Long term goal 3: Pt to ambulate short community distances with use of a single point cane with good safety awareness.   Long term goal 4: Pt to perform daily activities with pain of less than 3/10.       Prognosis: [x] Good [] Fair  [] Poor    Patient Requires Follow-up: [x] Yes  [] No    Plan:   [x] Continue per plan of care [] Alter current plan (see comments)  [] Plan of care initiated [] Hold pending MD visit [] Discharge    Plan for Next Session:        Electronically signed by:  Emy Major PTA

## 2022-05-17 ENCOUNTER — HOSPITAL ENCOUNTER (OUTPATIENT)
Dept: PHYSICAL THERAPY | Facility: HOSPITAL | Age: 62
Setting detail: THERAPIES SERIES
Discharge: HOME | End: 2022-05-17
Payer: COMMERCIAL

## 2022-05-17 ENCOUNTER — APPOINTMENT (OUTPATIENT)
Dept: PHYSICAL THERAPY | Facility: HOSPITAL | Age: 62
End: 2022-05-17
Payer: COMMERCIAL

## 2022-05-17 PROCEDURE — 97110 THERAPEUTIC EXERCISES: CPT

## 2022-05-17 NOTE — FLOWSHEET NOTE
Physical Therapy Daily Treatment Note   Date:  2022    TIme In:    1134            Time Out:    1209    Patient Name:  Casimiro Blackmon    :  1960  MRN: [id number]    Restrictions/Precautions:    Pertinent Medical History:  Medical/Treatment Diagnosis Information:  ·   R knee lateral arthritis / R lateral tibial plateau fracture - non operative treatment     Insurance/Certification information:    [facility]  Physician Information:    Josef Hobson PA-C / Louis Hoffman MD  Plan of care signed (Y/N):    Visit# / total visits:    13/    G-Code (if applicable):      Date / Visit # G-Code Applied:         Progress Note: []  Yes  [x]  No  Next due by: 22      Pain level: 0/10    Subjective:  Patient reports she is doing well today. Objective:   Observation: gait; steady with RW   Test measurements:   LEFS: 43/80, R knee AROM: 0 deg, R knee MMT: flex: 5/5, ext: 4+/5.  Palpation:    Exercises:  Exercise Resistance/Repetitions Other comments   Nustep L5 x 6' 17   Standing heel raises 3 x 10 17   TKE GTB - 3 x 10 17   HS curls GTB - 3 x 10 17   Quad sets 30x 17   SLR - limit ROM 3 x 10 17   LAQ 3 x 10 3# 17   Hook-lying hip ABD with band GTB - 3 x 10 17   St marching 4x30\" 17   St hip abd 2x10 17   Lunge to step 2x10 17        Advance ther-ex next visit            Other Therapeutic Activities:      Manual Treatments:        Modalities:        Timed Code Treatment Minutes: 32      Total Treatment Minutes:  35    Treatment/Activity Tolerance:  [x] Patient tolerated treatment well [] Patient limited by fatigue  [] Patient limited by pain  [] Patient limited by other medical complications  [x] Other:  Pt completed tx with no pain and plan on advancing there-ex next visit.     Pain after treatment:    0/10    Goals  Short term goals  Time Frame for Short term goals: 4 weeks  Short term goal 1: Pt to be I with HEP -MET  Short term goal 2: Pt to demonstrate full R knee ext AROM -MET  Short term goal 3: Pt to demonstrate 4+/5 R knee flexion / extension AROM  -MET  Short term goal 4: Pt to perform daily activities with pain of 5/10 or less. -MET  Long term goals  Time Frame for Long term goals : 8 weeks  Long term goal 1: LEFS score to improve to 55/80 indicating improved function. Long term goal 2: Pt to demonstrate 5/5 RLE strength grossly. Long term goal 3: Pt to ambulate short community distances with use of a single point cane with good safety awareness.   Long term goal 4: Pt to perform daily activities with pain of less than 3/10.       Prognosis: [x] Good [] Fair  [] Poor    Patient Requires Follow-up: [x] Yes  [] No    Plan:   [x] Continue per plan of care [] Alter current plan (see comments)  [] Plan of care initiated [] Hold pending MD visit [] Discharge    Plan for Next Session:        Electronically signed by:  Kayleigh Major PTA

## 2022-05-19 ENCOUNTER — HOSPITAL ENCOUNTER (OUTPATIENT)
Dept: PHYSICAL THERAPY | Facility: HOSPITAL | Age: 62
Setting detail: THERAPIES SERIES
Discharge: HOME | End: 2022-05-19
Payer: COMMERCIAL

## 2022-05-19 PROCEDURE — 97110 THERAPEUTIC EXERCISES: CPT

## 2022-05-19 NOTE — FLOWSHEET NOTE
Physical Therapy Daily Treatment Note   Date:  2022    TIme In:    1130            Time Out:    1159    Patient Name:  Girish Hall    :  1960  MRN: [id number]    Restrictions/Precautions:    Pertinent Medical History:  Medical/Treatment Diagnosis Information:  ·   R knee lateral arthritis / R lateral tibial plateau fracture - non operative treatment     Insurance/Certification information:    [facility]  Physician Information:    Marcos Barraza PA-C / Shantanu Rice MD  Plan of care signed (Y/N):    Visit# / total visits:    14/    G-Code (if applicable):      Date / Visit # G-Code Applied:         Progress Note: []  Yes  [x]  No  Next due by: 22      Pain level: 0/10    Subjective:  Patient reports she is doing good and staying busy at home. Objective:   Observation: gait; steady with RW   Test measurements:   LEFS: 43/80, R knee AROM: 0 deg, R knee MMT: flex: 5/5, ext: 4+/5.  Palpation:    Exercises:  Exercise Resistance/Repetitions Other comments   Nustep L5 x 6' 19   Standing heel raises 3 x 10 19   TKE GTB - 3 x 10 19   HS curls GTB - 3 x 10 19   Quad sets 30x 19   SLR - limit ROM 3 x 10 19   LAQ 3 x 10 3# 19   Hook-lying hip ABD with band GTB - 3 x 10 19   St marching 4x30\" 19   St hip abd 2x10 19   Lunge to step 2x10 19        Advance ther-ex next visit            Other Therapeutic Activities:      Manual Treatments:        Modalities:        Timed Code Treatment Minutes: 25      Total Treatment Minutes:  29    Treatment/Activity Tolerance:  [x] Patient tolerated treatment well [] Patient limited by fatigue  [] Patient limited by pain  [] Patient limited by other medical complications  [x] Other:  Pt completed tx with no pain and plan on advancing there-ex next visit.     Pain after treatment:   0/10    Goals  Short term goals  Time Frame for Short term goals: 4 weeks  Short term goal 1: Pt to be I with HEP -MET  Short term goal 2: Pt to demonstrate full R knee ext AROM -MET  Short term goal 3: Pt to demonstrate 4+/5 R knee flexion / extension AROM  -MET  Short term goal 4: Pt to perform daily activities with pain of 5/10 or less. -MET  Long term goals  Time Frame for Long term goals : 8 weeks  Long term goal 1: LEFS score to improve to 55/80 indicating improved function. Long term goal 2: Pt to demonstrate 5/5 RLE strength grossly. Long term goal 3: Pt to ambulate short community distances with use of a single point cane with good safety awareness.   Long term goal 4: Pt to perform daily activities with pain of less than 3/10.       Prognosis: [x] Good [] Fair  [] Poor    Patient Requires Follow-up: [x] Yes  [] No    Plan:   [x] Continue per plan of care [] Alter current plan (see comments)  [] Plan of care initiated [] Hold pending MD visit [] Discharge    Plan for Next Session:        Electronically signed by:  Imelda Major PTA

## 2022-05-24 ENCOUNTER — HOSPITAL ENCOUNTER (OUTPATIENT)
Dept: PHYSICAL THERAPY | Facility: HOSPITAL | Age: 62
Setting detail: THERAPIES SERIES
Discharge: HOME | End: 2022-05-24
Payer: COMMERCIAL

## 2022-05-24 PROCEDURE — 97110 THERAPEUTIC EXERCISES: CPT

## 2022-05-24 NOTE — FLOWSHEET NOTE
Physical Therapy Daily Treatment Note / Re-Assessment    Date:  2022    TIme In:    3231            Time Out:    1219    Patient Name:  Inderjit Wallace    :  1960  MRN: [id number]    Restrictions/Precautions:    Pertinent Medical History:  Medical/Treatment Diagnosis Information:  ·   R knee lateral arthritis / R lateral tibial plateau fracture - non operative treatment     Insurance/Certification information:    [facility]   Physician Information:    Chaya oBnner PA-C / Valerie Brown MD  Plan of care signed (Y/N):    Visit# / total visits:    15/    G-Code (if applicable):      Date / Visit # G-Code Applied:         Progress Note: [x]  Yes  []  No  Next due by: 22      Pain level: 1-2/10    Subjective:  Patient reports she is doing well today. Objective:   Observation: gait; steady with RW   Test measurements:   LEFS: 43/80, R knee AROM: ext: 0 deg, R knee MMT: flex: 5/5, ext: 4+/5.  Palpation:     Exercises:  Exercise Resistance/Repetitions Other comments   Nustep L5 x 6' 24   Heel walk/toe walk 2 laps 24   TKE BTB - 3 x 10 24   HS curls BTB - 3 x 10 24   St hip 3 way x15 OTB Hold   Sidestepping 2 laps 24   LAQ 3 x 10 3# 24   Step ups: fwd/sideways 2x10 24   St marching 3x1' 24   Sit to stands 2x10 24   Lunge to step 2x10 24                    Other Therapeutic Activities:      Manual Treatments:        Modalities:        Timed Code Treatment Minutes: 42      Total Treatment Minutes:  44    Treatment/Activity Tolerance:  [x] Patient tolerated treatment well [] Patient limited by fatigue  [] Patient limited by pain  [] Patient limited by other medical complications  [x] Other:  Pt performed all new tx with no pain and mild fatigue. Pt is responding well to PT intervention with improved strength and mild improvement in LEFS score. She has been advanced with activity with good tolerance.   She will continue to benefit from skilled PT to further address deficits and restore optimal functional level. Pain after treatment:   0/10    Goals  Short term goals  Time Frame for Short term goals: 4 weeks  Short term goal 1: Pt to be I with HEP -MET  Short term goal 2: Pt to demonstrate full R knee ext AROM -MET  Short term goal 3: Pt to demonstrate 4+/5 R knee flexion / extension AROM  -MET  Short term goal 4: Pt to perform daily activities with pain of 5/10 or less. -MET  Long term goals  Time Frame for Long term goals : 8 weeks  Long term goal 1: LEFS score to improve to 55/80 indicating improved function. Long term goal 2: Pt to demonstrate 5/5 RLE strength grossly. Long term goal 3: Pt to ambulate short community distances with use of a single point cane with good safety awareness.   Long term goal 4: Pt to perform daily activities with pain of less than 3/10.       Prognosis: [x] Good [] Fair  [] Poor    Patient Requires Follow-up: [x] Yes  [] No    Plan:   [x] Continue per plan of care [] Alter current plan (see comments)  [] Plan of care initiated [] Hold pending MD visit [] Discharge    Plan for Next Session:        Electronically signed by:  Teddy Alcantara PTA    Electronically signed by Trina Slaughter PT on 9/70/3170 at 5:45 PM

## 2022-05-26 ENCOUNTER — HOSPITAL ENCOUNTER (OUTPATIENT)
Dept: PHYSICAL THERAPY | Facility: HOSPITAL | Age: 62
Setting detail: THERAPIES SERIES
Discharge: HOME | End: 2022-05-26
Payer: COMMERCIAL

## 2022-05-26 PROCEDURE — 97110 THERAPEUTIC EXERCISES: CPT

## 2022-05-26 NOTE — FLOWSHEET NOTE
Physical Therapy Daily Treatment Note   Date:  2022    TIme In:  1134              Time Out:    1200    Patient Name:  Alba Christensen    :  1960  MRN: [id number]    Restrictions/Precautions:    Pertinent Medical History:  Medical/Treatment Diagnosis Information:  ·   R knee lateral arthritis / R lateral tibial plateau fracture - non operative treatment     Insurance/Certification information:    [facility]  Physician Information:    Jacy Lizama PA-C / María Lima MD  Plan of care signed (Y/N):    Visit# / total visits:    16/    G-Code (if applicable):      Date / Visit # G-Code Applied:         Progress Note: []  Yes  [x]  No  Next due by: 22      Pain level: 410    Subjective:  Patient reports her right knee is hurting some today. Objective:   Observation: gait; steady with RW   Test measurements:   LEFS: 36/80, R knee AROM: 0 deg, R knee MMT: flex: 5/5, ext: 4+/5.  Palpation:     Exercises:  Exercise Resistance/Repetitions Other comments   Nustep L5 x 6' 26   Heel walk/toe walk 2 laps 26   TKE BTB - 3 x 10 26   HS curls BTB - 3 x 10 26   St hip 3 way x15 OTB Hold   Sidestepping 2 laps 26   LAQ 3 x 10 3# 26   Step ups: fwd/sideways 2x10 26   St marching 3x1' 26   Sit to stands 2x10 26   Lunge to step 2x10 26                    Other Therapeutic Activities: Treatment performed by Benson Major PTA; re-assessment performed by Malika Schuster PT. Manual Treatments:        Modalities:        Timed Code Treatment Minutes: 25      Total Treatment Minutes:  26    Treatment/Activity Tolerance:  [x] Patient tolerated treatment well [] Patient limited by fatigue  [] Patient limited by pain  [] Patient limited by other medical complications  [x] Other:  Pt performed al tx well with mild c/o pain in right knee with activity.     Pain after treatment:   0/10    Goals  Short term goals  Time Frame for Short term goals: 4 weeks  Short term goal 1: Pt to be I with HEP -MET  Short term goal 2: Pt to demonstrate full R knee ext AROM -MET  Short term goal 3: Pt to demonstrate 4+/5 R knee flexion / extension AROM  -MET  Short term goal 4: Pt to perform daily activities with pain of 5/10 or less. -MET  Long term goals  Time Frame for Long term goals : 8 weeks  Long term goal 1: LEFS score to improve to 55/80 indicating improved function. Long term goal 2: Pt to demonstrate 5/5 RLE strength grossly. Long term goal 3: Pt to ambulate short community distances with use of a single point cane with good safety awareness.   Long term goal 4: Pt to perform daily activities with pain of less than 3/10.       Prognosis: [x] Good [] Fair  [] Poor    Patient Requires Follow-up: [x] Yes  [] No    Plan:   [x] Continue per plan of care [] Alter current plan (see comments)  [] Plan of care initiated [] Hold pending MD visit [] Discharge    Plan for Next Session:        Electronically signed by:  Erasto Major PTA

## 2022-05-31 ENCOUNTER — HOSPITAL ENCOUNTER (OUTPATIENT)
Dept: PHYSICAL THERAPY | Facility: HOSPITAL | Age: 62
Setting detail: THERAPIES SERIES
Discharge: HOME | End: 2022-05-31
Payer: COMMERCIAL

## 2022-05-31 PROCEDURE — 97110 THERAPEUTIC EXERCISES: CPT

## 2022-05-31 NOTE — FLOWSHEET NOTE
Physical Therapy Daily Treatment Note   Date:  2022    TIme In:  1114             Time Out:     3995    Patient Name:  Aura Kunz    :  1960  MRN: [id number]    Restrictions/Precautions:    Pertinent Medical History:  Medical/Treatment Diagnosis Information:  ·   R knee lateral arthritis / R lateral tibial plateau fracture - non operative treatment     Insurance/Certification information:    [facility]  Physician Information:    Darrell Adan PA-C / Bella Treviño MD  Plan of care signed (Y/N):    Visit# / total visits:    17/    G-Code (if applicable):      Date / Visit # G-Code Applied:         Progress Note: []  Yes  [x]  No  Next due by: 22      Pain level: 310    Subjective:  Patient reports she tries to do stuff without her cane. She expressed that walking on her tip toes and heels is still really hard. Objective:    Observation: gait; steady with RW   Test measurements:   LEFS: 36/80, R knee AROM: 0 deg, R knee MMT: flex: 5/5, ext: 4+/5.  Palpation:     Exercises:  Exercise Resistance/Repetitions Other comments   Nustep L5 x 6' 31   Heel walk/toe walk 2 laps 31   TKE BTB - 3 x 10 31   HS curls BTB - 3 x 10 31   St hip 3 way x15 OTB Hold   Sidestepping 2 laps 31   LAQ 3 x 10 3# 31   Step ups: fwd/sideways 2x10 31   St marching 3x1' 31   Sit to stands 2x10 31   Lunge to step 2x10 31                    Other Therapeutic Activities:      Manual Treatments:        Modalities:        Timed Code Treatment Minutes: 45      Total Treatment Minutes:  47    Treatment/Activity Tolerance:  [x] Patient tolerated treatment well [] Patient limited by fatigue  [] Patient limited by pain  [] Patient limited by other medical complications   [x] Other:  Pt performed all tx with no pain and improving stability with dynamic activity.       Pain after treatment:   0/10    Goals  Short term goals  Time Frame for Short term goals: 4 weeks  Short term goal 1: Pt to be I with HEP -MET  Short term goal 2: Pt to demonstrate full R knee ext AROM -MET  Short term goal 3: Pt to demonstrate 4+/5 R knee flexion / extension AROM  -MET  Short term goal 4: Pt to perform daily activities with pain of 5/10 or less. -MET  Long term goals  Time Frame for Long term goals : 8 weeks  Long term goal 1: LEFS score to improve to 55/80 indicating improved function. Long term goal 2: Pt to demonstrate 5/5 RLE strength grossly. Long term goal 3: Pt to ambulate short community distances with use of a single point cane with good safety awareness.   Long term goal 4: Pt to perform daily activities with pain of less than 3/10.       Prognosis: [x] Good [] Fair  [] Poor    Patient Requires Follow-up: [x] Yes  [] No    Plan:   [x] Continue per plan of care [] Alter current plan (see comments)  [] Plan of care initiated [] Hold pending MD visit [] Discharge    Plan for Next Session:        Electronically signed by:  Macey Major PTA

## 2022-06-01 DIAGNOSIS — M79.2 NEUROPATHIC PAIN: ICD-10-CM

## 2022-06-02 ENCOUNTER — HOSPITAL ENCOUNTER (OUTPATIENT)
Dept: PHYSICAL THERAPY | Facility: HOSPITAL | Age: 62
Setting detail: THERAPIES SERIES
Discharge: HOME | End: 2022-06-02
Payer: COMMERCIAL

## 2022-06-02 PROCEDURE — 97110 THERAPEUTIC EXERCISES: CPT

## 2022-06-02 NOTE — FLOWSHEET NOTE
Physical Therapy Daily Treatment Note   Date:  2022    TIme In:  1030             Time Out:     4257    Patient Name:  Inderjit Wallace    :  1960  MRN: [id number]    Restrictions/Precautions:    Pertinent Medical History:  Medical/Treatment Diagnosis Information:  ·   R knee lateral arthritis / R lateral tibial plateau fracture - non operative treatment     Insurance/Certification information:    [facility]  Physician Information:    Chaya Bonner PA-C / Valerie Brown MD  Plan of care signed (Y/N):    Visit# / total visits:    18/    G-Code (if applicable):      Date / Visit # G-Code Applied:         Progress Note: []  Yes  [x]  No  Next due by: 22      Pain level: 0/10    Subjective:  Patient reports having no pain currently. She continues to try to rely less on her cane during daily activities. Objective:    Observation: gait; steady with RW   Test measurements:   LEFS: 36/80, R knee AROM: 0 deg, R knee MMT: flex: 5/5, ext: 4+/5.    Palpation:     Exercises:  Exercise Resistance/Repetitions Other comments   Nustep L5 x 6' 2   Heel walk/toe walk 2 laps 2   TKE BTB - 3 x 10 2   HS curls BTB - 3 x 10 2   St hip 3 way x15 OTB Hold   Sidestepping 2 laps 2   LAQ 3 x 10 3# 2   Step ups: fwd/sideways 2x10 2   St marching 3x1' 2   Sit to stands 2x10 2   Lunge to step 2x10 2                    Other Therapeutic Activities:      Manual Treatments:        Modalities:        Timed Code Treatment Minutes: 40      Total Treatment Minutes:  42    Treatment/Activity Tolerance:  [x] Patient tolerated treatment well [] Patient limited by fatigue  [] Patient limited by pain  [] Patient limited by other medical complications   [] Other:       Pain after treatment:   0/10    Goals  Short term goals  Time Frame for Short term goals: 4 weeks  Short term goal 1: Pt to be I with HEP -MET  Short term goal 2: Pt to demonstrate full R knee ext AROM -MET  Short term goal 3: Pt to demonstrate 4+/5 R knee flexion / extension AROM  -MET  Short term goal 4: Pt to perform daily activities with pain of 5/10 or less. -MET  Long term goals  Time Frame for Long term goals : 8 weeks  Long term goal 1: LEFS score to improve to 55/80 indicating improved function. Long term goal 2: Pt to demonstrate 5/5 RLE strength grossly. Long term goal 3: Pt to ambulate short community distances with use of a single point cane with good safety awareness.   Long term goal 4: Pt to perform daily activities with pain of less than 3/10.       Prognosis: [x] Good [] Fair  [] Poor    Patient Requires Follow-up: [x] Yes  [] No    Plan:   [x] Continue per plan of care [] Alter current plan (see comments)  [] Plan of care initiated [] Hold pending MD visit [] Discharge    Plan for Next Session:        Electronically signed by:  Eric Schwab PT

## 2022-06-04 RX ORDER — GABAPENTIN 300 MG/1
CAPSULE ORAL
Qty: 90 CAPSULE | Refills: 2 | Status: SHIPPED | OUTPATIENT
Start: 2022-06-04 | End: 2022-08-24

## 2022-06-07 ENCOUNTER — APPOINTMENT (OUTPATIENT)
Dept: PHYSICAL THERAPY | Facility: HOSPITAL | Age: 62
End: 2022-06-07
Payer: COMMERCIAL

## 2022-06-09 ENCOUNTER — HOSPITAL ENCOUNTER (OUTPATIENT)
Dept: PHYSICAL THERAPY | Facility: HOSPITAL | Age: 62
Setting detail: THERAPIES SERIES
Discharge: HOME | End: 2022-06-09
Payer: COMMERCIAL

## 2022-06-09 PROCEDURE — 97110 THERAPEUTIC EXERCISES: CPT

## 2022-06-09 NOTE — FLOWSHEET NOTE
Physical Therapy Daily Treatment Note   Date:  2022    TIme In: 1131              Time Out:     7181    Patient Name:  Seu Mcclure    :  1960  MRN: [id number]    Restrictions/Precautions:    Pertinent Medical History:  Medical/Treatment Diagnosis Information:  ·   R knee lateral arthritis / R lateral tibial plateau fracture - non operative treatment     Insurance/Certification information:    [facility]  Physician Information:    Fior Spangler PA-C / Rosalba Mackay MD  Plan of care signed (Y/N):    Visit# / total visits:    19/    G-Code (if applicable):      Date / Visit # G-Code Applied:         Progress Note: []  Yes  [x]  No  Next due by: 22      Pain level: 0/10    Subjective:  Patient reports she is hurting all over today and used bengay before she came. Objective:    Observation: gait; steady with RW   Test measurements:   LEFS: 36/80, R knee AROM: 0 deg, R knee MMT: flex: 5/5, ext: 4+/5.  Palpation:     Exercises:  Exercise Resistance/Repetitions Other comments   Nustep L5 x 6' 9   Heel walk/toe walk 2 laps 9   TKE BTB - 3 x 10 9   HS curls BTB - 3 x 10 9   St hip 3 way x15 OTB Hold   Sidestepping 2 laps 9   LAQ 3 x 10 3# 9   Step ups: fwd/sideways 2x10 9   St marching 3x1' 9   Sit to stands 2x10 9   Lunge to step 2x10 9                    Other Therapeutic Activities:      Manual Treatments:        Modalities:        Timed Code Treatment Minutes: 44      Total Treatment Minutes:  46    Treatment/Activity Tolerance:  [x] Patient tolerated treatment well [] Patient limited by fatigue  [] Patient limited by pain  [] Patient limited by other medical complications   [x] Other:   Pt completed tx with no pain and few rest breaks throughout.     Pain after treatment:   0/10    Goals  Short term goals  Time Frame for Short term goals: 4 weeks  Short term goal 1: Pt to be I with HEP -MET  Short term goal 2: Pt to demonstrate full R knee ext AROM -MET  Short term goal 3: Pt to demonstrate 4+/5 R knee flexion / extension AROM  -MET  Short term goal 4: Pt to perform daily activities with pain of 5/10 or less. -MET  Long term goals  Time Frame for Long term goals : 8 weeks  Long term goal 1: LEFS score to improve to 55/80 indicating improved function. Long term goal 2: Pt to demonstrate 5/5 RLE strength grossly. Long term goal 3: Pt to ambulate short community distances with use of a single point cane with good safety awareness.   Long term goal 4: Pt to perform daily activities with pain of less than 3/10.       Prognosis: [x] Good [] Fair  [] Poor    Patient Requires Follow-up: [x] Yes  [] No    Plan:   [x] Continue per plan of care [] Alter current plan (see comments)  [] Plan of care initiated [] Hold pending MD visit [] Discharge    Plan for Next Session:        Electronically signed by:  Imelda Major PTA

## 2022-06-13 ENCOUNTER — HOSPITAL ENCOUNTER (OUTPATIENT)
Dept: PHYSICAL THERAPY | Facility: HOSPITAL | Age: 62
Setting detail: THERAPIES SERIES
End: 2022-06-13
Payer: COMMERCIAL

## 2022-06-15 ENCOUNTER — HOSPITAL ENCOUNTER (OUTPATIENT)
Dept: PHYSICAL THERAPY | Facility: HOSPITAL | Age: 62
Setting detail: THERAPIES SERIES
Discharge: HOME | End: 2022-06-15
Payer: COMMERCIAL

## 2022-06-15 PROCEDURE — 97110 THERAPEUTIC EXERCISES: CPT

## 2022-06-15 PROCEDURE — 97112 NEUROMUSCULAR REEDUCATION: CPT

## 2022-06-15 NOTE — FLOWSHEET NOTE
Physical Therapy Daily Treatment Note   Date:  6/15/2022    TIme In:  1133             Time Out:    1214    Patient Name:  Felix Swanson   :  1960 MRN: [id number]    Restrictions/Precautions:    Pertinent Medical History:  Medical/Treatment Diagnosis Information:  ·   R knee lateral arthritis / R lateral tibial plateau fracture - non operative treatment     Insurance/Certification information:    [facility]  Physician Information:    Masha Garber PA-C / Douglas Garibay MD  Plan of care signed (Y/N):    Visit# / total visits:    20/    G-Code (if applicable):      Date / Visit # G-Code Applied:         Progress Note: []  Yes  [x]  No  Next due by: 22      Pain level: 0/10    Subjective:  Patient states she's doing pretty good today. Reports being able to do a little more around the house. Objective:    Observation: gait; steady with quad cane   Test measurements:   LEFS: 36/80, R knee AROM: 0 deg, R knee MMT: flex: 5/5, ext: 4+/5.    Palpation:     Exercises:  Exercise Resistance/Repetitions Other comments   Nustep L5 x 6' 15   Heel walk/toe walk 2 laps 15   TKE BTB - 3 x 10 15   HS curls BTB - 3 x 10 15   St hip 3 way x15 OTB Hold   Sidestepping 2 laps 15   LAQ 3 x 10 3# 15   Step ups: fwd/sideways 2x10 15   St marching 3x1' 15   Sit to stands 2x10 15   Lunge to step 2x10 15                    Other Therapeutic Activities:      Manual Treatments:        Modalities:        Timed Code Treatment Minutes:  38    Total Treatment Minutes:  41    Treatment/Activity Tolerance:  [x] Patient tolerated treatment well [] Patient limited by fatigue  [] Patient limited by pain  [] Patient limited by other medical complications   [] Other:    Pain after treatment:   0/10    Goals  Short term goals  Time Frame for Short term goals: 4 weeks  Short term goal 1: Pt to be I with HEP -MET  Short term goal 2: Pt to demonstrate full R knee ext AROM -MET  Short term goal 3: Pt to demonstrate 4+/5 R knee flexion / extension AROM  -MET  Short term goal 4: Pt to perform daily activities with pain of 5/10 or less. -MET  Long term goals  Time Frame for Long term goals : 8 weeks  Long term goal 1: LEFS score to improve to 55/80 indicating improved function. Long term goal 2: Pt to demonstrate 5/5 RLE strength grossly. Long term goal 3: Pt to ambulate short community distances with use of a single point cane with good safety awareness.   Long term goal 4: Pt to perform daily activities with pain of less than 3/10.       Prognosis: [x] Good [] Fair  [] Poor    Patient Requires Follow-up: [x] Yes  [] No    Plan:   [x] Continue per plan of care [] Alter current plan (see comments)  [] Plan of care initiated [] Hold pending MD visit [] Discharge    Plan for Next Session:        Electronically signed by:  Ginny Valdes PTA

## 2022-06-21 ENCOUNTER — HOSPITAL ENCOUNTER (OUTPATIENT)
Dept: PHYSICAL THERAPY | Facility: HOSPITAL | Age: 62
Setting detail: THERAPIES SERIES
Discharge: HOME | End: 2022-06-21
Payer: COMMERCIAL

## 2022-06-21 PROCEDURE — 97110 THERAPEUTIC EXERCISES: CPT

## 2022-06-21 NOTE — FLOWSHEET NOTE
Physical Therapy Daily Treatment Note / Re-Assessment    Date:  2022    TIme In:    1125           Time Out:    1295    Patient Name:  Florencia Lozano   :  1960 MRN: [id number]    Restrictions/Precautions:    Pertinent Medical History:  Medical/Treatment Diagnosis Information:  ·   R knee lateral arthritis / R lateral tibial plateau fracture - non operative treatment     Insurance/Certification information:    [facility]  Physician Information:    Randy Torres PA-C / Madhav Singleton MD  Plan of care signed (Y/N):    Visit# / total visits:    21/    G-Code (if applicable):      Date / Visit # G-Code Applied:         Progress Note: [x]  Yes  []  No  Next due by: 22      Pain level: 0/10    Subjective:  Patient states she is getting better and was able to perform sit to stands better today. Objective:    Observation: gait; steady with quad cane   Test measurements:   LEFS: 35/80, R knee AROM: 0 deg, R knee MMT: flex: 5/5, ext: 5-/5. MCTSB: 3/4   Palpation:     Exercises:  Exercise Resistance/Repetitions Other comments   Nustep L5 x 6' 21   Heel walk/toe walk 2 laps 21   TKE BTB - 3 x 10 21   HS curls BTB - 3 x 10 21   St hip 3 way x15 OTB Hold   Sidestepping 2 laps 21   LAQ 3 x 10 3# 21   Step ups: fwd/sideways 2x10 21   St marching 3x1' 21   Sit to stands 2x10 21   Lunge to step 2x10 21                    Other Therapeutic Activities:  Treatment performed by Aury Major PTA; re-assessment performed by Myles Jalloh PT. Manual Treatments:        Modalities:        Timed Code Treatment Minutes:  42    Total Treatment Minutes:  58    Treatment/Activity Tolerance:  [x] Patient tolerated treatment well [] Patient limited by fatigue  [] Patient limited by pain  [] Patient limited by other medical complications   [x] Other: Pt completed tx with increased strength in right knee ext and improved functional mobility.   She is responding well to PT intervention and will continue to benefit to further address deficits and restore optimal functional level. MCTSB testing was performed and demonstrated difficulty with eyes closed on a foam surface. Balance activities with be advanced next visit to address dynamic balance deficits. Pain after treatment:   0/10    Goals  Short term goals  Time Frame for Short term goals: 4 weeks  Short term goal 1: Pt to be I with HEP -MET  Short term goal 2: Pt to demonstrate full R knee ext AROM -MET  Short term goal 3: Pt to demonstrate 4+/5 R knee flexion / extension AROM  -MET  Short term goal 4: Pt to perform daily activities with pain of 5/10 or less. -MET  Long term goals  Time Frame for Long term goals : 8 weeks  Long term goal 1: LEFS score to improve to 55/80 indicating improved function. Long term goal 2: Pt to demonstrate 5/5 RLE strength grossly. Long term goal 3: Pt to ambulate short community distances with use of a single point cane with good safety awareness.   Long term goal 4: Pt to perform daily activities with pain of less than 3/10.       Prognosis: [x] Good [] Fair  [] Poor    Patient Requires Follow-up: [x] Yes  [] No    Plan:   [x] Continue per plan of care [] Alter current plan (see comments)  [] Plan of care initiated [] Hold pending MD visit [] Discharge    Plan for Next Session:        Electronically signed by:  Armani Servin PTA    Electronically signed by Erasto Campos PT

## 2022-06-23 ENCOUNTER — HOSPITAL ENCOUNTER (OUTPATIENT)
Dept: PHYSICAL THERAPY | Facility: HOSPITAL | Age: 62
Setting detail: THERAPIES SERIES
Discharge: HOME | End: 2022-06-23
Payer: COMMERCIAL

## 2022-06-23 PROCEDURE — 97110 THERAPEUTIC EXERCISES: CPT

## 2022-06-23 NOTE — FLOWSHEET NOTE
Physical Therapy Daily Treatment Note   Date:  2022    TIme In:    1125           Time Out:    1218    Patient Name:  Cynthia Duncan   :  1960 MRN: [id number]    Restrictions/Precautions:    Pertinent Medical History:  Medical/Treatment Diagnosis Information:  ·   R knee lateral arthritis / R lateral tibial plateau fracture - non operative treatment     Insurance/Certification information:    [facility]  Physician Information:    Laurita Wynn PA-C / Don Freeman MD  Plan of care signed (Y/N):    Visit# / total visits:    22/    G-Code (if applicable):      Date / Visit # G-Code Applied:         Progress Note: []  Yes  [x]  No  Next due by: 22      Pain level: 1/10    Subjective:  Patient states she is okay today. Objective:    Observation: gait; steady with quad cane   Test measurements:   LEFS: 35/80, R knee AROM: 0 deg, R knee MMT: flex: 5/5, ext: 5-/5.  Palpation:     Exercises:  Exercise Resistance/Repetitions Other comments   Nustep L5 x 6' 23   Heel walk/toe walk 2 laps 23   TKE BTB - 3 x 10 23   HS curls BTB - 3 x 10 23   St hip 3 way x15 OTB Hold   Sidestepping 2 laps 23   LAQ 3 x 10 3# 23   Step ups: fwd/sideways 2x10 23   St marching 3x1' 23   Sit to stands 2x10 23   Lunge to step 2x10 23   Foam balance: EC/EO 30\"x4 23   Small rocker board:2-way 1'x2 23          Other Therapeutic Activities:      Manual Treatments:      Modalities:        Timed Code Treatment Minutes:  50    Total Treatment Minutes:  53    Treatment/Activity Tolerance:  [x] Patient tolerated treatment well [] Patient limited by fatigue  [] Patient limited by pain  [] Patient limited by other medical complications   [x] Other: Pt completed tx with mild pain and performed balance activity well.     Pain after treatment:   1/10    Goals  Short term goals  Time Frame for Short term goals: 4 weeks  Short term goal 1: Pt to be I with HEP -MET  Short term goal 2: Pt to demonstrate full R knee ext AROM -MET  Short term goal 3: Pt to demonstrate 4+/5 R knee flexion / extension AROM  -MET  Short term goal 4: Pt to perform daily activities with pain of 5/10 or less. -MET  Long term goals  Time Frame for Long term goals : 8 weeks  Long term goal 1: LEFS score to improve to 55/80 indicating improved function. Long term goal 2: Pt to demonstrate 5/5 RLE strength grossly. Long term goal 3: Pt to ambulate short community distances with use of a single point cane with good safety awareness.   Long term goal 4: Pt to perform daily activities with pain of less than 3/10.       Prognosis: [x] Good [] Fair  [] Poor    Patient Requires Follow-up: [x] Yes  [] No    Plan:   [x] Continue per plan of care [] Alter current plan (see comments)  [] Plan of care initiated [] Hold pending MD visit [] Discharge    Plan for Next Session:        Electronically signed by:  Ramos Major PTA

## 2022-06-24 DIAGNOSIS — Z96.641 STATUS POST RIGHT HIP REPLACEMENT: ICD-10-CM

## 2022-06-24 RX ORDER — ETANERCEPT 50 MG/ML
SOLUTION SUBCUTANEOUS
Qty: 4 ML | Refills: 1 | Status: SHIPPED | OUTPATIENT
Start: 2022-06-24 | End: 2022-08-24

## 2022-06-24 RX ORDER — PREDNISONE 10 MG/1
10 TABLET ORAL DAILY PRN
Qty: 30 TABLET | Refills: 3 | Status: SHIPPED | OUTPATIENT
Start: 2022-06-24 | End: 2022-10-17

## 2022-06-24 RX ORDER — DOCUSATE SODIUM 100 MG/1
CAPSULE, LIQUID FILLED ORAL
Qty: 60 CAPSULE | Refills: 3 | Status: SHIPPED | OUTPATIENT
Start: 2022-06-24 | End: 2022-10-17

## 2022-06-24 RX ORDER — MELOXICAM 15 MG/1
15 TABLET ORAL DAILY
Qty: 30 TABLET | Refills: 3 | Status: SHIPPED | OUTPATIENT
Start: 2022-06-24 | End: 2022-09-19

## 2022-06-27 ENCOUNTER — APPOINTMENT (OUTPATIENT)
Dept: PHYSICAL THERAPY | Facility: HOSPITAL | Age: 62
End: 2022-06-27
Payer: COMMERCIAL

## 2022-06-29 ENCOUNTER — HOSPITAL ENCOUNTER (OUTPATIENT)
Dept: PHYSICAL THERAPY | Facility: HOSPITAL | Age: 62
Setting detail: THERAPIES SERIES
Discharge: HOME | End: 2022-06-29
Payer: COMMERCIAL

## 2022-06-29 PROCEDURE — 97110 THERAPEUTIC EXERCISES: CPT

## 2022-06-29 NOTE — FLOWSHEET NOTE
term goal 3: Pt to demonstrate 4+/5 R knee flexion / extension AROM  -MET  Short term goal 4: Pt to perform daily activities with pain of 5/10 or less. -MET  Long term goals  Time Frame for Long term goals : 8 weeks  Long term goal 1: LEFS score to improve to 55/80 indicating improved function. Long term goal 2: Pt to demonstrate 5/5 RLE strength grossly. Long term goal 3: Pt to ambulate short community distances with use of a single point cane with good safety awareness.   Long term goal 4: Pt to perform daily activities with pain of less than 3/10.       Prognosis: [x] Good [] Fair  [] Poor    Patient Requires Follow-up: [x] Yes  [] No    Plan:   [x] Continue per plan of care [] Alter current plan (see comments)  [] Plan of care initiated [] Hold pending MD visit [] Discharge    Plan for Next Session:        Electronically signed by:  Macey Major PTA

## 2022-07-05 ENCOUNTER — HOSPITAL ENCOUNTER (OUTPATIENT)
Dept: PHYSICAL THERAPY | Facility: HOSPITAL | Age: 62
Setting detail: THERAPIES SERIES
Discharge: HOME | End: 2022-07-05
Payer: COMMERCIAL

## 2022-07-05 PROCEDURE — 97110 THERAPEUTIC EXERCISES: CPT

## 2022-07-05 NOTE — FLOWSHEET NOTE
Physical Therapy Daily Treatment Note   Date:  2022    TIme In:  1134            Time Out:   1225     Patient Name:  Lenny Kelly   :  1960 MRN: [id number]    Restrictions/Precautions:    Pertinent Medical History:  Medical/Treatment Diagnosis Information:  ·   R knee lateral arthritis / R lateral tibial plateau fracture - non operative treatment     Insurance/Certification information:    [facility]  Physician Information:    Catharine Landau, PA-C / Leola Sierar MD  Plan of care signed (Y/N):    Visit# / total visits:    24/    G-Code (if applicable):      Date / Visit # G-Code Applied:         Progress Note: []  Yes  [x]  No  Next due by: 22      Pain level: 0/10    Subjective:  Patient reports she started driving this weekend and has done well so far. She expressed that she is able to run errands except lifting heavy items. Objective:     Observation: gait; steady with quad cane   Test measurements:   LEFS: 35/80, R knee AROM: 0 deg, R knee MMT: flex: 5/5, ext: 5-/5.  Palpation:     Exercises:  Exercise Resistance/Repetitions Other comments   Nustep L5 x 8' 5   Heel walk/toe walk 2 laps 5   TKE BTB - 3 x 10 5   HS curls BTB - 3 x 10 5   St hip 3 way x15 OTB Hold   Sidestepping 2 laps 5   LAQ 3 x 10 3# 5   Step ups: fwd/sideways 2x10 5   St marching 3x1' 5   Sit to stands 2x10 5   Lunge to step 2x10 5   Foam balance: EC/EO 30\"x4 5   Small rocker board:2-way 1'x2 5          Other Therapeutic Activities:      Manual Treatments:      Modalities:        Timed Code Treatment Minutes:  45    Total Treatment Minutes:  51    Treatment/Activity Tolerance:  [x] Patient tolerated treatment well [] Patient limited by fatigue  [] Patient limited by pain  [] Patient limited by other medical complications   [x] Other: Pt completed tx with no pain and improving balance.     Pain after treatment:   0/10    Goals  Short term goals  Time Frame for Short term goals: 4 weeks  Short term goal 1: Pt to be I with HEP -MET  Short term goal 2: Pt to demonstrate full R knee ext AROM -MET  Short term goal 3: Pt to demonstrate 4+/5 R knee flexion / extension AROM  -MET  Short term goal 4: Pt to perform daily activities with pain of 5/10 or less. -MET  Long term goals  Time Frame for Long term goals : 8 weeks  Long term goal 1: LEFS score to improve to 55/80 indicating improved function. Long term goal 2: Pt to demonstrate 5/5 RLE strength grossly. Long term goal 3: Pt to ambulate short community distances with use of a single point cane with good safety awareness.   Long term goal 4: Pt to perform daily activities with pain of less than 3/10.       Prognosis: [x] Good [] Fair  [] Poor    Patient Requires Follow-up: [x] Yes  [] No    Plan:   [x] Continue per plan of care [] Alter current plan (see comments)  [] Plan of care initiated [] Hold pending MD visit [] Discharge    Plan for Next Session:        Electronically signed by:  Maxim Major PTA

## 2022-07-07 ENCOUNTER — HOSPITAL ENCOUNTER (OUTPATIENT)
Dept: PHYSICAL THERAPY | Facility: HOSPITAL | Age: 62
Setting detail: THERAPIES SERIES
Discharge: HOME | End: 2022-07-07
Payer: COMMERCIAL

## 2022-07-07 PROCEDURE — 97110 THERAPEUTIC EXERCISES: CPT

## 2022-07-07 NOTE — FLOWSHEET NOTE
Physical Therapy Daily Treatment Note   Date:  2022    TIme In: 9106             Time Out:   0154    Patient Name:  Jus Thorne   :  1960 MRN: [id number]    Restrictions/Precautions:    Pertinent Medical History:  Medical/Treatment Diagnosis Information:  ·   R knee lateral arthritis / R lateral tibial plateau fracture - non operative treatment     Insurance/Certification information:    [facility]  Physician Information:    Oswaldo Romero PA-C / Shamar Hammer MD  Plan of care signed (Y/N):    Visit# / total visits:    25/    G-Code (if applicable):      Date / Visit # G-Code Applied:         Progress Note: []  Yes  [x]  No  Next due by: 22      Pain level: 310    Subjective:  Patient reports her knee is hurting more today. Objective:     Observation: gait; steady with quad cane   Test measurements:   LEFS: 35/80, R knee AROM: 0 deg, R knee MMT: flex: 5/5, ext: 5-/5.  Palpation:     Exercises:  Exercise Resistance/Repetitions Other comments   Nustep L5 x 8' 7   Heel walk/toe walk 2 laps 7   TKE BTB - 3 x 10 7   HS curls BTB - 3 x 10 7   St hip 3 way x15 OTB Hold   Sidestepping 2 laps 7   LAQ 3 x 10 3# 7   Step ups: fwd/sideways 2x10 7   St marching 3x1' 7   Sit to stands 2x10 7   Lunge to step 2x10 7   Foam balance: EC/EO 30\"x4 7   Small rocker board:2-way 1'x2 7          Other Therapeutic Activities:      Manual Treatments:      Modalities:        Timed Code Treatment Minutes:  40    Total Treatment Minutes:  42    Treatment/Activity Tolerance:  [x] Patient tolerated treatment well [] Patient limited by fatigue  [] Patient limited by pain  [] Patient limited by other medical complications   [x] Other: Pt completed tx with pain in knee today.     Pain after treatment:   0/10    Goals  Short term goals  Time Frame for Short term goals: 4 weeks  Short term goal 1: Pt to be I with HEP -MET  Short term goal 2: Pt to demonstrate full R knee ext AROM -MET  Short term goal 3: Pt to demonstrate 4+/5 R knee flexion / extension AROM  -MET  Short term goal 4: Pt to perform daily activities with pain of 5/10 or less. -MET  Long term goals  Time Frame for Long term goals : 8 weeks  Long term goal 1: LEFS score to improve to 55/80 indicating improved function. Long term goal 2: Pt to demonstrate 5/5 RLE strength grossly. Long term goal 3: Pt to ambulate short community distances with use of a single point cane with good safety awareness.   Long term goal 4: Pt to perform daily activities with pain of less than 3/10.       Prognosis: [x] Good [] Fair  [] Poor    Patient Requires Follow-up: [x] Yes  [] No    Plan:   [x] Continue per plan of care [] Alter current plan (see comments)  [] Plan of care initiated [] Hold pending MD visit [] Discharge    Plan for Next Session:        Electronically signed by:  Rebeca Major PTA

## 2022-07-12 ENCOUNTER — HOSPITAL ENCOUNTER (OUTPATIENT)
Dept: PHYSICAL THERAPY | Facility: HOSPITAL | Age: 62
Setting detail: THERAPIES SERIES
Discharge: HOME | End: 2022-07-12
Payer: COMMERCIAL

## 2022-07-12 PROCEDURE — 97110 THERAPEUTIC EXERCISES: CPT

## 2022-07-12 PROCEDURE — 97116 GAIT TRAINING THERAPY: CPT

## 2022-07-12 NOTE — FLOWSHEET NOTE
Physical Therapy Daily Treatment Note   Date:  2022    TIme In: 1132             Time Out:   1234    Patient Name:  Emerson Chawla   :  1960 MRN: [id number]    Restrictions/Precautions:    Pertinent Medical History:  Medical/Treatment Diagnosis Information:  ·   R knee lateral arthritis / R lateral tibial plateau fracture - non operative treatment     Insurance/Certification information:    [facility]  Physician Information:    Amelia Walter PA-C / Rupa Barajas MD  Plan of care signed (Y/N):    Visit# / total visits:    25/    G-Code (if applicable):      Date / Visit # G-Code Applied:         Progress Note: []  Yes  [x]  No  Next due by: 22      Pain level: 0/10    Subjective:  Patient reports her knee is doing a lot better. Objective:     Observation: gait; steady with quad cane   Test measurements:   LEFS: 35/80, R knee AROM: 0 deg, R knee MMT: flex: 5/5, ext: 5-/5.  Palpation:     Exercises:  Exercise Resistance/Repetitions Other comments   Nustep L5 x 8' 12   Heel walk/toe walk 2 laps 12   TKE BTB - 3 x 10 12   HS curls BTB - 3 x 10 12   St hip 3 way x15 OTB 12   Sidestepping 2 laps 12   LAQ 3 x 10 3# 12   Step ups: fwd/sideways 2x10 12   St marching 3x1' 12   Sit to stands 2x10 12   Lunge to step 2x10 12   Foam balance: EC/EO 30\"x4 12   Small rocker board:2-way 1'x2 12          Other Therapeutic Activities:  GT with SPC, 100'x2 SBA occa cues for sequencing 10'. D/C quad cane, recommended SPC for community distances. Manual Treatments:      Modalities:        Timed Code Treatment Minutes:  51    Total Treatment Minutes:  62    Treatment/Activity Tolerance:  [x] Patient tolerated treatment well [] Patient limited by fatigue  [] Patient limited by pain  [] Patient limited by other medical complications   [x] Other: Pt completed tx with no knee pain today. Pt d/c quad cane, recommended SPC.      Pain after treatment:   0/10    Goals  Short term goals  Time Frame for Short term goals: 4 weeks  Short term goal 1: Pt to be I with HEP -MET  Short term goal 2: Pt to demonstrate full R knee ext AROM -MET  Short term goal 3: Pt to demonstrate 4+/5 R knee flexion / extension AROM  -MET  Short term goal 4: Pt to perform daily activities with pain of 5/10 or less. -MET  Long term goals  Time Frame for Long term goals : 8 weeks  Long term goal 1: LEFS score to improve to 55/80 indicating improved function. Long term goal 2: Pt to demonstrate 5/5 RLE strength grossly. Long term goal 3: Pt to ambulate short community distances with use of a single point cane with good safety awareness.   Long term goal 4: Pt to perform daily activities with pain of less than 3/10.       Prognosis: [x] Good [] Fair  [] Poor    Patient Requires Follow-up: [x] Yes  [] No    Plan:   [x] Continue per plan of care [] Alter current plan (see comments)  [] Plan of care initiated [] Hold pending MD visit [] Discharge    Plan for Next Session:        Electronically signed by:  Cheikh Newton PT

## 2022-07-14 ENCOUNTER — HOSPITAL ENCOUNTER (OUTPATIENT)
Dept: PHYSICAL THERAPY | Facility: HOSPITAL | Age: 62
Setting detail: THERAPIES SERIES
Discharge: HOME | End: 2022-07-14
Payer: COMMERCIAL

## 2022-07-14 PROCEDURE — 97110 THERAPEUTIC EXERCISES: CPT

## 2022-07-14 NOTE — FLOWSHEET NOTE
Physical Therapy Daily Treatment Note   Date:  2022    TIme In: 1133             Time Out:   1225    Patient Name:  Lenny Kelly   :  1960 MRN: [id number]    Restrictions/Precautions:    Pertinent Medical History:  Medical/Treatment Diagnosis Information:  ·   R knee lateral arthritis / R lateral tibial plateau fracture - non operative treatment     Insurance/Certification information:    [facility]  Physician Information:    Catharine Landau, PA-C / Leola Sierra MD  Plan of care signed (Y/N):    Visit# / total visits:    26/    G-Code (if applicable):      Date / Visit # G-Code Applied:         Progress Note: []  Yes  [x]  No  Next due by: 22      Pain level: 3/10    Subjective:  Patient reports she is having some pain and stiffness today. Objective:     Observation: gait; steady with quad cane   Test measurements:   LEFS: 35/80, R knee AROM: 0 deg, R knee MMT: flex: 5/5, ext: 5-/5.  Palpation:     Exercises:  Exercise Resistance/Repetitions Other comments   Nustep L5 x 8' 14   Heel walk/toe walk 2 laps 14   TKE BTB - 3 x 10 14   HS curls BTB - 3 x 10 14   St hip 3 way x15 OTB 14   Sidestepping 2 laps 14   LAQ 3 x 10 3# 14   Step ups: fwd/sideways 2x10 14   St marching 3x1' 14   Sit to stands 2x10 14   Lunge to step 2x10 14   Foam balance: EC/EO 30\"x4 14   Small rocker board:2-way 1'x2 14          Other Therapeutic Activities:  Pt educated in use of single point cane for ambulation. Manual Treatments:      Modalities:        Timed Code Treatment Minutes:  45    Total Treatment Minutes:  52    Treatment/Activity Tolerance:  [x] Patient tolerated treatment well [] Patient limited by fatigue  [] Patient limited by pain  [] Patient limited by other medical complications   [x] Other: Pt completed tx with mild c/o pain and good gait with use of SPC.      Pain after treatment:   3/10    Goals  Short term goals  Time Frame for Short term goals: 4 weeks  Short term goal 1: Pt to be I with HEP -MET  Short term goal 2: Pt to demonstrate full R knee ext AROM -MET  Short term goal 3: Pt to demonstrate 4+/5 R knee flexion / extension AROM  -MET  Short term goal 4: Pt to perform daily activities with pain of 5/10 or less. -MET  Long term goals  Time Frame for Long term goals : 8 weeks  Long term goal 1: LEFS score to improve to 55/80 indicating improved function. Long term goal 2: Pt to demonstrate 5/5 RLE strength grossly. Long term goal 3: Pt to ambulate short community distances with use of a single point cane with good safety awareness.   Long term goal 4: Pt to perform daily activities with pain of less than 3/10.       Prognosis: [x] Good [] Fair  [] Poor    Patient Requires Follow-up: [x] Yes  [] No    Plan:   [x] Continue per plan of care [] Alter current plan (see comments)  [] Plan of care initiated [] Hold pending MD visit [] Discharge    Plan for Next Session:        Electronically signed by:  Leon Major PTA

## 2022-07-19 ENCOUNTER — HOSPITAL ENCOUNTER (OUTPATIENT)
Dept: PHYSICAL THERAPY | Facility: HOSPITAL | Age: 62
Setting detail: THERAPIES SERIES
Discharge: HOME | End: 2022-07-19
Payer: COMMERCIAL

## 2022-07-19 DIAGNOSIS — G25.81 RLS (RESTLESS LEGS SYNDROME): ICD-10-CM

## 2022-07-19 DIAGNOSIS — E53.8 B12 DEFICIENCY: ICD-10-CM

## 2022-07-19 DIAGNOSIS — R12 HEARTBURN: ICD-10-CM

## 2022-07-19 DIAGNOSIS — F32.9 REACTIVE DEPRESSION: ICD-10-CM

## 2022-07-19 DIAGNOSIS — Z72.0 TOBACCO ABUSE: ICD-10-CM

## 2022-07-19 PROCEDURE — 97110 THERAPEUTIC EXERCISES: CPT

## 2022-07-19 PROCEDURE — 97530 THERAPEUTIC ACTIVITIES: CPT

## 2022-07-19 RX ORDER — FAMOTIDINE 40 MG/1
40 TABLET, FILM COATED ORAL EVERY EVENING
Qty: 30 TABLET | Refills: 3 | Status: SHIPPED | OUTPATIENT
Start: 2022-07-19

## 2022-07-19 RX ORDER — CYANOCOBALAMIN 1000 UG/ML
INJECTION INTRAMUSCULAR; SUBCUTANEOUS
Qty: 1 ML | Refills: 5 | Status: SHIPPED | OUTPATIENT
Start: 2022-07-19

## 2022-07-19 RX ORDER — ROPINIROLE 0.5 MG/1
0.5 TABLET, FILM COATED ORAL NIGHTLY
Qty: 30 TABLET | Refills: 2 | Status: SHIPPED | OUTPATIENT
Start: 2022-07-19 | End: 2022-10-17

## 2022-07-19 RX ORDER — BUPROPION HYDROCHLORIDE 150 MG/1
150 TABLET ORAL EVERY MORNING
Qty: 30 TABLET | Refills: 3 | Status: SHIPPED | OUTPATIENT
Start: 2022-07-19

## 2022-07-19 NOTE — FLOWSHEET NOTE
Physical Therapy Daily Treatment Note/Re-assessment  Date:  2022    TIme In: 1135             Time Out:   200    Patient Name:  Katelin Mcclelland   :  1960 MRN: [id number]    Restrictions/Precautions:    Pertinent Medical History:  Medical/Treatment Diagnosis Information:    R knee lateral arthritis / R lateral tibial plateau fracture - non operative treatment     Insurance/Certification information:    [facility]  Physician Information:    Roverto Will PA-C / Anna Waterman MD  Plan of care signed (Y/N):    Visit# / total visits:    26/    G-Code (if applicable):      Date / Visit # G-Code Applied:         Progress Note: [x]  Yes  []  No  Next due by: 22      Pain level: 2/10    Subjective:  Patient reports she is doing ok, but had a sleepless weekend d/t her dog escaping and her having to spend hours looking for it. Objective:    Observation: gait; steady with quad cane  Test measurements:   LEFS: 39/80, R knee AROM: 0 deg, R knee MMT: flex: 5/5, ext: 5-/5.   Palpation:     Exercises:  Exercise Resistance/Repetitions Other comments   Nustep L5 x 8' 19   Heel walk/toe walk 2 laps 19   TKE BTB - 3 x 10 19   HS curls PTB - 3 x 10 19   St hip 3 way x15 OTB 19   Sidestepping 2 laps 19   LAQ 3 x 10 4# 19   Step ups: fwd/sideways 2x10 19   St marching 3x1' 19 #2lbs next session   Sit to stands 2x10 19   Lunge to step 2x10 19   Foam balance: EC/EO 30\"x4 19   Small rocker board:2-way 1'x2 19        Treadmill  Lvl 1.0 2m 19   10lb bucket carry 2Laps 19   Leg press 3x10 30lbs 19   Incline calf stretch  3x1' 19     Other Therapeutic Activities:  Stair training, [address] training 150'+    Manual Treatments:      Modalities:        Timed Code Treatment Minutes:  58    Total Treatment Minutes:  65    Treatment/Activity Tolerance:  [x] Patient tolerated treatment well [] Patient limited by fatigue  [] Patient limited by pain  [] Patient limited by other medical complications   [x] Other: Pt reassessed for this reporting period, progressed pt with exercises to improve LEFS score and overall str, pt tolerated session with increased pain/fatigue d/t increasing resistance with exercises. Pt will benefit from continued skilled PT services to address functional deficits and maximize QOL and decrease pain to reach optimal LOF. Pain after treatment:   4/10    Goals  Short term goals  Time Frame for Short term goals: 4 weeks  Short term goal 1: Pt to be I with HEP -MET  Short term goal 2: Pt to demonstrate full R knee ext AROM -MET  Short term goal 3: Pt to demonstrate 4+/5 R knee flexion / extension AROM  -MET  Short term goal 4: Pt to perform daily activities with pain of 5/10 or less.   -MET  Long term goals  Time Frame for Long term goals : 8 weeks  Long term goal 1: LEFS score to improve to 55/80 indicating improved function. -Ongoing  Long term goal 2: Pt to demonstrate 5/5 RLE strength grossly-Ongoing  Long term goal 3: Pt to ambulate short community distances with use of a single point cane with good safety awareness.- Ongoing  Long term goal 4: Pt to perform daily activities with pain of less than 3/10. -Ongoing       Prognosis: [x] Good [] Fair  [] Poor    Patient Requires Follow-up: [x] Yes  [] No    Plan:   [x] Continue per plan of care [] Alter current plan (see comments)  [] Plan of care initiated [] Hold pending MD visit [] Discharge    Plan for Next Session:        Electronically signed by:  Rebecca Larson PT

## 2022-07-21 ENCOUNTER — HOSPITAL ENCOUNTER (OUTPATIENT)
Dept: PHYSICAL THERAPY | Facility: HOSPITAL | Age: 62
Setting detail: THERAPIES SERIES
Discharge: HOME | End: 2022-07-21
Payer: COMMERCIAL

## 2022-07-21 PROCEDURE — 97110 THERAPEUTIC EXERCISES: CPT

## 2022-07-21 PROCEDURE — 97530 THERAPEUTIC ACTIVITIES: CPT

## 2022-07-21 NOTE — FLOWSHEET NOTE
Physical Therapy Daily Treatment Note/Re-assessment  Date:  2022    TIme In: 1137             Time Out:   [address] [facility]    Patient Name:  Rafat Roldan   :  1960 MRN: [id number]    Restrictions/Precautions:    Pertinent Medical History:  Medical/Treatment Diagnosis Information:    R knee lateral arthritis / R lateral tibial plateau fracture - non operative treatment     Insurance/Certification information:    [facility]  Physician Information:    Litzy Jacobsen PA-C / Julia Bernardo MD  Plan of care signed (Y/N):    Visit# / total visits:    26/    G-Code (if applicable):      Date / Visit # G-Code Applied:         Progress Note: []  Yes  [x]  No  Next due by: 22      Pain level: 410    Subjective:  Patient reports she is a lot more sore d/t increased resistance with exercises. Objective:    Observation: gait; steady with quad cane  Test measurements:   LEFS: 39/80, R knee AROM: 0 deg, R knee MMT: flex: 5/5, ext: 5-/5. Palpation:     Exercises:  Exercise Resistance/Repetitions Other comments   Nustep L5 x 8' 21   Heel walk/toe walk 2 laps 21   TKE BTB - 3 x 10 21   HS curls PTB - 3 x 10 21   St hip 3 way x15 OTB 21   Sidestepping 2 laps 21   LAQ 3 x 10 4# 21   Step ups: fwd/sideways 2x10 21   St marching 3x1' 2lbs 21   Sit to stands 2x10 21   Lunge to step 2x10 21   Foam balance: EC/EO 30\"x4 21   Small rocker board:2-way 1'x2 21        Treadmill  Lvl 1.0 2m (not done)   Leg press 3x10 30lbs 21   Incline calf stretch  3x1' 21     Other Therapeutic Activities:  Stair training, [facility] training 150'+, 10lb bucket carry 2Laps    Manual Treatments:      Modalities:        Timed Code Treatment Minutes:  66    Total Treatment Minutes:  72    Treatment/Activity Tolerance:  [x] Patient tolerated treatment well [] Patient limited by fatigue  [] Patient limited by pain  [] Patient limited by other medical complications   [x] Other: Pt able to show decreased signs of pain after tx with exercises.  Pt educated that increased soreness is a normal response to increased resistance added. Pain after treatment:   2/10    Goals  Short term goals  Time Frame for Short term goals: 4 weeks  Short term goal 1: Pt to be I with HEP -MET  Short term goal 2: Pt to demonstrate full R knee ext AROM -MET  Short term goal 3: Pt to demonstrate 4+/5 R knee flexion / extension AROM  -MET  Short term goal 4: Pt to perform daily activities with pain of 5/10 or less.   -MET  Long term goals  Time Frame for Long term goals : 8 weeks  Long term goal 1: LEFS score to improve to 55/80 indicating improved function. -Ongoing  Long term goal 2: Pt to demonstrate 5/5 RLE strength grossly-Ongoing  Long term goal 3: Pt to ambulate short community distances with use of a single point cane with good safety awareness.- Ongoing  Long term goal 4: Pt to perform daily activities with pain of less than 3/10. -Ongoing       Prognosis: [x] Good [] Fair  [] Poor    Patient Requires Follow-up: [x] Yes  [] No    Plan:   [x] Continue per plan of care [] Alter current plan (see comments)  [] Plan of care initiated [] Hold pending MD visit [] Discharge    Plan for Next Session:        Electronically signed by:  Mary Alice Nolan, PT

## 2022-07-26 ENCOUNTER — APPOINTMENT (OUTPATIENT)
Dept: PHYSICAL THERAPY | Facility: HOSPITAL | Age: 62
End: 2022-07-26
Payer: COMMERCIAL

## 2022-07-28 ENCOUNTER — APPOINTMENT (OUTPATIENT)
Dept: PHYSICAL THERAPY | Facility: HOSPITAL | Age: 62
End: 2022-07-28
Payer: COMMERCIAL

## 2022-08-02 ENCOUNTER — APPOINTMENT (OUTPATIENT)
Dept: PHYSICAL THERAPY | Facility: HOSPITAL | Age: 62
End: 2022-08-02
Payer: COMMERCIAL

## 2022-08-04 ENCOUNTER — HOSPITAL ENCOUNTER (OUTPATIENT)
Dept: PHYSICAL THERAPY | Facility: HOSPITAL | Age: 62
Setting detail: THERAPIES SERIES
Discharge: HOME | End: 2022-08-04
Payer: COMMERCIAL

## 2022-08-04 PROCEDURE — 97110 THERAPEUTIC EXERCISES: CPT

## 2022-08-04 NOTE — FLOWSHEET NOTE
Physical Therapy Daily Treatment Note    Date:  2022    TIme In:      1137          Time Out:   6743    Patient Name:  Melanie You   :  1960 MRN: [id number]    Restrictions/Precautions:    Pertinent Medical History:  Medical/Treatment Diagnosis Information:    R knee lateral arthritis / R lateral tibial plateau fracture - non operative treatment     Insurance/Certification information:      Physician Information:    Padma Tobias PA-C / Nicolas Olivares MD  Plan of care signed (Y/N):    Visit# / total visits:    27/    G-Code (if applicable):      Date / Visit # G-Code Applied:         Progress Note: []  Yes  [x]  No  Next due by: 22      Pain level: 0/10    Subjective:  Patient reports she hurt her back a couple weeks ago and it got so bad she couldn't breath and it would hurt out the front of her chest.     Objective:    Observation: gait; steady with quad cane  Test measurements:   LEFS: 39/80, R knee AROM: 0 deg, R knee MMT: flex: 5/5, ext: 5-/5. Palpation:     Exercises:  Exercise Resistance/Repetitions Other comments   Nustep L5 x 8' 4   Heel walk/toe walk 2 laps 4   TKE BTB - 3 x 10 4   HS curls PTB - 3 x 10 4   St hip 3 way x15 OTB 4   Sidestepping 2 laps 4   LAQ 3 x 10 4# 4   Step ups: fwd/sideways 2x10 4   St marching 3x1' 2lbs 4   Sit to stands 2x10 4   Lunge to step 2x10 4   Foam balance: EC/EO 30\"x4 4   Small rocker board:2-way 1'x2 4        Treadmill  Lvl 1.0 2m (not done)   Leg press 3x10 30lbs 4   Incline calf stretch  3x1' 4     Other Therapeutic Activities:  Stair training, [address] training 150'+, 10lb bucket carry 2Laps    Manual Treatments:      Modalities:        Timed Code Treatment Minutes:  45    Total Treatment Minutes:  50    Treatment/Activity Tolerance:  [x] Patient tolerated treatment well [] Patient limited by fatigue  [] Patient limited by pain  [] Patient limited by other medical complications   [x] Other: Pt completed tx with mod fatigue and decreased speed today. Pain after treatment:   0/10    Goals  Short term goals  Time Frame for Short term goals: 4 weeks  Short term goal 1: Pt to be I with HEP -MET  Short term goal 2: Pt to demonstrate full R knee ext AROM -MET  Short term goal 3: Pt to demonstrate 4+/5 R knee flexion / extension AROM  -MET  Short term goal 4: Pt to perform daily activities with pain of 5/10 or less.   -MET  Long term goals  Time Frame for Long term goals : 8 weeks  Long term goal 1: LEFS score to improve to 55/80 indicating improved function. -Ongoing  Long term goal 2: Pt to demonstrate 5/5 RLE strength grossly-Ongoing  Long term goal 3: Pt to ambulate short community distances with use of a single point cane with good safety awareness.- Ongoing  Long term goal 4: Pt to perform daily activities with pain of less than 3/10. -Ongoing       Prognosis: [x] Good [] Fair  [] Poor    Patient Requires Follow-up: [x] Yes  [] No    Plan:   [x] Continue per plan of care [] Alter current plan (see comments)  [] Plan of care initiated [] Hold pending MD visit [] Discharge    Plan for Next Session:        Electronically signed by:  Lionel Major PTA

## 2022-08-09 ENCOUNTER — HOSPITAL ENCOUNTER (OUTPATIENT)
Dept: PHYSICAL THERAPY | Facility: HOSPITAL | Age: 62
Setting detail: THERAPIES SERIES
Discharge: HOME | End: 2022-08-09
Payer: COMMERCIAL

## 2022-08-09 PROCEDURE — 97110 THERAPEUTIC EXERCISES: CPT

## 2022-08-09 NOTE — FLOWSHEET NOTE
fatigue. Pt educated with using a pulling cart to TF groceries from car to home. Pain after treatment:   0/10    Goals  Short term goals  Time Frame for Short term goals: 4 weeks  Short term goal 1: Pt to be I with HEP -MET  Short term goal 2: Pt to demonstrate full R knee ext AROM -MET  Short term goal 3: Pt to demonstrate 4+/5 R knee flexion / extension AROM  -MET  Short term goal 4: Pt to perform daily activities with pain of 5/10 or less.   -MET  Long term goals  Time Frame for Long term goals : 8 weeks  Long term goal 1: LEFS score to improve to 55/80 indicating improved function. -Ongoing  Long term goal 2: Pt to demonstrate 5/5 RLE strength grossly-Ongoing  Long term goal 3: Pt to ambulate short community distances with use of a single point cane with good safety awareness.- Ongoing  Long term goal 4: Pt to perform daily activities with pain of less than 3/10. -Ongoing       Prognosis: [x] Good [] Fair  [] Poor    Patient Requires Follow-up: [x] Yes  [] No    Plan:   [x] Continue per plan of care [] Alter current plan (see comments)  [] Plan of care initiated [] Hold pending MD visit [] Discharge    Plan for Next Session:        Electronically signed by:  Rolando Mcgarry PT

## 2022-08-11 ENCOUNTER — APPOINTMENT (OUTPATIENT)
Dept: PHYSICAL THERAPY | Facility: HOSPITAL | Age: 62
End: 2022-08-11
Payer: COMMERCIAL

## 2022-08-12 ENCOUNTER — OFFICE VISIT (OUTPATIENT)
Dept: PRIMARY CARE CLINIC | Age: 62
End: 2022-08-12
Payer: MEDICAID

## 2022-08-12 ENCOUNTER — HOSPITAL ENCOUNTER (OUTPATIENT)
Facility: HOSPITAL | Age: 62
Discharge: HOME | End: 2022-08-12
Payer: MEDICAID

## 2022-08-12 VITALS
DIASTOLIC BLOOD PRESSURE: 72 MMHG | SYSTOLIC BLOOD PRESSURE: 128 MMHG | OXYGEN SATURATION: 98 % | HEIGHT: 66 IN | TEMPERATURE: 97.1 F | RESPIRATION RATE: 14 BRPM | BODY MASS INDEX: 20.6 KG/M2 | HEART RATE: 91 BPM | WEIGHT: 128.2 LBS

## 2022-08-12 DIAGNOSIS — E53.9 VITAMIN B DEFICIENCY: ICD-10-CM

## 2022-08-12 DIAGNOSIS — Z13.29 THYROID DISORDER SCREEN: ICD-10-CM

## 2022-08-12 DIAGNOSIS — M54.6 THORACIC SPINE PAIN: Primary | ICD-10-CM

## 2022-08-12 DIAGNOSIS — M05.9 RHEUMATOID ARTHRITIS WITH POSITIVE RHEUMATOID FACTOR, INVOLVING UNSPECIFIED SITE (HCC): ICD-10-CM

## 2022-08-12 DIAGNOSIS — Z13.220 SCREENING CHOLESTEROL LEVEL: ICD-10-CM

## 2022-08-12 DIAGNOSIS — M62.838 MUSCLE SPASM: ICD-10-CM

## 2022-08-12 DIAGNOSIS — E55.9 VITAMIN D DEFICIENCY: ICD-10-CM

## 2022-08-12 LAB
A/G RATIO: 1.5 (ref 0.8–2)
ALBUMIN SERPL-MCNC: 4.3 G/DL (ref 3.4–4.8)
ALP BLD-CCNC: 88 U/L (ref 25–100)
ALT SERPL-CCNC: 17 U/L (ref 4–36)
ANION GAP SERPL CALCULATED.3IONS-SCNC: 12 MMOL/L (ref 3–16)
AST SERPL-CCNC: 24 U/L (ref 8–33)
BILIRUB SERPL-MCNC: 0.3 MG/DL (ref 0.3–1.2)
BUN BLDV-MCNC: 10 MG/DL (ref 6–20)
CALCIUM SERPL-MCNC: 10.3 MG/DL (ref 8.5–10.5)
CHLORIDE BLD-SCNC: 98 MMOL/L (ref 98–107)
CHOLESTEROL, TOTAL: 242 MG/DL (ref 0–200)
CO2: 28 MMOL/L (ref 20–30)
CREAT SERPL-MCNC: 0.7 MG/DL (ref 0.4–1.2)
FOLATE: 8.24 NG/ML
GFR AFRICAN AMERICAN: >59
GFR NON-AFRICAN AMERICAN: >60
GLOBULIN: 2.8 G/DL
GLUCOSE BLD-MCNC: 81 MG/DL (ref 74–106)
HCT VFR BLD CALC: 41.7 % (ref 37–47)
HDLC SERPL-MCNC: 96 MG/DL (ref 40–60)
HEMOGLOBIN: 13.5 G/DL (ref 11.5–16.5)
LDL CHOLESTEROL CALCULATED: 122 MG/DL
MCH RBC QN AUTO: 33.5 PG (ref 27–32)
MCHC RBC AUTO-ENTMCNC: 32.4 G/DL (ref 31–35)
MCV RBC AUTO: 103.5 FL (ref 80–100)
PDW BLD-RTO: 13.2 % (ref 11–16)
PLATELET # BLD: 386 K/UL (ref 150–400)
PMV BLD AUTO: 10 FL (ref 6–10)
POTASSIUM SERPL-SCNC: 4.3 MMOL/L (ref 3.4–5.1)
RBC # BLD: 4.03 M/UL (ref 3.8–5.8)
SODIUM BLD-SCNC: 138 MMOL/L (ref 136–145)
TOTAL PROTEIN: 7.1 G/DL (ref 6.4–8.3)
TRIGL SERPL-MCNC: 119 MG/DL (ref 0–249)
TSH SERPL DL<=0.05 MIU/L-ACNC: 3.63 UIU/ML (ref 0.27–4.2)
VITAMIN B-12: 839 PG/ML (ref 211–911)
VITAMIN D 25-HYDROXY: 47.6 (ref 32–100)
VLDLC SERPL CALC-MCNC: 24 MG/DL
WBC # BLD: 7.4 K/UL (ref 4–11)

## 2022-08-12 PROCEDURE — 80061 LIPID PANEL: CPT

## 2022-08-12 PROCEDURE — G8420 CALC BMI NORM PARAMETERS: HCPCS | Performed by: NURSE PRACTITIONER

## 2022-08-12 PROCEDURE — 82607 VITAMIN B-12: CPT

## 2022-08-12 PROCEDURE — 84443 ASSAY THYROID STIM HORMONE: CPT

## 2022-08-12 PROCEDURE — 82746 ASSAY OF FOLIC ACID SERUM: CPT

## 2022-08-12 PROCEDURE — G8427 DOCREV CUR MEDS BY ELIG CLIN: HCPCS | Performed by: NURSE PRACTITIONER

## 2022-08-12 PROCEDURE — 3017F COLORECTAL CA SCREEN DOC REV: CPT | Performed by: NURSE PRACTITIONER

## 2022-08-12 PROCEDURE — 99214 OFFICE O/P EST MOD 30 MIN: CPT | Performed by: NURSE PRACTITIONER

## 2022-08-12 PROCEDURE — 4004F PT TOBACCO SCREEN RCVD TLK: CPT | Performed by: NURSE PRACTITIONER

## 2022-08-12 PROCEDURE — 80053 COMPREHEN METABOLIC PANEL: CPT

## 2022-08-12 PROCEDURE — 82306 VITAMIN D 25 HYDROXY: CPT

## 2022-08-12 PROCEDURE — 85027 COMPLETE CBC AUTOMATED: CPT

## 2022-08-12 RX ORDER — HYDROCODONE BITARTRATE AND ACETAMINOPHEN 5; 325 MG/1; MG/1
1 TABLET ORAL EVERY 4 HOURS PRN
Qty: 18 TABLET | Refills: 0 | Status: SHIPPED | OUTPATIENT
Start: 2022-08-12 | End: 2022-08-31 | Stop reason: SDUPTHER

## 2022-08-12 RX ORDER — CYCLOBENZAPRINE HCL 10 MG
10 TABLET ORAL 3 TIMES DAILY PRN
Qty: 30 TABLET | Refills: 0 | Status: SHIPPED | OUTPATIENT
Start: 2022-08-12 | End: 2022-09-19 | Stop reason: SDUPTHER

## 2022-08-12 ASSESSMENT — PATIENT HEALTH QUESTIONNAIRE - PHQ9
2. FEELING DOWN, DEPRESSED OR HOPELESS: 0
SUM OF ALL RESPONSES TO PHQ QUESTIONS 1-9: 2
8. MOVING OR SPEAKING SO SLOWLY THAT OTHER PEOPLE COULD HAVE NOTICED. OR THE OPPOSITE, BEING SO FIGETY OR RESTLESS THAT YOU HAVE BEEN MOVING AROUND A LOT MORE THAN USUAL: 0
4. FEELING TIRED OR HAVING LITTLE ENERGY: 1
SUM OF ALL RESPONSES TO PHQ QUESTIONS 1-9: 2
10. IF YOU CHECKED OFF ANY PROBLEMS, HOW DIFFICULT HAVE THESE PROBLEMS MADE IT FOR YOU TO DO YOUR WORK, TAKE CARE OF THINGS AT HOME, OR GET ALONG WITH OTHER PEOPLE: 1
3. TROUBLE FALLING OR STAYING ASLEEP: 1
6. FEELING BAD ABOUT YOURSELF - OR THAT YOU ARE A FAILURE OR HAVE LET YOURSELF OR YOUR FAMILY DOWN: 0
1. LITTLE INTEREST OR PLEASURE IN DOING THINGS: 0
SUM OF ALL RESPONSES TO PHQ QUESTIONS 1-9: 2
9. THOUGHTS THAT YOU WOULD BE BETTER OFF DEAD, OR OF HURTING YOURSELF: 0
SUM OF ALL RESPONSES TO PHQ9 QUESTIONS 1 & 2: 0
SUM OF ALL RESPONSES TO PHQ QUESTIONS 1-9: 2
5. POOR APPETITE OR OVEREATING: 0
7. TROUBLE CONCENTRATING ON THINGS, SUCH AS READING THE NEWSPAPER OR WATCHING TELEVISION: 0

## 2022-08-12 ASSESSMENT — ENCOUNTER SYMPTOMS
NAUSEA: 1
BACK PAIN: 1
ALLERGIC/IMMUNOLOGIC NEGATIVE: 1
RESPIRATORY NEGATIVE: 1
EYES NEGATIVE: 1

## 2022-08-12 NOTE — PROGRESS NOTES
Chief Complaint   Patient presents with    3 Month Follow-Up    Arthritis              Have you seen any other physician or provider since your last visit yes - orthopedic     Have you had any other diagnostic tests since your last visit? no    Have you changed or stopped any medications since your last visit? no     I have recommended that this patient have a immunization for pneumonia and sigmoidoscopy but she due to refusal reason: not comfortable with test   I have discussed the risks and benefits of this examination with her. The patient verbalizes understanding. Provider will be informed of refusal.      Diabetic retinal exam completed this year? No                       * If yes please have patient sign a records release to obtain record to update Health Maintenance                       * If no, please order referral for patient to be scheduled     SUBJECTIVE:    Patient Osbaldo Betts is a 62 y.o. female. Chief Complaint   Patient presents with    3 Month Follow-Up    Arthritis            HPI:  Patient is hre to follow up on chronic arthritis pain. Patient has been having back spasms in her upper thoracic region for 2 weeks. She has been alternating Tylenol and Advil but the Tylenol made her nauseated. She is still having pain that radiates under her left arm and across her chest. Pain is worse when she bends over. Pain is stabbing and twisting. She says it is constant. No injury she is aware. Blood pressure medication daily and doing better. The Zoloft is doing well and helps her sleep and he mood is better. Patient's medications, allergies, past medical, surgical, social and family histories were reviewed and updated as appropriate. Review of Systems   Constitutional: Negative. HENT: Negative. Eyes: Negative. Respiratory: Negative. Cardiovascular: Negative. Gastrointestinal:  Positive for nausea. Endocrine: Negative. Genitourinary: Negative. Musculoskeletal:  Positive for arthralgias and back pain. Skin: Negative. Allergic/Immunologic: Negative. Neurological: Negative. Hematological: Negative. Psychiatric/Behavioral: Negative. OBJECTIVE:  /72 (Site: Right Upper Arm, Position: Sitting, Cuff Size: Small Adult)   Pulse 91   Temp 97.1 °F (36.2 °C) (Temporal)   Resp 14   Ht 5' 6\" (1.676 m)   Wt 128 lb 3.2 oz (58.2 kg)   SpO2 98% Comment: room air  BMI 20.69 kg/m²    Physical Exam  Vitals and nursing note reviewed. Constitutional:       Appearance: She is well-developed. HENT:      Head: Normocephalic and atraumatic. Eyes:      Conjunctiva/sclera: Conjunctivae normal.      Pupils: Pupils are equal, round, and reactive to light. Neck:      Thyroid: No thyromegaly. Vascular: No JVD. Cardiovascular:      Rate and Rhythm: Normal rate and regular rhythm. Heart sounds: No murmur heard. No friction rub. No gallop. Pulmonary:      Effort: Pulmonary effort is normal. No respiratory distress. Breath sounds: Normal breath sounds. No wheezing or rales. Abdominal:      General: Bowel sounds are normal. There is no distension. Palpations: Abdomen is soft. Tenderness: There is no abdominal tenderness. There is no guarding. Musculoskeletal:         General: No tenderness. Cervical back: Normal range of motion and neck supple. Thoracic back: Deformity (kyphosis) and spasms present. Decreased range of motion. Skin:     General: Skin is warm and dry. Findings: No rash. Neurological:      Mental Status: She is alert and oriented to person, place, and time. Psychiatric:         Judgment: Judgment normal.       No results found for requested labs within last 30 days.        Hemoglobin A1C (%)   Date Value   03/04/2016 5.5     LDL Calculated (mg/dL)   Date Value   08/12/2019 143 (H)         Lab Results   Component Value Date/Time    WBC 17.0 03/05/2021 11:40 AM    NEUTROABS 14.5 03/05/2021 11:40 AM    HGB 10.8 03/05/2021 11:40 AM    HCT 31.2 03/05/2021 11:40 AM    MCV 92.6 03/05/2021 11:40 AM     03/05/2021 11:40 AM       Lab Results   Component Value Date    TSH 4.00 03/05/2021         ASSESSMENT/PLAN:     1. Thoracic spine pain  Robin risk for fracture due to osteoporosis. - cyclobenzaprine (FLEXERIL) 10 MG tablet; Take 1 tablet by mouth 3 times daily as needed for Muscle spasms  Dispense: 30 tablet; Refill: 0  - XR THORACIC SPINE (2 VIEWS); Future  - HYDROcodone-acetaminophen (NORCO) 5-325 MG per tablet; Take 1 tablet by mouth every 4 hours as needed for Pain for up to 3 days. Intended supply: 3 days. Take lowest dose possible to manage pain  Dispense: 18 tablet; Refill: 0    2. Muscle spasm    - cyclobenzaprine (FLEXERIL) 10 MG tablet; Take 1 tablet by mouth 3 times daily as needed for Muscle spasms  Dispense: 30 tablet; Refill: 0  - XR THORACIC SPINE (2 VIEWS);  Future     Written by Chago GURROLA, acting as a scribe for Medardo Olmedo on 8/12/2022 at 12:18 PM.

## 2022-08-15 ENCOUNTER — HOSPITAL ENCOUNTER (OUTPATIENT)
Dept: GENERAL RADIOLOGY | Facility: HOSPITAL | Age: 62
Discharge: HOME | End: 2022-08-15
Payer: MEDICAID

## 2022-08-15 ENCOUNTER — HOSPITAL ENCOUNTER (OUTPATIENT)
Facility: HOSPITAL | Age: 62
Discharge: HOME | End: 2022-08-15
Payer: MEDICAID

## 2022-08-15 DIAGNOSIS — M62.838 MUSCLE SPASM: ICD-10-CM

## 2022-08-15 DIAGNOSIS — M54.6 THORACIC SPINE PAIN: ICD-10-CM

## 2022-08-15 PROCEDURE — 72070 X-RAY EXAM THORAC SPINE 2VWS: CPT

## 2022-08-16 DIAGNOSIS — M54.6 ACUTE BILATERAL THORACIC BACK PAIN: Primary | ICD-10-CM

## 2022-08-16 DIAGNOSIS — R93.89 ABNORMAL X-RAY: ICD-10-CM

## 2022-08-20 DIAGNOSIS — M79.2 NEUROPATHIC PAIN: ICD-10-CM

## 2022-08-20 DIAGNOSIS — M81.0 OSTEOPOROSIS WITHOUT CURRENT PATHOLOGICAL FRACTURE, UNSPECIFIED OSTEOPOROSIS TYPE: ICD-10-CM

## 2022-08-22 ENCOUNTER — TELEPHONE (OUTPATIENT)
Dept: PRIMARY CARE CLINIC | Age: 62
End: 2022-08-22

## 2022-08-22 DIAGNOSIS — M54.6 THORACIC SPINE PAIN: ICD-10-CM

## 2022-08-22 RX ORDER — ALENDRONATE SODIUM 70 MG/1
70 TABLET ORAL
Qty: 4 TABLET | Refills: 5 | Status: SHIPPED | OUTPATIENT
Start: 2022-08-22

## 2022-08-22 RX ORDER — HYDROCODONE BITARTRATE AND ACETAMINOPHEN 5; 325 MG/1; MG/1
1 TABLET ORAL EVERY 4 HOURS PRN
Qty: 18 TABLET | Refills: 0 | OUTPATIENT
Start: 2022-08-22 | End: 2022-08-25

## 2022-08-22 NOTE — TELEPHONE ENCOUNTER
Pt called asking if you can refill pain med to get her through to MRI (gave Lortab #[id number] on 8/12/22) MRI sched 8/29/22 \"extreme back pain\"  Tomy Faye 8/22/2022

## 2022-08-23 DIAGNOSIS — M54.6 THORACIC SPINE PAIN: ICD-10-CM

## 2022-08-23 RX ORDER — HYDROCODONE BITARTRATE AND ACETAMINOPHEN 5; 325 MG/1; MG/1
1 TABLET ORAL EVERY 4 HOURS PRN
Qty: 18 TABLET | Refills: 0 | Status: CANCELLED | OUTPATIENT
Start: 2022-08-23 | End: 2022-08-26

## 2022-08-24 RX ORDER — ETANERCEPT 50 MG/ML
SOLUTION SUBCUTANEOUS
Qty: 4 ML | Refills: 1 | Status: SHIPPED | OUTPATIENT
Start: 2022-08-24 | End: 2022-10-17

## 2022-08-24 RX ORDER — GABAPENTIN 300 MG/1
CAPSULE ORAL
Qty: 90 CAPSULE | Refills: 2 | Status: SHIPPED | OUTPATIENT
Start: 2022-08-24 | End: 2022-10-23

## 2022-08-29 ENCOUNTER — HOSPITAL ENCOUNTER (OUTPATIENT)
Dept: MRI IMAGING | Facility: HOSPITAL | Age: 62
Discharge: HOME | End: 2022-08-29
Payer: MEDICAID

## 2022-08-29 DIAGNOSIS — M54.6 ACUTE BILATERAL THORACIC BACK PAIN: ICD-10-CM

## 2022-08-29 DIAGNOSIS — R93.89 ABNORMAL X-RAY: ICD-10-CM

## 2022-08-29 PROCEDURE — 72146 MRI CHEST SPINE W/O DYE: CPT

## 2022-08-31 ENCOUNTER — TELEPHONE (OUTPATIENT)
Dept: PRIMARY CARE CLINIC | Age: 62
End: 2022-08-31

## 2022-08-31 DIAGNOSIS — M54.6 THORACIC SPINE PAIN: ICD-10-CM

## 2022-08-31 DIAGNOSIS — S22.000A COMPRESSION FRACTURE OF BODY OF THORACIC VERTEBRA (HCC): Primary | ICD-10-CM

## 2022-08-31 RX ORDER — HYDROCODONE BITARTRATE AND ACETAMINOPHEN 5; 325 MG/1; MG/1
1 TABLET ORAL EVERY 6 HOURS PRN
Qty: 42 TABLET | Refills: 0 | Status: SHIPPED | OUTPATIENT
Start: 2022-08-31 | End: 2022-09-30

## 2022-08-31 NOTE — TELEPHONE ENCOUNTER
Patient advised of several multiple fractures. She was informed to consider Kyphoplasty and would refer to Dr Jessica Duffy. She will speak to daughter and call back. She also request Norco and per Meliza Davalos she will send her some.

## 2022-09-06 ENCOUNTER — HOSPITAL ENCOUNTER (OUTPATIENT)
Dept: PHYSICAL THERAPY | Facility: HOSPITAL | Age: 62
Setting detail: THERAPIES SERIES
Discharge: HOME | End: 2022-09-06
Payer: COMMERCIAL

## 2022-09-06 PROCEDURE — 97112 NEUROMUSCULAR REEDUCATION: CPT

## 2022-09-06 PROCEDURE — 97110 THERAPEUTIC EXERCISES: CPT

## 2022-09-06 NOTE — FLOWSHEET NOTE
Physical Therapy Daily Treatment Note / Re-Assessment     Date:  2022    TIme In:    5716            Time Out:   7011    Patient Name:  Rafat Roldan   :  1960 MRN: [id number]    Restrictions/Precautions:    Pertinent Medical History:  Medical/Treatment Diagnosis Information:    R knee lateral arthritis / R lateral tibial plateau fracture - non operative treatment     Insurance/Certification information:    [facility]  Physician Information:    Litzy Jacobsen PA-C / Julia Bernardo MD  Plan of care signed (Y/N):    Visit# / total visits:    33/    G-Code (if applicable):      Date / Visit # G-Code Applied:         Progress Note: [x]  Yes  []  No  Next due by: 10/6/22      Pain level: 0/10    Subjective:  Patient reports she had been having back pain and had x-rays recently which revealed thoracic spine compression fractures. She reports that she is going to be scheduled with a spine specialist to see what treatment options are available. She notes her R leg is doing better. She continues to use a cane for mobility. Objective:    Observation: gait; steady with quad cane  Test measurements:   LEFS: 31/80, R knee AROM: 0-138 deg.   MMT: hip flexion: 4+/5 B, knee ext: 5/5 B, knee flex: 5/5 B, R hip abd: 3+/5  Palpation:     Exercises:  Exercise Resistance/Repetitions Other comments   Nustep L6 x 8' 6   Heel walk/toe walk 2 laps 6   TKE BTB - 3 x 10 6   HS curls PTB - 3 x 10 6   St hip 3 way x15 OTB 6   Sidestepping 2 laps 6   LAQ 3 x 10 4# 6   Step ups: fwd/sideways 2x10 6   St marching 3x1' 2lbs 6   Sit to stands 2x10 6   Lunge to step 2x10 6   Foam balance: EC/EO 30\"x4 6   Small rocker board:2-way 1'x2 6        Treadmill  Lvl 1.0 2m (Not performed)   Leg press 3x10 30lbs (Not performed)   Incline calf stretch  3x1' 9     Other Therapeutic Activities:      Manual Treatments:      Modalities:        Timed Code Treatment Minutes:  60    Total Treatment Minutes:  63    Treatment/Activity Tolerance:  [x] Patient tolerated treatment well [] Patient limited by fatigue  [] Patient limited by pain  [] Patient limited by other medical complications   [x] Other: Pt returns to PT after an almost month absence. Her strength and ROM are improving although she continues to demonstrate significant R hip abd weakness. Therex was modified today to avoid exercises that would exacerbate her LBP. She will continue to benefit from skilled PT to further address strength, mobility, and function. Pain after treatment:   0/10    Goals  Short term goals  Time Frame for Short term goals: 4 weeks  Short term goal 1: Pt to be I with HEP -MET  Short term goal 2: Pt to demonstrate full R knee ext AROM -MET  Short term goal 3: Pt to demonstrate 4+/5 R knee flexion / extension AROM  -MET  Short term goal 4: Pt to perform daily activities with pain of 5/10 or less. -MET  Long term goals  Time Frame for Long term goals : 8 weeks  Long term goal 1: LEFS score to improve to 55/80 indicating improved function. -Ongoing  Long term goal 2: Pt to demonstrate 5/5 RLE strength grossly-Ongoing  Long term goal 3: Pt to ambulate short community distances with use of a single point cane with good safety awareness.- Ongoing  Long term goal 4: Pt to perform daily activities with pain of less than 3/10. -Ongoing       Prognosis: [x] Good [] Fair  [] Poor    Patient Requires Follow-up: [x] Yes  [] No    Plan:   [x] Continue per plan of care [] Alter current plan (see comments)  [] Plan of care initiated [] Hold pending MD visit [] Discharge    Plan for Next Session:        Electronically signed by:  Kirstin Gloria PT    Certification of Medical Necessity: It will be understood that this treatment plan is certified medically necessary by the documenting therapist and referring physician mentioned in this report.   Unless the physician indicated otherwise through written correspondence with our office, all further referrals will act as certification of medical necessity on this treatment plan. Thank you for this referral.  If you have questions regarding this plan of care, please call [phone].           Revisions to this plan (optional):                     Please sign and return this plan to:   FAX: [phone]      Signature:                                 Date:

## 2022-09-08 ENCOUNTER — HOSPITAL ENCOUNTER (OUTPATIENT)
Dept: PHYSICAL THERAPY | Facility: HOSPITAL | Age: 62
Setting detail: THERAPIES SERIES
Discharge: HOME | End: 2022-09-08
Payer: COMMERCIAL

## 2022-09-08 PROCEDURE — 97110 THERAPEUTIC EXERCISES: CPT

## 2022-09-08 NOTE — FLOWSHEET NOTE
Physical Therapy Daily Treatment Note    Date:  2022    TIme In:    1138            Time Out:   1226    Patient Name:  Josué Lyn   :  1960 MRN: [id number]    Restrictions/Precautions:    Pertinent Medical History:  Medical/Treatment Diagnosis Information:    R knee lateral arthritis / R lateral tibial plateau fracture - non operative treatment     Insurance/Certification information:    [facility] [address]  Physician Information:    Urszula Cabrera PA-C / Najma Gonsalez MD  Plan of care signed (Y/N):    Visit# / total visits:    34/    G-Code (if applicable):      Date / Visit # G-Code Applied:         Progress Note: [x]  Yes  []  No  Next due by: 10/6/22      Pain level: 0/10    Subjective:  Patient reports having mild soreness after Tuesday's PT treatment, but states she tolerated better than she thought. Objective:    Observation: gait; steady with quad cane  Test measurements:   LEFS: 31/80, R knee AROM: 0-138 deg.   MMT: hip flexion: 4+/5 B, knee ext: 5/5 B, knee flex: 5/5 B, R hip abd: 3+/5  Palpation:     Exercises:  Exercise Resistance/Repetitions Other comments   Nustep L6 x 8' 8   Heel walk/toe walk 2 laps 8   TKE BTB - 3 x 10 8   HS curls PTB - 3 x 10 8   St hip 3 way x15 OTB 8   Sidestepping 2 laps 8   LAQ 3 x 10 4# 8   Step ups: fwd/sideways 2x10 8   St marching 3x1' 2lbs 8   Sit to stands 2x10 8   Lunge to step 2x10 8   Foam balance: EC/EO 30\"x4 8   Small rocker board:2-way 1'x2 8        Treadmill  Lvl 1.0 2m (Not performed)   Leg press 3x10 30lbs (Not performed)   Incline calf stretch  3x1' 8     Other Therapeutic Activities:      Manual Treatments:      Modalities:        Timed Code Treatment Minutes:  45    Total Treatment Minutes:  48    Treatment/Activity Tolerance:  [x] Patient tolerated treatment well [] Patient limited by fatigue  [] Patient limited by pain  [] Patient limited by other medical complications   [] Other:     Pain after treatment:   0/10    Goals  Short term goals  Time Frame for Short term goals: 4 weeks  Short term goal 1: Pt to be I with HEP -MET  Short term goal 2: Pt to demonstrate full R knee ext AROM -MET  Short term goal 3: Pt to demonstrate 4+/5 R knee flexion / extension AROM  -MET  Short term goal 4: Pt to perform daily activities with pain of 5/10 or less. -MET  Long term goals  Time Frame for Long term goals : 8 weeks  Long term goal 1: LEFS score to improve to 55/80 indicating improved function. -Ongoing  Long term goal 2: Pt to demonstrate 5/5 RLE strength grossly-Ongoing  Long term goal 3: Pt to ambulate short community distances with use of a single point cane with good safety awareness.- Ongoing  Long term goal 4: Pt to perform daily activities with pain of less than 3/10. -Ongoing       Prognosis: [x] Good [] Fair  [] Poor    Patient Requires Follow-up: [x] Yes  [] No    Plan:   [x] Continue per plan of care [] Alter current plan (see comments)  [] Plan of care initiated [] Hold pending MD visit [] Discharge    Plan for Next Session:        Electronically signed by:  Jatin Bateman PT    Certification of Medical Necessity: It will be understood that this treatment plan is certified medically necessary by the documenting therapist and referring physician mentioned in this report. Unless the physician indicated otherwise through written correspondence with our office, all further referrals will act as certification of medical necessity on this treatment plan. Thank you for this referral.  If you have questions regarding this plan of care, please call [phone].           Revisions to this plan (optional):                     Please sign and return this plan to:   FAX: [phone]      Signature:                                 Date:

## 2022-09-13 ENCOUNTER — HOSPITAL ENCOUNTER (OUTPATIENT)
Dept: PHYSICAL THERAPY | Facility: HOSPITAL | Age: 62
Setting detail: THERAPIES SERIES
Discharge: HOME | End: 2022-09-13
Payer: COMMERCIAL

## 2022-09-13 ENCOUNTER — NURSE ONLY (OUTPATIENT)
Dept: PRIMARY CARE CLINIC | Age: 62
End: 2022-09-13
Payer: MEDICAID

## 2022-09-13 ENCOUNTER — HOSPITAL ENCOUNTER (OUTPATIENT)
Facility: HOSPITAL | Age: 62
Discharge: HOME | End: 2022-09-13
Payer: MEDICAID

## 2022-09-13 DIAGNOSIS — E87.1 LOW SODIUM LEVELS: Primary | ICD-10-CM

## 2022-09-13 DIAGNOSIS — E53.8 B12 DEFICIENCY: Primary | ICD-10-CM

## 2022-09-13 DIAGNOSIS — E87.1 LOW SODIUM LEVELS: ICD-10-CM

## 2022-09-13 LAB
A/G RATIO: 1.7 (ref 0.8–2)
ALBUMIN SERPL-MCNC: 4.6 G/DL (ref 3.4–4.8)
ALP BLD-CCNC: 84 U/L (ref 25–100)
ALT SERPL-CCNC: 17 U/L (ref 4–36)
ANION GAP SERPL CALCULATED.3IONS-SCNC: 12 MMOL/L (ref 3–16)
AST SERPL-CCNC: 24 U/L (ref 8–33)
BILIRUB SERPL-MCNC: <0.2 MG/DL (ref 0.3–1.2)
BUN BLDV-MCNC: 12 MG/DL (ref 6–20)
CALCIUM SERPL-MCNC: 10 MG/DL (ref 8.5–10.5)
CHLORIDE BLD-SCNC: 100 MMOL/L (ref 98–107)
CO2: 27 MMOL/L (ref 20–30)
CREAT SERPL-MCNC: 0.7 MG/DL (ref 0.4–1.2)
GFR AFRICAN AMERICAN: >59
GFR NON-AFRICAN AMERICAN: >60
GLOBULIN: 2.7 G/DL
GLUCOSE BLD-MCNC: 105 MG/DL (ref 74–106)
POTASSIUM SERPL-SCNC: 4.9 MMOL/L (ref 3.4–5.1)
SODIUM BLD-SCNC: 139 MMOL/L (ref 136–145)
TOTAL PROTEIN: 7.3 G/DL (ref 6.4–8.3)

## 2022-09-13 PROCEDURE — 96372 THER/PROPH/DIAG INJ SC/IM: CPT | Performed by: NURSE PRACTITIONER

## 2022-09-13 PROCEDURE — 80053 COMPREHEN METABOLIC PANEL: CPT

## 2022-09-13 PROCEDURE — 97110 THERAPEUTIC EXERCISES: CPT

## 2022-09-13 RX ORDER — CYANOCOBALAMIN 1000 UG/ML
1000 INJECTION INTRAMUSCULAR; SUBCUTANEOUS ONCE
Status: COMPLETED | OUTPATIENT
Start: 2022-09-13 | End: 2022-09-13

## 2022-09-13 RX ADMIN — CYANOCOBALAMIN 1000 MCG: 1000 INJECTION INTRAMUSCULAR; SUBCUTANEOUS at 13:49

## 2022-09-13 NOTE — FLOWSHEET NOTE
Physical Therapy Daily Treatment Note    Date:  2022    TIme In:    1138            Time Out:   1237    Patient Name:  Sang Bal   :  1960 MRN: [id number]    Restrictions/Precautions:    Pertinent Medical History:  Medical/Treatment Diagnosis Information:    R knee lateral arthritis / R lateral tibial plateau fracture - non operative treatment     Insurance/Certification information:    [facility]  Physician Information:    Deepa Hartley PA-C / Zhane Leal MD  Plan of care signed (Y/N):    Visit# / total visits:    35/    G-Code (if applicable):      Date / Visit # G-Code Applied:         Progress Note: []  Yes  [x]  No  Next due by: 10/6/22      Pain level: 0/10    Subjective:  Patient reports she is doing well today with no c/o of pain or soreness. Objective:    Observation: Pt presents with Quad cane. Test measurements:    Palpation:     Exercises:  Exercise Resistance/Repetitions Other comments   Nustep L6 x 8' 13   Heel walk/toe walk 2 laps 13   TKE BTB - 3 x 10 13   HS curls PTB - 3 x 10 13   St hip 3 way x15 OTB 13   Sidestepping 2 laps 13   LAQ 3 x 10 4# 13   Step ups: fwd/sideways 2x10 13   St marching 3x1' 2lbs 13   Sit to stands 2x10 13   Lunge to step 2x10 13   Foam balance: EC/EO 30\"x4 13   Small rocker board:2-way 1'x2 13   Treadmill  Lvl 1.0 2m (Not performed)   Leg press 3x10 30lbs (Not performed)   Incline calf stretch  3x1' 13     Other Therapeutic Activities:      Manual Treatments:      Modalities:        Timed Code Treatment Minutes:  51    Total Treatment Minutes:  59    Treatment/Activity Tolerance:  [x] Patient tolerated treatment well [] Patient limited by fatigue  [] Patient limited by pain  [] Patient limited by other medical complications   [x] Other:  Pt able to complete all exercises except her LP and treadmill exercises d/t precaution from recent fracture dx. Increased rest breaks required d/t general deconditioning.      Pain after treatment:   0/10    Goals  Short term goals  Time Frame for Short term goals: 4 weeks  Short term goal 1: Pt to be I with HEP -MET  Short term goal 2: Pt to demonstrate full R knee ext AROM -MET  Short term goal 3: Pt to demonstrate 4+/5 R knee flexion / extension AROM  -MET  Short term goal 4: Pt to perform daily activities with pain of 5/10 or less.   -MET  Long term goals  Time Frame for Long term goals : 8 weeks  Long term goal 1: LEFS score to improve to 55/80 indicating improved function. -Ongoing  Long term goal 2: Pt to demonstrate 5/5 RLE strength grossly-Ongoing  Long term goal 3: Pt to ambulate short community distances with use of a single point cane with good safety awareness.- Ongoing  Long term goal 4: Pt to perform daily activities with pain of less than 3/10. -Ongoing       Prognosis: [x] Good [] Fair  [] Poor    Patient Requires Follow-up: [x] Yes  [] No    Plan:   [x] Continue per plan of care [] Alter current plan (see comments)  [] Plan of care initiated [] Hold pending MD visit [] Discharge    Plan for Next Session:        Electronically signed by:  Abdulkadir Buckley PT

## 2022-09-13 NOTE — PROGRESS NOTES
Administrations This Visit       cyanocobalamin injection 1,000 mcg       Admin Date  09/13/2022  13:49 Action  Given Dose  1,000 mcg Route  IntraMUSCular Site  Deltoid Left Administered By  Georgiann Hodgkin, MA    Ordering Provider: LUCILLE Kirby    NDC: 05319-357-58    Lot#:     : [address] [place] [address]    Patient Supplied?: No

## 2022-09-15 ENCOUNTER — HOSPITAL ENCOUNTER (OUTPATIENT)
Dept: PHYSICAL THERAPY | Facility: HOSPITAL | Age: 62
Setting detail: THERAPIES SERIES
Discharge: HOME | End: 2022-09-15
Payer: COMMERCIAL

## 2022-09-15 PROCEDURE — 97110 THERAPEUTIC EXERCISES: CPT

## 2022-09-15 NOTE — FLOWSHEET NOTE
Physical Therapy Daily Treatment Note    Date:  9/15/2022    TIme In:   1135            Time Out:   200    Patient Name:  Pawel Carter   :  1960 MRN: [id number]    Restrictions/Precautions:    Pertinent Medical History:  Medical/Treatment Diagnosis Information:    R knee lateral arthritis / R lateral tibial plateau fracture - non operative treatment     Insurance/Certification information:    [facility]  Physician Information:    Tylor Rice PA-C / Marine Mahmood MD  Plan of care signed (Y/N):    Visit# / total visits:    36/    G-Code (if applicable):      Date / Visit # G-Code Applied:         Progress Note: []  Yes  [x]  No  Next due by: 10/6/22      Pain level: 3/10    Subjective:  Patient reports she is okay just been doing a lot at home. Objective:    Observation: Pt presents with Quad cane. Test measurements:    Palpation:     Exercises:  Exercise Resistance/Repetitions Other comments   Nustep L6 x 8' 15   Heel walk/toe walk 2 laps 15   TKE BTB - 3 x 10 15   HS curls PTB - 3 x 10 15   St hip 3 way x15 OTB 15   Sidestepping 2 laps 15   LAQ 3 x 10 4# 15   Step ups: fwd/sideways 2x10 15   St marching 3x1' 4lbs 15   Sit to stands 2x10 15   Lunge to step 2x10 15   Foam balance: EC/EO 30\"x4 15   Small rocker board:2-way 1'x2 15   Treadmill  Lvl 1.0 2m 15        Incline calf stretch  3x1' 15     Other Therapeutic Activities:      Manual Treatments:      Modalities:        Timed Code Treatment Minutes:  50    Total Treatment Minutes:  55    Treatment/Activity Tolerance:  [x] Patient tolerated treatment well [] Patient limited by fatigue  [] Patient limited by pain  [] Patient limited by other medical complications   [x] Other:  Pt completed tx with mild pain and no rest breaks throughout. Pt is progressing well with activity.     Pain after treatment:   3/10    Goals  Short term goals  Time Frame for Short term goals: 4 weeks  Short term goal 1: Pt to be I with HEP -MET  Short term goal 2: Pt to demonstrate full R knee ext AROM -MET  Short term goal 3: Pt to demonstrate 4+/5 R knee flexion / extension AROM  -MET  Short term goal 4: Pt to perform daily activities with pain of 5/10 or less.   -MET  Long term goals  Time Frame for Long term goals : 8 weeks  Long term goal 1: LEFS score to improve to 55/80 indicating improved function. -Ongoing  Long term goal 2: Pt to demonstrate 5/5 RLE strength grossly-Ongoing  Long term goal 3: Pt to ambulate short community distances with use of a single point cane with good safety awareness.- Ongoing  Long term goal 4: Pt to perform daily activities with pain of less than 3/10. -Ongoing       Prognosis: [x] Good [] Fair  [] Poor    Patient Requires Follow-up: [x] Yes  [] No    Plan:   [x] Continue per plan of care [] Alter current plan (see comments)  [] Plan of care initiated [] Hold pending MD visit [] Discharge    Plan for Next Session:        Electronically signed by:  Artemio Major PTA

## 2022-09-16 DIAGNOSIS — M54.6 THORACIC SPINE PAIN: ICD-10-CM

## 2022-09-16 DIAGNOSIS — M62.838 MUSCLE SPASM: ICD-10-CM

## 2022-09-19 DIAGNOSIS — F32.9 REACTIVE DEPRESSION: ICD-10-CM

## 2022-09-19 DIAGNOSIS — Z96.641 STATUS POST RIGHT HIP REPLACEMENT: ICD-10-CM

## 2022-09-19 RX ORDER — CYCLOBENZAPRINE HCL 10 MG
10 TABLET ORAL 3 TIMES DAILY PRN
Qty: 30 TABLET | Refills: 0 | Status: SHIPPED | OUTPATIENT
Start: 2022-09-19 | End: 2022-10-17

## 2022-09-19 RX ORDER — MELOXICAM 15 MG/1
15 TABLET ORAL DAILY
Qty: 30 TABLET | Refills: 3 | Status: SHIPPED | OUTPATIENT
Start: 2022-09-19

## 2022-09-19 RX ORDER — CYCLOBENZAPRINE HCL 10 MG
10 TABLET ORAL 3 TIMES DAILY PRN
Qty: 30 TABLET | Refills: 0 | OUTPATIENT
Start: 2022-09-19 | End: 2022-09-29

## 2022-09-20 ENCOUNTER — APPOINTMENT (OUTPATIENT)
Dept: PHYSICAL THERAPY | Facility: HOSPITAL | Age: 62
End: 2022-09-20
Payer: COMMERCIAL

## 2022-09-22 ENCOUNTER — HOSPITAL ENCOUNTER (OUTPATIENT)
Dept: PHYSICAL THERAPY | Facility: HOSPITAL | Age: 62
Setting detail: THERAPIES SERIES
Discharge: HOME | End: 2022-09-22
Payer: COMMERCIAL

## 2022-09-22 PROCEDURE — 97110 THERAPEUTIC EXERCISES: CPT

## 2022-09-22 NOTE — FLOWSHEET NOTE
Physical Therapy Daily Treatment Note    Date:  2022    TIme In:   1134            Time Out:   [address]    Patient Name:  Silas Reynolds   :  1960 MRN: [id number]    Restrictions/Precautions:    Pertinent Medical History:  Medical/Treatment Diagnosis Information:    R knee lateral arthritis / R lateral tibial plateau fracture - non operative treatment     Insurance/Certification information:    [facility]  Physician Information:    Marylu Curiel PA-C / Leanne Chow MD  Plan of care signed (Y/N):    Visit# / total visits:    37/    G-Code (if applicable):      Date / Visit # G-Code Applied:         Progress Note: []  Yes  [x]  No  Next due by: 10/6/22      Pain level: 1/10    Subjective:  Patient reports her dog ran into her, but she is not hurting too much. Objective:    Observation: Pt presents with Quad cane. Test measurements:    Palpation:     Exercises:  Exercise Resistance/Repetitions Other comments   Nustep L6 x 8' 22   Heel walk/toe walk 2 laps 22   TKE BTB - 3 x 10 22   HS curls PTB - 3 x 10 22   St hip 3 way x15 OTB 22   Sidestepping 2 laps 22   LAQ 3 x 10 4# 22   Step ups: fwd/sideways 2x10 22   St marching 3x1' 4lbs 22   Sit to stands 2x10 22   Lunge to step 2x10 22   Foam balance: EC/EO 30\"x4 22   Small rocker board:2-way 1'x2 22   Treadmill  Lvl 1.0 2m -   Incline calf stretch  3x1' 22     Other Therapeutic Activities:      Manual Treatments:      Modalities:        Timed Code Treatment Minutes:  50    Total Treatment Minutes:  55    Treatment/Activity Tolerance:  [x] Patient tolerated treatment well [] Patient limited by fatigue  [] Patient limited by pain  [] Patient limited by other medical complications   [x] Other:  Pt completed tx with mild pain and minimal rest breaks throughout. Pt is progressing well.     Pain after treatment:   1/10    Goals  Short term goals  Time Frame for Short term goals: 4 weeks  Short term goal 1: Pt to be I with HEP -MET  Short term goal 2: Pt to demonstrate full R knee ext AROM -MET  Short term goal 3: Pt to demonstrate 4+/5 R knee flexion / extension AROM  -MET  Short term goal 4: Pt to perform daily activities with pain of 5/10 or less.   -MET  Long term goals  Time Frame for Long term goals : 8 weeks  Long term goal 1: LEFS score to improve to 55/80 indicating improved function. -Ongoing  Long term goal 2: Pt to demonstrate 5/5 RLE strength grossly-Ongoing  Long term goal 3: Pt to ambulate short community distances with use of a single point cane with good safety awareness.- Ongoing  Long term goal 4: Pt to perform daily activities with pain of less than 3/10. -Ongoing       Prognosis: [x] Good [] Fair  [] Poor    Patient Requires Follow-up: [x] Yes  [] No    Plan:   [x] Continue per plan of care [] Alter current plan (see comments)  [] Plan of care initiated [] Hold pending MD visit [] Discharge    Plan for Next Session:        Electronically signed by:  eHath Vyas PT

## 2022-09-27 ENCOUNTER — HOSPITAL ENCOUNTER (OUTPATIENT)
Dept: PHYSICAL THERAPY | Facility: HOSPITAL | Age: 62
Setting detail: THERAPIES SERIES
Discharge: HOME | End: 2022-09-27
Payer: COMMERCIAL

## 2022-09-27 PROCEDURE — 97110 THERAPEUTIC EXERCISES: CPT

## 2022-09-27 NOTE — FLOWSHEET NOTE
Physical Therapy Daily Treatment Note    Date:  2022    TIme In:  1133            Time Out:   9068    Patient Name:  Gertrudis Cee   :  1960 MRN: [id number]    Restrictions/Precautions:    Pertinent Medical History:  Medical/Treatment Diagnosis Information:    R knee lateral arthritis / R lateral tibial plateau fracture - non operative treatment     Insurance/Certification information:    [facility]  Physician Information:    Van Hudson PA-C / Memo Restrepo MD  Plan of care signed (Y/N):    Visit# / total visits:    38/    G-Code (if applicable):      Date / Visit # G-Code Applied:         Progress Note: []  Yes  [x]  No  Next due by: 10/6/22      Pain level: 0/10    Subjective:  Patient reports she got tangled in the dog chain and cut her legs up pretty bad and she is hurting from it today. Objective:    Observation: Pt presents with Quad cane. Test measurements:    Palpation:     Exercises:  Exercise Resistance/Repetitions Other comments   Nustep L6 x 8' 27   Heel walk/toe walk 2 laps 27   TKE BTB - 3 x 10 27   HS curls PTB - 3 x 10 27   St hip 3 way x15 OTB 27   Sidestepping 2 laps 27   LAQ 3 x 10 4# 27   Step ups: fwd/sideways 2x10 27   St marching 3x1' 4lbs 27   Sit to stands 2x10 27   Lunge to step 2x10 27   Foam balance: EC/EO 30\"x4 27   Small rocker board:2-way 1'x2 27   Treadmill  Lvl 1.0 2m -   Incline calf stretch  3x1' 27     Other Therapeutic Activities:      Manual Treatments:      Modalities:        Timed Code Treatment Minutes:  55    Total Treatment Minutes:  59    Treatment/Activity Tolerance:  [x] Patient tolerated treatment well [] Patient limited by fatigue  [] Patient limited by pain  [] Patient limited by other medical complications   [x] Other:  Pt completed tx with no pain except discomfort in bilateral ankles.     Pain after treatment:   0/10    Goals  Short term goals  Time Frame for Short term goals: 4 weeks  Short term goal 1: Pt to be I with HEP -MET  Short term goal 2: Pt to demonstrate full R knee ext AROM -MET  Short term goal 3: Pt to demonstrate 4+/5 R knee flexion / extension AROM  -MET  Short term goal 4: Pt to perform daily activities with pain of 5/10 or less.   -MET  Long term goals  Time Frame for Long term goals : 8 weeks  Long term goal 1: LEFS score to improve to 55/80 indicating improved function. -Ongoing  Long term goal 2: Pt to demonstrate 5/5 RLE strength grossly-Ongoing  Long term goal 3: Pt to ambulate short community distances with use of a single point cane with good safety awareness.- Ongoing  Long term goal 4: Pt to perform daily activities with pain of less than 3/10. -Ongoing       Prognosis: [x] Good [] Fair  [] Poor    Patient Requires Follow-up: [x] Yes  [] No    Plan:   [x] Continue per plan of care [] Alter current plan (see comments)  [] Plan of care initiated [] Hold pending MD visit [] Discharge    Plan for Next Session:        Electronically signed by:  Grady Major PTA

## 2022-09-29 ENCOUNTER — HOSPITAL ENCOUNTER (OUTPATIENT)
Dept: PHYSICAL THERAPY | Facility: HOSPITAL | Age: 62
Setting detail: THERAPIES SERIES
Discharge: HOME | End: 2022-09-29
Payer: COMMERCIAL

## 2022-09-29 PROCEDURE — 97110 THERAPEUTIC EXERCISES: CPT

## 2022-09-29 PROCEDURE — 97530 THERAPEUTIC ACTIVITIES: CPT

## 2022-09-29 NOTE — FLOWSHEET NOTE
Physical Therapy Daily Treatment Note    Date:  2022    TIme In:  1140       Time Out:   6019    Patient Name:  Jus Thorne   :  1960 MRN: [id number]    Restrictions/Precautions:    Pertinent Medical History:  Medical/Treatment Diagnosis Information:    R knee lateral arthritis / R lateral tibial plateau fracture - non operative treatment     Insurance/Certification information:    [facility] [employer]  Physician Information:    Oswaldo Romero PA-C / Shamar Hammer MD  Plan of care signed (Y/N):    Visit# / total visits:    39/    G-Code (if applicable):      Date / Visit # G-Code Applied:         Progress Note: []  Yes [x]  No     Next due by: 10/6/22      Pain level: 0/10    Subjective:  Patient reports still having soreness in hips and buttocks from the fall on Friday. Patient has an abrasion from it on her R lateral lower leg. Sees orthopedist tomorrow and states she will probably need back surgery. Objective:    Observation:  Patient presents to the clinic ambulating with quad cane  Test measurements:    Palpation:     Exercises:  Exercise Resistance/Repetitions Other comments   Nustep L6 x 8' 29   Heel walk/toe walk 2 laps 29   TKE BTB - 3 x 10 29   HS curls PTB - 3 x 10 29   Standing hip 3 way x15 OTB 29   Sidestepping 2 laps 29   LAQ 3 x 10 4# --   Step ups: fwd/sideways 2x10 29   Standing marching 3x1' 4lbs 29   Sit to stands 2x10 w/blue TB --   Lunge to step 2x10 29   Foam balance: EC/EO 30\"x4 29   Small rocker board:2-way 1'x2 29   Treadmill  Lvl 1.0 2m --   Incline calf stretch  3x1' 29     Other Therapeutic Activities:      Manual Treatments:      Modalities:        Timed Code Treatment Minutes:  53    Total Treatment Minutes:  57    Treatment/Activity Tolerance:  [x] Patient tolerated treatment well [] Patient limited by fatigue  [] Patient limited by pain  [] Patient limited by other medical complications   [x] Other:  Patient unable to tolerate sit to stands this date.   Reports pain in buttocks and pelvic area. States it's from her fall. Pain after treatment:   0/10, sore    Goals  Short term goals  Time Frame for Short term goals: 4 weeks  Short term goal 1: Pt to be I with HEP -MET  Short term goal 2: Pt to demonstrate full R knee ext AROM -MET  Short term goal 3: Pt to demonstrate 4+/5 R knee flexion / extension AROM  -MET  Short term goal 4: Pt to perform daily activities with pain of 5/10 or less.   -MET  Long term goals  Time Frame for Long term goals : 8 weeks  Long term goal 1: LEFS score to improve to 55/80 indicating improved function. -Ongoing  Long term goal 2: Pt to demonstrate 5/5 RLE strength grossly-Ongoing  Long term goal 3: Pt to ambulate short community distances with use of a single point cane with good safety awareness.- Ongoing  Long term goal 4: Pt to perform daily activities with pain of less than 3/10. -Ongoing       Prognosis: [x] Good [] Fair  [] Poor    Patient Requires Follow-up: [x] Yes  [] No    Plan:   [x] Continue per plan of care [] Alter current plan (see comments)  [] Plan of care initiated [] Hold pending MD visit [] Discharge    Plan for Next Session:        Electronically signed by:  Lilo Castaneda PTA

## 2022-10-04 ENCOUNTER — HOSPITAL ENCOUNTER (OUTPATIENT)
Facility: HOSPITAL | Age: 62
Discharge: HOME | End: 2022-10-04
Payer: MEDICAID

## 2022-10-04 ENCOUNTER — HOSPITAL ENCOUNTER (OUTPATIENT)
Dept: GENERAL RADIOLOGY | Facility: HOSPITAL | Age: 62
Discharge: HOME | End: 2022-10-04
Payer: MEDICAID

## 2022-10-04 ENCOUNTER — OFFICE VISIT (OUTPATIENT)
Dept: PRIMARY CARE CLINIC | Age: 62
End: 2022-10-04
Payer: MEDICAID

## 2022-10-04 ENCOUNTER — HOSPITAL ENCOUNTER (OUTPATIENT)
Dept: PHYSICAL THERAPY | Facility: HOSPITAL | Age: 62
Setting detail: THERAPIES SERIES
Discharge: HOME | End: 2022-10-04
Payer: COMMERCIAL

## 2022-10-04 VITALS
SYSTOLIC BLOOD PRESSURE: 138 MMHG | WEIGHT: 125.2 LBS | HEIGHT: 65 IN | HEART RATE: 94 BPM | DIASTOLIC BLOOD PRESSURE: 88 MMHG | OXYGEN SATURATION: 96 % | BODY MASS INDEX: 20.86 KG/M2

## 2022-10-04 DIAGNOSIS — M25.571 ACUTE RIGHT ANKLE PAIN: ICD-10-CM

## 2022-10-04 DIAGNOSIS — E53.8 B12 DEFICIENCY: ICD-10-CM

## 2022-10-04 DIAGNOSIS — L03.116 CELLULITIS OF LEFT LOWER EXTREMITY: Primary | ICD-10-CM

## 2022-10-04 DIAGNOSIS — M81.0 OSTEOPOROSIS WITHOUT CURRENT PATHOLOGICAL FRACTURE, UNSPECIFIED OSTEOPOROSIS TYPE: ICD-10-CM

## 2022-10-04 PROCEDURE — G8427 DOCREV CUR MEDS BY ELIG CLIN: HCPCS | Performed by: PHYSICIAN ASSISTANT

## 2022-10-04 PROCEDURE — 95115 IMMUNOTHERAPY ONE INJECTION: CPT | Performed by: PHYSICIAN ASSISTANT

## 2022-10-04 PROCEDURE — 73610 X-RAY EXAM OF ANKLE: CPT

## 2022-10-04 PROCEDURE — 99214 OFFICE O/P EST MOD 30 MIN: CPT | Performed by: PHYSICIAN ASSISTANT

## 2022-10-04 PROCEDURE — G8420 CALC BMI NORM PARAMETERS: HCPCS | Performed by: PHYSICIAN ASSISTANT

## 2022-10-04 PROCEDURE — G8484 FLU IMMUNIZE NO ADMIN: HCPCS | Performed by: PHYSICIAN ASSISTANT

## 2022-10-04 PROCEDURE — 3017F COLORECTAL CA SCREEN DOC REV: CPT | Performed by: PHYSICIAN ASSISTANT

## 2022-10-04 PROCEDURE — 97110 THERAPEUTIC EXERCISES: CPT

## 2022-10-04 PROCEDURE — 4004F PT TOBACCO SCREEN RCVD TLK: CPT | Performed by: PHYSICIAN ASSISTANT

## 2022-10-04 RX ORDER — DOXYCYCLINE HYCLATE 100 MG
100 TABLET ORAL 2 TIMES DAILY
Qty: 20 TABLET | Refills: 0 | Status: SHIPPED | OUTPATIENT
Start: 2022-10-04 | End: 2022-10-14

## 2022-10-04 RX ORDER — CYANOCOBALAMIN 1000 UG/ML
1000 INJECTION INTRAMUSCULAR; SUBCUTANEOUS ONCE
Status: COMPLETED | OUTPATIENT
Start: 2022-10-04 | End: 2022-10-04

## 2022-10-04 RX ADMIN — CYANOCOBALAMIN 1000 MCG: 1000 INJECTION INTRAMUSCULAR; SUBCUTANEOUS at 13:56

## 2022-10-04 ASSESSMENT — ENCOUNTER SYMPTOMS
SORE THROAT: 0
SHORTNESS OF BREATH: 0
COUGH: 0
EYE PAIN: 0
DIARRHEA: 0
ABDOMINAL PAIN: 0
CONSTIPATION: 0

## 2022-10-04 NOTE — PROGRESS NOTES
appearance. HENT:      Head: Normocephalic and atraumatic. Right Ear: External ear normal.      Left Ear: External ear normal.      Nose: Nose normal.      Mouth/Throat:      Mouth: Mucous membranes are moist.      Pharynx: Oropharynx is clear. Eyes:      Extraocular Movements: Extraocular movements intact. Conjunctiva/sclera: Conjunctivae normal.      Pupils: Pupils are equal, round, and reactive to light. Cardiovascular:      Rate and Rhythm: Normal rate and regular rhythm. Pulses: Normal pulses. Heart sounds: Normal heart sounds. Pulmonary:      Effort: Pulmonary effort is normal.      Breath sounds: Normal breath sounds. Abdominal:      General: Bowel sounds are normal.      Palpations: Abdomen is soft. Musculoskeletal:         General: Normal range of motion. Cervical back: Normal range of motion. Right ankle: No tenderness. Normal range of motion. Normal pulse. Left ankle: Laceration (and wound left inner) present. Skin:     General: Skin is warm. Findings: Wound (left inner ankle 3 inch sized wound with surrounding erythema) present. No rash. Neurological:      General: No focal deficit present. Mental Status: She is alert and oriented to person, place, and time. Psychiatric:         Mood and Affect: Mood normal.         Thought Content: Thought content normal.               ASSESSMENT/PLAN:  1. Cellulitis of left lower extremity  DC Neosporin  Antibacterial soap; no scrubbing of area    - doxycycline hyclate (VIBRA-TABS) 100 MG tablet; Take 1 tablet by mouth 2 times daily for 10 days  Dispense: 20 tablet; Refill: 0  - mupirocin (BACTROBAN) 2 % ointment; Apply topically 3 times daily. Dispense: 22 g; Refill: 1    2. Acute right ankle pain  - XR ANKLE RIGHT (2 VIEWS); Future    3. Osteoporosis  On Fosamax    4. B12 deficiency  B12 IM Today    Return if symptoms worsen or fail to improve.          An electronic signature was used to authenticate this note.     --NICOLAS StevensC

## 2022-10-04 NOTE — FLOWSHEET NOTE
Physical Therapy Daily Treatment Note/Re-assessment    Date:  10/4/2022    TIme In:  1140       Time Out:   Ty 18    Patient Name:  Demetrice Dozier   :  1960 MRN: [id number]    Restrictions/Precautions:    Pertinent Medical History:  Medical/Treatment Diagnosis Information:    R knee lateral arthritis / R lateral tibial plateau fracture - non operative treatment     Insurance/Certification information:    [facility]  Physician Information:    Lucas Garibay PA-C / Joellen Chaparro MD  Plan of care signed (Y/N):    Visit# / total visits:    40/    G-Code (if applicable):      Date / Visit # G-Code Applied:         Progress Note: [x]  Yes []  No     Next due by: 10/6/22      Pain level: 0/10    Subjective:  Patient reports she has her xray today to see if she fx her pelvis after last weeks fall. Objective:    Observation:  Patient presents to the clinic ambulating with quad cane  Test measurements:  LEFS: 35/80, R knee. MMT: hip flexion: 4+/5 B, R hip abd: 3+/5  Palpation:     Exercises:  Exercise Resistance/Repetitions Other comments   Nustep L6 x 8' 4   Heel walk/toe walk 2 laps 4   TKE BTB - 3 x 10 4   HS curls PTB - 3 x 10 -   Standing hip 3 way x15  4   Sidestepping 2 laps 4   LAQ 3 x 10  4   Step ups: fwd/sideways 2x10 4   Standing marching 3x1' 4lbs 4   Sit to stands 1x13 4   Lunge to step 2x10 4   Foam balance: EC/EO 30\"x4 4   Small rocker board:2-way 1'x2 4   Treadmill  Lvl 1.0 2m --   Incline calf stretch  3x1' 4 2x1' next time     Other Therapeutic Activities:      Manual Treatments:      Modalities:        Timed Code Treatment Minutes:  49    Total Treatment Minutes:  58    Treatment/Activity Tolerance:  [x] Patient tolerated treatment well [] Patient limited by fatigue  [] Patient limited by pain  [] Patient limited by other medical complications   [x] Other:  Patient able to perform exercises with decreased resistance d/t bandages around ankles from fall.  Pt reassessed this reporting period, pt demonstrated improved LEFS score, but still has pain and weakness with LE's d/t frequent and recent falls. Pt will benefit from continued PT services to address pt impairments and LOF. Pain after treatment:   0/10    Goals  Short term goals  Time Frame for Short term goals: 4 weeks  Short term goal 1: Pt to be I with HEP -MET  Short term goal 2: Pt to demonstrate full R knee ext AROM -MET  Short term goal 3: Pt to demonstrate 4+/5 R knee flexion / extension AROM  -MET  Short term goal 4: Pt to perform daily activities with pain of 5/10 or less.   -MET  Long term goals  Time Frame for Long term goals : 8 weeks  Long term goal 1: LEFS score to improve to 55/80 indicating improved function. -Ongoing  Long term goal 2: Pt to demonstrate 5/5 RLE strength grossly-Ongoing  Long term goal 3: Pt to ambulate short community distances with use of a single point cane with good safety awareness.- Ongoing  Long term goal 4: Pt to perform daily activities with pain of less than 3/10. -Ongoing       Prognosis: [] Good [x] Fair  [] Poor    Patient Requires Follow-up: [x] Yes  [] No    Plan:   [x] Continue per plan of care [] Alter current plan (see comments)  [] Plan of care initiated [] Hold pending MD visit [] Discharge    Plan for Next Session:        Electronically signed by:  Harley Goyal PT

## 2022-10-04 NOTE — PROGRESS NOTES
Chief Complaint   Patient presents with    Ankle Injury           Tailbone Pain       Have you seen another provider for this condition recently- No    Have you had any recent diagnostic testing for this condition- No    Do you currently take any medications for this condition- No      After obtaining consent, and per orders of LUCILLE Nguyen, injection of b12 given in right deltoid by Jamal Goldberg CMA. Patient instructed to remain in clinic for 20 minutes afterwards, and to report any adverse reaction to me immediately.

## 2022-10-06 ENCOUNTER — APPOINTMENT (OUTPATIENT)
Dept: PHYSICAL THERAPY | Facility: HOSPITAL | Age: 62
End: 2022-10-06
Payer: COMMERCIAL

## 2022-10-11 ENCOUNTER — HOSPITAL ENCOUNTER (OUTPATIENT)
Dept: PHYSICAL THERAPY | Facility: HOSPITAL | Age: 62
Setting detail: THERAPIES SERIES
Discharge: HOME | End: 2022-10-11
Payer: COMMERCIAL

## 2022-10-11 PROCEDURE — 97110 THERAPEUTIC EXERCISES: CPT

## 2022-10-11 PROCEDURE — 97112 NEUROMUSCULAR REEDUCATION: CPT

## 2022-10-11 PROCEDURE — 97530 THERAPEUTIC ACTIVITIES: CPT

## 2022-10-11 NOTE — FLOWSHEET NOTE
Physical Therapy Daily Treatment Note    Date:  10/11/2022    TIme In:     1427      Time Out:    18    Patient Name:  Cameron Garvin   :  1960 MRN: [id number]    Restrictions/Precautions:    Pertinent Medical History:  Medical/Treatment Diagnosis Information:    R knee lateral arthritis / R lateral tibial plateau fracture - non operative treatment     Insurance/Certification information:    [facility]  Physician Information:    Nasim Gillis PA-C / Ruby Garcia MD  Plan of care signed (Y/N):    Visit# / total visits:    41/    G-Code (if applicable):      Date / Visit # G-Code Applied:         Progress Note: []  Yes [x]  No     Next due by: 10/6/22      Pain level: 0/10    Subjective:  Patient reports x-rays showed no fractures. States MD wants her to use ACE wrap on R ankle during exercise. States L ankle wounds may be infected, so MD started her on an antibiotic.     Objective:    Observation:  Patient presents to the clinic ambulating with quad cane  Test measurements:    Palpation:     Exercises:  Exercise Resistance/Repetitions Other comments   Nustep L6 x 8' 11   Heel walk/toe walk 2 laps 11   TKE BTB - 3 x 10 11   HS curls PTB - 3 x 10 --   Standing hip 3 way x15  11   Sidestepping 2 laps 11   LAQ 3 x 10  11   Step ups: fwd/sideways 2x10 11   Standing marching 3x1' 4lbs 11   Sit to stands 1x13 11   Lunge to step 2x10 11   Foam balance: EC/EO 30\"x4 11   Small rocker board:2-way 1'x2 11   Treadmill  Lvl 1.0 2m --   Incline calf stretch  2x1' 11     Other Therapeutic Activities:      Manual Treatments:      Modalities:        Timed Code Treatment Minutes:  53    Total Treatment Minutes:  56    Treatment/Activity Tolerance:  [x] Patient tolerated treatment well [] Patient limited by fatigue  [] Patient limited by pain  [] Patient limited by other medical complications   [x] Other:  Pt able to complete all exercises except HS curls and treadmill    Pain after treatment:   0/10    Goals  Short term goals  Time Frame for Short term goals: 4 weeks  Short term goal 1: Pt to be I with HEP -MET  Short term goal 2: Pt to demonstrate full R knee ext AROM -MET  Short term goal 3: Pt to demonstrate 4+/5 R knee flexion / extension AROM  -MET  Short term goal 4: Pt to perform daily activities with pain of 5/10 or less.   -MET  Long term goals  Time Frame for Long term goals : 8 weeks  Long term goal 1: LEFS score to improve to 55/80 indicating improved function. -Ongoing  Long term goal 2: Pt to demonstrate 5/5 RLE strength grossly-Ongoing  Long term goal 3: Pt to ambulate short community distances with use of a single point cane with good safety awareness.- Ongoing  Long term goal 4: Pt to perform daily activities with pain of less than 3/10. -Ongoing       Prognosis: [] Good [x] Fair  [] Poor    Patient Requires Follow-up: [x] Yes  [] No    Plan:   [x] Continue per plan of care [] Alter current plan (see comments)  [] Plan of care initiated [] Hold pending MD visit [] Discharge    Plan for Next Session:        Electronically signed by:  Natalia White PTA

## 2022-10-13 ENCOUNTER — HOSPITAL ENCOUNTER (OUTPATIENT)
Dept: PHYSICAL THERAPY | Facility: HOSPITAL | Age: 62
Setting detail: THERAPIES SERIES
Discharge: HOME | End: 2022-10-13
Payer: COMMERCIAL

## 2022-10-13 PROCEDURE — 97110 THERAPEUTIC EXERCISES: CPT

## 2022-10-13 NOTE — FLOWSHEET NOTE
Physical Therapy Daily Treatment Note    Date:  10/13/2022    TIme In:     1136      Time Out:    1050    Patient Name:  Madison Baez   :  1960 MRN: [id number]    Restrictions/Precautions:    Pertinent Medical History:  Medical/Treatment Diagnosis Information:    R knee lateral arthritis / R lateral tibial plateau fracture - non operative treatment     Insurance/Certification information:    [facility]  Physician Information:    Mary Hollins PA-C / Julia Jones MD  Plan of care signed (Y/N):    Visit# / total visits:    42/    G-Code (if applicable):      Date / Visit # G-Code Applied:         Progress Note: []  Yes [x]  No     Next due by: 10/6/22      Pain level: 0/10    Subjective:  Patient reports she is doing well today.      Objective:    Observation:  Patient presents to the clinic ambulating with quad cane  Test measurements:    Palpation:     Exercises:  Exercise Resistance/Repetitions Other comments   Nustep L6 x 8' 13   Heel walk/toe walk 2 laps 13   TKE BTB - 3 x 10 13   HS curls PTB - 3 x 10 13   Standing hip 3 way x15  13   Sidestepping 2 laps 13   LAQ 3 x 10  13   Step ups: fwd/sideways 2x10 13   Standing marching 3x1' 3lbs RLE 13   Sit to stands 1x13 13   Lunge to step 2x10 13   Foam balance: EC/EO 30\" x4 13   Small rocker board:2-way 1'x2 13   Treadmill  Lvl 1.0 2m --   Incline calf stretch  2x1' 13     Other Therapeutic Activities:      Manual Treatments:      Modalities:        Timed Code Treatment Minutes:  57    Total Treatment Minutes:  62    Treatment/Activity Tolerance:  [x] Patient tolerated treatment well [] Patient limited by fatigue  [] Patient limited by pain  [] Patient limited by other medical complications   [x] Other:  Pt able to complete all exercises except treadmill no pain post tx    Pain after treatment:   0/10    Goals  Short term goals  Time Frame for Short term goals: 4 weeks  Short term goal 1: Pt to be I with HEP -MET  Short term goal 2: Pt to demonstrate full R knee ext AROM -MET  Short term goal 3: Pt to demonstrate 4+/5 R knee flexion / extension AROM  -MET  Short term goal 4: Pt to perform daily activities with pain of 5/10 or less.   -MET  Long term goals  Time Frame for Long term goals : 8 weeks  Long term goal 1: LEFS score to improve to 55/80 indicating improved function. -Ongoing  Long term goal 2: Pt to demonstrate 5/5 RLE strength grossly-Ongoing  Long term goal 3: Pt to ambulate short community distances with use of a single point cane with good safety awareness.- Ongoing  Long term goal 4: Pt to perform daily activities with pain of less than 3/10. -Ongoing       Prognosis: [] Good [x] Fair  [] Poor    Patient Requires Follow-up: [x] Yes  [] No    Plan:   [x] Continue per plan of care [] Alter current plan (see comments)  [] Plan of care initiated [] Hold pending MD visit [] Discharge    Plan for Next Session:        Electronically signed by:  Kiara Goff, PT

## 2022-10-17 ENCOUNTER — APPOINTMENT (OUTPATIENT)
Dept: GENERAL RADIOLOGY | Facility: HOSPITAL | Age: 62
End: 2022-10-17
Payer: MEDICAID

## 2022-10-17 ENCOUNTER — APPOINTMENT (OUTPATIENT)
Dept: CT IMAGING | Facility: HOSPITAL | Age: 62
End: 2022-10-17
Payer: MEDICAID

## 2022-10-17 ENCOUNTER — HOSPITAL ENCOUNTER (EMERGENCY)
Facility: HOSPITAL | Age: 62
Discharge: HOME | End: 2022-10-17
Attending: HOSPITALIST
Payer: MEDICAID

## 2022-10-17 VITALS
TEMPERATURE: 98.3 F | DIASTOLIC BLOOD PRESSURE: 95 MMHG | OXYGEN SATURATION: 95 % | SYSTOLIC BLOOD PRESSURE: 133 MMHG | RESPIRATION RATE: 16 BRPM | HEART RATE: 93 BPM

## 2022-10-17 DIAGNOSIS — G25.81 RLS (RESTLESS LEGS SYNDROME): ICD-10-CM

## 2022-10-17 DIAGNOSIS — M25.561 ACUTE PAIN OF BOTH KNEES: ICD-10-CM

## 2022-10-17 DIAGNOSIS — G89.29 OTHER CHRONIC PAIN: ICD-10-CM

## 2022-10-17 DIAGNOSIS — L03.116 CELLULITIS OF LEFT LOWER EXTREMITY: ICD-10-CM

## 2022-10-17 DIAGNOSIS — S82.142A CLOSED FRACTURE OF LEFT TIBIAL PLATEAU, INITIAL ENCOUNTER: ICD-10-CM

## 2022-10-17 DIAGNOSIS — M25.562 ACUTE PAIN OF BOTH KNEES: ICD-10-CM

## 2022-10-17 DIAGNOSIS — M62.838 MUSCLE SPASM: ICD-10-CM
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
## 2022-10-17 DIAGNOSIS — S82.141A CLOSED FRACTURE OF RIGHT TIBIAL PLATEAU, INITIAL ENCOUNTER: ICD-10-CM

## 2022-10-17 DIAGNOSIS — M54.6 THORACIC SPINE PAIN: ICD-10-CM

## 2022-10-17 PROCEDURE — 6370000000 HC RX 637 (ALT 250 FOR IP): Performed by: EMERGENCY MEDICINE

## 2022-10-17 PROCEDURE — 73700 CT LOWER EXTREMITY W/O DYE: CPT

## 2022-10-17 PROCEDURE — 99284 EMERGENCY DEPT VISIT MOD MDM: CPT

## 2022-10-17 PROCEDURE — 73562 X-RAY EXAM OF KNEE 3: CPT

## 2022-10-17 PROCEDURE — 6370000000 HC RX 637 (ALT 250 FOR IP): Performed by: HOSPITALIST

## 2022-10-17 RX ORDER — CYCLOBENZAPRINE HCL 10 MG
10 TABLET ORAL 3 TIMES DAILY PRN
Qty: 30 TABLET | Refills: 0 | Status: SHIPPED | OUTPATIENT
Start: 2022-10-17 | End: 2022-10-27

## 2022-10-17 RX ORDER — HYDROCODONE BITARTRATE AND ACETAMINOPHEN 7.5; 325 MG/1; MG/1
1 TABLET ORAL ONCE
Status: COMPLETED | OUTPATIENT
Start: 2022-10-17 | End: 2022-10-17

## 2022-10-17 RX ORDER — PREDNISONE 10 MG/1
10 TABLET ORAL DAILY PRN
Qty: 30 TABLET | Refills: 3 | Status: SHIPPED | OUTPATIENT
Start: 2022-10-17

## 2022-10-17 RX ORDER — OXYCODONE AND ACETAMINOPHEN 10; 325 MG/1; MG/1
1 TABLET ORAL ONCE
Status: COMPLETED | OUTPATIENT
Start: 2022-10-17 | End: 2022-10-17

## 2022-10-17 RX ORDER — ETANERCEPT 50 MG/ML
SOLUTION SUBCUTANEOUS
Qty: 4 ML | Refills: 1 | Status: SHIPPED | OUTPATIENT
Start: 2022-10-17

## 2022-10-17 RX ORDER — ROPINIROLE 0.5 MG/1
0.5 TABLET, FILM COATED ORAL NIGHTLY
Qty: 30 TABLET | Refills: 2 | Status: SHIPPED | OUTPATIENT
Start: 2022-10-17

## 2022-10-17 RX ORDER — DOCUSATE SODIUM 100 MG/1
CAPSULE, LIQUID FILLED ORAL
Qty: 60 CAPSULE | Refills: 3 | Status: SHIPPED | OUTPATIENT
Start: 2022-10-17

## 2022-10-17 RX ORDER — CHOLECALCIFEROL (VITAMIN D3) 50 MCG
TABLET ORAL
Qty: 30 TABLET | Refills: 5 | Status: SHIPPED | OUTPATIENT
Start: 2022-10-17

## 2022-10-17 RX ADMIN — OXYCODONE HYDROCHLORIDE AND ACETAMINOPHEN 1 TABLET: 10; 325 TABLET ORAL at 19:06

## 2022-10-17 RX ADMIN — HYDROCODONE BITARTRATE AND ACETAMINOPHEN 1 TABLET: 7.5; 325 TABLET ORAL at 21:46

## 2022-10-17 ASSESSMENT — PAIN - FUNCTIONAL ASSESSMENT: PAIN_FUNCTIONAL_ASSESSMENT: 0-10

## 2022-10-17 ASSESSMENT — PAIN SCALES - GENERAL
PAINLEVEL_OUTOF10: 9
PAINLEVEL_OUTOF10: 9

## 2022-10-17 ASSESSMENT — PAIN DESCRIPTION - PAIN TYPE: TYPE: ACUTE PAIN

## 2022-10-17 ASSESSMENT — PAIN DESCRIPTION - LOCATION
LOCATION: KNEE
LOCATION: KNEE

## 2022-10-17 ASSESSMENT — PAIN DESCRIPTION - ORIENTATION: ORIENTATION: OTHER (COMMENT)
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
## 2022-10-17 NOTE — ED PROVIDER NOTES
Emergency Department Encounter  Location: [address] [place] [address] [address]    Patient: Elizabeth Zamora  MRN: [id number]  : 1960  Date of evaluation: 10/17/2022  ED Provider: Ant Jeffries MD    190  Elizabeth Zamora was checked out to me by Dr. Alfredo Garcia pending radiological evaluation of bilateral knees. Please see his/her initial documentation for details of the patient's initial ED presentation, physical exam and completed studies. In brief, Elizabeth Zamora is a 62 y.o. female that presented to the emergency department after reporting a fall prior to arrival landing on both her knees. Patient was complaining that her left knee hurts more than her right knee. Patient reports a prior history of a right tibial plateau fracture of her right knee. Patient was concerned that she fractured her left knee so she presented to the emergency department for further evaluation. Prior to shift change x-rays were obtained and patient was given Percocet p.o. Patient denies any other complaint. Patient is resting comfortably in the room in no acute distress conversing in full sentences nontoxic    I have reviewed and interpreted all of the currently available lab results and diagnostics from this visit:  No results found for this visit on 10/17/22. XR KNEE LEFT (3 VIEWS)    Result Date: 10/17/2022  EXAM: LEFT KNEE 3 VIEWS INDICATION: Fall. Left knee pain and swelling. COMPARISON: 6/10/11 FINDINGS: Bones are demineralized. Previous ORIF in the distal femur and proximal tibia with multiple screws and plates in place. There is a lucency in the inner aspect of the lateral talus relative, suspicious for an acute nondisplaced fracture. There is a joint effusion. No dislocation. Mild to moderate osteoarthritis. Diffuse vascular calcifications. 1.  Acute nondisplaced fracture suspected in the lateral tibial plateau. CT recommended. 2.  Status post ORIF in the distal femur and proximal tibia.  3.  Knee effusion. XR KNEE RIGHT (3 VIEWS)    Result Date: 10/17/2022  EXAM: RIGHT KNEE 3 VIEWS INDICATION: Fall. Right knee pain. COMPARISON: None FINDINGS: Lateral tibial plateau fracture deformity with depression of the articular surface demonstrating relatively smooth borders suggesting old fracture, but age indeterminate without comparison. Bones are demineralized. There is a knee effusion. No dislocation. Mild osteoarthritis. Diffuse facet calcifications. Chronic appearing fracture deformity in the lateral tibial plateau, but indeterminate without comparisons particularly in the setting of bony demineralization and the joint effusion. CT recommended. CT KNEE LEFT WO CONTRAST    Result Date: 10/17/2022  CT KNEE LEFT WO CONTRAST INDICATION: Left knee pain. COMPARISON: Radiograph same day TECHNIQUE: Noncontrast CT of the left knee was acquired. The axial data set was reformatted in the coronal and sagittal planes. Up-to-date CT equipment and radiation dose reduction techniques were employed. FINDINGS: Well-defined nondisplaced fracture lucency within the inner portion of the lateral tibial plateau is best seen on coronal images (series 601 image [id number]), likely acute. There is mild depression deformity measuring 2 to 3 mm in depth involving the central  portion of the tibial plateau, which is age indeterminate. Multiple bone fragments about the tibial spine representing avulsive injuries from the cruciate ligament attachments. These are difficult to age. The femur is anteriorly translated relative to the tibia consistent with a deficient/torn ACL again based on  the bony fragments may be a chronic finding but is difficult to determine with certainty. Nondisplaced transverse fracture lucency surrounded by sclerosis in the head of the fibula has a more subacute appearance. No definite acute fracture seen within the distal femur or patella. Bones are diffusely demineralized.  Multiple ORIF screws and plates placed to the distal femur and proximal tibia. There is a large joint effusion. 1.  Nondisplaced acute-appearing fracture within the lateral tibial plateau. Mild depression in the central portion of the tibial plateau is age indeterminate. 2.  Nondisplaced fracture in the head of the fibula with surrounding sclerosis suggesting subacute age. 3.  Bone fragments about the tibial spine suggesting cruciate ligamentous avulsions, and are difficult to age. The anterior tibial translation is consistent with deficient/torn ACL. 4.  Large knee effusion. CT KNEE RIGHT WO CONTRAST    Result Date: 10/17/2022  CT KNEE RIGHT WO CONTRAST INDICATION: Right knee pain. COMPARISON: Radiographs same day. TECHNIQUE: Noncontrast CT of the right knee was acquired. The axial data set was reformatted in the coronal and sagittal planes. Up-to-date CT equipment and radiation dose reduction techniques were employed. FINDINGS: Moderate depression of the lateral tibial plateau articular surface of up to 5 mm. The borders are relatively smooth and corticated, which likely supports a chronic tibial plateau fracture. Note that given the severely demineralized bone, an acute on chronic fracture injury would be difficult by CT. Acute fracture with abrupt cortical step-off in the medial tibial plateau is consistent with an acute fracture, best demonstrated on the sagittal reformatted images (series 602 images [id number]). Mild depression of up to 2 mm. No definite fracture seen within the distal femur, patella or fibula. There is a lucent tract within the distal femur from previous IM nail and screws, subsequently removed. There is a moderate knee effusion. 1.  Acute fracture in the medial tibial plateau with mild depression. 2.  The moderately depressed fracture deformity in the lateral tibial plateau appears chronic, but acute on chronic injury would be difficult to exclude by CT given the severe bone demineralization.  If there is lateral pain/point tenderness MRI can be performed. 3.  Moderate joint effusion. Final ED Course and MDM: In brief, Debrah Bence is a 62 y.o. female whose care was signed out to me by the outgoing provider. In brief, please see above HPI, examination and treatment plan per Dr. Kandy Gamino prior to shift change. Prior to shift change patient was given Percocet p.o. Evaluation of bilateral knees. Right knee showed chronic appearing fracture deformity in the lateral tibial plateau per radiology and recommended a CT scan of the knee. X-ray left knee revealed an acute nondisplaced fracture suspected in the lateral tibial plateau per radiology and recommended a CT scan of the left knee acute nondisplaced lateral tibial plateau fracture and joint effusion noted. CT right knee acute fracture noted of the medial plateau and a chronic of the lateral plateau. Results were reviewed with patient who states that she is already affiliated with an orthopedist who is following her for her prior tibial plateau fracture of her right knee who she requests to follow-up with tomorrow. Patient was placed in a knee immobilizer and was instructed to ice and elevate. Patient states that she already has everything that she needs at home that she has a wheelchair, walker and crutches and requested to go home and will follow-up with her personal orthopedist.  Patient is already followed by chronic pain management as noted by her Geri Brewer report. Patient was given Norco 7.5 p.o. prior to discharge.   Patient was appreciative the care and agrees with plan above    ED Medication Orders (From admission, onward)      Start Ordered     Status Ordering Provider    10/17/22 2130 10/17/22 2124  HYDROcodone-acetaminophen ([address]) 7.5-325 MG per tablet 1 tablet  ONCE         Acknowledged Way[facility] [place]    10/17/22 1900 10/17/22 1849  oxyCODONE-acetaminophen (PERCOCET)  MG per tablet 1 tablet  ONCE         Last MAR action: Given - by Conception Roll on 10/17/22 at 1906 SARAH ESCOBEDO            Final Impression      1. Fall, initial encounter Stable   2. Acute pain of both knees Stable   3. Closed fracture of left tibial plateau, initial encounter Stable   4. Closed fracture of right tibial plateau, initial encounter Stable   5.  Other chronic pain Stable       DISPOSITION Decision To Discharge 10/17/2022 09:35:06 PM     (Please note that portions of this note may have been completed with a voice recognition program. Efforts were made to edit the dictations but occasionally words are mis-transcribed.)    Bebeto Sandhu MD  [address] [place] [address]  10/17/22 5044

## 2022-10-18 NOTE — ED NOTES
Pt informed of d/c instructions. Pt verbalizes understanding.       Yolanda Potter RN  10/17/22 7399

## 2022-11-04 ENCOUNTER — OFFICE VISIT (OUTPATIENT)
Dept: CARDIOLOGY | Facility: CLINIC | Age: 62
End: 2022-11-04

## 2022-11-04 VITALS
OXYGEN SATURATION: 97 % | WEIGHT: 124 LBS | DIASTOLIC BLOOD PRESSURE: 74 MMHG | HEIGHT: 65 IN | HEART RATE: 89 BPM | BODY MASS INDEX: 20.66 KG/M2 | SYSTOLIC BLOOD PRESSURE: 128 MMHG

## 2022-11-04 DIAGNOSIS — I51.89 CARDIAC MASS: Primary | ICD-10-CM

## 2022-11-04 PROCEDURE — 99213 OFFICE O/P EST LOW 20 MIN: CPT | Performed by: INTERNAL MEDICINE

## 2022-11-04 NOTE — PROGRESS NOTES
[facility] Cardiology  [address], [address]  [place], [place], [address]    [phone]  WWW.[facility] HospitalN30 PharmaceuticalsUniversity [facility]           OUTPATIENT CLINIC PROGRESS NOTE    Patient care team:  Patient Care Team:  Iraida Demarco APRN as PCP - General (Family Medicine)  Anca Trevino MD as Surgeon (General Surgery)    Subjective:   Chief complaint:   Chief Complaint   Patient presents with   • Cardiac mass       HPI:    Jessica Winston is a 61 y.o. female.  Partial problem list, including cardiac problems:  1. Cardiac mass  a. Incidentally noted on TTE 1/15/2021 at [facility].  b. Cardiac MRI 12/2021, [facility], intra atrial mass 3.6 x 2.1 x 2.6 cm, possibly represents highly vascular fat/brown fat, normal LVEF, no scar  2. Dyspnea, anginal equivalent  a. Long smoking history, family history of father having MI in his 50s or 60s  3. Palpitations  a. Negative heart monitor 48-hour, 10/2021  4. Hypertension  5. Hyperlipidemia  6. Right hip fracture early 2020  a. Status post repair early 2020  b. Fall 3/2021, mechanical in nature, resulted in mild rhabdomyolysis and CHRISTINE  c. Corrective surgery complicated by infection, 4/2021.  Repeat surgery in 6/2021.  Skin grafting in 7/2021  7. Mechanical fall, right tibial fracture, 10/2021  8. Rheumatoid arthritis  9. Anxiety/depression  10. Tobacco dependence    Today the patient presents for follow-up    Chronic dyspnea: Chronic stable dyspnea, still smoking.  No associated chest pain    Incidental cardiac mass: Denies signs/symptoms of heart failure. Denies chest pain, significant palpitations.  Denies lower extremity swelling     Review of Systems:  As noted in the HPI    PFSH:  Patient Active Problem List   Diagnosis   • RUQ pain   • Cardiac mass   • PVD (peripheral vascular disease) with claudication (HCC)   • Tobacco dependence   • Pain in right hip   • HTN (hypertension)   • Rheumatoid arthritis (HCC)   • S/P right hip superficial  irrigation and debridement arthrocentesis   • Acute blood loss anemia   • Postoperative pain   • Infection   • Hyponatremia, mild   • Palpitations         Current Outpatient Medications:   •  alendronate (FOSAMAX) 70 MG tablet, Take 70 mg by mouth Every 7 (Seven) Days., Disp: , Rfl:   •  buPROPion SR (WELLBUTRIN SR) 150 MG 12 hr tablet, Take 150 mg by mouth Daily., Disp: , Rfl:   •  cyanocobalamin 1000 MCG/ML injection, Inject 1,000 mcg into the appropriate muscle as directed by prescriber Every 30 (Thirty) Days. Once per month- usually first of month, Disp: , Rfl:   •  docusate sodium (Colace) 100 MG capsule, Take 1 capsule by mouth 2 (Two) Times a Day. (Patient taking differently: Take 1 capsule by mouth As Needed.), Disp: , Rfl:   •  Etanercept (Enbrel SureClick) 50 MG/ML solution auto-injector, Inject 50 mg as directed 1 (One) Time Per Week., Disp: , Rfl:   •  gabapentin (NEURONTIN) 300 MG capsule, Take 300 capsules by mouth Every 12 (Twelve) Hours., Disp: , Rfl:   •  lisinopril (PRINIVIL,ZESTRIL) 10 MG tablet, Take 10 mg by mouth Daily., Disp: , Rfl:   •  meloxicam (MOBIC) 15 MG tablet, Take 15 mg by mouth Daily., Disp: , Rfl:   •  orphenadrine (NORFLEX) 100 MG 12 hr tablet, Take 1 tablet by mouth 2 (Two) Times a Day As Needed for Muscle Spasms., Disp: 20 tablet, Rfl: 0  •  predniSONE (DELTASONE) 10 MG tablet, Take 1 tablet by mouth As Needed., Disp: , Rfl:   •  rOPINIRole (REQUIP) 0.5 MG tablet, Take 0.5 mg by mouth Every Night. Take 1 hour before bedtime., Disp: , Rfl:   •  vitamin D (ERGOCALCIFEROL) 1.25 MG (42032 UT) capsule capsule, Take 50,000 Units by mouth 1 (One) Time Per Week. Tuesday, Disp: , Rfl:   •  oxyCODONE-acetaminophen (PERCOCET) 5-325 MG per tablet, Take 1 tablet by mouth Every 6 (Six) Hours As Needed for Severe Pain ., Disp: 14 tablet, Rfl: 0    Allergies   Allergen Reactions   • Clindamycin/Lincomycin Anaphylaxis and Swelling     took whole dose and then throat started to swell -    •  "Penicillins Anaphylaxis and Swelling     Tolerated cefazolin 4/15/21   • Codeine Swelling       Social History     Socioeconomic History   • Marital status:    Tobacco Use   • Smoking status: Former     Packs/day: 0.50     Years: 30.00     Pack years: 15.00     Types: Cigarettes     Quit date: 4/15/2021     Years since quittin.5   • Smokeless tobacco: Never   • Tobacco comments:     HX OF SMOKING 1 PPD FOR THE PAST 30 YEARS    Vaping Use   • Vaping Use: Never used   Substance and Sexual Activity   • Alcohol use: No   • Drug use: No   • Sexual activity: Defer     Family History   Problem Relation Age of Onset   • Arthritis Mother    • Cancer Mother         leukemia   • Hypertension Mother    • Heart disease Father         heart attack   • Hypertension Father    • Cancer Maternal Grandmother         uterus   • Migraines Daughter        Objective:   Physical Exam:  /74 (BP Location: Left arm, Patient Position: Sitting)   Pulse 89   Ht 165.1 cm (65\")   Wt 56.2 kg (124 lb)   LMP 2010 (Approximate)   SpO2 97%   BMI 20.63 kg/m²   CONSTITUTIONAL: Well-nourished. In no acute distress.   CARDIOVASCULAR: Regular rate and rhythm with a normal S1 and S2. There is no murmur.  No carotid bruit bilaterally.  Normal radial pulse.There is no peripheral edema.     3/2021 exam: Nonpalpable right pedal pulses, nonpalpable left PT, 2+ left DP  2021 exam: 2+ right PT pulse, nonpalpable right DP pulse  10/2021 exam: 2+ right PT pulse    Labs:    No results found for: CHOL  Lab Results   Component Value Date    TRIG 119 2022    TRIG 85 2019     Lab Results   Component Value Date    HDL 96 (H) 2022    HDL 87 (H) 2019     Lab Results   Component Value Date     2022     No components found for: LDLDIRECTC    Diagnostic Data:    Procedures    TTE 1/15/2021  -Normal left v entricular systolic function with an estimated ejection fraction of 65%.  There is grade 1A diastolic " dysfunction present.  -The intra-atrial septum has evidence of significant hypertrophy.  May represent simple lipomatous hypertrophy but cannot rule out a benign or malignant neoplasm.  A cardiac MRI and/or transesophageal echocardiogram could be considered to further evaluate.  There is moderate mitral annular calcification present.    Cardiac MRI 12/2021, see report under media tab    Assessment and Plan:     Cardiac mass  Ectopic heartbeats on exam  -Possible lipomatous hypertrophy of the septum versus lipoma versus other  -Repeat CT scan of the chest to assess intra-atrial mass size and to monitor for progression.  Can compare size to cardiac MRI from 12/2021, [facility]  -If stable, will consider repeat in 2 years    Tobacco dependence  -Recommend smoking cessation    - Return in about 1 year (around 11/4/2023) for Next follow up with an ECG, if none in last year.    Jimy Davis MD, MSc, FACC, [facility]  Interventional Cardiology  [facility]

## 2022-11-10 ENCOUNTER — HOSPITAL ENCOUNTER (OUTPATIENT)
Dept: CT IMAGING | Facility: HOSPITAL | Age: 62
Discharge: HOME | End: 2022-11-10
Payer: MEDICAID

## 2022-11-10 DIAGNOSIS — I51.89 CARDIAC MASS: ICD-10-CM

## 2022-11-10 PROCEDURE — 71250 CT THORAX DX C-: CPT

## 2022-11-15 DIAGNOSIS — M79.2 NEUROPATHIC PAIN: ICD-10-CM

## 2022-11-15 RX ORDER — GABAPENTIN 300 MG/1
CAPSULE ORAL
Qty: 90 CAPSULE | Refills: 1 | Status: SHIPPED | OUTPATIENT
Start: 2022-11-15 | End: 2022-12-14

## 2022-11-17 DIAGNOSIS — Z72.0 TOBACCO ABUSE: ICD-10-CM

## 2022-11-17 DIAGNOSIS — F32.9 REACTIVE DEPRESSION: ICD-10-CM

## 2022-11-17 DIAGNOSIS — R12 HEARTBURN: ICD-10-CM

## 2022-11-17 DIAGNOSIS — M54.6 THORACIC SPINE PAIN: ICD-10-CM

## 2022-11-17 DIAGNOSIS — M62.838 MUSCLE SPASM: ICD-10-CM

## 2022-11-17 RX ORDER — FAMOTIDINE 40 MG/1
40 TABLET, FILM COATED ORAL EVERY EVENING
Qty: 30 TABLET | Refills: 3 | Status: SHIPPED | OUTPATIENT
Start: 2022-11-17

## 2022-11-17 RX ORDER — BUPROPION HYDROCHLORIDE 150 MG/1
150 TABLET ORAL EVERY MORNING
Qty: 30 TABLET | Refills: 3 | Status: SHIPPED | OUTPATIENT
Start: 2022-11-17

## 2022-11-17 RX ORDER — CYCLOBENZAPRINE HCL 10 MG
10 TABLET ORAL 3 TIMES DAILY PRN
Qty: 30 TABLET | Refills: 0 | Status: SHIPPED | OUTPATIENT
Start: 2022-11-17 | End: 2022-11-27

## 2022-12-19 RX ORDER — MEDROXYPROGESTERONE ACETATE 150 MG/ML
INJECTION, SUSPENSION INTRAMUSCULAR
Qty: 4 ML | Refills: 1 | Status: SHIPPED | OUTPATIENT
Start: 2022-12-19

## 2023-01-16 ENCOUNTER — HOSPITAL ENCOUNTER (OUTPATIENT)
Dept: PHYSICAL THERAPY | Facility: HOSPITAL | Age: 63
Setting detail: THERAPIES SERIES
Discharge: HOME | End: 2023-01-16
Payer: COMMERCIAL

## 2023-01-16 PROCEDURE — 97162 PT EVAL MOD COMPLEX 30 MIN: CPT

## 2023-01-16 PROCEDURE — 97110 THERAPEUTIC EXERCISES: CPT

## 2023-01-16 ASSESSMENT — PAIN - FUNCTIONAL ASSESSMENT: PAIN_FUNCTIONAL_ASSESSMENT: PREVENTS OR INTERFERES SOME ACTIVE ACTIVITIES AND ADLS

## 2023-01-16 ASSESSMENT — PAIN DESCRIPTION - FREQUENCY: FREQUENCY: INTERMITTENT

## 2023-01-16 ASSESSMENT — PAIN DESCRIPTION - DESCRIPTORS: DESCRIPTORS: STABBING;SHARP

## 2023-01-16 ASSESSMENT — PAIN DESCRIPTION - ORIENTATION: ORIENTATION: LEFT;RIGHT

## 2023-01-16 ASSESSMENT — PAIN DESCRIPTION - LOCATION: LOCATION: KNEE

## 2023-01-16 ASSESSMENT — PAIN SCALES - GENERAL: PAINLEVEL_OUTOF10: 4

## 2023-01-16 ASSESSMENT — PAIN DESCRIPTION - ONSET: ONSET: AWAKENED FROM SLEEP

## 2023-01-16 NOTE — PROGRESS NOTES
Physical Therapy: Initial Evaluation    Patient: Bettylou Cushing (61 y.o. female)   Examination Date:   Plan of Care Certification Period: 2023 to        :  1960 ;    Confirmed: Yes MRN: [id number]  CSN: [id number]   Insurance: Payor: [employer] / [facility]: [facility] [employer] / Product Type: *No Product type* /   Insurance ID: [id number] - (Medicaid Managed) Secondary Insurance (if applicable):    Referring Physician: Kaylee Angulo PA-C   PCP: LUCILLE Gilliam Visits to Date/Visits Approved:   /      No Show/Cancelled Appts:   /       Medical Diagnosis: Lack of strength [R53.1] Tibial plataeu fx  Treatment Diagnosis: R knee medial tibial plataeu fx     PERTINENT MEDICAL HISTORY   Patient Assessed for Rehabilitation Services: Yes       Medical History: Chart Reviewed: Yes   Past Medical History:   Diagnosis Date    Allergic rhinitis     Arthritis     GERD (gastroesophageal reflux disease)     Hypertension     Rheumatoid arthritis ([facility] [address]      Surgical History:   Past Surgical History:   Procedure Laterality Date    ARM SURGERY Left     BREAST SURGERY  2019    lump    HERNIA REPAIR      HIP SURGERY Right 2021    right total hip athroplasty infection    KNEE SURGERY Left 2010    LEG SURGERY Right     femur    MANDIBLE FRACTURE SURGERY  2010    SKIN GRAFT  06/10/2021    TOTAL HIP ARTHROPLASTY Right     TUBAL LIGATION      UPPER GASTROINTESTINAL ENDOSCOPY      Dr Alma Schultz       Medications:   Current Outpatient Medications:     ENBREL SURECLICK 50 MG/ML SOAJ, INJECT 1ML UNDER THE SKIN ONCE WEEKLY, Disp: 4 mL, Rfl: 1    buPROPion (WELLBUTRIN XL) 150 MG extended release tablet, TAKE 1 TABLET BY MOUTH EVERY MORNING, Disp: 30 tablet, Rfl: 3    famotidine (PEPCID) 40 MG tablet, TAKE 1 TABLET BY MOUTH EVERY EVENING, Disp: 30 tablet, Rfl: 3    gabapentin (NEURONTIN) 300 MG capsule, TAKE ONE CAPSULE BY MOUTH 3 TIMES DAILY, Disp: 90 capsule, Rfl: 1 rOPINIRole (REQUIP) 0.5 MG tablet, TAKE 1 TABLET BY MOUTH NIGHTLY, Disp: 30 tablet, Rfl: 2    vitamin D (CHOLECALCIFEROL) 50 MCG (2000 UT) TABS tablet, TAKE ONE TABLET DAILY, Disp: 30 tablet, Rfl: 5    predniSONE (DELTASONE) 10 MG tablet, TAKE 1 TABLET BY MOUTH DAILY AS NEEDED (ARTHRITIC PAIN), Disp: 30 tablet, Rfl: 3    docusate sodium (COLACE) 100 MG capsule, TAKE ONE CAPSULE 2 TIMES A DAY, Disp: 60 capsule, Rfl: 3    meloxicam (MOBIC) 15 MG tablet, TAKE 1 TABLET BY MOUTH DAILY, Disp: 30 tablet, Rfl: 3    sertraline (ZOLOFT) 50 MG tablet, TAKE 1 TABLET BY MOUTH DAILY, Disp: 30 tablet, Rfl: 5    alendronate (FOSAMAX) 70 MG tablet, TAKE 1 TABLET BY MOUTH EVERY 7 DAYS, Disp: 4 tablet, Rfl: 5    cyanocobalamin 1000 MCG/ML injection, INJECT 1 ML ONCE A WEEK FOR 4 WEEKS AND THEN ONCE A MONTH., Disp: 1 mL, Rfl: 5    Cholecalciferol 50 MCG (2000 UT) TABS, , Disp: , Rfl:     Syringe/Needle, Disp, (SYRINGE 3CC/25GX1\") 25G X 1\" 3 ML MISC, USE TO INJECT ONCE A WEEK, Disp: 4 each, Rfl: 1    PROAIR  (90 Base) MCG/ACT inhaler, INHALE 1 PUFF BY MOUTH EVERY 6 HOURS AS NEEDED FOR WHEEZING OR SHORTNESS OF BREATH, Disp: 8.5 g, Rfl: 0    lisinopril (PRINIVIL;ZESTRIL) 10 MG tablet, Take 1 tablet by mouth daily, Disp: 30 tablet, Rfl: 3  Allergies: Clindamycin/lincomycin, Codeine, and Penicillins      SUBJECTIVE EXAMINATION     History obtained from[de-identified] Patient,      Family/Caregiver Present: No    Subjective History:    Subjective: Pt states that she fx her R and L tibeal plataeu on 11/17/22 due to falling from accidently running into her. Pt states she w/c bound for a few weeks and she had to use BLE knee immobilizers up until 1/9/22. Pt now is to use BLE knee braces. Pt presents with Rolling FWW. Pt presents to PT services with order to address pt tibial fx.    Additional Pertinent Hx (if applicable):     Prior diagnostic testing[de-identified] MRI, CT Scan  Previous treatments prior to current episode?: Brace, Medications Learning/Language: Learning  What is the preferred language of the patient/guardian?: English     Pain Screening   Pain Screening  Patient Currently in Pain: Yes  Pain Assessment: 0-10  Pain Level: 4  Best Pain Level: 3  Worst Pain Level: 9  Date pain first started: 11/17/22  Pain Location: Knee  Pain Orientation: Left, Right  Pain Descriptors: Stabbing, Sharp  Pain Frequency: Intermittent  Pain Onset: Awakened from sleep  Functional Pain Assessment: Prevents or interferes some active activities and ADLs  Aggravating factors: Walking, Standing, Movement  Pain Management/Relieving Factors:  (inactivity)    Functional Status    Dominant Hand: : Right    Social History:  Social History  Lives With: Family  Type of Home: House  Home Layout: One level (Pt does not use main entrance.  She goes through the back door with only 1 step)  Home Access: Stairs to enter with rails  Entrance Stairs - Rails: Left  Entrance Stairs - Number of Steps: 8  Bathroom Shower/Tub: Walk-in shower  Bathroom Toilet: Standard  Bathroom Equipment: Shower chair  Bathroom Accessibility: Walker accessible, Wheelchair accessible  Home Equipment: Luz Maria Conner, rolling, Rollator, Cane, quad (Pt uses FWW currently, pt used quad can prior to fall)    Occupation/Interests:  Occupation: Workers comp  Type of Occupation:  at [employer] 1/9/20 pt originally fx'd her R hip    Prior Level of Function:    Independent        Current Level of Function:         Receives Help From: Souleymane Torres (helps will laundry, going to grocery store)  Ambulation Assistance: Independent  Transfer Assistance: Independent  Active : Yes  Mode of Transportation: Car    OBJECTIVE EXAMINATION   Restrictions:         PWB until 1/23/23 WBAT after 1/23/23    Review of Systems:  Vision: Impaired  Visual Deficits: Far-sighted  Hearing: Within functional limits  Overall Orientation Status: Within Normal Limits  Follows Commands: Within Functional Limits        Regional Screen:         Observations:  General Observations  General Observations: Yes  Description: Pt presents with FWW    Palpation:   Right Knee Palpation: ant joint line  Left Knee Palpation: Medial knee    Ambulation/Gait (if applicable):  Decreased edgardo, decreased step length, pt presents with FWW. Left AROM  Right AROM         AROM LLE (degrees)  L SLR: 0-90  L Knee Flexion (0-145): 0-132  L Knee Extension (0): -5    AROM RLE (degrees)  R SLR: 0-90  R Knee Flexion (0-145): 0-140  R Knee Extension (0): -2     Left PROM  Right PROM       WNL    WNL     Left Strength  Right Strength         Strength LLE  L Hip Flexion: 4/5  L Hip Extension: 3+/5  L Hip ABduction: 3+/5  L Hip ADduction: 4+/5  L Hip Internal Rotation: 4/5  L Hip External Rotation: 3+/5  L Knee Flexion: 4+/5  L Knee Extension: 5/5  L Ankle Dorsiflexion: 5/5    Strength RLE  R Hip Flexion: 4/5  R Hip Extension: 3+/5  R Hip ABduction: 3-/5  R Hip ADduction: 5/5  R Hip Internal Rotation: 3-/5  R Hip External Rotation: 3-/5  R Knee Flexion: 4+/5  R Knee Extension: 4+/5  R Ankle Dorsiflexion: 5/5     Cervical Assessment    FHP         Thoracic Assessment     Kyphotic         Muscle Length/Flexibility:   Grossly hypermobile     Special Tests:   Special Tests for Knee  Valgus Stress Test (MCL injury): L (-), R (-)  Varus Stress Test (LCL injury): R (-), L (-)  Posterior Draw Test (PCL injury): R (-), L (-)  Ant. Drawer (ACL injury): L (-)  Maryan (Meniscus Lesion): R (-), L (-)      Additional Finding(s) (if applicable):    LEFS 31/48         ASSESSMENT     Impression: Assessment: Pt is 64 y/o female that presents to Physical therapy services d/t sustaining R and L medial plataeu fx. Upon evaluation, pt presents with decrease ROM, decreased strength, increased pain with palpation, increased joint laxity and decreased functional scores. Pt currently uses FWW.   Pt will benefit from skilled PT services to improve pt QOL and LOF.    Body Structures, Functions, Activity Limitations Requiring Skilled Therapeutic Intervention: Decreased functional mobility , Decreased ADL status, Decreased ROM, Decreased strength, Increased pain, Decreased posture, Decreased endurance, Decreased tolerance to work activity, Decreased balance    Statement of Medical Necessity: Physical Therapy is both indicated and medically necessary as outlined in the POC to increase the likelihood of meeting the functionally related goals stated below.     Patient's Activity Tolerance:        Patient's rehabilitation potential/prognosis is considered to be: Good    Factors which may impact rehabilitation potential include: Sex, Age        GOALS   Patient Goal(s):    Short Term Goals Completed by 4-6 weeks Goal Status   Pt to be I with HEP     Pt to improve L AROM knee ext to 0     Pt to improve R AROM knee ext to 0     Pt to improve L knee flexion AROM to 0-140     Pt to have 0/10 pain with BLE knee palpation.                                       Long Term Goals Completed by 8-12 weeks Goal Status   Pt to improve LEFS score to 55/80     Pt to improve BLE str to 4+/5 or greater     Pt to return to ambulating with quad cane     Pt to have 0/10 ADL pain during WB                                              TREATMENT PLAN       Requires PT Follow-Up: Yes    Pt. actively involved in establishing Plan of Care and Goals: Yes  Patient/ Caregiver education and instruction: Goals, PT Role, Plan of Care, Evaluative findings, Home Exercise Program, Anatomy of condition, Posture, Weight-bearing Education, Body mechanics             Treatment may include any combination of the following: Current Treatment Recommendations: Strengthening, ROM, Endurance training, Gait training, Neuromuscular re-education, Home exercise program, Modalities, Therapeutic activities, Manual, Pain management     Frequency / Duration:  Patient to be seen 2-3x week for 8-12 weeks weeks      Eval Complexity:     Decision Making: Medium Complexity    PT Treatment Completed:  Exercises:  Therapeutic exercise (CPT 62392)   Treatment Reasoning    Exercise 1: HS curl BTB 3x10 B  Exercise 2: LAQ #3 3x10 2\"  Exercise 3: BTB marches 3x10 2\"  Exercise 4: Hooklying clamshell 3x10 2\"  Exercise 5: Quad set BLE 2x10 B 3\"  Exercise 6: Hip abd 2x10 2\" B  Exercise 7: Prone IR/ER 2x10 B    Pt was educated in and issued a written HEP of the above exercises.    Next time   Nustep lvl 7x7'  Ice or MH as needed Limitations addressed: Strength, Activity tolerance, Pain modulation, Posture                          Therapist Signature: Marco Antonio Yañez, PT    Date: 1/16/2023     I certify that the above Therapy Services are being furnished while the patient is under my care. I agree with the treatment plan and certify that this therapy is necessary.      Physician's Signature:  ___________________________   Date:_______                                                                   Maki Hinton PA        Physician Comments: _______________________________________________    Please sign and return to [facility] PHYSICAL THERAPY.  Please fax to the location listed below. THANK YOU for this referral!    [facility]  [facility] PHYSICAL THERAPY  [address] [place] [id number]  Dept: [phone]  Loc: [phone]

## 2023-01-19 ENCOUNTER — HOSPITAL ENCOUNTER (OUTPATIENT)
Dept: PHYSICAL THERAPY | Facility: HOSPITAL | Age: 63
Setting detail: THERAPIES SERIES
Discharge: HOME | End: 2023-01-19
Payer: COMMERCIAL

## 2023-01-19 PROCEDURE — 97110 THERAPEUTIC EXERCISES: CPT

## 2023-01-19 NOTE — FLOWSHEET NOTE
Physical Therapy Daily Treatment Note   Date:  2023    TIme In:       1406               Time Out: 8073    Patient Name:  Dasha Dickens    :  1960  MRN: [id number]    Restrictions/Precautions:    Pertinent Medical History:  Medical/Treatment Diagnosis Information:  No admission diagnoses are documented for this encounter. Insurance/Certification information:  Payor: Rita Anglin / Plan: Rosalba Meza / Product Type: *No Product type* /   Physician Information:  No ref. provider found  Plan of care signed (Y/N):    Visit# / total visits:     2 /    G-Code (if applicable):      Date / Visit # G-Code Applied:         Progress Note: []  Yes  [x]  No  Next due by: Visit #10       Pain level:   4/10  Subjective: Pt reports she is doing okay and wants to get back to where she used to be before her fall. Objective:  Observation:   Test measurements:    Palpation:    Exercises:  Exercise Resistance/Repetitions Other comments   Nustep L7x8' 19   HS curl 3x10 BTB B 19   LAQ 3x10x2\" 3#  23   Marches seated  3x10x2\" BTB 19   Bent knee fallout 3x10x2\" 19   QS 2x10x3\" B 19   Hip abd  2x10x2\" B BTB 19   Prone IR/ER 2x10 B 19                         Other Therapeutic Activities:      Manual Treatments:      Modalities:  Ice or MH as needed      Timed Code Treatment Minutes:  38      Total Treatment Minutes:  41    Treatment/Activity Tolerance:  [x] Patient tolerated treatment well [] Patient limited by fatigue  [] Patient limited by pain  [] Patient limited by other medical complications  [x] Other: Pt completed tx with mod c/o pain in bilateral knees and will increase activity next visit if this visit is tolerated well.     Pain after treatment:      4/10    Prognosis: [x] Good [] Fair  [] Poor    Patient Requires Follow-up: [x] Yes  [] No    Plan:   [x] Continue per plan of care [] Alter current plan (see comments)  [] Plan of care initiated [] Hold pending MD visit [] Discharge    Plan for Next Session:        Electronically signed by:  Denice Major PTA

## 2023-01-23 ENCOUNTER — HOSPITAL ENCOUNTER (OUTPATIENT)
Dept: PHYSICAL THERAPY | Facility: HOSPITAL | Age: 63
Setting detail: THERAPIES SERIES
Discharge: HOME | End: 2023-01-23
Payer: COMMERCIAL

## 2023-01-23 PROCEDURE — 97110 THERAPEUTIC EXERCISES: CPT

## 2023-01-23 NOTE — FLOWSHEET NOTE
Physical Therapy Daily Treatment Note   Date:  2023    TIme In:        1601                Time Out: [id number]    Patient Name:  Jhon Lerma    :  1960  MRN: [id number]    Restrictions/Precautions:    Pertinent Medical History:  Medical/Treatment Diagnosis Information:  No admission diagnoses are documented for this encounter. Insurance/Certification information:  Payor: Mera Dorman / Plan: Ced Brake / Product Type: *No Product type* /   Physician Information:  No ref. provider found  Plan of care signed (Y/N):    Visit# / total visits:     3 /    G-Code (if applicable):      Date / Visit # G-Code Applied:         Progress Note: []  Yes  [x]  No  Next due by: Visit #10       Pain level:   3-4/10  Subjective: Pt reports she hasn't had much sleep and is already tired from the mornings activities. Objective:  Observation:   Test measurements:    Palpation:    Exercises:  Exercise Resistance/Repetitions Other comments   Nustep L7x8' 23   HS curl 3x10 BTB B 23   LAQ 3x10x2\" 3#  23   Marches seated  3x10x2\" 3# 23   Bent knee fallout 3x10x2\" 23   QS 2x10x3\" B 23   Hip abd  2x15x2\" B BTB 23   Prone IR/ER 2x10 B 23   Seated HR/TR 2x10 23   SLR: flex/ext/abd 2x10 B 23               Other Therapeutic Activities:      Manual Treatments:      Modalities:  Ice or MH as needed. Not today. Timed Code Treatment Minutes:  43      Total Treatment Minutes:  45    Treatment/Activity Tolerance:  [x] Patient tolerated treatment well [] Patient limited by fatigue  [] Patient limited by pain  [] Patient limited by other medical complications  [x] Other: Pt completed tx with new activity with no significant c/o pain.      Pain after treatment:      5/10    Prognosis: [x] Good [] Fair  [] Poor    Patient Requires Follow-up: [x] Yes  [] No    Plan:   [x] Continue per plan of care [] Alter current plan (see comments)  [] Plan of care initiated [] Hold pending MD visit [] Discharge    Plan for Next Session:        Electronically signed by:  Kavya Major PTA

## 2023-01-26 ENCOUNTER — HOSPITAL ENCOUNTER (OUTPATIENT)
Dept: PHYSICAL THERAPY | Facility: HOSPITAL | Age: 63
Setting detail: THERAPIES SERIES
Discharge: HOME | End: 2023-01-26
Payer: COMMERCIAL

## 2023-01-26 PROCEDURE — 97110 THERAPEUTIC EXERCISES: CPT

## 2023-01-26 NOTE — FLOWSHEET NOTE
Physical Therapy Daily Treatment Note   Date:  2023    TIme In:  9820                      Time Out:  [address]    Patient Name:  Nasim Anaya    :  1960  MRN: [id number]    Restrictions/Precautions:    Pertinent Medical History:  Medical/Treatment Diagnosis Information:  No admission diagnoses are documented for this encounter. Insurance/Certification information:  Payor: Delisa Cooney / Plan: Alyssa Needs / Product Type: *No Product type* /   Physician Information:  No ref. provider found  Plan of care signed (Y/N):    Visit# / total visits:     4 /    G-Code (if applicable):      Date / Visit # G-Code Applied:         Progress Note: []  Yes  [x]  No  Next due by: Visit #10       Pain level:   3/10    Subjective: Pt reports she feels better today and is well rested. She expressed that her legs are about the same. Objective:  Observation:   Test measurements:    Palpation:    Exercises:  Exercise Resistance/Repetitions Other comments   Nustep L7x8' 26   HS curl 3x10 BTB B 26   LAQ 3x10x2\" 3#  32   Marches seated  3x10x2\" 3# 26   Bent knee fallout 3x10x2\" 26   QS 2x10x3\" B 26   Hip abd  2x15x2\" B BTB 26   Prone IR/ER 2x10 B 26   Seated HR/TR 2x10 26   SLR: flex/ext/abd 2x10 B 26               Other Therapeutic Activities:      Manual Treatments:      Modalities:  Ice or MH as needed. Not today. Timed Code Treatment Minutes:  35      Total Treatment Minutes:  37    Treatment/Activity Tolerance:  [x] Patient tolerated treatment well [] Patient limited by fatigue  [] Patient limited by pain  [] Patient limited by other medical complications  [x] Other: Pt was able to perform activity well with no increase in symptoms.     Pain after treatment:      0/10    Prognosis: [x] Good [] Fair  [] Poor    Patient Requires Follow-up: [x] Yes  [] No    Plan:   [x] Continue per plan of care [] Alter current plan (see comments)  [] Plan of care initiated [] Hold pending MD visit [] Discharge    Plan for Next Session:        Electronically signed by:  Tom Major PTA

## 2023-01-31 ENCOUNTER — HOSPITAL ENCOUNTER (OUTPATIENT)
Dept: PHYSICAL THERAPY | Facility: HOSPITAL | Age: 63
Setting detail: THERAPIES SERIES
Discharge: HOME | End: 2023-01-31
Payer: COMMERCIAL

## 2023-01-31 DIAGNOSIS — M79.2 NEUROPATHIC PAIN: ICD-10-CM

## 2023-01-31 DIAGNOSIS — G25.81 RLS (RESTLESS LEGS SYNDROME): ICD-10-CM

## 2023-01-31 DIAGNOSIS — Z96.641 STATUS POST RIGHT HIP REPLACEMENT: ICD-10-CM

## 2023-01-31 PROCEDURE — 97110 THERAPEUTIC EXERCISES: CPT

## 2023-01-31 RX ORDER — MELOXICAM 15 MG/1
15 TABLET ORAL DAILY
Qty: 30 TABLET | Refills: 3 | Status: SHIPPED | OUTPATIENT
Start: 2023-01-31

## 2023-01-31 RX ORDER — ROPINIROLE 0.5 MG/1
0.5 TABLET, FILM COATED ORAL NIGHTLY
Qty: 30 TABLET | Refills: 2 | Status: SHIPPED | OUTPATIENT
Start: 2023-01-31

## 2023-01-31 RX ORDER — GABAPENTIN 300 MG/1
CAPSULE ORAL
Qty: 90 CAPSULE | Refills: 1 | Status: SHIPPED | OUTPATIENT
Start: 2023-01-31 | End: 2023-03-01

## 2023-01-31 NOTE — FLOWSHEET NOTE
Physical Therapy Daily Treatment Note   Date:  2023    TIme In:  1136                      Time Out:  1739    Patient Name:  Debrah Bence    :  1960  MRN: [id number]    Restrictions/Precautions:    Pertinent Medical History:  Medical/Treatment Diagnosis Information:  Lack of strength [W35.1]     Insurance/Certification information:  Payor: Iman Broderick / Plan: Iman Broderick / Product Type: *No Product type* /   Physician Information:  NIRU Ballesteros  Plan of care signed (Y/N):    Visit# / total visits:     5 /    G-Code (if applicable):      Date / Visit # G-Code Applied:         Progress Note: []  Yes  [x]  No  Next due by: Visit #10       Pain level:   310    Subjective: Pt reports her legs are feeling better. Objective:  Observation:   Test measurements:    Palpation:    Exercises:  Exercise Resistance/Repetitions Other comments   Nustep L7x8' 31   HS curl 3x10 BTB B 31   LAQ 3x10x2\" 3#  32   Marches seated  3x10x2\" 3# 31   Bent knee fallout 3x10x2\" 32 BTB next time   QS 2x10x3\" B 31   Hip abd  2x15x2\" B BTB 31   Prone IR/ER 2x10 B 31 OTB next time   Seated HR/TR 2x10 31   SLR: flex/ext/abd 2x10 B  31 #2 next time               Other Therapeutic Activities:      Manual Treatments:      Modalities:  Ice or MH as needed. Not today. Timed Code Treatment Minutes:  55'      Total Treatment Minutes:  50'    Treatment/Activity Tolerance:  [x] Patient tolerated treatment well [] Patient limited by fatigue  [] Patient limited by pain  [] Patient limited by other medical complications  [x] Other: Pt was able to perform activity well with no increase in symptoms. Discussed increasing load with exercises, pt in agreement.      Pain after treatment:      0/10    Prognosis: [x] Good [] Fair  [] Poor    Patient Requires Follow-up: [x] Yes  [] No    Plan:   [x] Continue per plan of care [] Alter current plan (see comments)  [] Plan of care initiated [] Hold pending MD visit [] Discharge    Plan for Next Session:        Electronically signed by:  Maria L Hill PT

## 2023-02-02 ENCOUNTER — HOSPITAL ENCOUNTER (OUTPATIENT)
Dept: PHYSICAL THERAPY | Facility: HOSPITAL | Age: 63
Setting detail: THERAPIES SERIES
Discharge: HOME | End: 2023-02-02
Payer: COMMERCIAL

## 2023-02-02 PROCEDURE — 97110 THERAPEUTIC EXERCISES: CPT

## 2023-02-02 NOTE — FLOWSHEET NOTE
Physical Therapy Daily Treatment Note   Date:  2023    TIme In:     1136                   Time Out:  Albert    Patient Name:  Jerardo Jaeger    :  1960  MRN: [id number]    Restrictions/Precautions:    Pertinent Medical History:  Medical/Treatment Diagnosis Information:  Lack of strength [O93.1]     Insurance/Certification information:  Payor: Mary Lewis / Plan: Mary Lewis / Product Type: *No Product type* /   Physician Information:  NIRU Marcum  Plan of care signed (Y/N):    Visit# / total visits:     6 /    G-Code (if applicable):      Date / Visit # G-Code Applied:         Progress Note: []  Yes  [x]  No  Next due by: Visit #10       Pain level:   4/10    Subjective: Pt reports she is okay today. Objective:  Observation:   Test measurements:    Palpation:    Exercises:  Exercise Resistance/Repetitions Other comments   Nustep L7x8' 2   HS curl 3x10 BTB B 2   LAQ 3x10x2\" 3#  2   Marches seated  3x10x2\" 3# 2   Bent knee fallout 3x10x2\" BTB 2   QS 2x10x3\" B 2   Hip abd  2x15x2\" B BTB 2   Prone IR/ER 2x10 B BTB 2   Seated HR/TR 2x10 2   SLR: flex/ext/abd 2x10 B #2 2               Other Therapeutic Activities:      Manual Treatments:      Modalities:  Ice or MH as needed. Not today. Timed Code Treatment Minutes:  45      Total Treatment Minutes:  46    Treatment/Activity Tolerance:  [x] Patient tolerated treatment well [] Patient limited by fatigue  [] Patient limited by pain  [] Patient limited by other medical complications  [x] Other: Pt tolerated tx well with no increase in symptoms and few rest breaks throughout.     Pain after treatment:      0/10    Prognosis: [x] Good [] Fair  [] Poor    Patient Requires Follow-up: [x] Yes  [] No    Plan:   [x] Continue per plan of care [] Alter current plan (see comments)  [] Plan of care initiated [] Hold pending MD visit [] Discharge    Plan for Next Session:        Electronically signed by:  Earnest Major PTA

## 2023-02-07 ENCOUNTER — HOSPITAL ENCOUNTER (OUTPATIENT)
Dept: PHYSICAL THERAPY | Facility: HOSPITAL | Age: 63
Setting detail: THERAPIES SERIES
Discharge: HOME | End: 2023-02-07
Payer: COMMERCIAL

## 2023-02-07 PROCEDURE — 97110 THERAPEUTIC EXERCISES: CPT

## 2023-02-07 NOTE — FLOWSHEET NOTE
Physical Therapy Daily Treatment Note   Date:  2023    TIme In:     1138               Time Out:     8247    Patient Name:  Flor Parikh   :  1960  MRN: [id number]    Restrictions/Precautions:    Pertinent Medical History:  Medical/Treatment Diagnosis Information:  Lack of strength [M96.4]     Insurance/Certification information:  Payor: Belgica De Luna / Plan: Belgica De Luna / Product Type: *No Product type* /   Physician Information:  NIRU Bhatia  Plan of care signed (Y/N):    Visit# / total visits:     7 /    G-Code (if applicable):      Date / Visit # G-Code Applied:         Progress Note: []  Yes  [x]  No  Next due by: Visit #10       Pain level:   2/10    Subjective:  Patient reports doing a little better overall but that her R hip is still weak from before.      Objective:  Observation:  Patient presents to the clinic ambulating with RW, B knee braces donned  Test measurements:    Palpation:    Exercises:  Exercise Resistance/Repetitions Other comments   Nustep L7x8' 7   HS curl 3x10 BTB B 7   LAQ 3x10x2\" 3#  7   Marches seated  3x10x2\" 3# 7   Bent knee fallout 3x10x2\" BTB 7   QS 2x10x3\" B 7   Hip abd sidelying 2x15x2\" 2# 7   Prone IR/ER 2x10 B 2# 7   Seated HR/TR 2x10 7   SLR: flex/ext/abd 2x10 B #2 7               Other Therapeutic Activities:      Manual Treatments:      Modalities:  Ice or MH as needed (patient deferred)      Timed Code Treatment Minutes:  42      Total Treatment Minutes:  44    Treatment/Activity Tolerance:  [x] Patient tolerated treatment well [] Patient limited by fatigue  [] Patient limited by pain  [] Patient limited by other medical complications  [] Other:     Pain after treatment:      3/10    Prognosis: [x] Good [] Fair  [] Poor    Patient Requires Follow-up: [x] Yes  [] No    Plan:   [x] Continue per plan of care [] Alter current plan (see comments)  [] Plan of care initiated [] Hold pending MD visit [] Discharge    Plan for Next Session: Electronically signed by:  Bennie Ma PTA

## 2023-02-09 ENCOUNTER — HOSPITAL ENCOUNTER (OUTPATIENT)
Dept: PHYSICAL THERAPY | Facility: HOSPITAL | Age: 63
Setting detail: THERAPIES SERIES
Discharge: HOME | End: 2023-02-09
Payer: COMMERCIAL

## 2023-02-09 PROCEDURE — 97110 THERAPEUTIC EXERCISES: CPT

## 2023-02-09 NOTE — FLOWSHEET NOTE
Physical Therapy Daily Treatment Note   Date:  2023    TIme In:    1136               Time Out:     5265    Patient Name:  Leonides Narvaez   :  1960  MRN: [id number]    Restrictions/Precautions:    Pertinent Medical History:  Medical/Treatment Diagnosis Information:  Lack of strength [J04.6]     Insurance/Certification information:  Payor: Margareth Pablo / Plan: Margareth Booty / Product Type: *No Product type* /   Physician Information:  NIRU Llanos  Plan of care signed (Y/N):    Visit# / total visits:     8 /    G-Code (if applicable):      Date / Visit # G-Code Applied:         Progress Note: []  Yes  [x]  No  Next due by: Visit #10       Pain level:   2/10    Subjective:  Patient reports she is hurting some but its not too bad today. Objective:  Observation:  Patient presents to the clinic ambulating with RW, B knee braces donned  Test measurements:    Palpation:    Exercises:  Exercise Resistance/Repetitions Other comments   Nustep L7x8' 9   HS curl 3x10 BTB B 9   LAQ 3x10x2\" 3#  9   Marches seated  3x10x2\" 3# 9   Bent knee fallout 3x10x2\" BTB 9   QS 2x10x3\" B 9   Hip abd sidelying 2x15x2\" 2# 9   Prone IR/ER 2x10 B 2# 9   Seated HR/TR 2x10 9   SLR: flex/ext/abd 2x10 B #2 9               Other Therapeutic Activities:      Manual Treatments:      Modalities:  Ice or MH as needed (patient deferred)      Timed Code Treatment Minutes:  45      Total Treatment Minutes:  47    Treatment/Activity Tolerance:  [x] Patient tolerated treatment well [] Patient limited by fatigue  [] Patient limited by pain  [] Patient limited by other medical complications  [x] Other: Pt completed tx with min c/o pain and improved tolerance to all activity. May progress exercises next visit.     Pain after treatment:      2/10    Prognosis: [x] Good [] Fair  [] Poor    Patient Requires Follow-up: [x] Yes  [] No    Plan:   [x] Continue per plan of care [] Alter current plan (see comments)  [] Plan of care initiated [] Hold pending MD visit [] Discharge    Plan for Next Session:        Electronically signed by:  Hiro Major PTA

## 2023-02-14 ENCOUNTER — HOSPITAL ENCOUNTER (OUTPATIENT)
Dept: PHYSICAL THERAPY | Facility: HOSPITAL | Age: 63
Setting detail: THERAPIES SERIES
Discharge: HOME | End: 2023-02-14
Payer: COMMERCIAL

## 2023-02-14 PROCEDURE — 97110 THERAPEUTIC EXERCISES: CPT

## 2023-02-14 NOTE — FLOWSHEET NOTE
Physical Therapy Daily Treatment Note/Re-assessment  Date:  2023    TIme In:    1141               Time Out:     200    Patient Name:  Gertrudis Cee   :  1960  MRN: [id number]    Restrictions/Precautions:    Pertinent Medical History:  Medical/Treatment Diagnosis Information:  Lack of strength [R53.1]   R knee medial tibial plataeu fx  Insurance/Certification information:  Payor: Ger Johns / Plan: Gilford Linger / Product Type: *No Product type* /   Physician Information:  No ref. provider found  Plan of care signed (Y/N):    Visit# / total visits:     9 /    G-Code (if applicable):      Date / Visit # G-Code Applied:         Progress Note: [x]  Yes  []  No  Next due by: 3/14/23     Pain level:   --/10    Subjective:  Patient reports her knees are bothering her today and she thought about canceling. Objective:  Observation:  Patient presents to the clinic ambulating with RW, B knee braces donned  Test measurements:  LEFS: 40/80. L knee AROM: 6-141 deg. R knee ext AROM: -2 deg. Strength  L  R     Hip flex  5/5  4+/5     Hip ext   4+/5  4/5     Hip abd  5/5  3+/5     Hip ER   3+/5  4+/5      Hip IR   4-/5  4+/5  Palpation: 0/10 pain with palpation. Exercises:  Exercise Resistance/Repetitions Other comments   Nustep L7x8' 14   HS curl 3x10 BTB B 14   LAQ 3x10x2\" 3#  14   Marches standing 3x10x2\" 3# 14   Bent knee fallout 3x10x2\" BTB 14   TKE 2x10x3\" B GTB 14   Prone IR/ER 2x10 B 2# 14   Standing HR/TR 2x10 14   SLR: flex/ext/abd 2x10 B #2 14   Sit to stands 2x10 14   Mini lunges 2x8 each side Next time   6\" step ups 2x8 each leg Next time               Other Therapeutic Activities:  Re-assessment performed by Nancy Robles DPT.     Manual Treatments:      Modalities:  Ice or MH as needed (patient deferred)      Timed Code Treatment Minutes:  50      Total Treatment Minutes:  59    Treatment/Activity Tolerance:  [x] Patient tolerated treatment well [] Patient limited by fatigue  [] Patient limited by pain  [] Patient limited by other medical complications  [x] Other: Pt able to tolerate new activity. Pt reassessment completed this reporting period and demonstrated improved strength bilaterally as well as improved functional measures. Pt demonstrates 3/5 STG MET with 0/5 LTG met. Pt will benefit from continued PT services to further improve pt QOL, and LOF. Pain after treatment:      \"doing okay\"/10    Short Term Goals Completed by 4-6 weeks Goal Status   Pt to be I with HEP Met   Pt to improve L AROM knee ext to 0 Not Met   Pt to improve R AROM knee ext to 0 Not Met   Pt to improve L knee flexion AROM to 0-140 Met   Pt to have 0/10 pain with BLE knee palpation.  Met             Long Term Goals Completed by 8-12 weeks Goal Status   Pt to improve LEFS score to 55/80 Progressing   Pt to improve BLE str to 4+/5 or greater Progressing   Pt to return to ambulating with quad cane Not Met   Pt to have 0/10 ADL pain during WB Not Met       Prognosis: [x] Good [] Fair  [] Poor    Patient Requires Follow-up: [x] Yes  [] No    Plan:   [x] Continue per plan of care [] Alter current plan (see comments)  [] Plan of care initiated [] Hold pending MD visit [] Discharge    Plan for Next Session:        Electronically signed by:  Logan Major PTA

## 2023-02-21 ENCOUNTER — HOSPITAL ENCOUNTER (OUTPATIENT)
Dept: PHYSICAL THERAPY | Facility: HOSPITAL | Age: 63
Setting detail: THERAPIES SERIES
Discharge: HOME | End: 2023-02-21
Payer: COMMERCIAL

## 2023-02-21 PROCEDURE — 97110 THERAPEUTIC EXERCISES: CPT

## 2023-02-23 ENCOUNTER — HOSPITAL ENCOUNTER (OUTPATIENT)
Dept: PHYSICAL THERAPY | Facility: HOSPITAL | Age: 63
Setting detail: THERAPIES SERIES
Discharge: HOME | End: 2023-02-23
Payer: COMMERCIAL

## 2023-02-23 PROCEDURE — 97110 THERAPEUTIC EXERCISES: CPT

## 2023-02-23 NOTE — FLOWSHEET NOTE
Physical Therapy Daily Treatment Note  Date:  2023    TIme In:   0440                Time Out:    [address]    Patient Name:  Kale Colbert   :  1960  MRN: [id number]    Restrictions/Precautions:    Pertinent Medical History:  Medical/Treatment Diagnosis Information:  Lack of strength [R53.1]   R knee medial tibial plataeu fx  Insurance/Certification information:  Payor: Abena Adames / Plan: Abena Adames / Product Type: *No Product type* /   Physician Information:  NIRU Pandey  Plan of care signed (Y/N):    Visit# / total visits:     11 /    G-Code (if applicable):      Date / Visit # G-Code Applied:         Progress Note: []  Yes  [x]  No  Next due by: 3/14/23     Pain level:   1-2/10    Subjective:  Patient reports she doing ok today, she states she has an ortho visit coming up. Objective:  Observation:  Patient presents to the clinic ambulating with RW, B knee braces donned  Test measurements:  LEFS: 40/80. L knee AROM: 6-141 deg. R knee ext AROM: -2 deg. Strength  L  R     Hip flex  5/5  4+/5     Hip ext   4+/5  4/5     Hip abd  5/5  3+/5     Hip ER   3+/5  4+/5      Hip IR   4-/5  4+/5  Palpation: 0/10 pain with palpation.     Exercises:  Exercise Resistance/Repetitions Other comments   Nustep L7x8' 23   HS curl 3x10 BTB B 23   LAQ 3x10x2\" 3#  23   Marches standing 3x10x2\" 3# 23   Bent knee fallout 3x10x2\" BTB 23   TKE 2x10x3\" B GTB 23 BTB next time   Prone IR/ER 2x10 B 2# 23 #3 next time   Standing HR/TR 2x10 23   SLR: flex/ext/abd 2x10 B #2 23 #3 next time   Sit to stands 2x10 23   Mini lunges 2x8 each side 23   6\" step ups 2x8 each leg #3 23               Other Therapeutic Activities:      Manual Treatments:      Modalities:  Ice or MH as needed (patient deferred)      Timed Code Treatment Minutes:  54      Total Treatment Minutes:  57    Treatment/Activity Tolerance:  [x] Patient tolerated treatment well [] Patient limited by fatigue  [] Patient limited by pain  [] Patient limited by other medical complications  [x] Other: Pt completed tx with no pain. Improved form noted, pt able to tolerate additional resistance. Pain after treatment:      2/10    Short Term Goals Completed by 4-6 weeks Goal Status   Pt to be I with HEP Met   Pt to improve L AROM knee ext to 0 Not Met   Pt to improve R AROM knee ext to 0 Not Met   Pt to improve L knee flexion AROM to 0-140 Met   Pt to have 0/10 pain with BLE knee palpation.  Met             Long Term Goals Completed by 8-12 weeks Goal Status   Pt to improve LEFS score to 55/80 Progressing   Pt to improve BLE str to 4+/5 or greater Progressing   Pt to return to ambulating with quad cane Not Met   Pt to have 0/10 ADL pain during WB Not Met       Prognosis: [x] Good [] Fair  [] Poor    Patient Requires Follow-up: [x] Yes  [] No    Plan:   [x] Continue per plan of care [] Alter current plan (see comments)  [] Plan of care initiated [] Hold pending MD visit [] Discharge    Plan for Next Session:        Electronically signed by:  Paola Claire PT

## 2023-02-28 ENCOUNTER — HOSPITAL ENCOUNTER (OUTPATIENT)
Dept: PHYSICAL THERAPY | Facility: HOSPITAL | Age: 63
Setting detail: THERAPIES SERIES
Discharge: HOME | End: 2023-02-28
Payer: COMMERCIAL

## 2023-02-28 PROCEDURE — 97110 THERAPEUTIC EXERCISES: CPT

## 2023-02-28 NOTE — FLOWSHEET NOTE
Physical Therapy Daily Treatment Note  Date:  2023    TIme In:   1134                Time Out:    4784    Patient Name:  Rivas Arevalo   :  1960  MRN: [id number]    Restrictions/Precautions:    Pertinent Medical History:  Medical/Treatment Diagnosis Information:  Lack of strength [R53.1]   R knee medial tibial plataeu fx  Insurance/Certification information:  Payor: Betsy Justa / Plan: Betsy Justa / Product Type: *No Product type* /   Physician Information:  NIRU Plummer  Plan of care signed (Y/N):    Visit# / total visits:     12 /    G-Code (if applicable):      Date / Visit # G-Code Applied:         Progress Note: []  Yes  [x]  No  Next due by: 3/14/23     Pain level:   --/10    Subjective:  Patient reports she is okay, about the same. Objective:  Observation:  Patient presents to the clinic ambulating with RW, B knee braces donned  Test measurements:  LEFS: 40/80. L knee AROM: 6-141 deg. R knee ext AROM: -2 deg. Strength  L  R     Hip flex  5/5  4+/5     Hip ext   4+/5  4/5     Hip abd  5/5  3+/5     Hip ER   3+/5  4+/5      Hip IR   4-/5  4+/5  Palpation: 0/10 pain with palpation.     Exercises:  Exercise Resistance/Repetitions Other comments   Nustep L7x8' 28   HS curl 3x10 BTB B 28   LAQ 3x10x2\" 3#  28   Marches standing 3x10x2\" 3# 28   Bent knee fallout 3x10x2\" BTB 28   TKE 2x10x3\" B BTB 28   Prone IR/ER 2x10 B 3# 28   Standing HR/TR 2x10 28   SLR: flex/ext/abd 2x10 B #3 28   Sit to stands 2x10 28   Mini lunges 2x8 each side 28   6\" step ups 2x8 each leg #3 28               Other Therapeutic Activities:      Manual Treatments:      Modalities:  Ice or MH as needed (patient deferred)      Timed Code Treatment Minutes:  42      Total Treatment Minutes:  46    Treatment/Activity Tolerance:  [x] Patient tolerated treatment well [] Patient limited by fatigue  [] Patient limited by pain  [] Patient limited by other medical complications  [x] Other: Pt completed all tx with mild difficulty and may progress activity next visit. Pain after treatment:      0/10    Short Term Goals Completed by 4-6 weeks Goal Status   Pt to be I with HEP Met   Pt to improve L AROM knee ext to 0 Not Met   Pt to improve R AROM knee ext to 0 Not Met   Pt to improve L knee flexion AROM to 0-140 Met   Pt to have 0/10 pain with BLE knee palpation.  Met             Long Term Goals Completed by 8-12 weeks Goal Status   Pt to improve LEFS score to 55/80 Progressing   Pt to improve BLE str to 4+/5 or greater Progressing   Pt to return to ambulating with quad cane Not Met   Pt to have 0/10 ADL pain during WB Not Met       Prognosis: [x] Good [] Fair  [] Poor    Patient Requires Follow-up: [x] Yes  [] No    Plan:   [x] Continue per plan of care [] Alter current plan (see comments)  [] Plan of care initiated [] Hold pending MD visit [] Discharge    Plan for Next Session:        Electronically signed by:  Sebastien Major PTA

## 2023-03-02 ENCOUNTER — HOSPITAL ENCOUNTER (OUTPATIENT)
Dept: PHYSICAL THERAPY | Facility: HOSPITAL | Age: 63
Setting detail: THERAPIES SERIES
Discharge: HOME | End: 2023-03-02
Payer: COMMERCIAL

## 2023-03-02 PROCEDURE — 97110 THERAPEUTIC EXERCISES: CPT

## 2023-03-02 NOTE — FLOWSHEET NOTE
Physical Therapy Daily Treatment Note  Date:  3/2/2023    TIme In:   1136                Time Out:    0197    Patient Name:  Kayleen Cabrera   :  1960  MRN: [id number]    Restrictions/Precautions:    Pertinent Medical History:  Medical/Treatment Diagnosis Information:  Lack of strength [R53.1]   R knee medial tibial plataeu fx  Insurance/Certification information:  Payor: Caro ZenDoc / Plan: Caro Kingdom / Product Type: *No Product type* /   Physician Information:  NIRU Carreon  Plan of care signed (Y/N):    Visit# / total visits:     15 /    G-Code (if applicable):      Date / Visit # G-Code Applied:         Progress Note: []  Yes  [x]  No  Next due by: 3/14/23     Pain level:   3/10    Subjective:  Patient reports not being able to have her knee braces on because she had them cleaned, but her washer broke so they are being air dried. She also states increased spasms in her knees     Objective:  Observation:  Patient presents to the clinic ambulating with RW  Test measurements:  LEFS: 40/80. L knee AROM: 6-141 deg. R knee ext AROM: -2 deg. Strength  L  R     Hip flex  5/5  4+/5     Hip ext   4+/5  4/5     Hip abd  5/5  3+/5     Hip ER   3+/5  4+/5      Hip IR   4-/5  4+/5  Palpation: 0/10 pain with palpation.     Exercises:  Exercise Resistance/Repetitions Other comments   Nustep L7x8' 2   HS curl 3x10 BTB B 2   LAQ 3x10x2\" 3#  2   Marches standing 3x10x2\" 3# 2   Bent knee fallout 3x10x2\" BTB 2   TKE 2x10x3\" B BTB 2   Prone IR/ER 2x10 B 3# 2   Prone hip ext  2x10 B #3  2   Standing HR/TR 2x10 2   SLR: flex/ext/abd 2x10 B #3 2   Sit to stands 2x10 2   Mini lunges 2x8 each side 2   6\" step ups 2x8 each leg #3 2               Other Therapeutic Activities:  there-ex observed by Cherelle Major PTA    Manual Treatments:      Modalities:  Ice or MH as needed (patient deferred)      Timed Code Treatment Minutes:  50      Total Treatment Minutes:  55    Treatment/Activity Tolerance:  [x] Patient tolerated treatment well [] Patient limited by fatigue  [] Patient limited by pain  [] Patient limited by other medical complications  [x] Other: Pt completed all tx with increased difficulty and some pain d/t performing exercises without knee brace. Pain after treatment:      3/10    Short Term Goals Completed by 4-6 weeks Goal Status   Pt to be I with HEP Met   Pt to improve L AROM knee ext to 0 Not Met   Pt to improve R AROM knee ext to 0 Not Met   Pt to improve L knee flexion AROM to 0-140 Met   Pt to have 0/10 pain with BLE knee palpation.  Met             Long Term Goals Completed by 8-12 weeks Goal Status   Pt to improve LEFS score to 55/80 Progressing   Pt to improve BLE str to 4+/5 or greater Progressing   Pt to return to ambulating with quad cane Not Met   Pt to have 0/10 ADL pain during WB Not Met       Prognosis: [x] Good [] Fair  [] Poor    Patient Requires Follow-up: [x] Yes  [] No    Plan:   [x] Continue per plan of care [] Alter current plan (see comments)  [] Plan of care initiated [] Hold pending MD visit [] Discharge    Plan for Next Session:        Electronically signed by:  Olga Cuevas, PT

## 2023-03-06 DIAGNOSIS — E53.8 B12 DEFICIENCY: ICD-10-CM

## 2023-03-06 DIAGNOSIS — M81.0 OSTEOPOROSIS WITHOUT CURRENT PATHOLOGICAL FRACTURE, UNSPECIFIED OSTEOPOROSIS TYPE: ICD-10-CM

## 2023-03-06 RX ORDER — ALENDRONATE SODIUM 70 MG/1
70 TABLET ORAL
Qty: 4 TABLET | Refills: 0 | Status: SHIPPED | OUTPATIENT
Start: 2023-03-06

## 2023-03-06 RX ORDER — CYANOCOBALAMIN 1000 UG/ML
INJECTION, SOLUTION INTRAMUSCULAR; SUBCUTANEOUS
Qty: 1 ML | Refills: 0 | Status: SHIPPED | OUTPATIENT
Start: 2023-03-06

## 2023-03-06 RX ORDER — DOCUSATE SODIUM 100 MG/1
CAPSULE, LIQUID FILLED ORAL
Qty: 60 CAPSULE | Refills: 0 | Status: SHIPPED | OUTPATIENT
Start: 2023-03-06

## 2023-03-06 RX ORDER — PREDNISONE 10 MG/1
10 TABLET ORAL DAILY PRN
Qty: 30 TABLET | Refills: 0 | Status: SHIPPED | OUTPATIENT
Start: 2023-03-06

## 2023-03-06 RX ORDER — MEDROXYPROGESTERONE ACETATE 150 MG/ML
INJECTION, SUSPENSION INTRAMUSCULAR
Qty: 4 ML | Refills: 0 | Status: SHIPPED | OUTPATIENT
Start: 2023-03-06

## 2023-03-07 ENCOUNTER — HOSPITAL ENCOUNTER (OUTPATIENT)
Dept: PHYSICAL THERAPY | Facility: HOSPITAL | Age: 63
Setting detail: THERAPIES SERIES
Discharge: HOME | End: 2023-03-07
Payer: COMMERCIAL

## 2023-03-07 PROCEDURE — 97110 THERAPEUTIC EXERCISES: CPT

## 2023-03-07 NOTE — FLOWSHEET NOTE
Physical Therapy Daily Treatment Note  Date:  3/7/2023    TIme In:    1138               Time Out:    1227    Patient Name:  Lenny Kelly   :  1960  MRN: [id number]    Restrictions/Precautions:    Pertinent Medical History:  Medical/Treatment Diagnosis Information:  Lack of strength [R53.1]   R knee medial tibial plataeu fx  Insurance/Certification information:  Payor: Jaye Goncalves / Plan: Lonn Gong / Product Type: *No Product type* /   Physician Information:  NIRU Cervantes  Plan of care signed (Y/N):    Visit# / total visits:     15 /    G-Code (if applicable):      Date / Visit # G-Code Applied:         Progress Note: []  Yes  [x]  No  Next due by: 3/14/23     Pain level:   2/10    Subjective:  Patient reports her hip is about the same, nothing major going on. Objective:  Observation:  Patient presents to the clinic ambulating with RW  Test measurements:  LEFS: 40/80. L knee AROM: 6-141 deg. R knee ext AROM: -2 deg. Strength  L  R     Hip flex  5/5  4+/5     Hip ext   4+/5  4/5     Hip abd  5/5  3+/5     Hip ER   3+/5  4+/5      Hip IR   4-/5  4+/5  Palpation: 0/10 pain with palpation.     Exercises:  Exercise Resistance/Repetitions Other comments   Nustep L7x8' 7   HS curl 3x10 BTB B 7   LAQ 3x10x2\" 3#  7   Marches standing 3x10x2\" 3# 7   Bent knee fallout 3x10x2\" BTB 7   TKE 2x10x3\" B BTB 7   Prone IR/ER 2x10 B 3# 7   Prone hip ext  2x10 B #3  7   Standing HR/TR 2x10 7   SLR: flex/ext/abd 2x10 B #3 7   Sit to stands 2x10 7   Mini lunges 2x8 each side 7   6\" step ups 2x8 each leg #3 7               Other Therapeutic Activities:      Manual Treatments:      Modalities:  Ice or MH as needed (patient deferred)      Timed Code Treatment Minutes:  45      Total Treatment Minutes:  49    Treatment/Activity Tolerance:  [x] Patient tolerated treatment well [] Patient limited by fatigue  [] Patient limited by pain  [] Patient limited by other medical complications  [x] Other: Pt completed tx with mild c/o pain but overall good tolerance to activity. Pain after treatment:      1-2/10    Short Term Goals Completed by 4-6 weeks Goal Status   Pt to be I with HEP Met   Pt to improve L AROM knee ext to 0 Not Met   Pt to improve R AROM knee ext to 0 Not Met   Pt to improve L knee flexion AROM to 0-140 Met   Pt to have 0/10 pain with BLE knee palpation.  Met             Long Term Goals Completed by 8-12 weeks Goal Status   Pt to improve LEFS score to 55/80 Progressing   Pt to improve BLE str to 4+/5 or greater Progressing   Pt to return to ambulating with quad cane Not Met   Pt to have 0/10 ADL pain during WB Not Met       Prognosis: [x] Good [] Fair  [] Poor    Patient Requires Follow-up: [x] Yes  [] No    Plan:   [x] Continue per plan of care [] Alter current plan (see comments)  [] Plan of care initiated [] Hold pending MD visit [] Discharge    Plan for Next Session:        Electronically signed by:  Artemio Major PTA

## 2023-03-09 ENCOUNTER — HOSPITAL ENCOUNTER (OUTPATIENT)
Dept: PHYSICAL THERAPY | Facility: HOSPITAL | Age: 63
Setting detail: THERAPIES SERIES
Discharge: HOME | End: 2023-03-09
Payer: COMMERCIAL

## 2023-03-09 PROCEDURE — 97110 THERAPEUTIC EXERCISES: CPT

## 2023-03-09 NOTE — FLOWSHEET NOTE
Physical Therapy Daily Treatment Note  Date:  3/9/2023    TIme In: 9590               Time Out: 18    Patient Name:  Luh Edwards   :  1960  MRN: [id number]    Restrictions/Precautions:    Pertinent Medical History:  Medical/Treatment Diagnosis Information:  Lack of strength [R53.1]   R knee medial tibial plataeu fx  Insurance/Certification information:  Payor: Sree Aquino / Plan: Sree Aquino / Product Type: *No Product type* /   Physician Information:  NIRU Umanzor  Plan of care signed (Y/N):    Visit# / total visits:     13 /    G-Code (if applicable):      Date / Visit # G-Code Applied:         Progress Note: []  Yes  [x]  No  Next due by: 3/14/23     Pain level:   0/10    Subjective:  Patient reports her hip doing well, she is going to see her Dr for a follow up to see how her fx has healed. Objective:  Observation:  Patient presents to the clinic ambulating with RW BLE knee braces donned on  Test measurements:  LEFS: 40/80. L knee AROM: 6-141 deg. R knee ext AROM: -2 deg. Strength  L  R     Hip flex  5/5  4+/5     Hip ext   4+/5  4/5     Hip abd  5/5  3+/5     Hip ER   3+/5  4+/5      Hip IR   4-/5  4+/5  Palpation: 0/10 pain with palpation.     Exercises:  Exercise Resistance/Repetitions Other comments   Nustep L7x8' 9   HS curl 3x10 BTB B 9   LAQ 3x10x2\" 3#  9   Marches standing 3x10x2\" 3# 9   Bent knee fallout 3x10x2\" BTB 9   TKE 2x10x3\" B BTB 9   Prone IR/ER 2x10 B 3# 9   Prone hip ext  2x10 B #3  9   Standing HR/TR 2x10 9   SLR: flex/ext/abd 2x10 B #3 9   Sit to stands 2x10 9   Mini lunges 2x8 each side 9   6\" step ups 2x8 each leg #3 9               Other Therapeutic Activities:      Manual Treatments:      Modalities:  Ice or MH as needed (patient deferred)      Timed Code Treatment Minutes:  51      Total Treatment Minutes:  56    Treatment/Activity Tolerance:  [x] Patient tolerated treatment well [] Patient limited by fatigue  [] Patient limited by pain  [] Patient limited by other medical complications  [x] Other: Pt completed tx with rest breaks required. No pain post tx, improved motor recruitment with exercises for lateral hip. Pain after treatment:      0/10    Short Term Goals Completed by 4-6 weeks Goal Status   Pt to be I with HEP Met   Pt to improve L AROM knee ext to 0 Not Met   Pt to improve R AROM knee ext to 0 Not Met   Pt to improve L knee flexion AROM to 0-140 Met   Pt to have 0/10 pain with BLE knee palpation.  Met             Long Term Goals Completed by 8-12 weeks Goal Status   Pt to improve LEFS score to 55/80 Progressing   Pt to improve BLE str to 4+/5 or greater Progressing   Pt to return to ambulating with quad cane Not Met   Pt to have 0/10 ADL pain during WB Not Met       Prognosis: [x] Good [] Fair  [] Poor    Patient Requires Follow-up: [x] Yes  [] No    Plan:   [x] Continue per plan of care [] Alter current plan (see comments)  [] Plan of care initiated [] Hold pending MD visit [] Discharge    Plan for Next Session:        Electronically signed by:  Shaista Zaman, PT

## 2023-03-14 ENCOUNTER — HOSPITAL ENCOUNTER (OUTPATIENT)
Dept: PHYSICAL THERAPY | Facility: HOSPITAL | Age: 63
Setting detail: THERAPIES SERIES
Discharge: HOME | End: 2023-03-14
Payer: COMMERCIAL

## 2023-03-14 PROCEDURE — 97110 THERAPEUTIC EXERCISES: CPT

## 2023-03-14 NOTE — FLOWSHEET NOTE
Physical Therapy Daily Treatment Note/Reassessment   Date:  3/14/2023    TIme In: 1138              Time Out: [address]    Patient Name:  Sudhir Noguera   :  1960  MRN: [id number]    Restrictions/Precautions:    Pertinent Medical History:  Medical/Treatment Diagnosis Information:  Lack of strength [R53.1]   R knee medial tibial plataeu fx  Insurance/Certification information:  Payor:  / Plan:  / Product Type: *No Product type* /   Physician Information:  NIRU Calixto  Plan of care signed (Y/N):    Visit# / total visits:     12 /    G-Code (if applicable):      Date / Visit # G-Code Applied:         Progress Note: [x]  Yes  []  No  Next due by: 23     Pain level:   2/10    Subjective:  Patient reports she is doing okay today. She expressed that she has an apt in [place] on Friday. Pt states her average pain while weight bearing is 2-3/10. Objective:  Observation:  Patient presents to the clinic ambulating with RW BLE knee braces donned on  Test measurements:  LEFS: 36/80. L knee AROM: 4-141 deg. R knee ext AROM: -2 deg. Strength  L  R     Hip flex  5/5  4+/5     Hip ext   4+/5  4/5     Hip abd  5/5  3+/5     Hip ER   3+/5  4+/5      Hip IR   4+/5  4+/5  Palpation: 0/10 pain with palpation. Exercises:  Exercise Resistance/Repetitions Other comments   Nustep L7x8' 14   HS curl 3x10 BTB B 14   LAQ 3x10x2\" 3#  15   Marches standing 3x10x2\" 3# 14   Bent knee fallout 3x10x2\" BTB 14   TKE 2x10x3\" B BTB 14   Prone IR/ER 2x10 B 3# 14   Prone hip ext  2x10 B #3  14   Standing HR/TR 2x10 14   St hip 3-way 2x10 OTB 14   Sit to stands 2x10 14   Mini lunges 2x8 each side 14   6\" step ups 2x8 each leg #3 14               Other Therapeutic Activities:  Re-assessment performed by Taylor Curtis DPT.     Manual Treatments:      Modalities:  Ice or MH as needed (patient deferred)      Timed Code Treatment Minutes:  50      Total Treatment Minutes: 54    Treatment/Activity Tolerance:  [x] Patient tolerated treatment well [] Patient limited by fatigue  [] Patient limited by pain  [] Patient limited by other medical complications  [x] Other: Pt reassessed this reporting period. Pt able to demonstrate improved left knee ext AROM as well as increased left hip IR strength. Pt scored less on LEFS possibly d/t her not being able to use her BLE knee brace for a few days. Pt reports she will be seeing the ortho on 3/17/23 for a follow up. Pt will benefit from continued PT services to further improve pt QOL and LOF by addressing impairments as listed      Pain after treatment:      0/10    Short Term Goals Completed by 4-6 weeks Goal Status   Pt to be I with HEP Met   Pt to improve L AROM knee ext to 0 Not Met   Pt to improve R AROM knee ext to 0 Not Met   Pt to improve L knee flexion AROM to 0-140 Met   Pt to have 0/10 pain with BLE knee palpation.  Met             Long Term Goals Completed by 8-12 weeks Goal Status   Pt to improve LEFS score to 55/80 Progressing   Pt to improve BLE str to 4+/5 or greater Progressing   Pt to return to ambulating with quad cane Not Met   Pt to have 0/10 ADL pain during WB Not Met       Prognosis: [x] Good [] Fair  [] Poor    Patient Requires Follow-up: [x] Yes  [] No    Plan:   [x] Continue per plan of care [] Alter current plan (see comments)  [] Plan of care initiated [] Hold pending MD visit [] Discharge    Plan for Next Session:        Electronically signed by:  Christiane Major PTA

## 2023-03-16 ENCOUNTER — HOSPITAL ENCOUNTER (OUTPATIENT)
Dept: PHYSICAL THERAPY | Facility: HOSPITAL | Age: 63
Setting detail: THERAPIES SERIES
Discharge: HOME | End: 2023-03-16
Payer: COMMERCIAL

## 2023-03-16 PROCEDURE — 97110 THERAPEUTIC EXERCISES: CPT

## 2023-03-16 NOTE — FLOWSHEET NOTE
Physical Therapy Daily Treatment Note  Date:  3/16/2023    TIme In: 1139              Time Out: 5107    Patient Name:  Kale Colbert   :  1960  MRN: [id number]    Restrictions/Precautions:    Pertinent Medical History:  Medical/Treatment Diagnosis Information:  Lack of strength [R53.1]   R knee medial tibial plataeu fx  Insurance/Certification information:  Payor: Abena Adames / Plan: Abena Adames / Product Type: *No Product type* /   Physician Information:  NIRU Pandey  Plan of care signed (Y/N):    Visit# / total visits:     16 /    G-Code (if applicable):      Date / Visit # G-Code Applied:         Progress Note: []  Yes  [x]  No  Next due by: 23     Pain level:   0/10    Subjective:  Patient reports she is scheduled for her appointment with the ortho tomorrow. Objective:  Observation:  Patient presents to the clinic ambulating with RW BLE knee braces donned on  Test measurements:  LEFS: 36/80. L knee AROM: 4-141 deg. R knee ext AROM: -2 deg. Strength  L  R     Hip flex  5/5  4+/5     Hip ext   4+/5  4/5     Hip abd  5/5  3+/5     Hip ER   3+/5  4+/5      Hip IR   4+/5  4+/5  Palpation: 0/10 pain with palpation.     Exercises:  Exercise Resistance/Repetitions Other comments   Nustep L7x8' 16   HS curl 3x10 BTB B 16   LAQ 3x10x2\" 3#  16   Marches standing 3x10x2\" 3# 16   Bent knee fallout 3x10x2\" BTB 16   TKE 2x10x3\" B BTB 16   Prone IR/ER 2x10 B 3# 16   Prone hip ext  2x10 B #3  16   Standing HR/TR 2x10 16   St hip 3-way 2x10 OTB 16   Sit to stands 2x10 16   Mini lunges 2x8 each side 16   6\" step ups 2x8 each leg #3 16               Other Therapeutic Activities:    Manual Treatments:      Modalities:  Ice or MH as needed (patient deferred)      Timed Code Treatment Minutes:  47      Total Treatment Minutes:  49    Treatment/Activity Tolerance:  [x] Patient tolerated treatment well [] Patient limited by fatigue  [] Patient limited by pain  [] Patient limited by other medical complications  [x] Other: Pt progressed well with exercises, less rest breaks required, occa cues for proper form. Pain after treatment:      0/10    Short Term Goals Completed by 4-6 weeks Goal Status   Pt to be I with HEP Met   Pt to improve L AROM knee ext to 0 Not Met   Pt to improve R AROM knee ext to 0 Not Met   Pt to improve L knee flexion AROM to 0-140 Met   Pt to have 0/10 pain with BLE knee palpation.  Met             Long Term Goals Completed by 8-12 weeks Goal Status   Pt to improve LEFS score to 55/80 Progressing   Pt to improve BLE str to 4+/5 or greater Progressing   Pt to return to ambulating with quad cane Not Met   Pt to have 0/10 ADL pain during WB Not Met       Prognosis: [x] Good [] Fair  [] Poor    Patient Requires Follow-up: [x] Yes  [] No    Plan:   [x] Continue per plan of care [] Alter current plan (see comments)  [] Plan of care initiated [] Hold pending MD visit [] Discharge    Plan for Next Session:        Electronically signed by:  Sarah Rosado, PT

## 2023-03-21 ENCOUNTER — HOSPITAL ENCOUNTER (OUTPATIENT)
Dept: PHYSICAL THERAPY | Facility: HOSPITAL | Age: 63
Setting detail: THERAPIES SERIES
Discharge: HOME | End: 2023-03-21
Payer: COMMERCIAL

## 2023-03-21 PROCEDURE — 97110 THERAPEUTIC EXERCISES: CPT

## 2023-03-21 NOTE — FLOWSHEET NOTE
limited by pain  [] Patient limited by other medical complications  [x] Other: Pt progressed well with increased resistance and will be progressed into work hardening activity next visit. Pain after treatment:      0/10    Short Term Goals Completed by 4-6 weeks Goal Status   Pt to be I with HEP Met   Pt to improve L AROM knee ext to 0 Not Met   Pt to improve R AROM knee ext to 0 Not Met   Pt to improve L knee flexion AROM to 0-140 Met   Pt to have 0/10 pain with BLE knee palpation.  Met             Long Term Goals Completed by 8-12 weeks Goal Status   Pt to improve LEFS score to 55/80 Progressing   Pt to improve BLE str to 4+/5 or greater Progressing   Pt to return to ambulating with quad cane Not Met   Pt to have 0/10 ADL pain during WB Not Met       Prognosis: [x] Good [] Fair  [] Poor    Patient Requires Follow-up: [x] Yes  [] No    Plan:   [x] Continue per plan of care [] Alter current plan (see comments)  [] Plan of care initiated [] Hold pending MD visit [] Discharge    Plan for Next Session:        Electronically signed by:  Samson Major PTA

## 2023-03-27 ENCOUNTER — APPOINTMENT (OUTPATIENT)
Dept: GENERAL RADIOLOGY | Facility: HOSPITAL | Age: 63
End: 2023-03-27
Payer: MEDICAID

## 2023-03-27 ENCOUNTER — HOSPITAL ENCOUNTER (EMERGENCY)
Facility: HOSPITAL | Age: 63
Discharge: HOME | End: 2023-03-27
Attending: EMERGENCY MEDICINE
Payer: MEDICAID

## 2023-03-27 VITALS
SYSTOLIC BLOOD PRESSURE: 134 MMHG | HEIGHT: 65 IN | HEART RATE: 86 BPM | BODY MASS INDEX: 20.66 KG/M2 | TEMPERATURE: 98.3 F | WEIGHT: 124 LBS | OXYGEN SATURATION: 100 % | RESPIRATION RATE: 18 BRPM | DIASTOLIC BLOOD PRESSURE: 83 MMHG

## 2023-03-27 DIAGNOSIS — S82.141A CLOSED FRACTURE OF RIGHT TIBIAL PLATEAU, INITIAL ENCOUNTER: ICD-10-CM

## 2023-03-27 DIAGNOSIS — M25.561 ACUTE PAIN OF RIGHT KNEE: Primary | ICD-10-CM

## 2023-03-27 PROCEDURE — 73562 X-RAY EXAM OF KNEE 3: CPT

## 2023-03-27 PROCEDURE — 6370000000 HC RX 637 (ALT 250 FOR IP): Performed by: EMERGENCY MEDICINE

## 2023-03-27 PROCEDURE — 99283 EMERGENCY DEPT VISIT LOW MDM: CPT

## 2023-03-27 RX ORDER — OXYCODONE HYDROCHLORIDE AND ACETAMINOPHEN 5; 325 MG/1; MG/1
1 TABLET ORAL ONCE
Status: COMPLETED | OUTPATIENT
Start: 2023-03-27 | End: 2023-03-27

## 2023-03-27 RX ORDER — OXYCODONE HYDROCHLORIDE AND ACETAMINOPHEN 5; 325 MG/1; MG/1
1 TABLET ORAL EVERY 6 HOURS PRN
Qty: 12 TABLET | Refills: 0 | Status: SHIPPED | OUTPATIENT
Start: 2023-03-27 | End: 2023-03-30

## 2023-03-27 RX ORDER — ONDANSETRON 4 MG/1
4 TABLET, ORALLY DISINTEGRATING ORAL ONCE
Status: COMPLETED | OUTPATIENT
Start: 2023-03-27 | End: 2023-03-27

## 2023-03-27 RX ADMIN — OXYCODONE AND ACETAMINOPHEN 1 TABLET: 5; 325 TABLET ORAL at 13:14

## 2023-03-27 RX ADMIN — ONDANSETRON 4 MG: 4 TABLET, ORALLY DISINTEGRATING ORAL at 13:14

## 2023-03-27 ASSESSMENT — PAIN SCALES - GENERAL
PAINLEVEL_OUTOF10: 10
PAINLEVEL_OUTOF10: 9

## 2023-03-27 ASSESSMENT — PAIN DESCRIPTION - DESCRIPTORS: DESCRIPTORS: THROBBING

## 2023-03-27 ASSESSMENT — PAIN DESCRIPTION - LOCATION
LOCATION: LEG
LOCATION: LEG

## 2023-03-27 ASSESSMENT — LIFESTYLE VARIABLES
HOW OFTEN DO YOU HAVE A DRINK CONTAINING ALCOHOL: NEVER
HOW MANY STANDARD DRINKS CONTAINING ALCOHOL DO YOU HAVE ON A TYPICAL DAY: PATIENT DOES NOT DRINK

## 2023-03-27 ASSESSMENT — PAIN DESCRIPTION - ORIENTATION
ORIENTATION: RIGHT
ORIENTATION: RIGHT

## 2023-03-27 ASSESSMENT — PAIN DESCRIPTION - PAIN TYPE: TYPE: ACUTE PAIN

## 2023-03-27 ASSESSMENT — PAIN - FUNCTIONAL ASSESSMENT: PAIN_FUNCTIONAL_ASSESSMENT: 0-10

## 2023-03-27 NOTE — DISCHARGE INSTRUCTIONS
Do not bear weight on your right lower extremity. Use your wheelchair and walker as directed. Keep using your knee immobilizer that you have.

## 2023-03-27 NOTE — ED PROVIDER NOTES
[address] ENCOUNTER      Pt Name: Melanie You  MRN: [id number]  YOB: 1960  Date of evaluation: 3/27/2023  Provider: Anastasia Carr MD    [address] [place] [address]       Chief Complaint   Patient presents with    Leg Pain     Right leg pain due to twisted right leg when turning around one hour ago. HISTORY OF PRESENT ILLNESS  (Location/Symptom, Timing/Onset, Context/Setting, Quality, Duration, Modifying Factors, Severity.)   Glenny Gonzalez is a 65 y.o. female who presents to the emergency department with concerns about severe pain to right knee and leg area after abnormally twisting when turning around this morning. She is unable to bear weight on the right lower extremity. She has had some problems with this right knee and leg. She is followed by Dr. Nicolas Olivares of orthopedic surgery at [facility]. She has had injuires to right tibial plateau. Problems began years ago (January 2020) after a fall. She has had a couple of right tibial plateau fractures, Nov 2021 and Oct 17, 2022 per pt. She had recently been cleared to begin bearing weight again on her right lower extremity. She has a walker, cane, and wheelchair at home. She has a  due to [employer] injury. Pain is 9/10 and worse with movement. She has a long knee immobilizer which she applied prior to arrival. Associated swelling to right knee. Symptoms feel like prior tibial plateau fracture. Pt denies other injuries. Nursing notes were reviewed. REVIEW OFSYSTEMS    (2-9 systems for level 4, 10 or more for level 5)   ROS:  General:  Denies recent fever. Musculoskeletal:  Positive as per HPI.     PAST MEDICAL HISTORY     Past Medical History:   Diagnosis Date    Allergic rhinitis     Arthritis     GERD (gastroesophageal reflux disease)     Hypertension     Rheumatoid arthritis ([facility] [address]          SURGICAL HISTORY       Past Surgical History:   Procedure Laterality

## 2023-03-27 NOTE — ED NOTES
Pt reports that she her right knee \"went out\" causing her to fall pt reports she landed on her butt, but twisted her knee. Pt has knee immobilizer on right leg, reports that is from \"the last time\" when she had tibial plato fx.         Martita Banuelos RN  03/27/23 9232

## 2023-03-27 NOTE — ED TRIAGE NOTES
Assisted patient to exam room Trauma 1 via wheelchair. Patient noted to have a Soft brace on the right leg. Patient stated \" I was turning and my knee gave out and I twisted my right leg. \" Patient rates pain 9/10 while smiling and moving herself over from the wheelchair to bed without difficulty.

## 2023-05-10 ENCOUNTER — HOSPITAL ENCOUNTER (OUTPATIENT)
Dept: PHYSICAL THERAPY | Facility: HOSPITAL | Age: 63
Setting detail: THERAPIES SERIES
Discharge: HOME | End: 2023-05-10
Payer: COMMERCIAL

## 2023-05-10 PROCEDURE — 97162 PT EVAL MOD COMPLEX 30 MIN: CPT

## 2023-05-10 PROCEDURE — 97110 THERAPEUTIC EXERCISES: CPT

## 2023-05-10 ASSESSMENT — PAIN DESCRIPTION - ORIENTATION: ORIENTATION: RIGHT

## 2023-05-10 ASSESSMENT — PAIN DESCRIPTION - LOCATION: LOCATION: KNEE

## 2023-05-10 ASSESSMENT — PAIN DESCRIPTION - PAIN TYPE: TYPE: CHRONIC PAIN

## 2023-05-10 ASSESSMENT — PAIN SCALES - GENERAL: PAINLEVEL_OUTOF10: 1

## 2023-05-10 NOTE — PROGRESS NOTES
Physical Therapy: Initial Evaluation    Patient: Ketan Alejo (20 y.o. female)   Examination Date:   Plan of Care Certification Period: 5/10/2023 to        :  1960 ;    Confirmed: Yes MRN: [id number]  CSN: [id number]   Insurance: Payor: Alcides Noriega / Plan: Alcides Noriega / Product Type: *No Product type* /   Insurance ID: [id number] - (Medicaid Managed) Secondary Insurance (if applicable):    Referring Physician: Kaylee Galicia PA-C   PCP: LUCILLE Johansen Visits to Date/Visits Approved:   /      No Show/Cancelled Appts:   /       Medical Diagnosis: Right knee pain [M25.561] S/P fx of R knee medial tibial plateau  Treatment Diagnosis: R knee medial tibial plataeu fx     PERTINENT MEDICAL HISTORY   Patient Assessed for Rehabilitation Services: Yes       Medical History: Chart Reviewed: Yes   Past Medical History:   Diagnosis Date    Allergic rhinitis     Arthritis     GERD (gastroesophageal reflux disease)     Hypertension     Rheumatoid arthritis ([facility] [address]      Surgical History:   Past Surgical History:   Procedure Laterality Date    ARM SURGERY Left     BREAST SURGERY  2019    lump    HERNIA REPAIR      HIP SURGERY Right 2021    right total hip athroplasty infection    KNEE SURGERY Left 2010    LEG SURGERY Right     femur    MANDIBLE FRACTURE SURGERY  2010    SKIN GRAFT  06/10/2021    TOTAL HIP ARTHROPLASTY Right     TUBAL LIGATION      UPPER GASTROINTESTINAL ENDOSCOPY      Dr Dewey Hurley       Medications:   Current Outpatient Medications:     buPROPion (WELLBUTRIN XL) 150 MG extended release tablet, Take 1 tablet by mouth every morning Needs appt with pcp for future refills, Disp: 30 tablet, Rfl: 0    ENBREL SURECLICK 50 MG/ML SOAJ, INJECT 1ML UNDER THE SKIN ONCE WEEKLY, Disp: 4 mL, Rfl: 0    docusate sodium (COLACE) 100 MG capsule, TAKE ONE CAPSULE 2 TIMES A DAY (Patient not taking: Reported on 3/27/2023), Disp: 60 capsule, Rfl: 0

## 2023-05-12 ENCOUNTER — HOSPITAL ENCOUNTER (OUTPATIENT)
Dept: PHYSICAL THERAPY | Facility: HOSPITAL | Age: 63
Setting detail: THERAPIES SERIES
Discharge: HOME | End: 2023-05-12
Payer: COMMERCIAL

## 2023-05-12 PROCEDURE — 97110 THERAPEUTIC EXERCISES: CPT

## 2023-05-12 PROCEDURE — 97140 MANUAL THERAPY 1/> REGIONS: CPT

## 2023-05-12 NOTE — FLOWSHEET NOTE
Physical Therapy Daily Treatment Note   Date:  2023    TIme In: 1102                     Time Out: 6068    Patient Name:  Marlon Myrick    :  1960  MRN: [id number]    Restrictions/Precautions:    Pertinent Medical History:  Medical/Treatment Diagnosis Information:  Right knee pain [W04.712]     Insurance/Certification information:  Payor: Velton Budds / Plan: Velton Budds / Product Type: *No Product type* /   Physician Information:  NIRU Vaca  Plan of care signed (Y/N):    Visit# / total visits:     2 /    G-Code (if applicable):      Date / Visit # G-Code Applied:         Progress Note: []  Yes  [x]  No  Next due by: Visit #10       Pain level: 0 /10      Subjective: Pt reports HEP compliance     Objective:  Observation:   Test measurements:    Palpation:    Exercises:  Exercise Resistance/Repetitions Date performed   Nustep level 6x7' 12   Standing TKE  3x10 12   Seated Hip abd  3x10 2\" GTB R 12   Seated HS curls  3x10 GTB 12   Seated IR/ER  2x10 each way R 12   HS stretch  3x30\" RLE 12    SLR  2x10 12 #2 next time   Quad set  3x10 2\" 12                              Other Therapeutic Activities:      Manual Treatments:  Manual STM to knee region, gentle knee ext/flex mobs, end range knee flexion PROM applied biofreeze post tx x12'    Modalities:        Timed Code Treatment Minutes:  40      Total Treatment Minutes:  43    Treatment/Activity Tolerance:  [x] Patient tolerated treatment well [] Patient limited by fatigue  [] Patient limited by pain  [] Patient limited by other medical complications  [x] Other: Pt progressed through all exercises with min cues for proper form. Increased pain during end range knee flexion, no pain post tx.      Pain after treatment:      0/10    Prognosis: [x] Good [] Fair  [] Poor    Patient Requires Follow-up: [x] Yes  [] No    Plan:   [x] Continue per plan of care [] Alter current plan (see comments)  [] Plan of care initiated [] Hold pending

## 2023-05-16 ENCOUNTER — HOSPITAL ENCOUNTER (OUTPATIENT)
Dept: PHYSICAL THERAPY | Facility: HOSPITAL | Age: 63
Setting detail: THERAPIES SERIES
Discharge: HOME | End: 2023-05-16
Payer: COMMERCIAL

## 2023-05-16 DIAGNOSIS — F32.9 REACTIVE DEPRESSION: ICD-10-CM

## 2023-05-16 DIAGNOSIS — G25.81 RLS (RESTLESS LEGS SYNDROME): ICD-10-CM

## 2023-05-16 DIAGNOSIS — M81.0 OSTEOPOROSIS WITHOUT CURRENT PATHOLOGICAL FRACTURE, UNSPECIFIED OSTEOPOROSIS TYPE: ICD-10-CM

## 2023-05-16 DIAGNOSIS — M79.2 NEUROPATHIC PAIN: ICD-10-CM

## 2023-05-16 DIAGNOSIS — E53.8 B12 DEFICIENCY: ICD-10-CM

## 2023-05-16 PROCEDURE — 97140 MANUAL THERAPY 1/> REGIONS: CPT

## 2023-05-16 PROCEDURE — 97110 THERAPEUTIC EXERCISES: CPT

## 2023-05-16 RX ORDER — GABAPENTIN 300 MG/1
CAPSULE ORAL
Qty: 90 CAPSULE | Refills: 1 | OUTPATIENT
Start: 2023-05-16 | End: 2023-06-14

## 2023-05-16 RX ORDER — DOCUSATE SODIUM 100 MG/1
CAPSULE, LIQUID FILLED ORAL
Qty: 60 CAPSULE | Refills: 0 | OUTPATIENT
Start: 2023-05-16

## 2023-05-16 RX ORDER — MEDROXYPROGESTERONE ACETATE 150 MG/ML
INJECTION, SUSPENSION INTRAMUSCULAR
Qty: 4 ML | Refills: 0 | OUTPATIENT
Start: 2023-05-16

## 2023-05-16 RX ORDER — ALENDRONATE SODIUM 70 MG/1
70 TABLET ORAL
Qty: 4 TABLET | Refills: 0 | OUTPATIENT
Start: 2023-05-16

## 2023-05-16 RX ORDER — ROPINIROLE 0.5 MG/1
0.5 TABLET, FILM COATED ORAL NIGHTLY
Qty: 30 TABLET | Refills: 2 | OUTPATIENT
Start: 2023-05-16

## 2023-05-16 RX ORDER — CHOLECALCIFEROL (VITAMIN D3) 50 MCG
TABLET ORAL
Qty: 30 TABLET | Refills: 5 | OUTPATIENT
Start: 2023-05-16

## 2023-05-16 RX ORDER — CYANOCOBALAMIN 1000 UG/ML
INJECTION, SOLUTION INTRAMUSCULAR; SUBCUTANEOUS
Qty: 1 ML | Refills: 0 | OUTPATIENT
Start: 2023-05-16

## 2023-05-16 RX ORDER — PREDNISONE 10 MG/1
10 TABLET ORAL DAILY PRN
Qty: 30 TABLET | Refills: 0 | OUTPATIENT
Start: 2023-05-16

## 2023-05-16 NOTE — FLOWSHEET NOTE
Physical Therapy Daily Treatment Note   Date:  2023    TIme In:     1138                 Time Out: 1214    Patient Name:  Demetrice Dozier    :  1960  MRN: [id number]    Restrictions/Precautions:    Pertinent Medical History:  Medical/Treatment Diagnosis Information:  No admission diagnoses are documented for this encounter. Insurance/Certification information:  Payor: Carlito Tsang / Plan: Issa Hind / Product Type: *No Product type* /   Physician Information:  No ref. provider found  Plan of care signed (Y/N):    Visit# / total visits:     3 /    G-Code (if applicable):      Date / Visit # G-Code Applied:         Progress Note: []  Yes  [x]  No  Next due by: Visit #10       Pain level:   0/10      Subjective: Pt reports her knee is doing okay and the biofreeze really helps. Objective:  Observation:   Test measurements:    Palpation:    Exercises:  Exercise Resistance/Repetitions Date performed   Nustep level 6x7' 16   Standing TKE  3x10 GTB 16    Seated Hip abd  3x10 2\" GTB R 16   Seated HS curls  3x10 GTB 16   Seated IR/ER  2x10 each way R 16   HS stretch  3x30\" RLE 16   SLR  2x10 2# 16   Quad set  3x10 2\" 16   Standing marching  2x1' Next time   Standing abd/flex X20 each way Next time   Balance board  X1' each way Next time   Slant board  2x45\" Next time          Other Therapeutic Activities:      Manual Treatments:  Manual STM to knee region, gentle knee ext/flex mobs, end range knee flexion PROM applied biofreeze post tx x12'    Modalities:        Timed Code Treatment Minutes:  35      Total Treatment Minutes:  36    Treatment/Activity Tolerance:  [x] Patient tolerated treatment well [] Patient limited by fatigue  [] Patient limited by pain  [] Patient limited by other medical complications  [x] Other: Pt completed tx with no pain and responds well to manual and biofreeze application.     Pain after treatment:      0/10    Prognosis: [x] Good [] Fair  [] Poor    Patient

## 2023-05-18 ENCOUNTER — HOSPITAL ENCOUNTER (OUTPATIENT)
Dept: PHYSICAL THERAPY | Facility: HOSPITAL | Age: 63
Setting detail: THERAPIES SERIES
Discharge: HOME | End: 2023-05-18
Payer: COMMERCIAL

## 2023-05-18 DIAGNOSIS — M79.2 NEUROPATHIC PAIN: ICD-10-CM

## 2023-05-18 PROCEDURE — 97110 THERAPEUTIC EXERCISES: CPT

## 2023-05-18 PROCEDURE — 97140 MANUAL THERAPY 1/> REGIONS: CPT

## 2023-05-18 RX ORDER — GABAPENTIN 300 MG/1
CAPSULE ORAL
Qty: 90 CAPSULE | Refills: 1 | OUTPATIENT
Start: 2023-05-18 | End: 2023-06-16

## 2023-05-18 RX ORDER — DOCUSATE SODIUM 100 MG/1
CAPSULE, LIQUID FILLED ORAL
Qty: 60 CAPSULE | Refills: 0 | OUTPATIENT
Start: 2023-05-18

## 2023-05-18 RX ORDER — PREDNISONE 10 MG/1
10 TABLET ORAL DAILY PRN
Qty: 30 TABLET | Refills: 0 | OUTPATIENT
Start: 2023-05-18

## 2023-05-23 ENCOUNTER — HOSPITAL ENCOUNTER (OUTPATIENT)
Dept: PHYSICAL THERAPY | Facility: HOSPITAL | Age: 63
Setting detail: THERAPIES SERIES
Discharge: HOME | End: 2023-05-23
Payer: COMMERCIAL

## 2023-05-23 PROCEDURE — 97110 THERAPEUTIC EXERCISES: CPT

## 2023-05-23 PROCEDURE — 97140 MANUAL THERAPY 1/> REGIONS: CPT

## 2023-05-23 NOTE — FLOWSHEET NOTE
No    Plan:   [x] Continue per plan of care [] Alter current plan (see comments)  [] Plan of care initiated [] Hold pending MD visit [] Discharge    Plan for Next Session:        Electronically signed by:  Aga Clemente PTA

## 2023-05-25 ENCOUNTER — HOSPITAL ENCOUNTER (OUTPATIENT)
Dept: PHYSICAL THERAPY | Facility: HOSPITAL | Age: 63
Setting detail: THERAPIES SERIES
Discharge: HOME | End: 2023-05-25
Payer: COMMERCIAL

## 2023-05-25 PROCEDURE — 97110 THERAPEUTIC EXERCISES: CPT

## 2023-05-25 PROCEDURE — 97140 MANUAL THERAPY 1/> REGIONS: CPT

## 2023-05-25 NOTE — FLOWSHEET NOTE
Physical Therapy Daily Treatment Note   Date:  2023    TIme In:    1143             Time Out:  6504    Patient Name:  Elizabeth Betancur    :  1960  MRN: [id number]    Restrictions/Precautions:    Pertinent Medical History:  Medical/Treatment Diagnosis Information:  No admission diagnoses are documented for this encounter. Insurance/Certification information:  Payor: Jeaneth Goldberg / Plan: [employer] Law / Product Type: *No Product type* /   Physician Information:  No ref. provider found  Plan of care signed (Y/N):    Visit# / total visits:     6 /    G-Code (if applicable):      Date / Visit # G-Code Applied:         Progress Note: []  Yes  [x]  No  Next due by: Visit #10       Pain level:   2/10      Subjective:   Patient reports her knee is hurting a little bit today. She expressed that she hopes she doesn't fall anymore. Objective:  Observation:   Test measurements:    Palpation:    Exercises:  Exercise Resistance/Repetitions Date performed   Nustep level 6x7' 25   Standing TKE  3x10 GTB RLE 25   Seated Hip abd  3x10 2\" GTB R 25   Seated HS curls  3x10 GTB 25   Seated IR/ER  2x10 each way R 25   Standing marching  2' 25   Standing abd X20 each way 25   Standing hip flex/straight leg X20 RLE 25   Balance board X1' each way 22   Slant board 2x45\" 25   HS stretch 3x30\" RLE 25   SLR 2x10 2# 25   Quad sets 3x10 2\" 25   RSL clamshells 3x10 GTB 25                    Other Therapeutic Activities:      Manual Treatments:  Manual STM to knee region, gentle knee ext/flex mobs, end range knee flexion PROM applied biofreeze post tx x13'    Modalities:        Timed Code Treatment Minutes:  54      Total Treatment Minutes:  55    Treatment/Activity Tolerance:  [x] Patient tolerated treatment well [] Patient limited by fatigue  [] Patient limited by pain  [] Patient limited by other medical complications  [x] Other: Pt completed tx with no pain and responds well to biofreeze application.     Pain

## 2023-06-01 ENCOUNTER — HOSPITAL ENCOUNTER (OUTPATIENT)
Dept: PHYSICAL THERAPY | Facility: HOSPITAL | Age: 63
Setting detail: THERAPIES SERIES
Discharge: HOME | End: 2023-06-01
Payer: COMMERCIAL

## 2023-06-01 PROCEDURE — 97140 MANUAL THERAPY 1/> REGIONS: CPT

## 2023-06-01 PROCEDURE — 97110 THERAPEUTIC EXERCISES: CPT

## 2023-06-01 NOTE — FLOWSHEET NOTE
Physical Therapy Daily Treatment Note   Date:  2023    TIme In: 1137             Time Out: 200    Patient Name:  Marlon Myrick    :  1960  MRN: [id number]    Restrictions/Precautions:    Pertinent Medical History:  Medical/Treatment Diagnosis Information:  Right knee pain [V18.112]     Insurance/Certification information:  Payor: Velton Budds / Plan: Velton Budds / Product Type: *No Product type* /   Physician Information:  NIRU Vaca  Plan of care signed (Y/N):    Visit# / total visits:     7 /    G-Code (if applicable):      Date / Visit # G-Code Applied:         Progress Note: []  Yes  [x]  No  Next due by: Visit #10       Pain level:  0/10      Subjective:   Patient reports her knee is not bothering her today     Objective:  Observation:   Test measurements:    Palpation:    Exercises:  Exercise Resistance/Repetitions Date performed   Nustep level 6x7' 1   Standing TKE  3x10 GTB RLE 1   Standing marching 2' 1 #2 next time   Standing abd X20 each way 1 #2 next time   Standing hip flex/straight leg X20 RLE 1 #2 next time   Slant board 2x45\" 1   Balance board X1' each way 1        Seated Hip abd  3x10 2\" GTB R 1   Seated HS curls  3x10 GTB 1   Seated IR/ER  2x10 each way R 1         HS stretch 3x30\" RLE 1   SLR 3x10 2# 1   Quad sets 3x10 2\" 1   RSL clamshells 3x10 GTB 1                    Other Therapeutic Activities:      Manual Treatments:  Manual STM to knee region, gentle knee ext/flex mobs, end range knee flexion PROM applied biofreeze post tx x13'    Modalities:        Timed Code Treatment Minutes:  52      Total Treatment Minutes:  53    Treatment/Activity Tolerance:  [x] Patient tolerated treatment well [] Patient limited by fatigue  [] Patient limited by pain  [] Patient limited by other medical complications  [x] Other: Pt completed tx with no pain, with pain only noted at end range PROM knee flexion. Pt completed tx with no pain.    Pain after treatment:

## 2023-06-08 ENCOUNTER — HOSPITAL ENCOUNTER (OUTPATIENT)
Dept: PHYSICAL THERAPY | Facility: HOSPITAL | Age: 63
Setting detail: THERAPIES SERIES
Discharge: HOME | End: 2023-06-08
Payer: COMMERCIAL

## 2023-06-08 PROCEDURE — 97140 MANUAL THERAPY 1/> REGIONS: CPT

## 2023-06-08 PROCEDURE — 97110 THERAPEUTIC EXERCISES: CPT

## 2023-06-08 NOTE — FLOWSHEET NOTE
STM to knee region, gentle knee ext/flex mobs, end range knee flexion PROM applied biofreeze post tx x10'    Modalities:        Timed Code Treatment Minutes:  45      Total Treatment Minutes:  46    GOALS   Patient Goal(s):    Short Term Goals Completed by 4-6 weeks Goal Status   Pt to be I with HEP MET   Pt to improve R AROM knee ext to 0 MET   Pt to have 0/10 knee pain with palpation MET   Pt to have 0/10 pain with RLE knee flexion AROM Progressing                                  Long Term Goals Completed by 8-12 weeks Goal Status   Pt to improve LEFS score to 55/80 Not Met    Pt to improve Gross RLE str to 4+/5 or greater Progressing    Pt to have 0/10 ADL pain during WB Progressing   Pt to report no falls during reporting period MET       Treatment/Activity Tolerance:  [x] Patient tolerated treatment well [] Patient limited by fatigue  [] Patient limited by pain  [] Patient limited by other medical complications  [x] Other: Pt completed tx with no pain, pt reassessed this reporting period, pt met 3/4 STG and 1/4 LTG, pt demonstrated improved RLE strength, improved AROM, decreased pain levels, and decreased pain with palpation. Pt will benefit from continued PT services to further improve pt QOL and LOF by addressing impairments as listed in POC. Pt exercises adjusted for next session.        Pain after treatment:      0/10    Prognosis: [x] Good [] Fair  [] Poor    Patient Requires Follow-up: [x] Yes  [] No    Plan:   [x] Continue per plan of care [] Alter current plan (see comments)  [] Plan of care initiated [] Hold pending MD visit [] Discharge    Plan for Next Session:        Electronically signed by:  Sarah Rosado, PT

## 2023-06-14 DIAGNOSIS — M81.0 OSTEOPOROSIS WITHOUT CURRENT PATHOLOGICAL FRACTURE, UNSPECIFIED OSTEOPOROSIS TYPE: ICD-10-CM

## 2023-06-14 DIAGNOSIS — Z72.0 TOBACCO ABUSE: ICD-10-CM

## 2023-06-14 DIAGNOSIS — E53.8 B12 DEFICIENCY: ICD-10-CM

## 2023-06-14 DIAGNOSIS — F32.9 REACTIVE DEPRESSION: ICD-10-CM

## 2023-06-14 DIAGNOSIS — M79.2 NEUROPATHIC PAIN: ICD-10-CM

## 2023-06-14 RX ORDER — BUPROPION HYDROCHLORIDE 150 MG/1
TABLET ORAL
Qty: 30 TABLET | Refills: 3 | OUTPATIENT
Start: 2023-06-14

## 2023-06-14 RX ORDER — ALENDRONATE SODIUM 70 MG/1
70 TABLET ORAL
Qty: 4 TABLET | Refills: 0 | OUTPATIENT
Start: 2023-06-14

## 2023-06-14 RX ORDER — CYANOCOBALAMIN 1000 UG/ML
INJECTION, SOLUTION INTRAMUSCULAR; SUBCUTANEOUS
Qty: 1 ML | Refills: 0 | OUTPATIENT
Start: 2023-06-14

## 2023-06-14 RX ORDER — GABAPENTIN 300 MG/1
CAPSULE ORAL
Qty: 90 CAPSULE | Refills: 1 | OUTPATIENT
Start: 2023-06-14 | End: 2023-07-13

## 2023-06-14 RX ORDER — PREDNISONE 10 MG/1
10 TABLET ORAL DAILY PRN
Qty: 30 TABLET | Refills: 0 | OUTPATIENT
Start: 2023-06-14

## 2023-06-14 RX ORDER — DOCUSATE SODIUM 100 MG/1
CAPSULE, LIQUID FILLED ORAL
Qty: 60 CAPSULE | Refills: 0 | OUTPATIENT
Start: 2023-06-14

## 2023-06-14 RX ORDER — MEDROXYPROGESTERONE ACETATE 150 MG/ML
INJECTION, SUSPENSION INTRAMUSCULAR
Qty: 4 ML | Refills: 0 | OUTPATIENT
Start: 2023-06-14

## 2023-06-20 ENCOUNTER — HOSPITAL ENCOUNTER (OUTPATIENT)
Dept: PHYSICAL THERAPY | Facility: HOSPITAL | Age: 63
Setting detail: THERAPIES SERIES
Discharge: HOME | End: 2023-06-20
Payer: COMMERCIAL

## 2023-06-20 PROCEDURE — 97140 MANUAL THERAPY 1/> REGIONS: CPT

## 2023-06-20 PROCEDURE — 97110 THERAPEUTIC EXERCISES: CPT

## 2023-06-20 NOTE — FLOWSHEET NOTE
Physical Therapy Daily Treatment Note  Date:  23      TIme In:     1107         Time Out: 1210    Patient Name:  Marlon Myrick    :  1960  MRN: [id number]    Restrictions/Precautions:    Pertinent Medical History:  Medical/Treatment Diagnosis Information:  Right knee pain [B12.391]     Insurance/Certification information:  Payor: Love Valencia / Plan: Nova Hidmaria l / Product Type: *No Product type* /   Physician Information:  No ref. provider found  Plan of care signed (Y/N):    Visit# / total visits:     8 /    G-Code (if applicable):      Date / Visit # G-Code Applied:         Progress Note: []  Yes  [x]  No  Next due by: Visit 23      Pain level:   0/10      Subjective:   Patient states she is doing okay today. Her legs are about the same.      Objective:  Observation:   Test measurements:    LEFS 39/80  AROM RLE (degrees)  R Knee Flexion (0-145): 0-140 4/10 pain  R AROM knee ext 0  0/10 tenderness on medial side  4/10 ADL pain during WB  Strength RLE  R Hip Flexion: 4/5  R Hip Extension: 3+/5  R Hip ABduction: 3+/5  R Hip Internal Rotation: 3/5  R Hip External Rotation: 3/5  R Knee Flexion: 4+/5  R Knee Extension: 5/5  R Ankle Dorsiflexion: 5/5  falls during reporting period- No  Palpation:    Exercises:  Exercise Resistance/Repetitions Date performed   Nustep level 6x7' 20   Standing TKE  3x10 GTB RLE 20   Standing marching 2' #2  20   Standing abd X20 each way #2  20   Standing hip flex/straight leg X20 RLE #2  20   Slant board 2x45\" 20   Balance board X1' each way 20   TM  X 0.5 lap 1.0 speed  20   Chair squats 1 foam  2x8 20   Lat band walk 3 laps GTB 20        Seated Hip abd  3x10 2\" GTB R 20   Seated HS curls  3x10 GTB 20   Seated IR/ER  2x10 each way R 20         HS stretch 3x30\" RLE 20   SLR 3x10 2# 20   RSL clamshells 3x10 GTB 20               Other Therapeutic Activities:      Manual Treatments:  Manual STM to knee region, gentle knee ext/flex mobs, end range knee

## 2023-06-22 ENCOUNTER — HOSPITAL ENCOUNTER (OUTPATIENT)
Dept: PHYSICAL THERAPY | Facility: HOSPITAL | Age: 63
Setting detail: THERAPIES SERIES
Discharge: HOME | End: 2023-06-22
Payer: COMMERCIAL

## 2023-06-22 PROCEDURE — 97110 THERAPEUTIC EXERCISES: CPT

## 2023-06-22 PROCEDURE — 97140 MANUAL THERAPY 1/> REGIONS: CPT

## 2023-06-22 NOTE — FLOWSHEET NOTE
ext/flex mobs, end range knee flexion PROM applied biofreeze post tx x10'    Modalities:        Timed Code Treatment Minutes:  54      Total Treatment Minutes:  54    GOALS   Patient Goal(s):    Short Term Goals Completed by 4-6 weeks Goal Status   Pt to be I with HEP MET   Pt to improve R AROM knee ext to 0 MET   Pt to have 0/10 knee pain with palpation MET   Pt to have 0/10 pain with RLE knee flexion AROM Progressing                                  Long Term Goals Completed by 8-12 weeks Goal Status   Pt to improve LEFS score to 55/80 Not Met    Pt to improve Gross RLE str to 4+/5 or greater Progressing    Pt to have 0/10 ADL pain during WB Progressing   Pt to report no falls during reporting period MET       Treatment/Activity Tolerance:  [x] Patient tolerated treatment well [] Patient limited by fatigue  [] Patient limited by pain  [] Patient limited by other medical complications  [x] Other: Pt performed all activity well with decreased rest breaks. Pt responded well with manual with decreased tenderness and no c/o of pain post tx.      Pain after treatment:      0/10    Prognosis: [x] Good [] Fair  [] Poor    Patient Requires Follow-up: [x] Yes  [] No    Plan:   [x] Continue per plan of care [] Alter current plan (see comments)  [] Plan of care initiated [] Hold pending MD visit [] Discharge    Plan for Next Session:        Electronically signed by:  Naif Pimentel PT

## 2023-07-06 ENCOUNTER — HOSPITAL ENCOUNTER (OUTPATIENT)
Dept: PHYSICAL THERAPY | Facility: HOSPITAL | Age: 63
Setting detail: THERAPIES SERIES
Discharge: HOME | End: 2023-07-06
Payer: COMMERCIAL

## 2023-07-06 PROCEDURE — 97110 THERAPEUTIC EXERCISES: CPT

## 2023-07-06 PROCEDURE — 97140 MANUAL THERAPY 1/> REGIONS: CPT

## 2023-07-06 NOTE — FLOWSHEET NOTE
Physical Therapy Daily Treatment Note  Date:  23      TIme In: 1140          Time Out:     Patient Name:  Leo Horowitz    :  1960  MRN: [id number]    Restrictions/Precautions:    Pertinent Medical History:  Medical/Treatment Diagnosis Information:  Right knee pain [F90.619]     Insurance/Certification information:  Payor: Jing Hawk / Plan: Mao  / Product Type: *No Product type* /   Physician Information:  No ref. provider found  Plan of care signed (Y/N):    Visit# / total visits:     8 /    G-Code (if applicable):      Date / Visit # G-Code Applied:         Progress Note: []  Yes  [x]  No  Next due by: Visit 23      Pain level:   0/10      Subjective:   Patient states she has good days and bad days. Objective:  Observation:   Test measurements:    LEFS 45/80  AROM RLE (degrees)  R Knee Flexion (0-145): 0-144 4/10 pain  R AROM knee ext 0  0/10 tenderness on medial side  4/10 ADL pain during WB  Strength RLE  R Hip Flexion: 4/5  R Hip Extension: 4+/5  R Hip ABduction: 5/5  R Hip Internal Rotation: 3+/5  R Hip External Rotation: 3+/5  R Knee Flexion: 4+/5  R Knee Extension: 5/5  R Ankle Dorsiflexion: 5/5  falls during reporting period- No  Palpation:    Exercises:  Exercise Resistance/Repetitions Date performed   Nustep level 6x7' 6   Standing TKE  3x10 GTB RLE 6   Standing marching 2' #2  6   Standing abd X20 each way #2  6   Standing hip flex/straight leg X20 RLE #2  6   Slant board 2x45\" 6   Balance board X1' each way 6   TM  X 1.0 speed 5mins 6   Chair squats 1 foam  2x8 6   Lat band walk 3 laps GTB 6        Seated Hip abd  3x10 2\" GTB R 6   Seated HS curls  3x10 GTB 6   Seated IR/ER  2x10 each way R 6         HS stretch 3x30\" RLE 6   SLR 3x10 2# 6   RSL clamshells 3x10 GTB 6               Other Therapeutic Activities:  Re-assess performed by Katelyn Kolb PT.     Manual Treatments:  Manual STM to knee region, gentle knee ext/flex mobs, end range knee flexion

## 2023-07-11 ENCOUNTER — HOSPITAL ENCOUNTER (OUTPATIENT)
Dept: PHYSICAL THERAPY | Facility: HOSPITAL | Age: 63
Setting detail: THERAPIES SERIES
Discharge: HOME | End: 2023-07-11
Payer: COMMERCIAL

## 2023-07-11 PROCEDURE — 97110 THERAPEUTIC EXERCISES: CPT

## 2023-07-11 PROCEDURE — 97140 MANUAL THERAPY 1/> REGIONS: CPT

## 2023-07-11 NOTE — FLOWSHEET NOTE
Physical Therapy Daily Treatment Note   Date:  23    TIme In: 1107          Time Out: 1205    Patient Name:  Sondra Aburto    :  1960  MRN: [id number]    Restrictions/Precautions:    Pertinent Medical History:  Medical/Treatment Diagnosis Information:  Right knee pain [H78.363]     Insurance/Certification information:  Payor: Glenys Pacheco / Plan: Alben Stalls / Product Type: *No Product type* /   Physician Information:  NIRU Valle  Plan of care signed (Y/N):    Visit# / total visits:     8 /    G-Code (if applicable):      Date / Visit # G-Code Applied:         Progress Note: []  Yes  [x]  No  Next due by: 23      Pain level:   0/10      Subjective:   Patient states her leg always feels better when she uses biofreeze on it. She states that when its bent it doesn't hurt as bad as it used to but she can feel the pressure from it.        Objective:  Observation:   Test measurements:    LEFS 45/80  AROM RLE (degrees)  R Knee Flexion (0-145): 0-144 4/10 pain  R AROM knee ext 0  0/10 tenderness on medial side  4/10 ADL pain during WB  Strength RLE  R Hip Flexion: 4/5  R Hip Extension: 4+/5  R Hip ABduction: 5/5  R Hip Internal Rotation: 3+/5  R Hip External Rotation: 3+/5  R Knee Flexion: 4+/5  R Knee Extension: 5/5  R Ankle Dorsiflexion: 5/5  falls during reporting period- No  Palpation:    Exercises:  Exercise Resistance/Repetitions Date performed   Nustep level 6x7' 11   Standing TKE  3x10 GTB RLE 11   Standing marching 2' #2  11   Standing abd X20 each way #2  11   Standing hip flex/straight leg X20 RLE #2  11   Slant board 2x45\" 11   Balance board X1' each way 11   TM  X 1.0 speed 5mins --   Chair squats 1 foam  2x8 11   Lat band walk 3 laps GTB 11        Seated Hip abd  3x10 2\" GTB R 11   Seated HS curls  3x10 GTB 11   Seated IR/ER  2x10 each way R 11         HS stretch 3x30\" RLE 11   SLR 3x10 2# 11   RSL clamshells 3x10 GTB 11               Other Therapeutic Activities:

## 2023-07-13 ENCOUNTER — HOSPITAL ENCOUNTER (OUTPATIENT)
Dept: PHYSICAL THERAPY | Facility: HOSPITAL | Age: 63
Setting detail: THERAPIES SERIES
Discharge: HOME | End: 2023-07-13
Payer: COMMERCIAL

## 2023-07-13 PROCEDURE — 97140 MANUAL THERAPY 1/> REGIONS: CPT

## 2023-07-13 PROCEDURE — 97110 THERAPEUTIC EXERCISES: CPT

## 2023-07-13 NOTE — FLOWSHEET NOTE
Physical Therapy Daily Treatment Note   Date:  23    TIme In: 1106          Time Out: 3976    Patient Name:  Leo Horowitz    :  1960  MRN: [id number]    Restrictions/Precautions:    Pertinent Medical History:  Medical/Treatment Diagnosis Information:  Right knee pain [D67.549]     Insurance/Certification information:  Payor: Jing Hawk / Plan: Mao  / Product Type: *No Product type* /   Physician Information:  NIRU Schultz  Plan of care signed (Y/N):    Visit# / total visits:     8 /    G-Code (if applicable):      Date / Visit # G-Code Applied:         Progress Note: []  Yes  [x]  No  Next due by: 23      Pain level:   2/10      Subjective:   Patient states her knee is hurting more today and she can tell we are going to get bad weather.      Objective:  Observation:   Test measurements:    LEFS 45/80  AROM RLE (degrees)  R Knee Flexion (0-145): 0-144 4/10 pain  R AROM knee ext 0  0/10 tenderness on medial side  4/10 ADL pain during WB  Strength RLE  R Hip Flexion: 4/5  R Hip Extension: 4+/5  R Hip ABduction: 5/5  R Hip Internal Rotation: 3+/5  R Hip External Rotation: 3+/5  R Knee Flexion: 4+/5  R Knee Extension: 5/5  R Ankle Dorsiflexion: 5/5  falls during reporting period- No  Palpation:    Exercises:  Exercise Resistance/Repetitions Date performed   Nustep level 6x7' 13   Standing TKE  3x10 GTB RLE 13   Standing marching 2' #2  13   Standing abd X20 each way #2  13   Standing hip flex/straight leg X20 RLE #2  13   Slant board 2x45\" 13   Balance board X1' each way 13   TM  X 1.0 speed 5mins --   Chair squats 1 foam  2x8 13   Lat band walk 3 laps GTB 13        Seated Hip abd  3x10 2\" GTB R 13   Seated HS curls  3x10 GTB 13   Seated IR/ER  2x10 each way R 13         HS stretch 3x30\" RLE 13   SLR 3x10 2# 13   RSL clamshells 3x10 GTB 13               Other Therapeutic Activities:      Manual Treatments:  Manual STM to knee region, gentle knee ext/flex mobs, end

## 2023-07-18 ENCOUNTER — HOSPITAL ENCOUNTER (OUTPATIENT)
Dept: PHYSICAL THERAPY | Facility: HOSPITAL | Age: 63
Setting detail: THERAPIES SERIES
Discharge: HOME | End: 2023-07-18
Payer: COMMERCIAL

## 2023-07-18 PROCEDURE — 97110 THERAPEUTIC EXERCISES: CPT

## 2023-07-18 PROCEDURE — 97140 MANUAL THERAPY 1/> REGIONS: CPT

## 2023-07-18 NOTE — FLOWSHEET NOTE
Physical Therapy Daily Treatment Note   Date:  23    TIme In: 1108          Time Out: 6518    Patient Name:  Terrell Greer    :  1960  MRN: [id number]    Restrictions/Precautions:    Pertinent Medical History:  Medical/Treatment Diagnosis Information:  Right knee pain [L88.251]     Insurance/Certification information:  Payor: [facility] / Plan: Margaretta High / Product Type: *No Product type* /   Physician Information:  NIRU Tan  Plan of care signed (Y/N):    Visit# / total visits:     8 /    G-Code (if applicable):      Date / Visit # G-Code Applied:         Progress Note: []  Yes  [x]  No  Next due by: 23      Pain level:  0/10      Subjective:   Patient states her knee is not bothering her today     Objective:  Observation:   Test measurements:    LEFS 45/80  AROM RLE (degrees)  R Knee Flexion (0-145): 0-144 4/10 pain  R AROM knee ext 0  0/10 tenderness on medial side  4/10 ADL pain during WB  Strength RLE  R Hip Flexion: 4/5  R Hip Extension: 4+/5  R Hip ABduction: 5/5  R Hip Internal Rotation: 3+/5  R Hip External Rotation: 3+/5  R Knee Flexion: 4+/5  R Knee Extension: 5/5  R Ankle Dorsiflexion: 5/5  falls during reporting period- No  Palpation:    Exercises:  Exercise Resistance/Repetitions Date performed   Nustep level 6x7' 18   Standing TKE  3x10 GTB RLE 18 BTB next time   Standing marching 2' #2  18   Standing abd X20 each way #2  18 #3 next time   Standing hip flex/straight leg X20 RLE #2  18 #3 next time   Slant board 2x45\" 18   Balance board X1' each way 18   TM  X 1.0 speed 5mins -- attempt next visit   Chair squats 1 foam  2x8 18   Lat band walk 3 laps GTB 18 BTB next time        Seated Hip abd  3x10 2\" GTB R 18 BTB next time   Seated HS curls  3x10 GTB 18 BTB next time   Seated IR/ER  2x10 each way R 18         HS stretch 3x30\" RLE 18   SLR 3x10 #2 18 #3 next time   RSL clamshells 3x10 GTB 18 BTB next time               Other Therapeutic Activities:

## 2023-07-20 ENCOUNTER — HOSPITAL ENCOUNTER (OUTPATIENT)
Dept: PHYSICAL THERAPY | Facility: HOSPITAL | Age: 63
Setting detail: THERAPIES SERIES
Discharge: HOME | End: 2023-07-20
Payer: COMMERCIAL

## 2023-07-20 PROCEDURE — 97110 THERAPEUTIC EXERCISES: CPT

## 2023-07-20 PROCEDURE — 97140 MANUAL THERAPY 1/> REGIONS: CPT

## 2023-07-20 NOTE — FLOWSHEET NOTE
Physical Therapy Daily Treatment Note   Date:  23    TIme In: 1110          Time Out: 5264    Patient Name:  Arnie Bdaillo    :  1960  MRN: [id number]    Restrictions/Precautions:    Pertinent Medical History:  Medical/Treatment Diagnosis Information:  Right knee pain [X92.090]     Insurance/Certification information:  Payor: Sabiha Castrejon / Plan: Olya Machuca / Product Type: *No Product type* /   Physician Information:  NIRU Munoz  Plan of care signed (Y/N):    Visit# / total visits:     8 /    G-Code (if applicable):      Date / Visit # G-Code Applied:         Progress Note: []  Yes  [x]  No  Next due by: 23      Pain level:  0/10      Subjective:   Patient states her knee is about the same. Objective:  Observation:   Test measurements:    LEFS 45/80  AROM RLE (degrees)  R Knee Flexion (0-145): 0-144 4/10 pain  R AROM knee ext 0  0/10 tenderness on medial side  4/10 ADL pain during WB  Strength RLE  R Hip Flexion: 4/5  R Hip Extension: 4+/5  R Hip ABduction: 5/5  R Hip Internal Rotation: 3+/5  R Hip External Rotation: 3+/5  R Knee Flexion: 4+/5  R Knee Extension: 5/5  R Ankle Dorsiflexion: 5/5  falls during reporting period- No  Palpation:    Exercises:  Exercise Resistance/Repetitions Date performed   Nustep level 6x7' 20   Standing TKE  3x10 BTB RLE 20   Standing marching 2' #2  20   Standing abd X20 each way #3 20   Standing hip flex/straight leg X20 RLE #3 20   Slant board 2x45\" 20   Balance board X1' each way 20   TM  X 1.0 speed 5mins 20   Chair squats 1 foam  2x8 20   Lat band walk 3 laps BTB 20        Seated Hip abd  3x10 2\" BTB R 20   Seated HS curls  3x10 BTB 20   Seated IR/ER  2x10 each way R 20         HS stretch 3x30\" RLE 20   SLR 3x10 #3 20   RSL clamshells 3x10 BTB 20               Other Therapeutic Activities:      Manual Treatments:  Manual STM to knee region, gentle knee ext/flex mobs, end range knee flexion PROM applied biofreeze post tx x10'.

## 2023-07-25 ENCOUNTER — HOSPITAL ENCOUNTER (OUTPATIENT)
Dept: PHYSICAL THERAPY | Facility: HOSPITAL | Age: 63
Setting detail: THERAPIES SERIES
Discharge: HOME | End: 2023-07-25
Payer: COMMERCIAL

## 2023-07-25 PROCEDURE — 97140 MANUAL THERAPY 1/> REGIONS: CPT

## 2023-07-25 PROCEDURE — 97110 THERAPEUTIC EXERCISES: CPT

## 2023-07-25 NOTE — FLOWSHEET NOTE
Physical Therapy Daily Treatment Note   Date:  23    TIme In: 1123          Time Out: 200    Patient Name:  Franc Maciel    :  1960  MRN: [id number]    Restrictions/Precautions:    Pertinent Medical History:  Medical/Treatment Diagnosis Information:  Right knee pain [O14.758]     Insurance/Certification information:  Payor: Ramiro Calles / Plan: Ayaan Montero / Product Type: *No Product type* /   Physician Information:  Marshal Felty, PA  Plan of care signed (Y/N):    Visit# / total visits:     8 /    G-Code (if applicable):      Date / Visit # G-Code Applied:         Progress Note: []  Yes  [x]  No  Next due by: 23      Pain level:  \"sore\"/10      Subjective:   Patient states her knee is a little sore today.       Objective:  Observation:   Test measurements:    LEFS 45/80  AROM RLE (degrees)  R Knee Flexion (0-145): 0-144 4/10 pain  R AROM knee ext 0  0/10 tenderness on medial side  4/10 ADL pain during WB  Strength RLE  R Hip Flexion: 4/5  R Hip Extension: 4+/5  R Hip ABduction: 5/5  R Hip Internal Rotation: 3+/5  R Hip External Rotation: 3+/5  R Knee Flexion: 4+/5  R Knee Extension: 5/5  R Ankle Dorsiflexion: 5/5  falls during reporting period- No  Palpation:    Exercises:  Exercise Resistance/Repetitions Date performed   Nustep level 6x7' 25   Seated Hip abd  3x10 2\" BTB R 25   Seated IR/ER  3x10 each way R OTB 25   Seated HS curls  3x10 BTB 25   Seated LAQ 3x10 #3 25        Chair squats 1 foam  2x8 25   Standing TKE  3x10 B RLE 25   Standing marching 2' #3  25   Standing abd X20 each way #3 25   Standing hip flex/ext straight leg X20 RLE #3 25   Lat band walk 3 laps BTB 25   Balance board X1' each way 22   Slant board 2x45\" 25        TM  X 1.0 speed 5mins 20        HS stretch 3x30\" RLE 25   SLR 3x10 #3 25   RSL clamshells 3x10 BTB 25               Other Therapeutic Activities:      Manual Treatments:  Manual STM to knee region, gentle knee ext/flex mobs, end range knee

## 2023-07-27 ENCOUNTER — HOSPITAL ENCOUNTER (OUTPATIENT)
Dept: PHYSICAL THERAPY | Facility: HOSPITAL | Age: 63
Setting detail: THERAPIES SERIES
Discharge: HOME | End: 2023-07-27
Payer: COMMERCIAL

## 2023-07-27 PROCEDURE — 97110 THERAPEUTIC EXERCISES: CPT

## 2023-07-27 PROCEDURE — 97140 MANUAL THERAPY 1/> REGIONS: CPT

## 2023-07-27 NOTE — FLOWSHEET NOTE
Physical Therapy Daily Treatment Note   Date:  23    TIme In: 1105          Time Out: 1218    Patient Name:  Grace Hobson    :  1960  MRN: [id number]    Restrictions/Precautions:    Pertinent Medical History:  Medical/Treatment Diagnosis Information:  Right knee pain [W68.045]     Insurance/Certification information:  Payor: Eun Landaverde / Plan: Zi Antis / Product Type: *No Product type* /   Physician Information:  NIRU Cuadra  Plan of care signed (Y/N):    Visit# / total visits:     8 /    G-Code (if applicable):      Date / Visit # G-Code Applied:         Progress Note: []  Yes  [x]  No  Next due by: 23      Pain level:  1/10      Subjective:   Patient states her knee is better but still hurts a little      Objective:  Observation:   Test measurements:    LEFS 45/80  AROM RLE (degrees)  R Knee Flexion (0-145): 0-144 4/10 pain  R AROM knee ext 0  0/10 tenderness on medial side  4/10 ADL pain during WB  Strength RLE  R Hip Flexion: 4/5  R Hip Extension: 4+/5  R Hip ABduction: 5/5  R Hip Internal Rotation: 3+/5  R Hip External Rotation: 3+/5  R Knee Flexion: 4+/5  R Knee Extension: 5/5  R Ankle Dorsiflexion: 5/5  falls during reporting period- No  Palpation:    Exercises:  Exercise Resistance/Repetitions Date performed   Nustep level 6x7' 27   Seated Hip abd  3x10 2\" BTB R 27   Seated IR/ER  3x10 each way R OTB 27   Seated HS curls  3x10 BTB 27   Seated LAQ 3x10 #3 27        Chair squats 1 foam  2x8 27   Standing TKE  3x10 B RLE 27   Standing marching 2' #3  27   Standing abd X20 each way #3 27   Standing hip flex/ext straight leg X20 RLE #3 27   Lat band walk 3 laps BTB 27   Balance board X1' each way 32   Slant board 2x45\" 27        TM  X 1.0 speed 5mins 27        HS stretch 3x30\" RLE 27   SLR 3x10 #3 27   RSL clamshells 3x10 BTB 27               Other Therapeutic Activities:  Manual performed by ROLO Connors student    Manual Treatments:  Manual STM to knee region,

## 2023-08-01 ENCOUNTER — HOSPITAL ENCOUNTER (OUTPATIENT)
Dept: PHYSICAL THERAPY | Facility: HOSPITAL | Age: 63
Setting detail: THERAPIES SERIES
Discharge: HOME | End: 2023-08-01
Payer: COMMERCIAL

## 2023-08-01 PROCEDURE — 97140 MANUAL THERAPY 1/> REGIONS: CPT

## 2023-08-01 PROCEDURE — 97110 THERAPEUTIC EXERCISES: CPT

## 2023-08-01 NOTE — FLOWSHEET NOTE
Physical Therapy Daily Treatment Note   Date:  23    TIme In: 1110          Time Out: 309 [facility]    Patient Name:  Flako Springer    :  1960  MRN: [id number]    Restrictions/Precautions:    Pertinent Medical History:  Medical/Treatment Diagnosis Information:  Right knee pain [N04.194]     Insurance/Certification information:  Payor: Sonia Oreilly / Plan: Natrona Deiters / Product Type: *No Product type* /   Physician Information:  NIRU Pa  Plan of care signed (Y/N):    Visit# / total visits:     8 /    G-Code (if applicable):      Date / Visit # G-Code Applied:         Progress Note: []  Yes  [x]  No  Next due by: 23      Pain level: 0/10      Subjective:   Patient states her knee feels pretty good today     Objective:  Observation:   Test measurements:    LEFS 45/80  AROM RLE (degrees)  R Knee Flexion (0-145): 0-144 4/10 pain  R AROM knee ext 0  0/10 tenderness on medial side  4/10 ADL pain during WB  Strength RLE  R Hip Flexion: 4/5  R Hip Extension: 4+/5  R Hip ABduction: 5/5  R Hip Internal Rotation: 3+/5  R Hip External Rotation: 3+/5  R Knee Flexion: 4+/5  R Knee Extension: 5/5  R Ankle Dorsiflexion: 5/5  falls during reporting period- No  Palpation:    Exercises:  Exercise Resistance/Repetitions Date performed   Nustep level 6x7' 1   Seated Hip abd  3x10 2\" BTB R 1   Seated IR/ER  3x10 each way R OTB 1   Seated HS curls  3x10 BTB 1   Seated LAQ 3x10 #3         Chair squats 1 foam  2x8 1   Standing TKE  3x10 B RLE 1   Standing marching 2' #3  1   Standing abd X20 each way #3 1   Standing hip flex/ext straight leg X20 RLE #3 1   Lat band walk 3 laps BTB 1   Balance board X1' each way 1   Slant board 2x45\" 1        TM  X 1.0 speed 5mins 1 Speed 1.2 next time        HS stretch 3x30\" RLE 1   SLR 3x10 #3 1   RSL clamshells 3x10 BTB 1               Other Therapeutic Activities:      Manual Treatments:  Manual STM to knee region, gentle knee ext/flex mobs, end range knee flexion

## 2023-08-03 ENCOUNTER — HOSPITAL ENCOUNTER (OUTPATIENT)
Dept: PHYSICAL THERAPY | Facility: HOSPITAL | Age: 63
Setting detail: THERAPIES SERIES
Discharge: HOME | End: 2023-08-03
Payer: COMMERCIAL

## 2023-08-03 PROCEDURE — 97110 THERAPEUTIC EXERCISES: CPT

## 2023-08-03 NOTE — FLOWSHEET NOTE
Physical Therapy Daily Treatment Note   Date:  8/3/23    TIme In: 1110         Time Out: 8463    Patient Name:  Yanira Plan    :  1960  MRN: [id number]    Restrictions/Precautions:    Pertinent Medical History:  Medical/Treatment Diagnosis Information:  Right knee pain [M61.840]     Insurance/Certification information:  Payor: Hpan Chuckpolo / Plan: Amaris Fraction / Product Type: *No Product type* /   Physician Information:  NIRU Stephenson  Plan of care signed (Y/N):    Visit# / total visits:     8 /    G-Code (if applicable):      Date / Visit # G-Code Applied:         Progress Note: []  Yes  [x]  No  Next due by: 23      Pain level:   0/10      Subjective:   Patient states she is doing okay today, no pain.      Objective:  Observation:   Test measurements:    LEFS 45/80  AROM RLE (degrees)  R Knee Flexion (0-145): 0-144 4/10 pain  R AROM knee ext 0  0/10 tenderness on medial side  4/10 ADL pain during WB  Strength RLE  R Hip Flexion: 4/5  R Hip Extension: 4+/5  R Hip ABduction: 5/5  R Hip Internal Rotation: 3+/5  R Hip External Rotation: 3+/5  R Knee Flexion: 4+/5  R Knee Extension: 5/5  R Ankle Dorsiflexion: 5/5  falls during reporting period- No  Palpation:    Exercises:  Exercise Resistance/Repetitions Date performed   Nustep level 6x7' 3   Seated Hip abd  3x10 2\" BTB R 3   Seated IR/ER  3x10 each way R OTB 3   Seated HS curls  3x10 BTB 3   Seated LAQ 3x10 #3 3        Chair squats 1 foam  2x8 3   Standing TKE  3x10 B RLE 3   Standing marching 2' #3  3   Standing abd X20 each way #3 3   Standing hip flex/ext straight leg X20 RLE #3 3   Lat band walk 3 laps BTB 3   Balance board X1' each way 3   Slant board 2x45\" 3        TM  X 1.2 speed 5mins 3        HS stretch 3x30\" RLE 3   SLR 3x10 #3 3   RSL clamshells 3x10 BTB 3               Other Therapeutic Activities:      Manual Treatments:  Manual STM to knee region, gentle knee ext/flex mobs, end range knee flexion PROM applied

## 2023-08-08 ENCOUNTER — HOSPITAL ENCOUNTER (OUTPATIENT)
Dept: PHYSICAL THERAPY | Facility: HOSPITAL | Age: 63
Setting detail: THERAPIES SERIES
Discharge: HOME | End: 2023-08-08
Payer: COMMERCIAL

## 2023-08-08 PROCEDURE — 97110 THERAPEUTIC EXERCISES: CPT

## 2023-08-08 PROCEDURE — 97140 MANUAL THERAPY 1/> REGIONS: CPT

## 2023-08-08 NOTE — FLOWSHEET NOTE
Physical Therapy Daily Treatment Note/Reassessment   Date:  23    TIme In: 1113         Time Out: 1233    Patient Name:  Monica Syed    :  1960  MRN: [id number]    Restrictions/Precautions:    Pertinent Medical History:  Medical/Treatment Diagnosis Information:  Right knee pain [S59.239]     Insurance/Certification information:  Payor: Benita Kathy / Plan: Maryana Espinoza / Product Type: *No Product type* /   Physician Information:  NIRU Guaman  Plan of care signed (Y/N):    Visit# / total visits:     22'/    G-Code (if applicable):      Date / Visit # G-Code Applied:         Progress Note: [x]  Yes  []  No  Next due by: 23      Pain level:  3/10        Subjective:   Pt reports she tweaked her knee the other day while walking the dog and it still hurts to weight bear through it.      Objective:  Observation:   Test measurements:    LEFS 43/80  AROM RLE (degrees)  R Knee Flexion (0-145): 0-144 3/10 pain  R AROM knee ext 0  0/10 tenderness on medial side  4/10 ADL pain during WB  Strength RLE  R Hip Flexion: 4+/5  R Hip Extension: 4+/5  R Hip ABduction: 5/5  R Hip Internal Rotation: 3+/5  R Hip External Rotation: 4-/5  R Knee Flexion: 4+/5  R Knee Extension: 5/5  R Ankle Dorsiflexion: 5/5  falls during reporting period- No  Palpation:    Exercises:  Exercise Resistance/Repetitions Date performed   Nustep level 6x7' 8   Seated Hip abd  3x10 2\" BTB R 8   Seated IR/ER  3x10 each way R OTB 8   Seated HS curls  3x10 BTB 8   Seated LAQ 3x10 #3 8   Chair squats 1 foam  2x8 8   Standing TKE  3x10 B RLE 8   Standing marching 2' #3  8   Standing abd X20 each way #3 8   Standing hip flex/ext straight leg X20 RLE #3 8   Lat band walk 3 laps BTB 8   Balance board X1' each way 8   Slant board 2x45\" 8   TM  X 1.2 speed 5mins 8   HS stretch 3x30\" RLE 8   SLR 3x10 #3 8   RSL clamshells 3x10 BTB 8   Stair training Next visit    Box lifts Next visit                Other Therapeutic Activities:

## 2023-08-10 ENCOUNTER — HOSPITAL ENCOUNTER (OUTPATIENT)
Dept: PHYSICAL THERAPY | Facility: HOSPITAL | Age: 63
Setting detail: THERAPIES SERIES
Discharge: HOME | End: 2023-08-10
Payer: COMMERCIAL

## 2023-08-10 PROCEDURE — 97110 THERAPEUTIC EXERCISES: CPT

## 2023-08-10 NOTE — FLOWSHEET NOTE
Physical Therapy Daily Treatment Note  Date:  08/10/23     TIme In: 1110         Time Out: 3597    Patient Name:  Alice Vieira    :  1960  MRN: [id number]    Restrictions/Precautions:    Pertinent Medical History:  Medical/Treatment Diagnosis Information:  Right knee pain [P22.123]     Insurance/Certification information:  Payor: Delia Puentes / Plan: Carlos Puff / Product Type: *No Product type* /   Physician Information:  NIRU Danielle  Plan of care signed (Y/N):    Visit# / total visits:     /    G-Code (if applicable):      Date / Visit # G-Code Applied:         Progress Note: []  Yes  [x]  No  Next due by: 23      Pain level:  0/10        Subjective:   Pt reports her knee is feeling a lot better today but her she doesn't feel well due to sinus issues.      Objective:  Observation:   Test measurements:    LEFS 43/80  AROM RLE (degrees)  R Knee Flexion (0-145): 0-144 3/10 pain  R AROM knee ext 0  0/10 tenderness on medial side  4/10 ADL pain during WB  Strength RLE  R Hip Flexion: 4+/5  R Hip Extension: 4+/5  R Hip ABduction: 5/5  R Hip Internal Rotation: 3+/5  R Hip External Rotation: 4-/5  R Knee Flexion: 4+/5  R Knee Extension: 5/5  R Ankle Dorsiflexion: 5/5  falls during reporting period- No  Palpation:    Exercises:  Exercise Resistance/Repetitions Date performed   Nustep level 6x7' 10   Seated Hip abd  3x10 2\" BTB R 10   Seated IR/ER  3x10 each way R OTB 10   Seated HS curls  3x10 BTB 10   Seated LAQ 3x10 #3 10   Chair squats 1 foam  2x8 10   Standing TKE  3x10 B RLE 10   Standing marching 2' #3  10   Standing abd X20 each way #3 10   Standing hip flex/ext straight leg X20 RLE #3 10   Lat band walk 3 laps BTB 10   Balance board X1' each way 10   Slant board 2x45\" 10   TM  X 1.2 speed 5mins 10   HS stretch 3x30\" RLE 10   SLR 3x10 #3 10   RSL clamshells 3x10 BTB 10   Stair training 4 stairs x 3 10   Box lifts X3 reps 10               Other Therapeutic Activities:

## 2023-08-15 ENCOUNTER — HOSPITAL ENCOUNTER (OUTPATIENT)
Dept: PHYSICAL THERAPY | Facility: HOSPITAL | Age: 63
Setting detail: THERAPIES SERIES
Discharge: HOME | End: 2023-08-15
Payer: COMMERCIAL

## 2023-08-15 PROCEDURE — 97112 NEUROMUSCULAR REEDUCATION: CPT

## 2023-08-15 PROCEDURE — 97110 THERAPEUTIC EXERCISES: CPT

## 2023-08-15 NOTE — FLOWSHEET NOTE
Physical Therapy Daily Treatment Note  Date:  08/15/23     TIme In: 1105         Time Out: 1210    Patient Name:  Gibson Hassan    :  1960  MRN: [id number]    Restrictions/Precautions:    Pertinent Medical History:  Medical/Treatment Diagnosis Information:  Right knee pain [K11.761]     Insurance/Certification information:  Payor: Meredithayo Batista / Plan: Cleotis Day / Product Type: *No Product type* /   Physician Information:  NIRU Hawthorne  Plan of care signed (Y/N):    Visit# / total visits:     24'/    G-Code (if applicable):      Date / Visit # G-Code Applied:         Progress Note: []  Yes  [x]  No  Next due by: 23      Pain level:  0/10        Subjective:   Pt reports her knee is feeling good today, she states she does not want to do box lifts today because she has to lift some jacinto litter at [employer].       Objective:  Observation:   Test measurements:    LEFS 43/80  AROM RLE (degrees)  R Knee Flexion (0-145): 0-144 3/10 pain  R AROM knee ext 0  0/10 tenderness on medial side  4/10 ADL pain during WB  Strength RLE  R Hip Flexion: 4+/5  R Hip Extension: 4+/5  R Hip ABduction: 5/5  R Hip Internal Rotation: 3+/5  R Hip External Rotation: 4-/5  R Knee Flexion: 4+/5  R Knee Extension: 5/5  R Ankle Dorsiflexion: 5/5  falls during reporting period- No  Palpation:    Exercises:  Exercise Resistance/Repetitions Date performed   Nustep level 6x7' 15   Seated Hip abd  3x10 2\" BTB R 15   Seated IR/ER  3x10 each way R OTB 15   Seated HS curls  3x10 BTB 15   Seated LAQ 3x10 #3 15   Chair squats 1 foam  2x8 15   Standing TKE  3x10 B RLE 15   Standing marching 2' #3  15   Standing abd X20 each way #3 15   Standing hip flex/ext straight leg X20 RLE #3 15   Lat band walk 3 laps BTB 15   Balance board X1' each way 15   Slant board 2x45\" 15   TM  X 1.2 speed 5mins 15   HS stretch 3x30\" RLE 15   SLR 3x10 #3 15   RSL clamshells 3x10 BTB 15   Stair training 4 stairs x 4 15   Box lifts X3

## 2023-08-21 ENCOUNTER — HOSPITAL ENCOUNTER (OUTPATIENT)
Dept: PHYSICAL THERAPY | Facility: HOSPITAL | Age: 63
Setting detail: THERAPIES SERIES
Discharge: HOME | End: 2023-08-21
Payer: COMMERCIAL

## 2023-08-21 PROCEDURE — 97110 THERAPEUTIC EXERCISES: CPT

## 2023-08-21 PROCEDURE — 97530 THERAPEUTIC ACTIVITIES: CPT

## 2023-08-21 NOTE — FLOWSHEET NOTE
Physical Therapy Daily Treatment Note  Date:  23     TIme In: 1537         Time Out: 22 [facility]equiel Zapata    Patient Name:  Crystal Krueger    :  1960  MRN: [id number]    Restrictions/Precautions:    Pertinent Medical History:  Medical/Treatment Diagnosis Information:  Right knee pain [Y23.065]     Insurance/Certification information:  Payor: Aliza Jameson / Plan: Jatin Nuñez / Product Type: *No Product type* /   Physician Information:  NIRU Souza  Plan of care signed (Y/N):    Visit# / total visits:     25'/    G-Code (if applicable):      Date / Visit # G-Code Applied:         Progress Note: []  Yes  [x]  No  Next due by: 23      Pain level:  0/10        Subjective:   Pt reports doing well today, no pain currently.        Objective:  Observation:   Test measurements:    LEFS 43/80  AROM RLE (degrees)  R Knee Flexion (0-145): 0-144 3/10 pain  R AROM knee ext 0  0/10 tenderness on medial side  4/10 ADL pain during WB  Strength RLE  R Hip Flexion: 4+/5  R Hip Extension: 4+/5  R Hip ABduction: 5/5  R Hip Internal Rotation: 3+/5  R Hip External Rotation: 4-/5  R Knee Flexion: 4+/5  R Knee Extension: 5/5  R Ankle Dorsiflexion: 5/5  falls during reporting period- No  Palpation:    Exercises:  Exercise Resistance/Repetitions Date performed   Nustep level 6x7' 21   Seated Hip abd  3x10 2\" BTB R 21   Seated IR/ER  3x10 each way R OTB 21   Seated HS curls  3x10 BTB 21   Seated LAQ 3x10 #3 21   Chair squats 1 foam  2x8 21   Standing TKE  3x10 B RLE 21   Standing marching 2' #3  21   Standing abd X20 each way #3 21   Standing hip flex/ext straight leg X20 RLE #3 21   Lat band walk 3 laps BTB 21   Balance board X1' each way 21   Slant board 2x45\" 21   TM  X 1.2 speed 5mins 21   HS stretch 3x30\" RLE 21   SLR 3x10 #3 21   RSL clamshells 3x10 BTB 21   Stair training 4 stairs x 4 21   Box lifts X5 reps 21               Other Therapeutic Activities:  Portion of therex observed by Judith Eagle

## 2023-08-23 ENCOUNTER — OFFICE VISIT (OUTPATIENT)
Dept: PRIMARY CARE CLINIC | Age: 63
End: 2023-08-23
Payer: MEDICAID

## 2023-08-23 VITALS
HEIGHT: 65 IN | TEMPERATURE: 97.6 F | RESPIRATION RATE: 16 BRPM | HEART RATE: 98 BPM | SYSTOLIC BLOOD PRESSURE: 131 MMHG | DIASTOLIC BLOOD PRESSURE: 78 MMHG | OXYGEN SATURATION: 98 % | WEIGHT: 116.8 LBS | BODY MASS INDEX: 19.46 KG/M2

## 2023-08-23 DIAGNOSIS — R12 HEARTBURN: ICD-10-CM

## 2023-08-23 DIAGNOSIS — E55.9 VITAMIN D DEFICIENCY: ICD-10-CM

## 2023-08-23 DIAGNOSIS — I10 PRIMARY HYPERTENSION: Primary | ICD-10-CM

## 2023-08-23 DIAGNOSIS — M81.0 OSTEOPOROSIS WITHOUT CURRENT PATHOLOGICAL FRACTURE, UNSPECIFIED OSTEOPOROSIS TYPE: ICD-10-CM

## 2023-08-23 DIAGNOSIS — G25.81 RLS (RESTLESS LEGS SYNDROME): ICD-10-CM

## 2023-08-23 DIAGNOSIS — Z96.641 STATUS POST RIGHT HIP REPLACEMENT: ICD-10-CM

## 2023-08-23 DIAGNOSIS — Z12.31 ENCOUNTER FOR SCREENING MAMMOGRAM FOR BREAST CANCER: ICD-10-CM

## 2023-08-23 DIAGNOSIS — F32.9 REACTIVE DEPRESSION: ICD-10-CM

## 2023-08-23 DIAGNOSIS — Z13.29 THYROID DISORDER SCREEN: ICD-10-CM

## 2023-08-23 PROCEDURE — 99214 OFFICE O/P EST MOD 30 MIN: CPT | Performed by: NURSE PRACTITIONER

## 2023-08-23 PROCEDURE — G8427 DOCREV CUR MEDS BY ELIG CLIN: HCPCS | Performed by: NURSE PRACTITIONER

## 2023-08-23 PROCEDURE — 4004F PT TOBACCO SCREEN RCVD TLK: CPT | Performed by: NURSE PRACTITIONER

## 2023-08-23 PROCEDURE — 3074F SYST BP LT 130 MM HG: CPT | Performed by: NURSE PRACTITIONER

## 2023-08-23 PROCEDURE — 3078F DIAST BP <80 MM HG: CPT | Performed by: NURSE PRACTITIONER

## 2023-08-23 PROCEDURE — G8420 CALC BMI NORM PARAMETERS: HCPCS | Performed by: NURSE PRACTITIONER

## 2023-08-23 PROCEDURE — 3017F COLORECTAL CA SCREEN DOC REV: CPT | Performed by: NURSE PRACTITIONER

## 2023-08-23 RX ORDER — FAMOTIDINE 40 MG/1
40 TABLET, FILM COATED ORAL EVERY EVENING
Qty: 30 TABLET | Refills: 3 | Status: SHIPPED | OUTPATIENT
Start: 2023-08-23

## 2023-08-23 RX ORDER — PREDNISONE 10 MG/1
10 TABLET ORAL DAILY PRN
Qty: 30 TABLET | Refills: 0 | Status: SHIPPED | OUTPATIENT
Start: 2023-08-23

## 2023-08-23 RX ORDER — ALENDRONATE SODIUM 70 MG/1
70 TABLET ORAL
Qty: 4 TABLET | Refills: 0 | Status: SHIPPED | OUTPATIENT
Start: 2023-08-23

## 2023-08-23 RX ORDER — CHOLECALCIFEROL (VITAMIN D3) 50 MCG
2000 TABLET ORAL DAILY
Qty: 30 TABLET | Refills: 5 | Status: SHIPPED | OUTPATIENT
Start: 2023-08-23

## 2023-08-23 RX ORDER — LISINOPRIL 10 MG/1
10 TABLET ORAL DAILY
Qty: 30 TABLET | Refills: 3 | Status: SHIPPED | OUTPATIENT
Start: 2023-08-23

## 2023-08-23 RX ORDER — ROPINIROLE 0.5 MG/1
0.5 TABLET, FILM COATED ORAL NIGHTLY
Qty: 30 TABLET | Refills: 2 | Status: SHIPPED | OUTPATIENT
Start: 2023-08-23

## 2023-08-23 RX ORDER — MELOXICAM 15 MG/1
15 TABLET ORAL DAILY
Qty: 30 TABLET | Refills: 3 | Status: SHIPPED | OUTPATIENT
Start: 2023-08-23

## 2023-08-23 ASSESSMENT — PATIENT HEALTH QUESTIONNAIRE - PHQ9
8. MOVING OR SPEAKING SO SLOWLY THAT OTHER PEOPLE COULD HAVE NOTICED. OR THE OPPOSITE, BEING SO FIGETY OR RESTLESS THAT YOU HAVE BEEN MOVING AROUND A LOT MORE THAN USUAL: 0
4. FEELING TIRED OR HAVING LITTLE ENERGY: 3
7. TROUBLE CONCENTRATING ON THINGS, SUCH AS READING THE NEWSPAPER OR WATCHING TELEVISION: 0
SUM OF ALL RESPONSES TO PHQ QUESTIONS 1-9: 5
SUM OF ALL RESPONSES TO PHQ QUESTIONS 1-9: 5
2. FEELING DOWN, DEPRESSED OR HOPELESS: 1
6. FEELING BAD ABOUT YOURSELF - OR THAT YOU ARE A FAILURE OR HAVE LET YOURSELF OR YOUR FAMILY DOWN: 0
10. IF YOU CHECKED OFF ANY PROBLEMS, HOW DIFFICULT HAVE THESE PROBLEMS MADE IT FOR YOU TO DO YOUR WORK, TAKE CARE OF THINGS AT HOME, OR GET ALONG WITH OTHER PEOPLE: 1
1. LITTLE INTEREST OR PLEASURE IN DOING THINGS: 1
5. POOR APPETITE OR OVEREATING: 0
SUM OF ALL RESPONSES TO PHQ9 QUESTIONS 1 & 2: 2
SUM OF ALL RESPONSES TO PHQ QUESTIONS 1-9: 5
9. THOUGHTS THAT YOU WOULD BE BETTER OFF DEAD, OR OF HURTING YOURSELF: 0
SUM OF ALL RESPONSES TO PHQ QUESTIONS 1-9: 5
3. TROUBLE FALLING OR STAYING ASLEEP: 0

## 2023-08-23 ASSESSMENT — ENCOUNTER SYMPTOMS
GASTROINTESTINAL NEGATIVE: 1
RESPIRATORY NEGATIVE: 1
EYES NEGATIVE: 1
ALLERGIC/IMMUNOLOGIC NEGATIVE: 1

## 2023-08-24 ENCOUNTER — HOSPITAL ENCOUNTER (OUTPATIENT)
Dept: PHYSICAL THERAPY | Facility: HOSPITAL | Age: 63
Setting detail: THERAPIES SERIES
Discharge: HOME | End: 2023-08-24
Payer: COMMERCIAL

## 2023-08-24 PROCEDURE — 97112 NEUROMUSCULAR REEDUCATION: CPT

## 2023-08-24 PROCEDURE — 97110 THERAPEUTIC EXERCISES: CPT

## 2023-08-24 NOTE — FLOWSHEET NOTE
Physical Therapy Daily Treatment Note  Date:  23     TIme In: 1601         Time Out: 2172    Patient Name:  Manjula Robles    :  1960  MRN: [id number]    Restrictions/Precautions:    Pertinent Medical History:  Medical/Treatment Diagnosis Information:  Right knee pain [L24.226]     Insurance/Certification information:  Payor: Denisse Huang / Plan: [address] / Product Type: *No Product type* /   Physician Information:  NIRU Rice  Plan of care signed (Y/N):    Visit# / total visits:     26'/    G-Code (if applicable):      Date / Visit # G-Code Applied:         Progress Note: []  Yes  [x]  No  Next due by: 23      Pain level:  0/10        Subjective:   Pt reports she saw the ortho and he said he thinks she is at a plateau but did not receive an xray to see how her fx is healing.       Objective:  Observation:   Test measurements:    LEFS 43/80  AROM RLE (degrees)  R Knee Flexion (0-145): 0-144 3/10 pain  R AROM knee ext 0  0/10 tenderness on medial side  4/10 ADL pain during WB  Strength RLE  R Hip Flexion: 4+/5  R Hip Extension: 4+/5  R Hip ABduction: 5/5  R Hip Internal Rotation: 3+/5  R Hip External Rotation: 4-/5  R Knee Flexion: 4+/5  R Knee Extension: 5/5  R Ankle Dorsiflexion: 5/5  falls during reporting period- No  Palpation:    Exercises:  Exercise Resistance/Repetitions Date performed   Nustep level 6x7' 24   Seated Hip abd  3x10 2\" BTB R 24   Seated IR/ER  3x10 each way R OTB 24   Seated HS curls  3x10 BTB 24   Seated LAQ 3x10 #3 24   Chair squats 1 foam  2x8 24   Standing TKE  3x10 B RLE 24   Standing marching 2' #3  24   Standing abd X20 each way #3 24   Standing hip flex/ext straight leg X20 RLE #3 24   Lat band walk 3 laps BTB 24   Balance board X1' each way 24   Slant board 2x45\" 24   TM  X 1.2 speed 5mins 24   HS stretch 3x30\" RLE 24   SLR 3x10 #3 24   RSL clamshells 3x10 BTB 24   Stair training 4 stairs x 4 24   Box lifts X5 reps 24 x7 next visit add

## 2023-08-28 ENCOUNTER — HOSPITAL ENCOUNTER (OUTPATIENT)
Dept: MAMMOGRAPHY | Facility: HOSPITAL | Age: 63
Discharge: HOME | End: 2023-08-28
Payer: MEDICAID

## 2023-08-28 DIAGNOSIS — Z12.31 ENCOUNTER FOR SCREENING MAMMOGRAM FOR BREAST CANCER: ICD-10-CM

## 2023-08-28 PROCEDURE — 77063 BREAST TOMOSYNTHESIS BI: CPT

## 2023-08-29 ENCOUNTER — HOSPITAL ENCOUNTER (OUTPATIENT)
Dept: PHYSICAL THERAPY | Facility: HOSPITAL | Age: 63
Setting detail: THERAPIES SERIES
Discharge: HOME | End: 2023-08-29
Payer: COMMERCIAL

## 2023-08-29 PROCEDURE — 97112 NEUROMUSCULAR REEDUCATION: CPT

## 2023-08-29 PROCEDURE — 97110 THERAPEUTIC EXERCISES: CPT

## 2023-08-29 PROCEDURE — 97530 THERAPEUTIC ACTIVITIES: CPT

## 2023-08-29 NOTE — FLOWSHEET NOTE
ext/flex mobs, end range knee flexion PROM applied biofreeze post tx (deferred)     Modalities:        Timed Code Treatment Minutes:  65      Total Treatment Minutes:  70    GOALS   Patient Goal(s):    Short Term Goals Completed by 4-6 weeks Goal Status   Pt to be I with HEP MET   Pt to improve R AROM knee ext to 0 MET   Pt to have 0/10 knee pain with palpation MET   Pt to have 0/10 pain with RLE knee flexion AROM Progressing              Long Term Goals Completed by 8-12 weeks Goal Status   Pt to improve LEFS score to 55/80 Not Met    Pt to improve Gross RLE str to 4+/5 or greater Progressing    Pt to have 0/10 ADL pain during WB Progressing   Pt to report no falls during reporting period MET   Pt to tolerate lifting 20# from floor to waist New       Treatment/Activity Tolerance:  [x] Patient tolerated treatment well [] Patient limited by fatigue  [] Patient limited by pain  [] Patient limited by other medical complications  [x] Other: Pt progressed through activity well with addition of 5# in the box lifts.     Pain after treatment:      0/10    Prognosis: [x] Good [] Fair  [] Poor    Patient Requires Follow-up: [x] Yes  [] No    Plan:   [x] Continue per plan of care [] Alter current plan (see comments)  [] Plan of care initiated [] Hold pending MD visit [] Discharge    Plan for Next Session:        Electronically signed by Bishop Martinez PTA on 8/29/2023 at 11:03 AM

## 2023-08-31 ENCOUNTER — HOSPITAL ENCOUNTER (OUTPATIENT)
Dept: PHYSICAL THERAPY | Facility: HOSPITAL | Age: 63
Setting detail: THERAPIES SERIES
Discharge: HOME | End: 2023-08-31
Payer: COMMERCIAL

## 2023-08-31 PROCEDURE — 97530 THERAPEUTIC ACTIVITIES: CPT

## 2023-08-31 PROCEDURE — 97110 THERAPEUTIC EXERCISES: CPT

## 2023-08-31 PROCEDURE — 97112 NEUROMUSCULAR REEDUCATION: CPT

## 2023-08-31 NOTE — FLOWSHEET NOTE
Manual Treatments:  Manual STM to knee region, gentle knee ext/flex mobs, end range knee flexion PROM applied biofreeze post tx (pt. deferred)     Modalities:        Timed Code Treatment Minutes:  63      Total Treatment Minutes:  70    GOALS   Patient Goal(s):    Short Term Goals Completed by 4-6 weeks Goal Status   Pt to be I with HEP MET   Pt to improve R AROM knee ext to 0 MET   Pt to have 0/10 knee pain with palpation MET   Pt to have 0/10 pain with RLE knee flexion AROM Progressing              Long Term Goals Completed by 8-12 weeks Goal Status   Pt to improve LEFS score to 55/80 Not Met    Pt to improve Gross RLE str to 4+/5 or greater Progressing    Pt to have 0/10 ADL pain during WB Progressing   Pt to report no falls during reporting period MET   Pt to tolerate lifting 20# from floor to waist New       Treatment/Activity Tolerance:  [x] Patient tolerated treatment well [] Patient limited by fatigue  [] Patient limited by pain  [] Patient limited by other medical complications  [] Other:     Pain after treatment:      0/10    Prognosis: [x] Good [] Fair  [] Poor    Patient Requires Follow-up: [x] Yes  [] No    Plan:   [x] Continue per plan of care [] Alter current plan (see comments)  [] Plan of care initiated [] Hold pending MD visit [] Discharge    Plan for Next Session:        Electronically signed by Luis Tatum PTA on 8/31/2023 at 11:09 AM

## 2023-09-05 ENCOUNTER — HOSPITAL ENCOUNTER (OUTPATIENT)
Dept: PHYSICAL THERAPY | Facility: HOSPITAL | Age: 63
Setting detail: THERAPIES SERIES
Discharge: HOME | End: 2023-09-05
Payer: COMMERCIAL

## 2023-09-05 PROCEDURE — 97110 THERAPEUTIC EXERCISES: CPT

## 2023-09-05 PROCEDURE — 97112 NEUROMUSCULAR REEDUCATION: CPT

## 2023-09-05 NOTE — FLOWSHEET NOTE
Physical Therapy Daily Treatment Note  Date:  23     TIme In:  1105        Time Out:  3876    Patient Name:  Grace Hobson    :  1960  MRN: [id number]    Restrictions/Precautions:    Pertinent Medical History:  Medical/Treatment Diagnosis Information:  Right knee pain [C51.243]     Insurance/Certification information:  Payor: Eun Landaverde / Plan: Zi Antis / Product Type: *No Product type* /   Physician Information:  NIRU Cuadra  Plan of care signed (Y/N):    Visit# / total visits:     29'/    G-Code (if applicable):      Date / Visit # G-Code Applied:         Progress Note: []  Yes  [x]  No  Next due by: 23      Pain level:  0/10        Subjective:  Patient reports she is doing ok today       Objective:  Observation:   Test measurements:    LEFS 43/80  AROM RLE (degrees)  R Knee Flexion (0-145): 0-144 3/10 pain  R AROM knee ext 0  0/10 tenderness on medial side  4/10 ADL pain during WB  Strength RLE  R Hip Flexion: 4+/5  R Hip Extension: 4+/5  R Hip ABduction: 5/5  R Hip Internal Rotation: 3+/5  R Hip External Rotation: 4-/5  R Knee Flexion: 4+/5  R Knee Extension: 5/5  R Ankle Dorsiflexion: 5/5  falls during reporting period- No  Palpation:    Exercises:  Exercise Resistance/Repetitions Date performed   Nustep level 6x7' 5   Seated Hip abd  3x10 2\" BTB R 5   Seated IR/ER  3x10 each way R BTB 5   Seated HS curls  3x10 BTB 5   Seated LAQ 3x10 #3 5   Chair squats 1 foam  2x8 5   Standing TKE  3x10 BTB 5   Standing marching 2' #3  5   Standing abd X20 each way #3 5   Standing hip flex/ext straight leg X20 RLE #3 5   Lat band walk 3 laps BTB 5   Balance board X1' each way 5   Slant board 3x45\" 5   TM  X 1.6 speed 5 mins 5   HS stretch 3x30\" RLE 5   SLR 3x10 #3 5   L sidelying clamshells 3x10 BTB 5   Stair training 4 stairs x 4 5   Box lifts 5# 6 reps 5               Other Therapeutic Activities:     Manual Treatments:  Manual STM to knee region, gentle knee ext/flex mobs,

## 2023-09-06 DIAGNOSIS — M79.2 NEUROPATHIC PAIN: ICD-10-CM

## 2023-09-06 RX ORDER — GABAPENTIN 300 MG/1
CAPSULE ORAL
Qty: 90 CAPSULE | Refills: 1 | Status: SHIPPED | OUTPATIENT
Start: 2023-09-06 | End: 2023-10-06

## 2023-09-06 RX ORDER — MEDROXYPROGESTERONE ACETATE 150 MG/ML
INJECTION, SUSPENSION INTRAMUSCULAR
Qty: 4 ML | Refills: 0 | Status: SHIPPED | OUTPATIENT
Start: 2023-09-06

## 2023-09-07 ENCOUNTER — HOSPITAL ENCOUNTER (OUTPATIENT)
Dept: PHYSICAL THERAPY | Facility: HOSPITAL | Age: 63
Setting detail: THERAPIES SERIES
Discharge: HOME | End: 2023-09-07
Payer: COMMERCIAL

## 2023-09-07 PROCEDURE — 97110 THERAPEUTIC EXERCISES: CPT

## 2023-09-07 PROCEDURE — 97112 NEUROMUSCULAR REEDUCATION: CPT

## 2023-09-07 NOTE — FLOWSHEET NOTE
Patient Goal(s):    Short Term Goals Completed by 4-6 weeks Goal Status   Pt to be I with HEP MET   Pt to improve R AROM knee ext to 0 MET   Pt to have 0/10 knee pain with palpation MET   Pt to have 0/10 pain with RLE knee flexion AROM MET             Long Term Goals Completed by 8-12 weeks Goal Status   Pt to improve LEFS score to 55/80 Almost MET   Pt to improve Gross RLE str to 4+/5 or greater Progressing    Pt to have 0/10 ADL pain during WB MET   Pt to report no falls during reporting period MET   Pt to tolerate lifting 20# from floor to waist MET       Treatment/Activity Tolerance:  [x] Patient tolerated treatment well [] Patient limited by fatigue  [] Patient limited by pain  [] Patient limited by other medical complications  [x] Other: Pt performed all exercises with good tolerance throughout. No pain during/post tx. Pt has reached maximal visits at this time after being released from [employer] [facility]. Pt has made significant progress since [facility] with decreased pain levels, increased ROM, strength and overall function. Pt is safe and ready for d/c at this time.      Pain after treatment:      0/10    Prognosis: [x] Good [] Fair  [] Poor    Patient Requires Follow-up: [] Yes  [x] No    Plan:   [] Continue per plan of care [] Alter current plan (see comments)  [] Plan of care initiated [] Hold pending MD visit [x] Discharge    Plan for Next Session:        Electronically signed by Lorenza Euceda PT on 9/7/2023 at 1:50 PM

## 2023-10-03 RX ORDER — MEDROXYPROGESTERONE ACETATE 150 MG/ML
INJECTION, SUSPENSION INTRAMUSCULAR
Qty: 4 ML | Refills: 0 | Status: SHIPPED | OUTPATIENT
Start: 2023-10-03

## 2023-10-24 DIAGNOSIS — G25.81 RLS (RESTLESS LEGS SYNDROME): ICD-10-CM

## 2023-10-24 DIAGNOSIS — M79.2 NEUROPATHIC PAIN: ICD-10-CM

## 2023-10-24 RX ORDER — ROPINIROLE 0.5 MG/1
0.5 TABLET, FILM COATED ORAL NIGHTLY
Qty: 30 TABLET | Refills: 2 | Status: SHIPPED | OUTPATIENT
Start: 2023-10-24

## 2023-10-25 RX ORDER — GABAPENTIN 300 MG/1
CAPSULE ORAL
Qty: 90 CAPSULE | Refills: 1 | Status: SHIPPED | OUTPATIENT
Start: 2023-10-25 | End: 2023-11-23

## 2023-11-20 RX ORDER — MEDROXYPROGESTERONE ACETATE 150 MG/ML
INJECTION, SUSPENSION INTRAMUSCULAR
Qty: 4 ML | Refills: 0 | Status: SHIPPED | OUTPATIENT
Start: 2023-11-20

## 2023-11-21 DIAGNOSIS — R12 HEARTBURN: ICD-10-CM

## 2023-11-21 DIAGNOSIS — I10 PRIMARY HYPERTENSION: ICD-10-CM

## 2023-11-21 RX ORDER — LISINOPRIL 10 MG/1
10 TABLET ORAL DAILY
Qty: 30 TABLET | Refills: 3 | Status: SHIPPED | OUTPATIENT
Start: 2023-11-21

## 2023-11-21 RX ORDER — FAMOTIDINE 40 MG/1
40 TABLET, FILM COATED ORAL EVERY EVENING
Qty: 30 TABLET | Refills: 3 | Status: SHIPPED | OUTPATIENT
Start: 2023-11-21

## 2023-12-22 DIAGNOSIS — M79.2 NEUROPATHIC PAIN: ICD-10-CM

## 2023-12-26 RX ORDER — GABAPENTIN 300 MG/1
CAPSULE ORAL
Qty: 90 CAPSULE | Refills: 1 | Status: SHIPPED | OUTPATIENT
Start: 2023-12-26 | End: 2024-01-26

## 2024-01-22 DIAGNOSIS — G25.81 RLS (RESTLESS LEGS SYNDROME): ICD-10-CM

## 2024-01-22 DIAGNOSIS — F32.9 REACTIVE DEPRESSION: ICD-10-CM

## 2024-01-22 RX ORDER — ROPINIROLE 0.5 MG/1
0.5 TABLET, FILM COATED ORAL NIGHTLY
Qty: 30 TABLET | Refills: 0 | Status: SHIPPED | OUTPATIENT
Start: 2024-01-22

## 2024-01-22 RX ORDER — MEDROXYPROGESTERONE ACETATE 150 MG/ML
INJECTION, SUSPENSION INTRAMUSCULAR
Qty: 4 ML | Refills: 0 | Status: SHIPPED | OUTPATIENT
Start: 2024-01-22

## 2024-01-22 RX ORDER — CHOLECALCIFEROL (VITAMIN D3) 50 MCG
2000 TABLET ORAL DAILY
Qty: 30 TABLET | Refills: 0 | Status: SHIPPED | OUTPATIENT
Start: 2024-01-22

## 2024-02-23 DIAGNOSIS — M79.2 NEUROPATHIC PAIN: ICD-10-CM

## 2024-02-23 RX ORDER — GABAPENTIN 300 MG/1
CAPSULE ORAL
Qty: 90 CAPSULE | Refills: 1 | OUTPATIENT
Start: 2024-02-23

## 2024-02-27 RX ORDER — MEDROXYPROGESTERONE ACETATE 150 MG/ML
INJECTION, SUSPENSION INTRAMUSCULAR
Qty: 4 ML | Refills: 0 | Status: ACTIVE | OUTPATIENT
Start: 2024-02-27

## 2024-05-01 ENCOUNTER — COMMUNITY OUTREACH (OUTPATIENT)
Dept: PRIMARY CARE CLINIC | Age: 64
End: 2024-05-01

## 2025-04-08 ENCOUNTER — HOSPITAL ENCOUNTER (OUTPATIENT)
Facility: HOSPITAL | Age: 65
Discharge: HOME | End: 2025-04-08
Payer: MEDICAID

## 2025-04-08 ENCOUNTER — OFFICE VISIT (OUTPATIENT)
Age: 65
End: 2025-04-08
Payer: MEDICAID

## 2025-04-08 VITALS
WEIGHT: 116.2 LBS | HEIGHT: 65 IN | TEMPERATURE: 97.4 F | DIASTOLIC BLOOD PRESSURE: 81 MMHG | HEART RATE: 72 BPM | SYSTOLIC BLOOD PRESSURE: 137 MMHG | OXYGEN SATURATION: 100 % | BODY MASS INDEX: 19.36 KG/M2 | RESPIRATION RATE: 16 BRPM

## 2025-04-08 DIAGNOSIS — R12 HEARTBURN: ICD-10-CM

## 2025-04-08 DIAGNOSIS — M06.9 RHEUMATOID ARTHRITIS INVOLVING MULTIPLE SITES, UNSPECIFIED WHETHER RHEUMATOID FACTOR PRESENT (HCC): Primary | ICD-10-CM

## 2025-04-08 DIAGNOSIS — E55.9 VITAMIN D DEFICIENCY: ICD-10-CM

## 2025-04-08 DIAGNOSIS — F32.9 REACTIVE DEPRESSION: ICD-10-CM

## 2025-04-08 DIAGNOSIS — E78.00 PURE HYPERCHOLESTEROLEMIA: ICD-10-CM

## 2025-04-08 DIAGNOSIS — Z13.29 THYROID DISORDER SCREEN: ICD-10-CM

## 2025-04-08 DIAGNOSIS — M81.0 OSTEOPOROSIS WITHOUT CURRENT PATHOLOGICAL FRACTURE, UNSPECIFIED OSTEOPOROSIS TYPE: ICD-10-CM

## 2025-04-08 DIAGNOSIS — G25.81 RLS (RESTLESS LEGS SYNDROME): ICD-10-CM

## 2025-04-08 DIAGNOSIS — M06.9 RHEUMATOID ARTHRITIS INVOLVING MULTIPLE SITES, UNSPECIFIED WHETHER RHEUMATOID FACTOR PRESENT (HCC): ICD-10-CM

## 2025-04-08 DIAGNOSIS — M79.2 NEUROPATHIC PAIN: ICD-10-CM

## 2025-04-08 DIAGNOSIS — I10 PRIMARY HYPERTENSION: ICD-10-CM

## 2025-04-08 LAB
25(OH)D3 SERPL-MCNC: 25.4 NG/ML (ref 32–100)
ALBUMIN SERPL-MCNC: 3.9 G/DL (ref 3.4–4.8)
ALBUMIN/GLOB SERPL: 0.9 {RATIO} (ref 0.8–2)
ALP SERPL-CCNC: 108 U/L (ref 25–100)
ALT SERPL-CCNC: 15 U/L (ref 4–36)
ANION GAP SERPL CALCULATED.3IONS-SCNC: 14 MMOL/L (ref 3–16)
AST SERPL-CCNC: 35 U/L (ref 8–33)
BILIRUB SERPL-MCNC: 0.3 MG/DL (ref 0.3–1.2)
BUN SERPL-MCNC: 5 MG/DL (ref 6–20)
CALCIUM SERPL-MCNC: 9.7 MG/DL (ref 8.3–10.6)
CHLORIDE SERPL-SCNC: 97 MMOL/L (ref 98–107)
CHOLEST SERPL-MCNC: 171 MG/DL (ref 0–200)
CO2 SERPL-SCNC: 22 MMOL/L (ref 20–30)
CREAT SERPL-MCNC: 0.6 MG/DL (ref 0.4–1.2)
ERYTHROCYTE [DISTWIDTH] IN BLOOD BY AUTOMATED COUNT: 13.1 % (ref 11–16)
FOLATE SERPL-MCNC: 8.98 NG/ML
GFR SERPLBLD CREATININE-BSD FMLA CKD-EPI: >90 ML/MIN/{1.73_M2}
GLOBULIN SER CALC-MCNC: 4.3 G/DL
GLUCOSE SERPL-MCNC: 77 MG/DL (ref 74–106)
HCT VFR BLD AUTO: 44.2 % (ref 37–47)
HDLC SERPL-MCNC: 79 MG/DL (ref 40–60)
HGB BLD-MCNC: 14.4 G/DL (ref 11.5–16.5)
LDLC SERPL CALC-MCNC: 80 MG/DL
MCH RBC QN AUTO: 32.7 PG (ref 27–32)
MCHC RBC AUTO-ENTMCNC: 32.6 G/DL (ref 31–35)
MCV RBC AUTO: 100.2 FL (ref 80–100)
PLATELET # BLD AUTO: 366 K/UL (ref 150–400)
PMV BLD AUTO: 10.1 FL (ref 6–10)
POTASSIUM SERPL-SCNC: 4.2 MMOL/L (ref 3.4–5.1)
PROT SERPL-MCNC: 8.2 G/DL (ref 6.4–8.3)
RBC # BLD AUTO: 4.41 M/UL (ref 3.8–5.8)
SODIUM SERPL-SCNC: 133 MMOL/L (ref 136–145)
TRIGL SERPL-MCNC: 60 MG/DL (ref 0–249)
TSH SERPL DL<=0.005 MIU/L-ACNC: 2.25 UIU/ML (ref 0.27–4.2)
VIT B12 SERPL-MCNC: 620 PG/ML (ref 211–911)
VLDLC SERPL CALC-MCNC: 12 MG/DL
WBC # BLD AUTO: 6.4 K/UL (ref 4–11)

## 2025-04-08 PROCEDURE — 84443 ASSAY THYROID STIM HORMONE: CPT

## 2025-04-08 PROCEDURE — 3079F DIAST BP 80-89 MM HG: CPT | Performed by: NURSE PRACTITIONER

## 2025-04-08 PROCEDURE — 82306 VITAMIN D 25 HYDROXY: CPT

## 2025-04-08 PROCEDURE — 85027 COMPLETE CBC AUTOMATED: CPT

## 2025-04-08 PROCEDURE — 99214 OFFICE O/P EST MOD 30 MIN: CPT | Performed by: NURSE PRACTITIONER

## 2025-04-08 PROCEDURE — 3075F SYST BP GE 130 - 139MM HG: CPT | Performed by: NURSE PRACTITIONER

## 2025-04-08 PROCEDURE — 82607 VITAMIN B-12: CPT

## 2025-04-08 PROCEDURE — 80053 COMPREHEN METABOLIC PANEL: CPT

## 2025-04-08 PROCEDURE — 82746 ASSAY OF FOLIC ACID SERUM: CPT

## 2025-04-08 PROCEDURE — 80061 LIPID PANEL: CPT

## 2025-04-08 RX ORDER — MEDROXYPROGESTERONE ACETATE 150 MG/ML
INJECTION, SUSPENSION INTRAMUSCULAR
Qty: 4 ML | Refills: 0 | Status: SHIPPED | OUTPATIENT
Start: 2025-04-08

## 2025-04-08 RX ORDER — LISINOPRIL 10 MG/1
10 TABLET ORAL DAILY
Qty: 30 TABLET | Refills: 3 | Status: SHIPPED | OUTPATIENT
Start: 2025-04-08

## 2025-04-08 RX ORDER — GABAPENTIN 300 MG/1
CAPSULE ORAL
Qty: 90 CAPSULE | Refills: 1 | Status: SHIPPED | OUTPATIENT
Start: 2025-04-08 | End: 2025-05-09

## 2025-04-08 RX ORDER — ROPINIROLE 0.5 MG/1
0.5 TABLET, FILM COATED ORAL NIGHTLY
Qty: 30 TABLET | Refills: 0 | Status: SHIPPED | OUTPATIENT
Start: 2025-04-08

## 2025-04-08 RX ORDER — PREDNISONE 10 MG/1
10 TABLET ORAL DAILY PRN
Qty: 30 TABLET | Refills: 0 | Status: SHIPPED | OUTPATIENT
Start: 2025-04-08

## 2025-04-08 RX ORDER — FAMOTIDINE 40 MG/1
40 TABLET, FILM COATED ORAL EVERY EVENING
Qty: 30 TABLET | Refills: 3 | Status: SHIPPED | OUTPATIENT
Start: 2025-04-08

## 2025-04-08 SDOH — ECONOMIC STABILITY: FOOD INSECURITY: WITHIN THE PAST 12 MONTHS, YOU WORRIED THAT YOUR FOOD WOULD RUN OUT BEFORE YOU GOT MONEY TO BUY MORE.: NEVER TRUE

## 2025-04-08 SDOH — ECONOMIC STABILITY: FOOD INSECURITY: WITHIN THE PAST 12 MONTHS, THE FOOD YOU BOUGHT JUST DIDN'T LAST AND YOU DIDN'T HAVE MONEY TO GET MORE.: NEVER TRUE

## 2025-04-08 ASSESSMENT — PATIENT HEALTH QUESTIONNAIRE - PHQ9
5. POOR APPETITE OR OVEREATING: SEVERAL DAYS
1. LITTLE INTEREST OR PLEASURE IN DOING THINGS: SEVERAL DAYS
SUM OF ALL RESPONSES TO PHQ QUESTIONS 1-9: 6
8. MOVING OR SPEAKING SO SLOWLY THAT OTHER PEOPLE COULD HAVE NOTICED. OR THE OPPOSITE, BEING SO FIGETY OR RESTLESS THAT YOU HAVE BEEN MOVING AROUND A LOT MORE THAN USUAL: NOT AT ALL
3. TROUBLE FALLING OR STAYING ASLEEP: NEARLY EVERY DAY
10. IF YOU CHECKED OFF ANY PROBLEMS, HOW DIFFICULT HAVE THESE PROBLEMS MADE IT FOR YOU TO DO YOUR WORK, TAKE CARE OF THINGS AT HOME, OR GET ALONG WITH OTHER PEOPLE: SOMEWHAT DIFFICULT
6. FEELING BAD ABOUT YOURSELF - OR THAT YOU ARE A FAILURE OR HAVE LET YOURSELF OR YOUR FAMILY DOWN: NOT AT ALL
SUM OF ALL RESPONSES TO PHQ QUESTIONS 1-9: 6
9. THOUGHTS THAT YOU WOULD BE BETTER OFF DEAD, OR OF HURTING YOURSELF: NOT AT ALL
SUM OF ALL RESPONSES TO PHQ QUESTIONS 1-9: 6
2. FEELING DOWN, DEPRESSED OR HOPELESS: NOT AT ALL
7. TROUBLE CONCENTRATING ON THINGS, SUCH AS READING THE NEWSPAPER OR WATCHING TELEVISION: NOT AT ALL
SUM OF ALL RESPONSES TO PHQ QUESTIONS 1-9: 6
4. FEELING TIRED OR HAVING LITTLE ENERGY: SEVERAL DAYS

## 2025-04-08 ASSESSMENT — ENCOUNTER SYMPTOMS
RESPIRATORY NEGATIVE: 1
ALLERGIC/IMMUNOLOGIC NEGATIVE: 1
GASTROINTESTINAL NEGATIVE: 1
EYES NEGATIVE: 1

## 2025-04-08 NOTE — PROGRESS NOTES
(HCC)    - predniSONE (DELTASONE) 10 MG tablet; Take 1 tablet by mouth daily as needed (arthritic pain)  Dispense: 30 tablet; Refill: 0  - etanercept (ENBREL SURECLICK) 50 MG/ML SOAJ; INJECT 1ML UNDER THE SKIN ONCE WEEKLY  Dispense: 4 mL; Refill: 0  - Comprehensive Metabolic Panel; Future  - CBC; Future  - External Referral To Rheumatology    4. Primary hypertension    - lisinopril (PRINIVIL;ZESTRIL) 10 MG tablet; Take 1 tablet by mouth daily  Dispense: 30 tablet; Refill: 3    5. Reactive depression    - sertraline (ZOLOFT) 50 MG tablet; Take 1 tablet by mouth daily  Dispense: 30 tablet; Refill: 0    6. RLS (restless legs syndrome)    - rOPINIRole (REQUIP) 0.5 MG tablet; Take 1 tablet by mouth nightly  Dispense: 30 tablet; Refill: 0    7. Osteoporosis without current pathological fracture, unspecified osteoporosis type      8. Vitamin D deficiency    - Cholecalciferol 50 MCG (2000 UT) TABS; Take 1 tablet by mouth daily  Dispense: 30 tablet; Refill: 3  - Vitamin B12 & Folate; Future  - Vitamin D 25 Hydroxy; Future    9. Thyroid disorder screen    - TSH; Future    10. Pure hypercholesterolemia    - Lipid Panel; Future       Written by Ila GURROLA, acting as a scribe for Luz Maria Morales on 4/8/2025 at 12:32 PM.

## 2025-05-05 DIAGNOSIS — F32.9 REACTIVE DEPRESSION: ICD-10-CM

## 2025-05-05 DIAGNOSIS — G25.81 RLS (RESTLESS LEGS SYNDROME): ICD-10-CM

## 2025-05-05 RX ORDER — ROPINIROLE 0.5 MG/1
0.5 TABLET, FILM COATED ORAL NIGHTLY
Qty: 30 TABLET | Refills: 1 | Status: SHIPPED | OUTPATIENT
Start: 2025-05-05

## 2025-05-06 DIAGNOSIS — M79.2 NEUROPATHIC PAIN: ICD-10-CM

## 2025-05-06 RX ORDER — GABAPENTIN 300 MG/1
CAPSULE ORAL
Qty: 90 CAPSULE | Refills: 2 | OUTPATIENT
Start: 2025-05-06

## 2025-06-03 DIAGNOSIS — G25.81 RLS (RESTLESS LEGS SYNDROME): ICD-10-CM

## 2025-06-03 DIAGNOSIS — F32.9 REACTIVE DEPRESSION: ICD-10-CM

## 2025-06-03 RX ORDER — ROPINIROLE 0.5 MG/1
0.5 TABLET, FILM COATED ORAL NIGHTLY
Qty: 30 TABLET | Refills: 2 | OUTPATIENT
Start: 2025-06-03

## 2025-06-06 ENCOUNTER — OFFICE VISIT (OUTPATIENT)
Age: 65
End: 2025-06-06
Payer: MEDICAID

## 2025-06-06 VITALS
WEIGHT: 110.6 LBS | DIASTOLIC BLOOD PRESSURE: 79 MMHG | TEMPERATURE: 98.6 F | BODY MASS INDEX: 18.4 KG/M2 | SYSTOLIC BLOOD PRESSURE: 124 MMHG | RESPIRATION RATE: 18 BRPM | HEART RATE: 91 BPM | OXYGEN SATURATION: 95 %

## 2025-06-06 DIAGNOSIS — F32.9 REACTIVE DEPRESSION: ICD-10-CM

## 2025-06-06 DIAGNOSIS — M79.2 NEUROPATHIC PAIN: ICD-10-CM

## 2025-06-06 DIAGNOSIS — M06.9 RHEUMATOID ARTHRITIS INVOLVING MULTIPLE SITES, UNSPECIFIED WHETHER RHEUMATOID FACTOR PRESENT (HCC): ICD-10-CM

## 2025-06-06 DIAGNOSIS — G25.81 RLS (RESTLESS LEGS SYNDROME): ICD-10-CM

## 2025-06-06 PROCEDURE — 99214 OFFICE O/P EST MOD 30 MIN: CPT | Performed by: NURSE PRACTITIONER

## 2025-06-06 RX ORDER — PREDNISONE 10 MG/1
10 TABLET ORAL DAILY PRN
Qty: 30 TABLET | Refills: 0 | Status: SHIPPED | OUTPATIENT
Start: 2025-06-06

## 2025-06-06 RX ORDER — ROPINIROLE 0.5 MG/1
0.5 TABLET, FILM COATED ORAL NIGHTLY
Qty: 30 TABLET | Refills: 1 | Status: SHIPPED | OUTPATIENT
Start: 2025-06-06

## 2025-06-06 RX ORDER — GABAPENTIN 300 MG/1
CAPSULE ORAL
Qty: 90 CAPSULE | Refills: 1 | Status: SHIPPED | OUTPATIENT
Start: 2025-06-06 | End: 2025-07-07

## 2025-06-06 ASSESSMENT — ENCOUNTER SYMPTOMS
GASTROINTESTINAL NEGATIVE: 1
RESPIRATORY NEGATIVE: 1

## 2025-06-06 NOTE — PROGRESS NOTES
Chief Complaint   Patient presents with    Hypertension    Other     Rheumatoid arthritis - pt has had to take extra prednisone until she could get into rheumatology          Have you seen any other physician or provider since your last visit no    Have you had any other diagnostic tests since your last visit? no    Have you changed or stopped any medications since your last visit? no

## 2025-06-06 NOTE — PROGRESS NOTES
SUBJECTIVE:    Patient ID: Glenny Gonzalez is a 64 y.o. female.    Chief Complaint   Patient presents with    Hypertension    Other     Rheumatoid arthritis - pt has had to take extra prednisone until she could get into rheumatology            HPI:    History of Present Illness  The patient presents for evaluation of arthritis.    She has been managing arthritis with Enbrel for several years but has recently encountered issues with Medicaid coverage for this medication. An appointment with a rheumatologist is scheduled for 06/18/2025 to discuss potential alternative treatments. In the interim, she is seeking a refill of her prednisone prescription to manage any flare-ups.    She is currently supplementing her diet with vitamin D.    SOCIAL HISTORY  She does not drink alcohol anymore. She admits to smoking.      Patient's medications,allergies, past medical, surgical, social and family histories were reviewed and updated as appropriate.  .  Current Outpatient Medications on File Prior to Visit   Medication Sig Dispense Refill    Cholecalciferol 50 MCG (2000 UT) TABS Take 1 tablet by mouth daily 30 tablet 3    famotidine (PEPCID) 40 MG tablet Take 1 tablet by mouth every evening 30 tablet 3    lisinopril (PRINIVIL;ZESTRIL) 10 MG tablet Take 1 tablet by mouth daily 30 tablet 3     No current facility-administered medications on file prior to visit.       Review of Systems   Constitutional: Negative.    HENT: Negative.     Respiratory: Negative.     Cardiovascular: Negative.    Gastrointestinal: Negative.    Genitourinary: Negative.    Musculoskeletal: Negative.    Skin: Negative.    Neurological: Negative.    Psychiatric/Behavioral: Negative.         OBJECTIVE:  /79   Pulse 91   Temp 98.6 °F (37 °C)   Resp 18   Wt 50.2 kg (110 lb 9.6 oz)   SpO2 95%   BMI 18.40 kg/m²    Physical Exam  Vitals and nursing note reviewed.   Constitutional:       Appearance: She is well-developed.   HENT:      Head: Normocephalic

## 2025-06-30 DIAGNOSIS — R12 HEARTBURN: ICD-10-CM

## 2025-06-30 DIAGNOSIS — I10 PRIMARY HYPERTENSION: ICD-10-CM

## 2025-06-30 DIAGNOSIS — G25.81 RLS (RESTLESS LEGS SYNDROME): ICD-10-CM

## 2025-06-30 DIAGNOSIS — E55.9 VITAMIN D DEFICIENCY: ICD-10-CM

## 2025-06-30 DIAGNOSIS — F32.9 REACTIVE DEPRESSION: ICD-10-CM

## 2025-06-30 RX ORDER — ROPINIROLE 0.5 MG/1
0.5 TABLET, FILM COATED ORAL NIGHTLY
Qty: 30 TABLET | Refills: 2 | Status: SHIPPED | OUTPATIENT
Start: 2025-06-30

## 2025-06-30 RX ORDER — FAMOTIDINE 40 MG/1
40 TABLET, FILM COATED ORAL EVERY EVENING
Qty: 30 TABLET | Refills: 4 | Status: SHIPPED | OUTPATIENT
Start: 2025-06-30

## 2025-06-30 RX ORDER — CHOLECALCIFEROL (VITAMIN D3) 50 MCG
2000 TABLET ORAL DAILY
Qty: 30 TABLET | Refills: 4 | Status: SHIPPED | OUTPATIENT
Start: 2025-06-30

## 2025-06-30 RX ORDER — LISINOPRIL 10 MG/1
10 TABLET ORAL DAILY
Qty: 30 TABLET | Refills: 4 | Status: SHIPPED | OUTPATIENT
Start: 2025-06-30

## 2025-07-14 DIAGNOSIS — M06.9 RHEUMATOID ARTHRITIS INVOLVING MULTIPLE SITES, UNSPECIFIED WHETHER RHEUMATOID FACTOR PRESENT (HCC): ICD-10-CM

## 2025-07-14 DIAGNOSIS — M79.2 NEUROPATHIC PAIN: ICD-10-CM

## 2025-07-15 RX ORDER — PREDNISONE 10 MG/1
TABLET ORAL
Qty: 30 TABLET | Refills: 0 | Status: SHIPPED | OUTPATIENT
Start: 2025-07-15

## 2025-07-15 RX ORDER — GABAPENTIN 300 MG/1
CAPSULE ORAL
Qty: 90 CAPSULE | Refills: 2 | OUTPATIENT
Start: 2025-07-15

## 2025-08-13 DIAGNOSIS — M79.2 NEUROPATHIC PAIN: ICD-10-CM

## 2025-08-15 RX ORDER — GABAPENTIN 300 MG/1
CAPSULE ORAL
Qty: 90 CAPSULE | Refills: 1 | Status: SHIPPED | OUTPATIENT
Start: 2025-08-15 | End: 2025-09-12

## (undated) DEVICE — DRSNG WND STRIP OPTIFOAM AG SUPRABS A/MIC 4X8IN STRL

## (undated) DEVICE — GLV SURG SENSICARE PI LF PF 6.5

## (undated) DEVICE — ISO/ALC 70PCT 16OZ

## (undated) DEVICE — PREMIUM DRY TRAY LF: Brand: MEDLINE INDUSTRIES, INC.

## (undated) DEVICE — SUT PDS MONO 0 36 CT1 VIL PDP346H

## (undated) DEVICE — GLV SURG SENSICARE PI ORTHO SZ8.5 LF STRL

## (undated) DEVICE — SYR LUER SLPTP 50ML

## (undated) DEVICE — SCRB SURG BACTOSHIELD CHG 4PCT 4OZ

## (undated) DEVICE — GLV SURG SENSICARE W/ALOE PF LF 6.5 STRL

## (undated) DEVICE — GLV SURG PREMIERPRO MIC LTX PF SZ8 BRN

## (undated) DEVICE — NDL HYPO ECLPS SFTY 22G 1 1/2IN

## (undated) DEVICE — GLV SURG SENSICARE PI LF PF 7.5

## (undated) DEVICE — DRAPE,U/ SHT,SPLIT,PLAS,STERIL: Brand: MEDLINE

## (undated) DEVICE — DISPOSABLE ORTHOPAEDIC PROTECTOR: Brand: ALEXIS ® ORTHOPAEDIC PROTECTOR

## (undated) DEVICE — GLV SURG PREMIERPRO MIC LTX PF SZ8.5 BRN

## (undated) DEVICE — CONTAINER,SPECIMEN,OR STERILE,4OZ: Brand: MEDLINE

## (undated) DEVICE — ELECTRD BLD EZ CLN STD 6.5IN

## (undated) DEVICE — CYSTO/BLADDER IRRIGATION SET, REGULATING CLAMP

## (undated) DEVICE — SHEET,DRAPE,53X77,STERILE: Brand: MEDLINE

## (undated) DEVICE — GLV SURG PREMIERPRO MIC LTX PF SZ6.5 BRN

## (undated) DEVICE — PAD GRND REM POLYHESIVE A/ DISP

## (undated) DEVICE — PROXIMATE RH ROTATING HEAD SKIN STAPLERS (35 WIDE) CONTAINS 35 STAINLESS STEEL STAPLES: Brand: PROXIMATE

## (undated) DEVICE — VAC WHITEFOAM DRESSING LARGE: Brand: V.A.C. WHITEFOAM™

## (undated) DEVICE — SYR LUERLOK 30CC

## (undated) DEVICE — PREP SOL POVIDONE/IODINE BT 4OZ

## (undated) DEVICE — SUT PDS 2 0 CT1 27IN CLR Z259H

## (undated) DEVICE — GLV SURG DERMASSURE GRN LF PF SZ 6.5

## (undated) DEVICE — SUT ETHLN 3/0 FS1 30IN 669H

## (undated) DEVICE — ELECTRD BLD EZ CLN STD 2.5IN

## (undated) DEVICE — GLV SURG SENSICARE PI MIC PF SZ9 LF STRL

## (undated) DEVICE — HANDPIECE SET WITH HIGH FLOW TIP AND SUCTION TUBE: Brand: INTERPULSE

## (undated) DEVICE — 6617 IOBAN II PATIENT ISOLATION DRAPE 5/BX,4BX/CS: Brand: STERI-DRAPE™ IOBAN™ 2

## (undated) DEVICE — DRSNG WND GZ CURAD OIL EMULSION 3X3IN STRL

## (undated) DEVICE — CONMED SCOPE SAVER BITE BLOCK, 20X27 MM: Brand: SCOPE SAVER

## (undated) DEVICE — ANTIBACTERIAL UNDYED BRAIDED (POLYGLACTIN 910), SYNTHETIC ABSORBABLE SUTURE: Brand: COATED VICRYL

## (undated) DEVICE — PK MAJ SHLDR SPLT 10

## (undated) DEVICE — DRP SURG UNIV BASIC BILAMINATE 53X77IN DISP

## (undated) DEVICE — SYR CONTRL LUERLOK 10CC

## (undated) DEVICE — SPK10295 ORTHOPEDIC FRACTURE KIT: Brand: SPK10295 ORTHOPEDIC FRACTURE KIT

## (undated) DEVICE — Device

## (undated) DEVICE — KT CANSTR WND ACTIVAC W/GEL TBG CLMP CONN 300ML

## (undated) DEVICE — PENCL ROCKRSWCH MEGADYNE W/HOLSTR 10FT SS

## (undated) DEVICE — UNDERGLV SURG BIOGEL INDICAT PI SZ8.5 BLU

## (undated) DEVICE — 1010 S-DRAPE TOWEL DRAPE 10/BX: Brand: STERI-DRAPE™

## (undated) DEVICE — DRSNG GZ PETROLTM XEROFORM CURAD 1X8IN STRL

## (undated) DEVICE — EXTRACTION ROD, CONICAL

## (undated) DEVICE — GOWN,PREVENTION PLUS,XXLARGE,STERILE: Brand: MEDLINE

## (undated) DEVICE — YANKAUER,BULB TIP, NO VENT: Brand: ARGYLE

## (undated) DEVICE — JELLY,LUBE,STERILE,FLIP TOP,TUBE,2-OZ: Brand: MEDLINE

## (undated) DEVICE — 2000CC GUARDIAN II: Brand: GUARDIAN

## (undated) DEVICE — STRYKER PERFORMANCE SERIES SAGITTAL BLADE: Brand: STRYKER PERFORMANCE SERIES

## (undated) DEVICE — FRCP BIOP COLD ENDOJAW ALLGTR W/NDL 2.8X2300MM BLU

## (undated) DEVICE — GLV SURG PREMIERPRO MIC LTX PF SZ7.5 BRN

## (undated) DEVICE — MARKER,SKIN,W/RULER,DUAL,STOP: Brand: MEDLINE

## (undated) DEVICE — GLV SURG DERMASSURE GRN LF PF 7.0

## (undated) DEVICE — SUT VIC 2/0 CT2 27IN J269H

## (undated) DEVICE — PATIENT RETURN ELECTRODE, SINGLE-USE, CONTACT QUALITY MONITORING, ADULT, WITH 9FT CORD, FOR PATIENTS WEIGING OVER 33LBS. (15KG): Brand: MEGADYNE

## (undated) DEVICE — DRSNG WND VAC GRANUFOAM SENSATRAC MD 5PK

## (undated) DEVICE — INTENDED FOR TISSUE SEPARATION, AND OTHER PROCEDURES THAT REQUIRE A SHARP SURGICAL BLADE TO PUNCTURE OR CUT.: Brand: BARD-PARKER ® STAINLESS STEEL BLADES

## (undated) DEVICE — PK EXTREM LOWR 10

## (undated) DEVICE — SHEET, DRAPE, SPLIT, STERILE: Brand: MEDLINE

## (undated) DEVICE — BNDG ELAS CO-FLEX SLF ADHR 4IN5YD LF STRL

## (undated) DEVICE — C-ARM DRAPE: Brand: DEROYAL

## (undated) DEVICE — GLV SURG PREMIERPRO MIC LTX PF SZ7 BRN

## (undated) DEVICE — GLV SURG SENSICARE PI LF PF 7.0

## (undated) DEVICE — TRY EPID SFTY 18G 3.5IN 1T7680